# Patient Record
Sex: FEMALE | Race: WHITE | Employment: OTHER | URBAN - NONMETROPOLITAN AREA
[De-identification: names, ages, dates, MRNs, and addresses within clinical notes are randomized per-mention and may not be internally consistent; named-entity substitution may affect disease eponyms.]

---

## 2017-03-02 ENCOUNTER — OFFICE VISIT (OUTPATIENT)
Dept: INTERNAL MEDICINE | Age: 82
End: 2017-03-02

## 2017-03-02 VITALS
DIASTOLIC BLOOD PRESSURE: 62 MMHG | RESPIRATION RATE: 16 BRPM | HEIGHT: 63 IN | WEIGHT: 144 LBS | SYSTOLIC BLOOD PRESSURE: 140 MMHG | BODY MASS INDEX: 25.52 KG/M2 | HEART RATE: 68 BPM

## 2017-03-02 DIAGNOSIS — E78.00 PURE HYPERCHOLESTEROLEMIA: Primary | ICD-10-CM

## 2017-03-02 DIAGNOSIS — M19.90 OSTEOARTHRITIS, UNSPECIFIED OSTEOARTHRITIS TYPE, UNSPECIFIED SITE: ICD-10-CM

## 2017-03-02 DIAGNOSIS — M79.602 LEFT ARM PAIN: ICD-10-CM

## 2017-03-02 DIAGNOSIS — Z12.31 ENCOUNTER FOR SCREENING MAMMOGRAM FOR MALIGNANT NEOPLASM OF BREAST: ICD-10-CM

## 2017-03-02 DIAGNOSIS — E03.9 HYPOTHYROIDISM, UNSPECIFIED TYPE: ICD-10-CM

## 2017-03-02 PROCEDURE — 99214 OFFICE O/P EST MOD 30 MIN: CPT | Performed by: INTERNAL MEDICINE

## 2017-03-02 PROCEDURE — 3288F FALL RISK ASSESSMENT DOCD: CPT | Performed by: INTERNAL MEDICINE

## 2017-03-02 ASSESSMENT — ENCOUNTER SYMPTOMS
DIARRHEA: 0
VOMITING: 0
EYE PAIN: 0
ABDOMINAL PAIN: 0
SHORTNESS OF BREATH: 0
NAUSEA: 0
BACK PAIN: 0
COUGH: 0
BLOOD IN STOOL: 0
CONSTIPATION: 0

## 2017-03-09 ENCOUNTER — OFFICE VISIT (OUTPATIENT)
Dept: ONCOLOGY | Age: 82
End: 2017-03-09

## 2017-03-09 VITALS
TEMPERATURE: 97 F | OXYGEN SATURATION: 99 % | BODY MASS INDEX: 25.62 KG/M2 | HEART RATE: 72 BPM | RESPIRATION RATE: 18 BRPM | WEIGHT: 144.6 LBS | DIASTOLIC BLOOD PRESSURE: 77 MMHG | HEIGHT: 63 IN | SYSTOLIC BLOOD PRESSURE: 125 MMHG

## 2017-03-09 DIAGNOSIS — C50.411 MALIGNANT NEOPLASM OF UPPER-OUTER QUADRANT OF RIGHT FEMALE BREAST (HCC): Primary | ICD-10-CM

## 2017-03-09 PROCEDURE — 99214 OFFICE O/P EST MOD 30 MIN: CPT | Performed by: INTERNAL MEDICINE

## 2017-09-08 ENCOUNTER — HOSPITAL ENCOUNTER (OUTPATIENT)
Dept: LAB | Age: 82
Discharge: HOME | End: 2017-09-08
Payer: MEDICARE

## 2017-09-08 DIAGNOSIS — E78.00 PURE HYPERCHOLESTEROLEMIA: ICD-10-CM

## 2017-09-08 DIAGNOSIS — C50.411 MALIGNANT NEOPLASM OF UPPER-OUTER QUADRANT OF RIGHT FEMALE BREAST (HCC): ICD-10-CM

## 2017-09-08 DIAGNOSIS — E03.9 HYPOTHYROIDISM, UNSPECIFIED TYPE: ICD-10-CM

## 2017-09-08 LAB
ABSOLUTE EOS #: 0.3 K/UL (ref 0–0.4)
ABSOLUTE LYMPH #: 1.3 K/UL (ref 1–4.8)
ABSOLUTE MONO #: 0.6 K/UL (ref 0.1–1.2)
ALBUMIN SERPL-MCNC: 4.2 G/DL (ref 3.5–5.2)
ALBUMIN SERPL-MCNC: 4.2 G/DL (ref 3.5–5.2)
ALBUMIN/GLOBULIN RATIO: 1.2 (ref 1–2.5)
ALBUMIN/GLOBULIN RATIO: 1.2 (ref 1–2.5)
ALP BLD-CCNC: 68 U/L (ref 35–104)
ALP BLD-CCNC: 68 U/L (ref 35–104)
ALT SERPL-CCNC: 14 U/L (ref 5–33)
ALT SERPL-CCNC: 14 U/L (ref 5–33)
ANION GAP SERPL CALCULATED.3IONS-SCNC: 10 MMOL/L (ref 9–17)
ANION GAP SERPL CALCULATED.3IONS-SCNC: 10 MMOL/L (ref 9–17)
AST SERPL-CCNC: 20 U/L
AST SERPL-CCNC: 20 U/L
BASOPHILS # BLD: 1 %
BASOPHILS ABSOLUTE: 0.1 K/UL (ref 0–0.2)
BILIRUB SERPL-MCNC: 0.4 MG/DL (ref 0.3–1.2)
BILIRUB SERPL-MCNC: 0.4 MG/DL (ref 0.3–1.2)
BILIRUBIN DIRECT: 0.11 MG/DL
BILIRUBIN, INDIRECT: 0.29 MG/DL (ref 0–1)
BUN BLDV-MCNC: 21 MG/DL (ref 8–23)
BUN BLDV-MCNC: 21 MG/DL (ref 8–23)
BUN/CREAT BLD: 27 (ref 9–20)
BUN/CREAT BLD: 27 (ref 9–20)
CALCIUM IONIZED: 1.27 MMOL/L (ref 1.13–1.33)
CALCIUM SERPL-MCNC: 9.7 MG/DL (ref 8.6–10.4)
CALCIUM SERPL-MCNC: 9.7 MG/DL (ref 8.6–10.4)
CHLORIDE BLD-SCNC: 100 MMOL/L (ref 98–107)
CHLORIDE BLD-SCNC: 100 MMOL/L (ref 98–107)
CHOLESTEROL/HDL RATIO: 1.8
CHOLESTEROL: 210 MG/DL
CO2: 32 MMOL/L (ref 20–31)
CO2: 32 MMOL/L (ref 20–31)
CREAT SERPL-MCNC: 0.79 MG/DL (ref 0.5–0.9)
CREAT SERPL-MCNC: 0.79 MG/DL (ref 0.5–0.9)
DIFFERENTIAL TYPE: NORMAL
EOSINOPHILS RELATIVE PERCENT: 4 %
GFR AFRICAN AMERICAN: >60 ML/MIN
GFR AFRICAN AMERICAN: >60 ML/MIN
GFR NON-AFRICAN AMERICAN: >60 ML/MIN
GFR NON-AFRICAN AMERICAN: >60 ML/MIN
GFR SERPL CREATININE-BSD FRML MDRD: ABNORMAL ML/MIN/{1.73_M2}
GLOBULIN: 3.5 G/DL (ref 1.5–3.8)
GLUCOSE BLD-MCNC: 99 MG/DL (ref 70–99)
GLUCOSE BLD-MCNC: 99 MG/DL (ref 70–99)
HCT VFR BLD CALC: 39.2 % (ref 36–46)
HDLC SERPL-MCNC: 118 MG/DL
HEMOGLOBIN: 12.9 G/DL (ref 12–16)
LDL CHOLESTEROL: 76 MG/DL (ref 0–130)
LYMPHOCYTES # BLD: 22 %
MCH RBC QN AUTO: 31.5 PG (ref 26–34)
MCHC RBC AUTO-ENTMCNC: 32.9 G/DL (ref 31–37)
MCV RBC AUTO: 96 FL (ref 80–100)
MONOCYTES # BLD: 9 %
PDW BLD-RTO: 13.4 % (ref 11–14.5)
PLATELET # BLD: 296 K/UL (ref 140–450)
PLATELET ESTIMATE: NORMAL
PMV BLD AUTO: 7.4 FL (ref 6–12)
POTASSIUM SERPL-SCNC: 4.2 MMOL/L (ref 3.7–5.3)
POTASSIUM SERPL-SCNC: 4.2 MMOL/L (ref 3.7–5.3)
RBC # BLD: 4.09 M/UL (ref 4–5.2)
RBC # BLD: NORMAL 10*6/UL
SEG NEUTROPHILS: 64 %
SEGMENTED NEUTROPHILS ABSOLUTE COUNT: 3.8 K/UL (ref 1.8–7.7)
SODIUM BLD-SCNC: 142 MMOL/L (ref 135–144)
SODIUM BLD-SCNC: 142 MMOL/L (ref 135–144)
TOTAL PROTEIN: 7.7 G/DL (ref 6.4–8.3)
TOTAL PROTEIN: 7.7 G/DL (ref 6.4–8.3)
TRIGL SERPL-MCNC: 79 MG/DL
TSH SERPL DL<=0.05 MIU/L-ACNC: 3.64 MIU/L (ref 0.3–5)
VLDLC SERPL CALC-MCNC: ABNORMAL MG/DL (ref 1–30)
WBC # BLD: 6 K/UL (ref 3.5–11)
WBC # BLD: NORMAL 10*3/UL

## 2017-09-08 PROCEDURE — 36415 COLL VENOUS BLD VENIPUNCTURE: CPT

## 2017-09-08 PROCEDURE — 80061 LIPID PANEL: CPT

## 2017-09-08 PROCEDURE — 80053 COMPREHEN METABOLIC PANEL: CPT

## 2017-09-08 PROCEDURE — 85025 COMPLETE CBC W/AUTO DIFF WBC: CPT

## 2017-09-08 PROCEDURE — 82330 ASSAY OF CALCIUM: CPT

## 2017-09-08 PROCEDURE — 84443 ASSAY THYROID STIM HORMONE: CPT

## 2017-09-08 PROCEDURE — 80076 HEPATIC FUNCTION PANEL: CPT

## 2017-09-11 ENCOUNTER — OFFICE VISIT (OUTPATIENT)
Dept: INTERNAL MEDICINE | Age: 82
End: 2017-09-11
Payer: MEDICARE

## 2017-09-11 VITALS
HEIGHT: 63 IN | RESPIRATION RATE: 16 BRPM | DIASTOLIC BLOOD PRESSURE: 66 MMHG | WEIGHT: 143 LBS | SYSTOLIC BLOOD PRESSURE: 130 MMHG | HEART RATE: 76 BPM | BODY MASS INDEX: 25.34 KG/M2

## 2017-09-11 DIAGNOSIS — Z00.00 ROUTINE GENERAL MEDICAL EXAMINATION AT A HEALTH CARE FACILITY: Primary | ICD-10-CM

## 2017-09-11 DIAGNOSIS — Z23 NEED FOR PNEUMOCOCCAL VACCINATION: ICD-10-CM

## 2017-09-11 DIAGNOSIS — E78.00 PURE HYPERCHOLESTEROLEMIA: ICD-10-CM

## 2017-09-11 DIAGNOSIS — E78.5 HYPERLIPIDEMIA, UNSPECIFIED HYPERLIPIDEMIA TYPE: ICD-10-CM

## 2017-09-11 DIAGNOSIS — R60.0 EDEMA EXTREMITIES: ICD-10-CM

## 2017-09-11 DIAGNOSIS — Z00.00 MEDICARE ANNUAL WELLNESS VISIT, SUBSEQUENT: ICD-10-CM

## 2017-09-11 DIAGNOSIS — E03.9 HYPOTHYROIDISM, UNSPECIFIED TYPE: ICD-10-CM

## 2017-09-11 DIAGNOSIS — C50.911 MALIGNANT NEOPLASM OF RIGHT FEMALE BREAST, UNSPECIFIED SITE OF BREAST: ICD-10-CM

## 2017-09-11 DIAGNOSIS — M79.602 LEFT ARM PAIN: ICD-10-CM

## 2017-09-11 PROCEDURE — G0009 ADMIN PNEUMOCOCCAL VACCINE: HCPCS | Performed by: INTERNAL MEDICINE

## 2017-09-11 PROCEDURE — G0439 PPPS, SUBSEQ VISIT: HCPCS | Performed by: INTERNAL MEDICINE

## 2017-09-11 PROCEDURE — G0008 ADMIN INFLUENZA VIRUS VAC: HCPCS | Performed by: INTERNAL MEDICINE

## 2017-09-11 PROCEDURE — 90662 IIV NO PRSV INCREASED AG IM: CPT | Performed by: INTERNAL MEDICINE

## 2017-09-11 PROCEDURE — 90670 PCV13 VACCINE IM: CPT | Performed by: INTERNAL MEDICINE

## 2017-09-11 RX ORDER — ANASTROZOLE 1 MG/1
TABLET ORAL
Qty: 30 TABLET | Refills: 11 | Status: SHIPPED | OUTPATIENT
Start: 2017-09-11 | End: 2018-09-17 | Stop reason: SDUPTHER

## 2017-09-11 RX ORDER — SIMVASTATIN 20 MG
10 TABLET ORAL EVERY EVENING
Qty: 30 TABLET | Refills: 11 | Status: SHIPPED | OUTPATIENT
Start: 2017-09-11 | End: 2018-09-17 | Stop reason: SDUPTHER

## 2017-09-11 RX ORDER — TRIAMTERENE AND HYDROCHLOROTHIAZIDE 37.5; 25 MG/1; MG/1
1 TABLET ORAL DAILY
Qty: 30 TABLET | Refills: 11 | Status: SHIPPED | OUTPATIENT
Start: 2017-09-11 | End: 2018-03-15 | Stop reason: SDUPTHER

## 2017-09-11 RX ORDER — LEVOTHYROXINE SODIUM 0.05 MG/1
50 TABLET ORAL DAILY
Qty: 30 TABLET | Refills: 11 | Status: SHIPPED | OUTPATIENT
Start: 2017-09-11 | End: 2018-09-12 | Stop reason: SDUPTHER

## 2017-09-11 ASSESSMENT — ANXIETY QUESTIONNAIRES: GAD7 TOTAL SCORE: 1

## 2017-09-11 ASSESSMENT — ENCOUNTER SYMPTOMS
BACK PAIN: 0
BLOOD IN STOOL: 0
VOMITING: 0
COUGH: 0
NAUSEA: 0
SHORTNESS OF BREATH: 0
CONSTIPATION: 0
DIARRHEA: 0
EYE PAIN: 0
ABDOMINAL PAIN: 0

## 2017-09-11 ASSESSMENT — LIFESTYLE VARIABLES: HOW OFTEN DO YOU HAVE A DRINK CONTAINING ALCOHOL: 0

## 2017-09-11 ASSESSMENT — PATIENT HEALTH QUESTIONNAIRE - PHQ9: SUM OF ALL RESPONSES TO PHQ QUESTIONS 1-9: 0

## 2017-11-02 ENCOUNTER — OFFICE VISIT (OUTPATIENT)
Dept: ONCOLOGY | Age: 82
End: 2017-11-02
Payer: MEDICARE

## 2017-11-02 ENCOUNTER — HOSPITAL ENCOUNTER (OUTPATIENT)
Dept: INFUSION THERAPY | Age: 82
Discharge: HOME | End: 2017-11-02
Payer: MEDICARE

## 2017-11-02 VITALS
WEIGHT: 141.6 LBS | BODY MASS INDEX: 25.09 KG/M2 | SYSTOLIC BLOOD PRESSURE: 134 MMHG | HEIGHT: 63 IN | HEART RATE: 75 BPM | TEMPERATURE: 97.3 F | RESPIRATION RATE: 16 BRPM | DIASTOLIC BLOOD PRESSURE: 77 MMHG | OXYGEN SATURATION: 99 %

## 2017-11-02 DIAGNOSIS — Z17.0 MALIGNANT NEOPLASM OF UPPER-OUTER QUADRANT OF RIGHT BREAST IN FEMALE, ESTROGEN RECEPTOR POSITIVE (HCC): Primary | ICD-10-CM

## 2017-11-02 DIAGNOSIS — C50.411 MALIGNANT NEOPLASM OF UPPER-OUTER QUADRANT OF RIGHT BREAST IN FEMALE, ESTROGEN RECEPTOR POSITIVE (HCC): Primary | ICD-10-CM

## 2017-11-02 DIAGNOSIS — Z79.811 USE OF ANASTROZOLE (ARIMIDEX): ICD-10-CM

## 2017-11-02 LAB
ALBUMIN SERPL-MCNC: 4.5 G/DL (ref 3.5–5.1)
ALP BLD-CCNC: 83 U/L (ref 38–126)
ALT SERPL-CCNC: 15 U/L (ref 11–66)
AST SERPL-CCNC: 19 U/L (ref 5–40)
BASINOPHIL, AUTOMATED: 1 % (ref 0–12)
BILIRUB SERPL-MCNC: 0.2 MG/DL (ref 0.3–1.2)
BILIRUBIN DIRECT: < 0.2 MG/DL (ref 0–0.3)
BUN, WHOLE BLOOD: 20 MG/DL (ref 8–26)
CHLORIDE, WHOLE BLOOD: 97 MEQ/L (ref 98–109)
CREATININE, WHOLE BLOOD: 0.9 MG/DL (ref 0.5–1.2)
EOSINOPHILS RELATIVE PERCENT: 3 % (ref 0–12)
GFR, ESTIMATED: 63 ML/MIN/1.73M2
GLUCOSE, WHOLE BLOOD: 112 MG/DL (ref 70–108)
HCT VFR BLD CALC: 38.3 % (ref 37–47)
HEMOGLOBIN: 13.1 GM/DL (ref 12–16)
IONIZED CALCIUM, WHOLE BLOOD: 1.2 MMOL/L (ref 1.12–1.32)
LYMPHOCYTES # BLD: 23 % (ref 15–47)
MCH RBC QN AUTO: 32.2 PG (ref 27–31)
MCHC RBC AUTO-ENTMCNC: 34 GM/DL (ref 33–37)
MCV RBC AUTO: 95 FL (ref 81–99)
MONOCYTES: 6 % (ref 0–12)
PDW BLD-RTO: 11.4 % (ref 11.5–14.5)
PLATELET # BLD: 349 THOU/MM3 (ref 130–400)
PMV BLD AUTO: 6.8 MCM (ref 7.4–10.4)
POTASSIUM, WHOLE BLOOD: 4.7 MEQ/L (ref 3.5–4.9)
RBC # BLD: 4.05 MILL/MM3 (ref 4.2–5.4)
SEG NEUTROPHILS: 67 % (ref 43–75)
SODIUM, WHOLE BLOOD: 138 MEQ/L (ref 138–146)
TOTAL CO2, WHOLE BLOOD: 30 MEQ/L (ref 23–33)
TOTAL PROTEIN: 7.9 G/DL (ref 6.1–8)
WBC # BLD: 6.1 THOU/MM3 (ref 4.8–10.8)

## 2017-11-02 PROCEDURE — 85025 COMPLETE CBC W/AUTO DIFF WBC: CPT

## 2017-11-02 PROCEDURE — 80076 HEPATIC FUNCTION PANEL: CPT

## 2017-11-02 PROCEDURE — 99211 OFF/OP EST MAY X REQ PHY/QHP: CPT

## 2017-11-02 PROCEDURE — 80047 BASIC METABLC PNL IONIZED CA: CPT

## 2017-11-02 PROCEDURE — 36415 COLL VENOUS BLD VENIPUNCTURE: CPT

## 2017-11-02 PROCEDURE — 99214 OFFICE O/P EST MOD 30 MIN: CPT | Performed by: INTERNAL MEDICINE

## 2017-11-09 ENCOUNTER — OFFICE VISIT (OUTPATIENT)
Dept: OPHTHALMOLOGY | Age: 82
End: 2017-11-09
Payer: MEDICARE

## 2017-11-09 DIAGNOSIS — H25.093 AGE-RELATED INCIPIENT CATARACT OF BOTH EYES: Primary | ICD-10-CM

## 2017-11-09 DIAGNOSIS — H35.40 PERIPHERAL RETINAL DEGENERATION: ICD-10-CM

## 2017-11-09 PROCEDURE — 99214 OFFICE O/P EST MOD 30 MIN: CPT | Performed by: OPHTHALMOLOGY

## 2017-11-09 RX ORDER — PHENYLEPHRINE HCL 2.5 %
1 DROPS OPHTHALMIC (EYE) ONCE
Status: COMPLETED | OUTPATIENT
Start: 2017-11-09 | End: 2017-11-09

## 2017-11-09 RX ORDER — BENOXINATE HCL/FLUORESCEIN SOD 0.4%-0.25%
1 DROPS OPHTHALMIC (EYE) ONCE
Status: COMPLETED | OUTPATIENT
Start: 2017-11-09 | End: 2017-11-09

## 2017-11-09 RX ORDER — TROPICAMIDE 10 MG/ML
1 SOLUTION/ DROPS OPHTHALMIC ONCE
Status: COMPLETED | OUTPATIENT
Start: 2017-11-09 | End: 2017-11-09

## 2017-11-09 RX ADMIN — TROPICAMIDE 1 DROP: 10 SOLUTION/ DROPS OPHTHALMIC at 10:51

## 2017-11-09 RX ADMIN — Medication 1 DROP: at 10:50

## 2017-11-09 ASSESSMENT — ENCOUNTER SYMPTOMS
RESPIRATORY NEGATIVE: 1
GASTROINTESTINAL NEGATIVE: 1

## 2017-11-09 ASSESSMENT — VISUAL ACUITY
CORRECTION_TYPE: GLASSES
OS_CC: 20/50
METHOD: SNELLEN - LINEAR

## 2017-11-09 ASSESSMENT — CONF VISUAL FIELD
OD_NORMAL: 1
METHOD: COUNTING FINGERS
OS_NORMAL: 1

## 2017-11-09 ASSESSMENT — TONOMETRY
OD_IOP_MMHG: 18
OS_IOP_MMHG: 21
IOP_METHOD: APPLANATION W FLURESS DROP

## 2017-11-09 ASSESSMENT — SLIT LAMP EXAM - LIDS
COMMENTS: 2+ DERMATOCHALASIS - UPPER LID
COMMENTS: 2+ DERMATOCHALASIS - UPPER LID

## 2017-11-09 NOTE — PROGRESS NOTES
She is here for a complete exam.     Past Medical History:   Diagnosis Date    Breast cancer ([facility] [address] 2014    s/p mastectomy and reconstruction surgery.  Edema extremities     takes diuretics prn    Hyperlipidemia     Hypothyroidism     Osteoarthritis     mild    Unspecified vitamin D deficiency           Current Outpatient Prescriptions:     anastrozole (ARIMIDEX) 1 MG tablet, TAKE 1 TABLET BY MOUTH DAILY, Disp: 30 tablet, Rfl: 11    levothyroxine (LEVOTHROID) 50 MCG tablet, Take 1 tablet by mouth daily, Disp: 30 tablet, Rfl: 11    simvastatin (ZOCOR) 20 MG tablet, Take 0.5 tablets by mouth every evening, Disp: 30 tablet, Rfl: 11    triamterene-hydrochlorothiazide (MAXZIDE-25) 37.5-25 MG per tablet, Take 1 tablet by mouth daily, Disp: 30 tablet, Rfl: 11    Handicap Placard MISC, by Does not apply route . Expires in 5 years, Disp: 1 each, Rfl: 0    CALCIUM-MAGNESIUM-VITAMIN D PO, Take by mouth daily, Disp: , Rfl:     acetaminophen (TYLENOL) 500 MG tablet, Take 500 mg by mouth every 6 hours as needed for Pain. Uses approx once a week, Disp: , Rfl:     Cholecalciferol (VITAMIN D) 2000 UNITS CAPS capsule, Take 1 capsule by mouth daily. , Disp: , Rfl:     Kelp 150 MCG TABS, Take 1 capsule by mouth daily. , Disp: , Rfl:     Omega-3 Fatty Acids (OMEGA-3 FISH OIL) 1000 MG CAPS, Take 1 capsule by mouth daily. , Disp: , Rfl:     Compression Sleeve MISC, by Does not apply route., Disp: 1 each, Rfl: 0    Multiple Vitamin (MULTI-VITAMIN DAILY PO), Take 1 tablet by mouth daily. , Disp: , Rfl:     aspirin 325 MG tablet, Take by mouth daily 1/2 daily. , Disp: , Rfl:      No Known Allergies   ROS  Review of Systems   Constitutional: Negative. HENT: Negative. Respiratory: Negative. Cardiovascular: Negative. Gastrointestinal: Negative. Musculoskeletal: Negative. Skin: Negative. Neurological: Negative. Endo/Heme/Allergies: Negative.       Main Ophthalmology Exam     External Exam       Right Left

## 2017-11-09 NOTE — PATIENT INSTRUCTIONS
If you have any problems or concerns before your next scheduled appointment, please call the office at [phone].

## 2018-01-27 ENCOUNTER — OFFICE VISIT (OUTPATIENT)
Dept: PRIMARY CARE CLINIC | Age: 83
End: 2018-01-27
Payer: MEDICARE

## 2018-01-27 VITALS
DIASTOLIC BLOOD PRESSURE: 80 MMHG | HEART RATE: 80 BPM | OXYGEN SATURATION: 96 % | BODY MASS INDEX: 24.98 KG/M2 | SYSTOLIC BLOOD PRESSURE: 136 MMHG | WEIGHT: 141 LBS | TEMPERATURE: 98.2 F

## 2018-01-27 DIAGNOSIS — R05.9 COUGH: ICD-10-CM

## 2018-01-27 DIAGNOSIS — J20.9 ACUTE BRONCHITIS, UNSPECIFIED ORGANISM: Primary | ICD-10-CM

## 2018-01-27 PROCEDURE — 99213 OFFICE O/P EST LOW 20 MIN: CPT | Performed by: PHYSICIAN ASSISTANT

## 2018-01-27 RX ORDER — PREDNISONE 10 MG/1
10 TABLET ORAL 2 TIMES DAILY
Qty: 6 TABLET | Refills: 0 | Status: SHIPPED | OUTPATIENT
Start: 2018-01-27 | End: 2018-01-30 | Stop reason: ALTCHOICE

## 2018-01-27 RX ORDER — AMOXICILLIN 875 MG/1
875 TABLET, COATED ORAL 2 TIMES DAILY
Qty: 20 TABLET | Refills: 0 | Status: SHIPPED | OUTPATIENT
Start: 2018-01-27 | End: 2018-05-03

## 2018-01-27 ASSESSMENT — ENCOUNTER SYMPTOMS
DIARRHEA: 1
WHEEZING: 0
SHORTNESS OF BREATH: 0
ABDOMINAL DISTENTION: 1
SORE THROAT: 1
COUGH: 1
TROUBLE SWALLOWING: 0
NAUSEA: 0
RHINORRHEA: 1
VOMITING: 0

## 2018-01-30 ENCOUNTER — HOSPITAL ENCOUNTER (OUTPATIENT)
Dept: LAB | Age: 83
Setting detail: SPECIMEN
Discharge: HOME | End: 2018-01-30
Payer: MEDICARE

## 2018-01-30 ENCOUNTER — HOSPITAL ENCOUNTER (OUTPATIENT)
Dept: GENERAL RADIOLOGY | Age: 83
Discharge: HOME | End: 2018-02-01
Payer: MEDICARE

## 2018-01-30 ENCOUNTER — OFFICE VISIT (OUTPATIENT)
Dept: INTERNAL MEDICINE | Age: 83
End: 2018-01-30
Payer: MEDICARE

## 2018-01-30 VITALS
DIASTOLIC BLOOD PRESSURE: 72 MMHG | RESPIRATION RATE: 18 BRPM | WEIGHT: 143.8 LBS | BODY MASS INDEX: 25.48 KG/M2 | HEIGHT: 63 IN | HEART RATE: 72 BPM | TEMPERATURE: 97.8 F | SYSTOLIC BLOOD PRESSURE: 136 MMHG | OXYGEN SATURATION: 99 %

## 2018-01-30 DIAGNOSIS — R53.83 OTHER FATIGUE: ICD-10-CM

## 2018-01-30 DIAGNOSIS — R05.3 PERSISTENT COUGH FOR 3 WEEKS OR LONGER: Primary | ICD-10-CM

## 2018-01-30 DIAGNOSIS — R35.0 URINARY FREQUENCY: ICD-10-CM

## 2018-01-30 DIAGNOSIS — R05.3 PERSISTENT COUGH FOR 3 WEEKS OR LONGER: ICD-10-CM

## 2018-01-30 DIAGNOSIS — I10 ESSENTIAL HYPERTENSION: ICD-10-CM

## 2018-01-30 DIAGNOSIS — E03.9 HYPOTHYROIDISM, UNSPECIFIED TYPE: ICD-10-CM

## 2018-01-30 LAB
-: NORMAL
AMORPHOUS: NORMAL
ANION GAP SERPL CALCULATED.3IONS-SCNC: 14 MMOL/L (ref 9–17)
BACTERIA: NORMAL
BILIRUBIN URINE: NEGATIVE
BUN BLDV-MCNC: 20 MG/DL (ref 8–23)
BUN/CREAT BLD: 28 (ref 9–20)
CALCIUM SERPL-MCNC: 9.6 MG/DL (ref 8.6–10.4)
CASTS UA: NORMAL /LPF (ref 0–2)
CHLORIDE BLD-SCNC: 91 MMOL/L (ref 98–107)
CO2: 30 MMOL/L (ref 20–31)
COLOR: ABNORMAL
COMMENT UA: ABNORMAL
CREAT SERPL-MCNC: 0.72 MG/DL (ref 0.5–0.9)
CRYSTALS, UA: NORMAL /HPF
EPITHELIAL CELLS UA: NORMAL /HPF (ref 0–5)
GFR AFRICAN AMERICAN: >60 ML/MIN
GFR NON-AFRICAN AMERICAN: >60 ML/MIN
GFR SERPL CREATININE-BSD FRML MDRD: ABNORMAL ML/MIN/{1.73_M2}
GFR SERPL CREATININE-BSD FRML MDRD: ABNORMAL ML/MIN/{1.73_M2}
GLUCOSE BLD-MCNC: 91 MG/DL (ref 70–99)
GLUCOSE URINE: NEGATIVE
HCT VFR BLD CALC: 38.4 % (ref 36–46)
HEMOGLOBIN: 12.8 G/DL (ref 12–16)
KETONES, URINE: NEGATIVE
LEUKOCYTE ESTERASE, URINE: ABNORMAL
MCH RBC QN AUTO: 31.9 PG (ref 26–34)
MCHC RBC AUTO-ENTMCNC: 33.3 G/DL (ref 31–37)
MCV RBC AUTO: 95.8 FL (ref 80–100)
MUCUS: NORMAL
NITRITE, URINE: NEGATIVE
NRBC AUTOMATED: ABNORMAL PER 100 WBC
OTHER OBSERVATIONS UA: NORMAL
PDW BLD-RTO: 13.6 % (ref 11–14.5)
PH UA: 6.5 (ref 5–6)
PLATELET # BLD: 386 K/UL (ref 140–450)
PMV BLD AUTO: 7.4 FL (ref 6–12)
POTASSIUM SERPL-SCNC: 3.7 MMOL/L (ref 3.7–5.3)
PROTEIN UA: NEGATIVE
RBC # BLD: 4 M/UL (ref 4–5.2)
RBC UA: NORMAL /HPF (ref 0–4)
RENAL EPITHELIAL, UA: NORMAL /HPF
SODIUM BLD-SCNC: 135 MMOL/L (ref 135–144)
SPECIFIC GRAVITY UA: 1 (ref 1.01–1.02)
TRICHOMONAS: NORMAL
TURBIDITY: ABNORMAL
URINE HGB: NEGATIVE
UROBILINOGEN, URINE: NORMAL
WBC # BLD: 12.2 K/UL (ref 3.5–11)
WBC UA: NORMAL /HPF (ref 0–4)
YEAST: NORMAL

## 2018-01-30 PROCEDURE — 81001 URINALYSIS AUTO W/SCOPE: CPT

## 2018-01-30 PROCEDURE — 99214 OFFICE O/P EST MOD 30 MIN: CPT | Performed by: NURSE PRACTITIONER

## 2018-01-30 PROCEDURE — 85027 COMPLETE CBC AUTOMATED: CPT

## 2018-01-30 PROCEDURE — 71046 X-RAY EXAM CHEST 2 VIEWS: CPT

## 2018-01-30 PROCEDURE — 80048 BASIC METABOLIC PNL TOTAL CA: CPT

## 2018-01-30 PROCEDURE — 36415 COLL VENOUS BLD VENIPUNCTURE: CPT

## 2018-02-04 ASSESSMENT — ENCOUNTER SYMPTOMS
COUGH: 1
SINUS PAIN: 0
CONSTIPATION: 0
SORE THROAT: 0
EYE REDNESS: 0
BLURRED VISION: 0
EYE PAIN: 0
NAUSEA: 0
ABDOMINAL PAIN: 0
SHORTNESS OF BREATH: 0
WHEEZING: 0
SPUTUM PRODUCTION: 0
HEARTBURN: 0
BLOOD IN STOOL: 0
ORTHOPNEA: 0
EYE DISCHARGE: 0
VOMITING: 0
DIARRHEA: 0

## 2018-02-04 NOTE — PROGRESS NOTES
01/30/18  Candido Chan  7/26/1931      Chief Complaint: Persistent cough, fatigue. HPI:  Patient comes in with complaints of persistent cough, fatigue persisting for over one week. Patient was seen in urgent care on 1/27/18. Given amoxicillin and prednisone. Patient states only mild improvement. Denies any fevers or chills. No excessive body aches. Eating and drinking without difficulty. Denies any problems with bowels. Concerned that she has had some increased frequency with urination. Denies any dysuria hematuria or urgency. Continue to use her levothyroxine for her hypothyroidism. Denies any missed doses. Recent TSH in September of last year within normal limits. Blood pressure has been stable. Continues on Maxzide. No Known Allergies    Past Medical History:   Diagnosis Date    Breast cancer ([facility] [address] 2014    s/p mastectomy and reconstruction surgery.     Edema extremities     takes diuretics prn    Hyperlipidemia     Hypothyroidism     Osteoarthritis     mild    Unspecified vitamin D deficiency        Past Surgical History:   Procedure Laterality Date    APPENDECTOMY      at age 5   Tamea Mao BREAST BIOPSY Right 2/2014    before she had her surgery    BREAST RECONSTRUCTION Right 2/2014    Pt had done at [facility] in [address] [facility] Left 2/2014    Pt had done at [facility] in [address] [place] [address]  2007    showed mild diverticular disease, otherwise negative, repeat in 2017    MASTECTOMY, RADICAL Right 2/2014    Pt had done at [facility] in [address] [facility]    due to fibroids       Current Outpatient Prescriptions on File Prior to Visit   Medication Sig Dispense Refill    amoxicillin (AMOXIL) 875 MG tablet Take 1 tablet by mouth 2 times daily 20 tablet 0    anastrozole (ARIMIDEX) 1 MG tablet TAKE 1 TABLET BY MOUTH DAILY 30 tablet 11    levothyroxine (LEVOTHROID) 50 MCG tablet Take 1 tablet by mouth daily 30 tablet 11 abdominal pain, blood in stool, constipation, diarrhea, heartburn, melena, nausea and vomiting. Genitourinary: Positive for frequency. Negative for dysuria, flank pain, hematuria and urgency. Musculoskeletal: Negative for falls, myalgias and neck pain. Skin: Negative for rash. Neurological: Negative for dizziness, tremors, seizures, weakness and headaches. Psychiatric/Behavioral: Negative for depression and memory loss. The patient is not nervous/anxious. Physical Exam   Constitutional: She is oriented to person, place, and time and well-developed, well-nourished, and in no distress. No distress. HENT:   Head: Normocephalic and atraumatic. Right Ear: External ear normal.   Left Ear: External ear normal.   Eyes: Right eye exhibits no discharge. Left eye exhibits no discharge. Neck: Neck supple. No tracheal deviation present. Cardiovascular: Normal rate, regular rhythm and normal heart sounds. Exam reveals no gallop and no friction rub. No murmur heard. Pulmonary/Chest: Effort normal and breath sounds normal. No respiratory distress. She has no wheezes. She has no rales. Abdominal: Soft. Bowel sounds are normal. She exhibits no distension. There is no tenderness. Musculoskeletal: She exhibits no edema. Neurological: She is alert and oriented to person, place, and time. No cranial nerve deficit. Gait normal. Coordination normal.   Skin: Skin is warm and dry. No rash noted. She is not diaphoretic. No pallor. Psychiatric: Mood, memory, affect and judgment normal.   Nursing note and vitals reviewed. Vitals:    01/30/18 1046   BP: 136/72   Site: Left Arm   Position: Sitting   Cuff Size: Large Adult   Pulse: 72   Resp: 18   Temp: 97.8 °F (36.6 °C)   TempSrc: Tympanic   SpO2: 99%   Weight: 143 lb 12.8 oz (65.2 kg)   Height: 5' 3\" (1.6 m)       Assessment:  1. Persistent cough for 3 weeks or longer    - XR CHEST STANDARD (2 VW); No acute process    2.  Urinary frequency  Urine without signs of infection  - UA W/Reflex Culture; Future    3. Other fatigue  Blood work stable  - CBC; Future  - Basic Metabolic Panel; Future    4. Hypothyroidism, unspecified type  Stable continue on levothyroxine    5. Essential hypertension  Stable on current antihypertensive regimen, continue to monitor      Plan:  Probable post viral syndrome. Supportive care. If persists or worsens is to notify the office. As noted above. Follow up for routine visit. Call sooner with concerns prior.     Electronically signed by Harish Reed CNP on 2/4/2018 at 6:33 PM

## 2018-03-08 ENCOUNTER — TELEPHONE (OUTPATIENT)
Dept: INTERNAL MEDICINE | Age: 83
End: 2018-03-08

## 2018-03-08 NOTE — TELEPHONE ENCOUNTER
There should not be any problems. However, if either patient or dentist is uncertain about proceeding,  Then hold Arimidex ×7 days before procedure and restart after procedure completed. This will not affect anything significantly.

## 2018-03-15 ENCOUNTER — OFFICE VISIT (OUTPATIENT)
Dept: INTERNAL MEDICINE | Age: 83
End: 2018-03-15
Payer: MEDICARE

## 2018-03-15 VITALS
RESPIRATION RATE: 16 BRPM | DIASTOLIC BLOOD PRESSURE: 66 MMHG | SYSTOLIC BLOOD PRESSURE: 138 MMHG | HEART RATE: 88 BPM | WEIGHT: 143 LBS | HEIGHT: 63 IN | BODY MASS INDEX: 25.34 KG/M2

## 2018-03-15 DIAGNOSIS — E55.9 VITAMIN D DEFICIENCY: ICD-10-CM

## 2018-03-15 DIAGNOSIS — R60.0 EDEMA EXTREMITIES: ICD-10-CM

## 2018-03-15 DIAGNOSIS — Z12.39 ENCOUNTER FOR SCREENING FOR MALIGNANT NEOPLASM OF BREAST: ICD-10-CM

## 2018-03-15 DIAGNOSIS — G89.29 CHRONIC LEFT SHOULDER PAIN: ICD-10-CM

## 2018-03-15 DIAGNOSIS — E03.9 HYPOTHYROIDISM, UNSPECIFIED TYPE: ICD-10-CM

## 2018-03-15 DIAGNOSIS — M25.512 CHRONIC LEFT SHOULDER PAIN: ICD-10-CM

## 2018-03-15 DIAGNOSIS — E78.00 PURE HYPERCHOLESTEROLEMIA: Primary | ICD-10-CM

## 2018-03-15 PROCEDURE — 99214 OFFICE O/P EST MOD 30 MIN: CPT | Performed by: INTERNAL MEDICINE

## 2018-03-15 RX ORDER — TRIAMTERENE AND HYDROCHLOROTHIAZIDE 37.5; 25 MG/1; MG/1
1 TABLET ORAL DAILY
Qty: 30 TABLET | Refills: 11 | Status: SHIPPED | OUTPATIENT
Start: 2018-03-15 | End: 2018-12-18 | Stop reason: SDUPTHER

## 2018-03-15 ASSESSMENT — ENCOUNTER SYMPTOMS
ABDOMINAL PAIN: 0
DIARRHEA: 0
VOMITING: 0
BLOOD IN STOOL: 0
NAUSEA: 0
COUGH: 0
EYE PAIN: 0
SHORTNESS OF BREATH: 0
CONSTIPATION: 0
BACK PAIN: 0

## 2018-05-03 ENCOUNTER — OFFICE VISIT (OUTPATIENT)
Dept: ONCOLOGY | Age: 83
End: 2018-05-03
Payer: MEDICARE

## 2018-05-03 ENCOUNTER — HOSPITAL ENCOUNTER (OUTPATIENT)
Dept: INFUSION THERAPY | Age: 83
Discharge: HOME | End: 2018-05-03
Payer: MEDICARE

## 2018-05-03 VITALS
HEIGHT: 63 IN | SYSTOLIC BLOOD PRESSURE: 132 MMHG | DIASTOLIC BLOOD PRESSURE: 69 MMHG | OXYGEN SATURATION: 98 % | HEART RATE: 67 BPM | RESPIRATION RATE: 16 BRPM | TEMPERATURE: 97.9 F | WEIGHT: 145.4 LBS | BODY MASS INDEX: 25.76 KG/M2

## 2018-05-03 DIAGNOSIS — Z85.3 HISTORY OF BREAST CANCER: ICD-10-CM

## 2018-05-03 DIAGNOSIS — C50.411 MALIGNANT NEOPLASM OF UPPER-OUTER QUADRANT OF RIGHT BREAST IN FEMALE, ESTROGEN RECEPTOR POSITIVE (HCC): Primary | ICD-10-CM

## 2018-05-03 DIAGNOSIS — C50.411 MALIGNANT NEOPLASM OF UPPER-OUTER QUADRANT OF RIGHT BREAST IN FEMALE, ESTROGEN RECEPTOR POSITIVE (HCC): ICD-10-CM

## 2018-05-03 DIAGNOSIS — Z17.0 MALIGNANT NEOPLASM OF UPPER-OUTER QUADRANT OF RIGHT BREAST IN FEMALE, ESTROGEN RECEPTOR POSITIVE (HCC): Primary | ICD-10-CM

## 2018-05-03 DIAGNOSIS — Z79.811 USE OF ANASTROZOLE (ARIMIDEX): ICD-10-CM

## 2018-05-03 DIAGNOSIS — Z17.0 MALIGNANT NEOPLASM OF UPPER-OUTER QUADRANT OF RIGHT BREAST IN FEMALE, ESTROGEN RECEPTOR POSITIVE (HCC): ICD-10-CM

## 2018-05-03 LAB
ALBUMIN SERPL-MCNC: 4.2 G/DL (ref 3.5–5.1)
ALP BLD-CCNC: 66 U/L (ref 38–126)
ALT SERPL-CCNC: 15 U/L (ref 11–66)
AST SERPL-CCNC: 20 U/L (ref 5–40)
BASINOPHIL, AUTOMATED: 0 % (ref 0–3)
BILIRUB SERPL-MCNC: 0.4 MG/DL (ref 0.3–1.2)
BILIRUBIN DIRECT: < 0.2 MG/DL (ref 0–0.3)
BUN, WHOLE BLOOD: 15 MG/DL (ref 8–26)
CHLORIDE, WHOLE BLOOD: 98 MEQ/L (ref 98–109)
CREATININE, WHOLE BLOOD: 0.8 MG/DL (ref 0.5–1.2)
EOSINOPHILS RELATIVE PERCENT: 4 % (ref 0–4)
GFR, ESTIMATED: 72 ML/MIN/1.73M2
GLUCOSE, WHOLE BLOOD: 95 MG/DL (ref 70–108)
HCT VFR BLD CALC: 38.3 % (ref 37–47)
HEMOGLOBIN: 12.6 GM/DL (ref 12–16)
IONIZED CALCIUM, WHOLE BLOOD: 1.19 MMOL/L (ref 1.12–1.32)
LYMPHOCYTES # BLD: 31 % (ref 15–47)
MCH RBC QN AUTO: 31.6 PG (ref 27–31)
MCHC RBC AUTO-ENTMCNC: 32.9 GM/DL (ref 33–37)
MCV RBC AUTO: 96 FL (ref 81–99)
MONOCYTES: 7 % (ref 0–12)
PDW BLD-RTO: 11.2 % (ref 11.5–14.5)
PLATELET # BLD: 286 THOU/MM3 (ref 130–400)
PMV BLD AUTO: 6.8 FL (ref 7.4–10.4)
POTASSIUM, WHOLE BLOOD: 4.5 MEQ/L (ref 3.5–4.9)
RBC # BLD: 3.99 MILL/MM3 (ref 4.2–5.4)
SEG NEUTROPHILS: 59 % (ref 43–75)
SODIUM, WHOLE BLOOD: 138 MEQ/L (ref 138–146)
TOTAL CO2, WHOLE BLOOD: 32 MEQ/L (ref 23–33)
TOTAL PROTEIN: 7.2 G/DL (ref 6.1–8)
WBC # BLD: 5.7 THOU/MM3 (ref 4.8–10.8)

## 2018-05-03 PROCEDURE — 85025 COMPLETE CBC W/AUTO DIFF WBC: CPT

## 2018-05-03 PROCEDURE — 80076 HEPATIC FUNCTION PANEL: CPT

## 2018-05-03 PROCEDURE — 99214 OFFICE O/P EST MOD 30 MIN: CPT | Performed by: INTERNAL MEDICINE

## 2018-05-03 PROCEDURE — 80047 BASIC METABLC PNL IONIZED CA: CPT

## 2018-05-03 PROCEDURE — 36415 COLL VENOUS BLD VENIPUNCTURE: CPT

## 2018-05-03 PROCEDURE — 99211 OFF/OP EST MAY X REQ PHY/QHP: CPT

## 2018-08-21 ENCOUNTER — HOSPITAL ENCOUNTER (OUTPATIENT)
Dept: MAMMOGRAPHY | Age: 83
Discharge: HOME | End: 2018-08-23
Payer: MEDICARE

## 2018-08-21 DIAGNOSIS — Z12.39 ENCOUNTER FOR SCREENING FOR MALIGNANT NEOPLASM OF BREAST: ICD-10-CM

## 2018-08-21 PROCEDURE — 77063 BREAST TOMOSYNTHESIS BI: CPT

## 2018-09-12 DIAGNOSIS — E03.9 HYPOTHYROIDISM, UNSPECIFIED TYPE: ICD-10-CM

## 2018-09-12 RX ORDER — LEVOTHYROXINE SODIUM 0.05 MG/1
50 TABLET ORAL DAILY
Qty: 30 TABLET | Refills: 11 | Status: SHIPPED | OUTPATIENT
Start: 2018-09-12 | End: 2019-10-09 | Stop reason: SDUPTHER

## 2018-09-17 DIAGNOSIS — C50.911 MALIGNANT NEOPLASM OF RIGHT FEMALE BREAST (HCC): ICD-10-CM

## 2018-09-17 DIAGNOSIS — E78.5 HYPERLIPIDEMIA, UNSPECIFIED HYPERLIPIDEMIA TYPE: ICD-10-CM

## 2018-09-17 RX ORDER — SIMVASTATIN 20 MG
10 TABLET ORAL EVERY EVENING
Qty: 90 TABLET | Refills: 3 | Status: SHIPPED | OUTPATIENT
Start: 2018-09-17 | End: 2019-10-09 | Stop reason: SDUPTHER

## 2018-09-17 RX ORDER — ANASTROZOLE 1 MG/1
TABLET ORAL
Qty: 90 TABLET | Refills: 3 | Status: SHIPPED | OUTPATIENT
Start: 2018-09-17 | End: 2019-09-04 | Stop reason: ALTCHOICE

## 2018-09-20 ENCOUNTER — HOSPITAL ENCOUNTER (OUTPATIENT)
Dept: LAB | Age: 83
Setting detail: SPECIMEN
Discharge: HOME | End: 2018-09-20
Payer: MEDICARE

## 2018-09-20 DIAGNOSIS — Z17.0 MALIGNANT NEOPLASM OF UPPER-OUTER QUADRANT OF RIGHT BREAST IN FEMALE, ESTROGEN RECEPTOR POSITIVE (HCC): ICD-10-CM

## 2018-09-20 DIAGNOSIS — C50.411 MALIGNANT NEOPLASM OF UPPER-OUTER QUADRANT OF RIGHT BREAST IN FEMALE, ESTROGEN RECEPTOR POSITIVE (HCC): ICD-10-CM

## 2018-09-20 DIAGNOSIS — E55.9 VITAMIN D DEFICIENCY: ICD-10-CM

## 2018-09-20 DIAGNOSIS — C50.411 MALIGNANT NEOPLASM OF UPPER-OUTER QUADRANT OF RIGHT BREAST IN FEMALE, ESTROGEN RECEPTOR POSITIVE (HCC): Primary | ICD-10-CM

## 2018-09-20 DIAGNOSIS — Z17.0 MALIGNANT NEOPLASM OF UPPER-OUTER QUADRANT OF RIGHT BREAST IN FEMALE, ESTROGEN RECEPTOR POSITIVE (HCC): Primary | ICD-10-CM

## 2018-09-20 DIAGNOSIS — E78.00 PURE HYPERCHOLESTEROLEMIA: ICD-10-CM

## 2018-09-20 DIAGNOSIS — E03.9 HYPOTHYROIDISM, UNSPECIFIED TYPE: ICD-10-CM

## 2018-09-20 LAB
ABSOLUTE EOS #: 0.2 K/UL (ref 0–0.4)
ABSOLUTE IMMATURE GRANULOCYTE: ABNORMAL K/UL (ref 0–0.3)
ABSOLUTE LYMPH #: 1.1 K/UL (ref 1–4.8)
ABSOLUTE MONO #: 0.5 K/UL (ref 0.1–1.2)
ALBUMIN SERPL-MCNC: 4 G/DL (ref 3.5–5.2)
ALBUMIN/GLOBULIN RATIO: 1.3 (ref 1–2.5)
ALP BLD-CCNC: 69 U/L (ref 35–104)
ALT SERPL-CCNC: 13 U/L (ref 5–33)
ANION GAP SERPL CALCULATED.3IONS-SCNC: 10 MMOL/L (ref 9–17)
AST SERPL-CCNC: 18 U/L
BASOPHILS # BLD: 1 % (ref 0–1)
BASOPHILS ABSOLUTE: 0 K/UL (ref 0–0.2)
BILIRUB SERPL-MCNC: 0.41 MG/DL (ref 0.3–1.2)
BILIRUBIN DIRECT: 0.11 MG/DL
BILIRUBIN, INDIRECT: 0.3 MG/DL (ref 0–1)
BUN BLDV-MCNC: 20 MG/DL (ref 8–23)
BUN/CREAT BLD: 24 (ref 9–20)
CALCIUM IONIZED: 1.23 MMOL/L (ref 1.13–1.33)
CALCIUM SERPL-MCNC: 9.3 MG/DL (ref 8.6–10.4)
CHLORIDE BLD-SCNC: 100 MMOL/L (ref 98–107)
CHOLESTEROL/HDL RATIO: 2.1
CHOLESTEROL: 212 MG/DL
CO2: 29 MMOL/L (ref 20–31)
CREAT SERPL-MCNC: 0.83 MG/DL (ref 0.5–0.9)
DIFFERENTIAL TYPE: ABNORMAL
EOSINOPHILS RELATIVE PERCENT: 3 % (ref 1–7)
GFR AFRICAN AMERICAN: >60 ML/MIN
GFR NON-AFRICAN AMERICAN: >60 ML/MIN
GFR SERPL CREATININE-BSD FRML MDRD: ABNORMAL ML/MIN/{1.73_M2}
GFR SERPL CREATININE-BSD FRML MDRD: ABNORMAL ML/MIN/{1.73_M2}
GLOBULIN: 3.2 G/DL (ref 1.5–3.8)
GLUCOSE BLD-MCNC: 94 MG/DL (ref 70–99)
HCT VFR BLD CALC: 35.9 % (ref 36–46)
HDLC SERPL-MCNC: 99 MG/DL
HEMOGLOBIN: 12.3 G/DL (ref 12–16)
IMMATURE GRANULOCYTES: ABNORMAL %
LDL CHOLESTEROL: 97 MG/DL (ref 0–130)
LYMPHOCYTES # BLD: 24 % (ref 16–46)
MCH RBC QN AUTO: 33.1 PG (ref 26–34)
MCHC RBC AUTO-ENTMCNC: 34.1 G/DL (ref 31–37)
MCV RBC AUTO: 96.9 FL (ref 80–100)
MONOCYTES # BLD: 10 % (ref 4–11)
NRBC AUTOMATED: ABNORMAL PER 100 WBC
PDW BLD-RTO: 13.4 % (ref 11–14.5)
PLATELET # BLD: 274 K/UL (ref 140–450)
PLATELET ESTIMATE: ABNORMAL
PMV BLD AUTO: 7.1 FL (ref 6–12)
POTASSIUM SERPL-SCNC: 4.1 MMOL/L (ref 3.7–5.3)
RBC # BLD: 3.71 M/UL (ref 4–5.2)
RBC # BLD: ABNORMAL 10*6/UL
SEG NEUTROPHILS: 62 % (ref 43–77)
SEGMENTED NEUTROPHILS ABSOLUTE COUNT: 3 K/UL (ref 1.8–7.7)
SODIUM BLD-SCNC: 139 MMOL/L (ref 135–144)
TOTAL PROTEIN: 7.2 G/DL (ref 6.4–8.3)
TRIGL SERPL-MCNC: 78 MG/DL
TSH SERPL DL<=0.05 MIU/L-ACNC: 3.08 MIU/L (ref 0.3–5)
VITAMIN D 25-HYDROXY: 78.2 NG/ML (ref 30–100)
VLDLC SERPL CALC-MCNC: ABNORMAL MG/DL (ref 1–30)
WBC # BLD: 4.8 K/UL (ref 3.5–11)
WBC # BLD: ABNORMAL 10*3/UL

## 2018-09-20 PROCEDURE — 82330 ASSAY OF CALCIUM: CPT

## 2018-09-20 PROCEDURE — 80061 LIPID PANEL: CPT

## 2018-09-20 PROCEDURE — 84443 ASSAY THYROID STIM HORMONE: CPT

## 2018-09-20 PROCEDURE — 82306 VITAMIN D 25 HYDROXY: CPT

## 2018-09-20 PROCEDURE — 80076 HEPATIC FUNCTION PANEL: CPT

## 2018-09-20 PROCEDURE — 85025 COMPLETE CBC W/AUTO DIFF WBC: CPT

## 2018-09-20 PROCEDURE — 36415 COLL VENOUS BLD VENIPUNCTURE: CPT

## 2018-09-20 PROCEDURE — 80048 BASIC METABOLIC PNL TOTAL CA: CPT

## 2018-09-21 ENCOUNTER — OFFICE VISIT (OUTPATIENT)
Dept: INTERNAL MEDICINE | Age: 83
End: 2018-09-21
Payer: MEDICARE

## 2018-09-21 VITALS
RESPIRATION RATE: 16 BRPM | WEIGHT: 143.8 LBS | HEART RATE: 78 BPM | SYSTOLIC BLOOD PRESSURE: 138 MMHG | HEIGHT: 63 IN | BODY MASS INDEX: 25.48 KG/M2 | DIASTOLIC BLOOD PRESSURE: 70 MMHG

## 2018-09-21 DIAGNOSIS — M85.80 OSTEOPENIA, UNSPECIFIED LOCATION: ICD-10-CM

## 2018-09-21 DIAGNOSIS — Z00.00 ROUTINE GENERAL MEDICAL EXAMINATION AT A HEALTH CARE FACILITY: Primary | ICD-10-CM

## 2018-09-21 DIAGNOSIS — M25.512 LEFT SHOULDER PAIN, UNSPECIFIED CHRONICITY: ICD-10-CM

## 2018-09-21 DIAGNOSIS — E03.9 HYPOTHYROIDISM, UNSPECIFIED TYPE: ICD-10-CM

## 2018-09-21 DIAGNOSIS — E78.00 PURE HYPERCHOLESTEROLEMIA: ICD-10-CM

## 2018-09-21 PROCEDURE — 99213 OFFICE O/P EST LOW 20 MIN: CPT | Performed by: INTERNAL MEDICINE

## 2018-09-21 PROCEDURE — G0439 PPPS, SUBSEQ VISIT: HCPCS | Performed by: INTERNAL MEDICINE

## 2018-09-21 ASSESSMENT — ENCOUNTER SYMPTOMS
COUGH: 0
ABDOMINAL PAIN: 0
DIARRHEA: 0
BACK PAIN: 0
NAUSEA: 0
BLOOD IN STOOL: 0
CONSTIPATION: 0
EYE PAIN: 0
VOMITING: 0
SHORTNESS OF BREATH: 0

## 2018-09-21 ASSESSMENT — PATIENT HEALTH QUESTIONNAIRE - PHQ9
SUM OF ALL RESPONSES TO PHQ QUESTIONS 1-9: 0
SUM OF ALL RESPONSES TO PHQ QUESTIONS 1-9: 0

## 2018-09-21 ASSESSMENT — LIFESTYLE VARIABLES: HOW OFTEN DO YOU HAVE A DRINK CONTAINING ALCOHOL: 0

## 2018-09-21 ASSESSMENT — ANXIETY QUESTIONNAIRES: GAD7 TOTAL SCORE: 0

## 2018-09-21 NOTE — PROGRESS NOTES
Medicare Annual Wellness Visit  Name: Jessica Ascencio Date: 2018   MRN: [id number] Sex: Female   Age: 80 y.o. Ethnicity: Non-/Non    : 1931 Race: Ursula Serum is here for Medicare AWV; Hyperlipidemia; Shoulder Pain; and Other (osteopenia)    Screenings for behavioral, psychosocial and functional/safety risks, and cognitive dysfunction are all negative except as indicated below. These results, as well as other patient data from the [address] form, are documented in Flowsheets linked to this Encounter. No Known Allergies    Prior to Visit Medications    Medication Sig Taking? Authorizing Provider   anastrozole (ARIMIDEX) 1 MG tablet TAKE 1 TABLET BY MOUTH DAILY Yes Candy Wan MD   simvastatin (ZOCOR) 20 MG tablet TAKE 0.5 TABLETS BY MOUTH EVERY EVENING Yes Candy Wan MD   levothyroxine (SYNTHROID) 50 MCG tablet TAKE 1 TABLET BY MOUTH DAILY Yes Candy Wan MD   triamterene-hydrochlorothiazide (MAXZIDE-25) 37.5-25 MG per tablet Take 1 tablet by mouth daily Yes Candy Wan MD   Handicap Placard MISC by Does not apply route . Expires in 5 years Yes Candy Wan MD   CALCIUM-MAGNESIUM-VITAMIN D PO Take by mouth daily Yes Historical Provider, MD   acetaminophen (TYLENOL) 500 MG tablet Take 500 mg by mouth every 6 hours as needed for Pain. Uses approx once a week Yes Historical MD Theo   Cholecalciferol (VITAMIN D) 2000 UNITS CAPS capsule Take 1 capsule by mouth daily. Yes Historical Provider, MD   Omega-3 Fatty Acids (OMEGA-3 FISH OIL) 1000 MG CAPS Take 1 capsule by mouth daily. Yes Historical Provider, MD   Compression Sleeve MISC by Does not apply route. Yes Mary Mensah MD   Multiple Vitamin (MULTI-VITAMIN DAILY PO) Take 1 tablet by mouth daily. Yes Historical Provider, MD   aspirin 325 MG tablet Take by mouth daily 1/2 daily.   Yes Historical MD Theo       Past Medical History:   Diagnosis Date    Breast

## 2018-09-21 NOTE — PATIENT INSTRUCTIONS
Personalized Preventive Plan for Scott Schwartz - 9/21/2018  Medicare offers a range of preventive health benefits. Some of the tests and screenings are paid in full while other may be subject to a deductible, co-insurance, and/or copay. Some of these benefits include a comprehensive review of your medical history including lifestyle, illnesses that may run in your family, and various assessments and screenings as appropriate. After reviewing your medical record and screening and assessments performed today your provider may have ordered immunizations, labs, imaging, and/or referrals for you. A list of these orders (if applicable) as well as your Preventive Care list are included within your After Visit Summary for your review. Other Preventive Recommendations:    · A preventive eye exam performed by an eye specialist is recommended every 1-2 years to screen for glaucoma; cataracts, macular degeneration, and other eye disorders. · A preventive dental visit is recommended every 6 months. · Try to get at least 150 minutes of exercise per week or 10,000 steps per day on a pedometer . · Order or download the FREE \"Exercise & Physical Activity: Your Everyday Guide\" from [employer]. Call 7-475.577.3643 or search [employer] online. · You need 5896-8655 mg of calcium and 0113-0099 IU of vitamin D per day. It is possible to meet your calcium requirement with diet alone, but a vitamin D supplement is usually necessary to meet this goal.  · When exposed to the sun, use a sunscreen that protects against both UVA and UVB radiation with an SPF of 30 or greater. Reapply every 2 to 3 hours or after sweating, drying off with a towel, or swimming. · Always wear a seat belt when traveling in a car. Always wear a helmet when riding a bicycle or motorcycle.

## 2018-09-21 NOTE — PROGRESS NOTES
deficiency       Past Surgical History:   Procedure Laterality Date    APPENDECTOMY      at age 5   Emma Fuller BREAST BIOPSY Right 2014    before she had her surgery    BREAST RECONSTRUCTION Right 2014    Pt had done at [facility] in [address] Left 2014    Pt had done at [facility] in [address]  2007    showed mild diverticular disease, otherwise negative, repeat in 2017    MASTECTOMY, RADICAL Right 2014    Pt had done at [facility] in [address]    due to fibroids       Family History   Problem Relation Age of Onset    Alzheimer's Disease Mother          at age 80    Emphysema Father          at age 68    Diabetes Father     COPD Sister     Other Sister         lung disease from tobacco abuse    Stroke Sister         history of a hemorrhagic cerebrovascular accident    Cancer Daughter         CML    Other Daughter         kidney stones    Hypertension Daughter     Heart Disease Maternal Grandmother     Heart Disease Maternal Grandfather     Diabetes Maternal Grandfather        Social History   Substance Use Topics    Smoking status: Never Smoker    Smokeless tobacco: Never Used    Alcohol use No      Current Outpatient Prescriptions   Medication Sig Dispense Refill    anastrozole (ARIMIDEX) 1 MG tablet TAKE 1 TABLET BY MOUTH DAILY 90 tablet 3    simvastatin (ZOCOR) 20 MG tablet TAKE 0.5 TABLETS BY MOUTH EVERY EVENING 90 tablet 3    levothyroxine (SYNTHROID) 50 MCG tablet TAKE 1 TABLET BY MOUTH DAILY 30 tablet 11    triamterene-hydrochlorothiazide (MAXZIDE-25) 37.5-25 MG per tablet Take 1 tablet by mouth daily 30 tablet 11    Handicap Placard MISC by Does not apply route . Expires in 5 years 1 each 0    CALCIUM-MAGNESIUM-VITAMIN D PO Take by mouth daily      acetaminophen (TYLENOL) 500 MG tablet Take 500 mg by mouth every 6 hours as needed for Pain.  Uses approx once a week      Cholecalciferol (VITAMIN D) 2000 UNITS CAPS capsule Take 1 capsule by mouth daily.  Omega-3 Fatty Acids (OMEGA-3 FISH OIL) 1000 MG CAPS Take 1 capsule by mouth daily.  Compression Sleeve MISC by Does not apply route. 1 each 0    Multiple Vitamin (MULTI-VITAMIN DAILY PO) Take 1 tablet by mouth daily.  aspirin 325 MG tablet Take by mouth daily 1/2 daily. No current facility-administered medications for this visit. No Known Allergies    Health Maintenance   Topic Date Due    Shingles Vaccine (1 of 2 - 2 Dose Series) 07/26/1981    Flu vaccine (1) 09/01/2018    Pneumococcal low/med risk (2 of 2 - PPSV23) 09/11/2018    TSH testing  09/20/2019    Potassium monitoring  09/20/2019    Creatinine monitoring  09/20/2019    DTaP/Tdap/Td vaccine (2 - Td) 12/19/2021       Subjective:      Review of Systems   Constitutional: Negative for chills and fever. HENT: Negative for hearing loss. Eyes: Negative for pain and visual disturbance. Respiratory: Negative for cough and shortness of breath. Cardiovascular: Negative for chest pain, palpitations and leg swelling. Gastrointestinal: Negative for abdominal pain, blood in stool, constipation, diarrhea, nausea and vomiting. Endocrine: Negative for cold intolerance, polydipsia and polyuria. Genitourinary: Negative for difficulty urinating, dysuria and hematuria. Musculoskeletal: Negative for arthralgias, back pain, gait problem and neck pain. Skin: Negative for pallor and rash. Neurological: Negative for dizziness, weakness, numbness and headaches. Hematological: Negative for adenopathy. Does not bruise/bleed easily. Psychiatric/Behavioral: Negative for confusion. The patient is not nervous/anxious. Objective:     Physical Exam   Constitutional: She is oriented to person, place, and time. She appears well-developed and well-nourished. HENT:   Head: Normocephalic and atraumatic.    Eyes: Pupils are equal, round, and

## 2018-11-08 DIAGNOSIS — Z17.0 MALIGNANT NEOPLASM OF UPPER-OUTER QUADRANT OF RIGHT BREAST IN FEMALE, ESTROGEN RECEPTOR POSITIVE (HCC): Primary | ICD-10-CM

## 2018-11-08 DIAGNOSIS — C50.411 MALIGNANT NEOPLASM OF UPPER-OUTER QUADRANT OF RIGHT BREAST IN FEMALE, ESTROGEN RECEPTOR POSITIVE (HCC): Primary | ICD-10-CM

## 2018-11-09 ENCOUNTER — HOSPITAL ENCOUNTER (OUTPATIENT)
Dept: INFUSION THERAPY | Age: 83
Discharge: HOME | End: 2018-11-09
Payer: MEDICARE

## 2018-11-09 ENCOUNTER — OFFICE VISIT (OUTPATIENT)
Dept: ONCOLOGY | Age: 83
End: 2018-11-09
Payer: MEDICARE

## 2018-11-09 VITALS
HEIGHT: 63 IN | TEMPERATURE: 98 F | RESPIRATION RATE: 18 BRPM | SYSTOLIC BLOOD PRESSURE: 141 MMHG | DIASTOLIC BLOOD PRESSURE: 70 MMHG | OXYGEN SATURATION: 97 % | WEIGHT: 144 LBS | BODY MASS INDEX: 25.52 KG/M2 | HEART RATE: 70 BPM

## 2018-11-09 DIAGNOSIS — Z51.81 ENCOUNTER FOR MONITORING AROMATASE INHIBITOR THERAPY: ICD-10-CM

## 2018-11-09 DIAGNOSIS — C50.411 MALIGNANT NEOPLASM OF UPPER-OUTER QUADRANT OF RIGHT BREAST IN FEMALE, ESTROGEN RECEPTOR POSITIVE (HCC): ICD-10-CM

## 2018-11-09 DIAGNOSIS — Z79.811 ENCOUNTER FOR MONITORING AROMATASE INHIBITOR THERAPY: ICD-10-CM

## 2018-11-09 DIAGNOSIS — Z17.0 MALIGNANT NEOPLASM OF UPPER-OUTER QUADRANT OF RIGHT BREAST IN FEMALE, ESTROGEN RECEPTOR POSITIVE (HCC): Primary | ICD-10-CM

## 2018-11-09 DIAGNOSIS — Z17.0 MALIGNANT NEOPLASM OF UPPER-OUTER QUADRANT OF RIGHT BREAST IN FEMALE, ESTROGEN RECEPTOR POSITIVE (HCC): ICD-10-CM

## 2018-11-09 DIAGNOSIS — C50.411 MALIGNANT NEOPLASM OF UPPER-OUTER QUADRANT OF RIGHT BREAST IN FEMALE, ESTROGEN RECEPTOR POSITIVE (HCC): Primary | ICD-10-CM

## 2018-11-09 LAB
ALBUMIN SERPL-MCNC: 4 G/DL (ref 3.5–5.1)
ALP BLD-CCNC: 68 U/L (ref 38–126)
ALT SERPL-CCNC: 17 U/L (ref 11–66)
AST SERPL-CCNC: 23 U/L (ref 5–40)
BASINOPHIL, AUTOMATED: 1 % (ref 0–3)
BILIRUB SERPL-MCNC: 0.2 MG/DL (ref 0.3–1.2)
BILIRUBIN DIRECT: < 0.2 MG/DL (ref 0–0.3)
BUN, WHOLE BLOOD: 17 MG/DL (ref 8–26)
CHLORIDE, WHOLE BLOOD: 95 MEQ/L (ref 98–109)
CREATININE, WHOLE BLOOD: 0.8 MG/DL (ref 0.5–1.2)
EOSINOPHILS RELATIVE PERCENT: 4 % (ref 0–4)
GFR, ESTIMATED: 72 ML/MIN/1.73M2
GLUCOSE, WHOLE BLOOD: 92 MG/DL (ref 70–108)
HCT VFR BLD CALC: 37.1 % (ref 37–47)
HEMOGLOBIN: 12.9 GM/DL (ref 12–16)
IONIZED CALCIUM, WHOLE BLOOD: 1.18 MMOL/L (ref 1.12–1.32)
LYMPHOCYTES # BLD: 25 % (ref 15–47)
MCH RBC QN AUTO: 31.7 PG (ref 27–31)
MCHC RBC AUTO-ENTMCNC: 34.6 GM/DL (ref 33–37)
MCV RBC AUTO: 91 FL (ref 81–99)
MONOCYTES: 11 % (ref 0–12)
PDW BLD-RTO: 11 % (ref 11.5–14.5)
PLATELET # BLD: 318 THOU/MM3 (ref 130–400)
PMV BLD AUTO: 6.5 FL (ref 7.4–10.4)
POTASSIUM, WHOLE BLOOD: 4.2 MEQ/L (ref 3.5–4.9)
RBC # BLD: 4.06 MILL/MM3 (ref 4.2–5.4)
SEG NEUTROPHILS: 59 % (ref 43–75)
SODIUM, WHOLE BLOOD: 138 MEQ/L (ref 138–146)
TOTAL CO2, WHOLE BLOOD: 31 MEQ/L (ref 23–33)
TOTAL PROTEIN: 7.3 G/DL (ref 6.1–8)
WBC # BLD: 5.8 THOU/MM3 (ref 4.8–10.8)

## 2018-11-09 PROCEDURE — 80076 HEPATIC FUNCTION PANEL: CPT

## 2018-11-09 PROCEDURE — 99211 OFF/OP EST MAY X REQ PHY/QHP: CPT

## 2018-11-09 PROCEDURE — 85025 COMPLETE CBC W/AUTO DIFF WBC: CPT

## 2018-11-09 PROCEDURE — 80047 BASIC METABLC PNL IONIZED CA: CPT

## 2018-11-09 PROCEDURE — 99214 OFFICE O/P EST MOD 30 MIN: CPT | Performed by: INTERNAL MEDICINE

## 2018-11-09 PROCEDURE — 36415 COLL VENOUS BLD VENIPUNCTURE: CPT

## 2018-11-09 NOTE — PROGRESS NOTES
management. 6.  Bone density prior to RTC. Katherine Mares M.D.   [facility] [address]  [address], 1 [facility], [address] [place] [address], [address] [place] [address]

## 2018-12-18 DIAGNOSIS — R60.0 EDEMA EXTREMITIES: Primary | ICD-10-CM

## 2018-12-19 RX ORDER — TRIAMTERENE AND HYDROCHLOROTHIAZIDE 37.5; 25 MG/1; MG/1
1 TABLET ORAL DAILY
Qty: 30 TABLET | Refills: 11 | Status: SHIPPED | OUTPATIENT
Start: 2018-12-19 | End: 2019-12-13 | Stop reason: SDUPTHER

## 2019-03-21 ENCOUNTER — OFFICE VISIT (OUTPATIENT)
Dept: INTERNAL MEDICINE | Age: 84
End: 2019-03-21
Payer: MEDICARE

## 2019-03-21 VITALS
DIASTOLIC BLOOD PRESSURE: 70 MMHG | HEART RATE: 108 BPM | WEIGHT: 147 LBS | TEMPERATURE: 100.7 F | HEIGHT: 63 IN | BODY MASS INDEX: 26.05 KG/M2 | SYSTOLIC BLOOD PRESSURE: 130 MMHG | RESPIRATION RATE: 16 BRPM

## 2019-03-21 DIAGNOSIS — G89.29 CHRONIC LEFT SHOULDER PAIN: ICD-10-CM

## 2019-03-21 DIAGNOSIS — M79.602 LEFT ARM PAIN: ICD-10-CM

## 2019-03-21 DIAGNOSIS — J01.00 ACUTE NON-RECURRENT MAXILLARY SINUSITIS: ICD-10-CM

## 2019-03-21 DIAGNOSIS — E78.00 PURE HYPERCHOLESTEROLEMIA: Primary | ICD-10-CM

## 2019-03-21 DIAGNOSIS — E03.9 ACQUIRED HYPOTHYROIDISM: ICD-10-CM

## 2019-03-21 DIAGNOSIS — Z12.39 ENCOUNTER FOR SCREENING FOR MALIGNANT NEOPLASM OF BREAST: ICD-10-CM

## 2019-03-21 DIAGNOSIS — E55.9 VITAMIN D DEFICIENCY: ICD-10-CM

## 2019-03-21 DIAGNOSIS — M25.512 CHRONIC LEFT SHOULDER PAIN: ICD-10-CM

## 2019-03-21 PROCEDURE — 99214 OFFICE O/P EST MOD 30 MIN: CPT | Performed by: INTERNAL MEDICINE

## 2019-03-21 RX ORDER — AMOXICILLIN AND CLAVULANATE POTASSIUM 875; 125 MG/1; MG/1
1 TABLET, FILM COATED ORAL 2 TIMES DAILY
Qty: 20 TABLET | Refills: 0 | Status: SHIPPED | OUTPATIENT
Start: 2019-03-21 | End: 2019-03-31

## 2019-03-21 ASSESSMENT — ENCOUNTER SYMPTOMS
CONSTIPATION: 0
EYE PAIN: 0
NAUSEA: 0
BLOOD IN STOOL: 0
COUGH: 0
BACK PAIN: 0
SHORTNESS OF BREATH: 0
ABDOMINAL PAIN: 0
VOMITING: 0
DIARRHEA: 0

## 2019-03-21 ASSESSMENT — PATIENT HEALTH QUESTIONNAIRE - PHQ9
SUM OF ALL RESPONSES TO PHQ QUESTIONS 1-9: 0
SUM OF ALL RESPONSES TO PHQ QUESTIONS 1-9: 0
2. FEELING DOWN, DEPRESSED OR HOPELESS: 0
1. LITTLE INTEREST OR PLEASURE IN DOING THINGS: 0
SUM OF ALL RESPONSES TO PHQ9 QUESTIONS 1 & 2: 0

## 2019-05-02 ENCOUNTER — HOSPITAL ENCOUNTER (OUTPATIENT)
Dept: BONE DENSITY | Age: 84
Discharge: HOME | End: 2019-05-04
Payer: MEDICARE

## 2019-05-02 DIAGNOSIS — Z51.81 ENCOUNTER FOR MONITORING AROMATASE INHIBITOR THERAPY: ICD-10-CM

## 2019-05-02 DIAGNOSIS — Z79.811 ENCOUNTER FOR MONITORING AROMATASE INHIBITOR THERAPY: ICD-10-CM

## 2019-05-02 DIAGNOSIS — Z17.0 MALIGNANT NEOPLASM OF UPPER-OUTER QUADRANT OF RIGHT BREAST IN FEMALE, ESTROGEN RECEPTOR POSITIVE (HCC): ICD-10-CM

## 2019-05-02 DIAGNOSIS — C50.411 MALIGNANT NEOPLASM OF UPPER-OUTER QUADRANT OF RIGHT BREAST IN FEMALE, ESTROGEN RECEPTOR POSITIVE (HCC): ICD-10-CM

## 2019-05-02 PROCEDURE — 77085 DXA BONE DENSITY AXL VRT FX: CPT

## 2019-05-10 ENCOUNTER — OFFICE VISIT (OUTPATIENT)
Dept: ONCOLOGY | Age: 84
End: 2019-05-10
Payer: MEDICARE

## 2019-05-10 ENCOUNTER — HOSPITAL ENCOUNTER (OUTPATIENT)
Dept: INFUSION THERAPY | Age: 84
Discharge: HOME | End: 2019-05-10
Payer: MEDICARE

## 2019-05-10 VITALS
HEART RATE: 78 BPM | OXYGEN SATURATION: 98 % | DIASTOLIC BLOOD PRESSURE: 74 MMHG | TEMPERATURE: 97.4 F | BODY MASS INDEX: 25.2 KG/M2 | WEIGHT: 142.2 LBS | SYSTOLIC BLOOD PRESSURE: 142 MMHG | RESPIRATION RATE: 18 BRPM | HEIGHT: 63 IN

## 2019-05-10 DIAGNOSIS — Z51.81 ENCOUNTER FOR MONITORING AROMATASE INHIBITOR THERAPY: ICD-10-CM

## 2019-05-10 DIAGNOSIS — C50.411 MALIGNANT NEOPLASM OF UPPER-OUTER QUADRANT OF RIGHT BREAST IN FEMALE, ESTROGEN RECEPTOR POSITIVE (HCC): ICD-10-CM

## 2019-05-10 DIAGNOSIS — Z17.0 MALIGNANT NEOPLASM OF UPPER-OUTER QUADRANT OF RIGHT BREAST IN FEMALE, ESTROGEN RECEPTOR POSITIVE (HCC): Primary | ICD-10-CM

## 2019-05-10 DIAGNOSIS — Z79.811 ENCOUNTER FOR MONITORING AROMATASE INHIBITOR THERAPY: ICD-10-CM

## 2019-05-10 DIAGNOSIS — Z17.0 MALIGNANT NEOPLASM OF UPPER-OUTER QUADRANT OF RIGHT BREAST IN FEMALE, ESTROGEN RECEPTOR POSITIVE (HCC): ICD-10-CM

## 2019-05-10 DIAGNOSIS — Z12.31 ENCOUNTER FOR SCREENING MAMMOGRAM FOR MALIGNANT NEOPLASM OF BREAST: ICD-10-CM

## 2019-05-10 DIAGNOSIS — C50.411 MALIGNANT NEOPLASM OF UPPER-OUTER QUADRANT OF RIGHT BREAST IN FEMALE, ESTROGEN RECEPTOR POSITIVE (HCC): Primary | ICD-10-CM

## 2019-05-10 LAB
ALBUMIN SERPL-MCNC: 4.2 G/DL (ref 3.5–5.1)
ALP BLD-CCNC: 81 U/L (ref 38–126)
ALT SERPL-CCNC: 15 U/L (ref 11–66)
AST SERPL-CCNC: 21 U/L (ref 5–40)
BASINOPHIL, AUTOMATED: 0 % (ref 0–3)
BILIRUB SERPL-MCNC: 0.3 MG/DL (ref 0.3–1.2)
BILIRUBIN DIRECT: < 0.2 MG/DL (ref 0–0.3)
BUN, WHOLE BLOOD: 20 MG/DL (ref 8–26)
CHLORIDE, WHOLE BLOOD: 96 MEQ/L (ref 98–109)
CREATININE, WHOLE BLOOD: 0.8 MG/DL (ref 0.5–1.2)
EOSINOPHILS RELATIVE PERCENT: 4 % (ref 0–4)
GFR, ESTIMATED: 72 ML/MIN/1.73M2
GLUCOSE, WHOLE BLOOD: 132 MG/DL (ref 70–108)
HCT VFR BLD CALC: 35.8 % (ref 37–47)
HEMOGLOBIN: 12.3 GM/DL (ref 12–16)
IONIZED CALCIUM, WHOLE BLOOD: 1.15 MMOL/L (ref 1.12–1.32)
LYMPHOCYTES # BLD: 26 % (ref 15–47)
MCH RBC QN AUTO: 31.4 PG (ref 27–31)
MCHC RBC AUTO-ENTMCNC: 34.4 GM/DL (ref 33–37)
MCV RBC AUTO: 91 FL (ref 81–99)
MONOCYTES: 7 % (ref 0–12)
PDW BLD-RTO: 11.8 % (ref 11.5–14.5)
PLATELET # BLD: 332 THOU/MM3 (ref 130–400)
PMV BLD AUTO: 6.7 FL (ref 7.4–10.4)
POTASSIUM, WHOLE BLOOD: 4 MEQ/L (ref 3.5–4.9)
RBC # BLD: 3.92 MILL/MM3 (ref 4.2–5.4)
SEG NEUTROPHILS: 62 % (ref 43–75)
SODIUM, WHOLE BLOOD: 136 MEQ/L (ref 138–146)
TOTAL CO2, WHOLE BLOOD: 29 MEQ/L (ref 23–33)
TOTAL PROTEIN: 7.5 G/DL (ref 6.1–8)
WBC # BLD: 6.6 THOU/MM3 (ref 4.8–10.8)

## 2019-05-10 PROCEDURE — 80076 HEPATIC FUNCTION PANEL: CPT

## 2019-05-10 PROCEDURE — 99214 OFFICE O/P EST MOD 30 MIN: CPT | Performed by: INTERNAL MEDICINE

## 2019-05-10 PROCEDURE — 99211 OFF/OP EST MAY X REQ PHY/QHP: CPT

## 2019-05-10 PROCEDURE — 80047 BASIC METABLC PNL IONIZED CA: CPT

## 2019-05-10 PROCEDURE — 36415 COLL VENOUS BLD VENIPUNCTURE: CPT

## 2019-05-10 PROCEDURE — 85025 COMPLETE CBC W/AUTO DIFF WBC: CPT

## 2019-05-10 NOTE — PROGRESS NOTES
[facility] PROFESSIONAL SERVICES  [facility]  [address] [facility] [address] [facility] [address]  [address] [address]  Dept: [phone]  Dept Fax: [phone]  Loc: [phone]    Subjective:      Chief Complaint: Brittaney Baca is a 80 y.o. female. In December, 2013, the patient noticed an abnormality in her right breast. She sought medical attention and had a mammogram and ultrasound completed in [facility]. The mammogram was noted to have dense breast tissue and the ultrasound found a mass in the right breast measuring 3.1 cm in greatest diameter. The patent elected to see Dr. Blessing Caro in [facility] and had a core biopsy of the mass performed. 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 The patient underwent a right mastectomy and sentinel lymph node biopsy on 02/05/2014. 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 The sentinel lymph nodes analysis was negative for malignancy. HPI:  The patient is here today for follow examination. She is here for follow up of her history of breast cancer. The patient is currently on therapy with Arimidex. The patient will complete 5 years of Arimidex therapy this year. She will discontinue Arimidex therapy at the completion of her five-year interval.  This was discussed with the patient today. She tolerated Arimidex well and without side effects. Her overall health has been good. She has no signs or symptoms that are suggestive of recurrence of her breast cancer. The patient denies skeletal pain. She has not had fever or other signs of infection. The patient denies shortness of breath, chest pain, a change in bowel habits or a change in bladder habits. ECOG performance status is level 0. Her most recent mammogram was in August, 2018.   This was reported as a benign appearing mammogram.  The results of the mammogram reviewed with the patient today. PMH, SH, and FH:  I reviewed the PMH, SH and FH as noted on the electronic medical record. There have been no changes as noted in the previous documentation. Review of Systems   Constitutional: Negative. HENT: Negative. Eyes: Negative. Respiratory: Negative. Cardiovascular: Negative. Gastrointestinal: Negative. Genitourinary: Negative. Musculoskeletal: Negative. Skin: Negative. Neurological: Negative. Hematological: Negative. Psychiatric/Behavioral: Negative. Objective:   Physical Exam   Vitals:    05/10/19 1123 05/10/19 1124   BP: (!) 152/76 (!) 142/74   Site: Left Upper Arm Right Upper Arm   Position: Sitting Sitting   Cuff Size: Medium Adult Medium Adult   Pulse: 78    Resp: 18    Temp: 97.4 °F (36.3 °C)    TempSrc: Oral    SpO2: 98%    Weight: 142 lb 3.2 oz (64.5 kg)    Height: 5' 3\" (1.6 m)    Vitals reviewed and are stable. Constitutional: Elderly, frail. No acute distress. HENT: Normocephalic and atraumatic. Eyes: Pupils are equal and reactive. No scleral icterus. Neck: Overall appearance is symmetrical. No identifiable masses. Chest: Inspection and palpation of chest is normal.  Pulmonary: Effort normal. No respiratory distress. Cardiovascular: Regular rate and rhythm normal S1 and S2. Abdominal: Soft. No hepatomegaly or splenomegaly. Musculoskeletal: Gait is abnormal. Muscle strength and tone decreased. Neurological: Alert and oriented to person, place, and time. Judgment and thought content normal.  Skin: Scattered ecchymosis noted on bilateral upper and lower extremities. Psychiatric: Mood and affect appropriate for the clinical situation.  Behavior is normal. .     Data Analysis:    Hematology 5/10/2019 11/9/2018 9/20/2018   WBC 6.6 5.8 4.8   RBC 3.92 (L) 4.06 (L) 3.71 (L)   HGB 12.3 12.9 12.3   HCT 35.8 (L) 37.1 35.9 (L)   MCV 91 91 96.9   RDW 11.8 11.0 (L) 13.4    318 045     Assessment:   1. Invasive breast cancer. Stage I. Estrogen receptor positive. 2.  Use of Arimidex. 3.  Hypertension. Plan:   1. Continue Arimidex until five years of therapy has been completed. 2.  Monitor for recurrence or malignancy. 3.  Mammogram in August, 2019. 4.  Monitor for side effects and toxicity from Arimidex. 5.  Monitor blood pressure and follow up with primary care provider for further surveillance and management. Roselyn Shrestha M.D. Medical Director: [facility]  [facility] [place] [address] [address] [employer] [facility], [address] [place] [address], [address] [place] [address], [address] [place] [address] of the [facility]      **This report has been created using voice recognition software. It may contain minor errors which are inherent in voice recognition technology. **

## 2019-08-22 ENCOUNTER — HOSPITAL ENCOUNTER (OUTPATIENT)
Dept: MAMMOGRAPHY | Age: 84
Discharge: HOME | End: 2019-08-24
Payer: MEDICARE

## 2019-08-22 DIAGNOSIS — Z12.39 ENCOUNTER FOR SCREENING FOR MALIGNANT NEOPLASM OF BREAST: ICD-10-CM

## 2019-08-22 PROCEDURE — 77063 BREAST TOMOSYNTHESIS BI: CPT

## 2019-09-04 ENCOUNTER — HOSPITAL ENCOUNTER (OUTPATIENT)
Dept: LAB | Age: 84
Discharge: HOME | End: 2019-09-04
Payer: MEDICARE

## 2019-09-04 ENCOUNTER — OFFICE VISIT (OUTPATIENT)
Dept: INTERNAL MEDICINE | Age: 84
End: 2019-09-04
Payer: MEDICARE

## 2019-09-04 VITALS
HEART RATE: 76 BPM | WEIGHT: 140 LBS | SYSTOLIC BLOOD PRESSURE: 130 MMHG | HEIGHT: 63 IN | DIASTOLIC BLOOD PRESSURE: 74 MMHG | BODY MASS INDEX: 24.8 KG/M2 | RESPIRATION RATE: 16 BRPM

## 2019-09-04 DIAGNOSIS — Z01.818 PRE-OP TESTING: Primary | ICD-10-CM

## 2019-09-04 DIAGNOSIS — E78.00 PURE HYPERCHOLESTEROLEMIA: ICD-10-CM

## 2019-09-04 DIAGNOSIS — Z01.818 PRE-OP TESTING: ICD-10-CM

## 2019-09-04 DIAGNOSIS — M25.512 LEFT SHOULDER PAIN, UNSPECIFIED CHRONICITY: ICD-10-CM

## 2019-09-04 DIAGNOSIS — E55.9 VITAMIN D DEFICIENCY: ICD-10-CM

## 2019-09-04 DIAGNOSIS — E03.9 ACQUIRED HYPOTHYROIDISM: ICD-10-CM

## 2019-09-04 LAB
ABSOLUTE EOS #: 0.1 K/UL (ref 0–0.4)
ABSOLUTE IMMATURE GRANULOCYTE: ABNORMAL K/UL (ref 0–0.3)
ABSOLUTE LYMPH #: 1.3 K/UL (ref 1–4.8)
ABSOLUTE MONO #: 0.5 K/UL (ref 0.1–1.2)
BASOPHILS # BLD: 1 % (ref 0–1)
BASOPHILS ABSOLUTE: 0 K/UL (ref 0–0.2)
CHOLESTEROL/HDL RATIO: 2.3
CHOLESTEROL: 245 MG/DL
DIFFERENTIAL TYPE: ABNORMAL
EOSINOPHILS RELATIVE PERCENT: 2 % (ref 1–7)
HCT VFR BLD CALC: 38.4 % (ref 36–46)
HDLC SERPL-MCNC: 106 MG/DL
HEMOGLOBIN: 13 G/DL (ref 12–16)
IMMATURE GRANULOCYTES: ABNORMAL %
LDL CHOLESTEROL: 116 MG/DL (ref 0–130)
LYMPHOCYTES # BLD: 20 % (ref 16–46)
MCH RBC QN AUTO: 32.5 PG (ref 26–34)
MCHC RBC AUTO-ENTMCNC: 33.7 G/DL (ref 31–37)
MCV RBC AUTO: 96.5 FL (ref 80–100)
MONOCYTES # BLD: 8 % (ref 4–11)
NRBC AUTOMATED: ABNORMAL PER 100 WBC
PDW BLD-RTO: 13.8 % (ref 11–14.5)
PLATELET # BLD: 336 K/UL (ref 140–450)
PLATELET ESTIMATE: ABNORMAL
PMV BLD AUTO: 7.6 FL (ref 6–12)
RBC # BLD: 3.98 M/UL (ref 4–5.2)
RBC # BLD: ABNORMAL 10*6/UL
SEG NEUTROPHILS: 69 % (ref 43–77)
SEGMENTED NEUTROPHILS ABSOLUTE COUNT: 4.5 K/UL (ref 1.8–7.7)
TRIGL SERPL-MCNC: 115 MG/DL
TSH SERPL DL<=0.05 MIU/L-ACNC: 2.24 MIU/L (ref 0.3–5)
VITAMIN D 25-HYDROXY: 78 NG/ML (ref 30–100)
VLDLC SERPL CALC-MCNC: ABNORMAL MG/DL (ref 1–30)
WBC # BLD: 6.6 K/UL (ref 3.5–11)
WBC # BLD: ABNORMAL 10*3/UL

## 2019-09-04 PROCEDURE — 99214 OFFICE O/P EST MOD 30 MIN: CPT | Performed by: INTERNAL MEDICINE

## 2019-09-04 PROCEDURE — 36415 COLL VENOUS BLD VENIPUNCTURE: CPT

## 2019-09-04 PROCEDURE — 84443 ASSAY THYROID STIM HORMONE: CPT

## 2019-09-04 PROCEDURE — 85025 COMPLETE CBC W/AUTO DIFF WBC: CPT

## 2019-09-04 PROCEDURE — 80061 LIPID PANEL: CPT

## 2019-09-04 PROCEDURE — 82306 VITAMIN D 25 HYDROXY: CPT

## 2019-09-04 PROCEDURE — 93000 ELECTROCARDIOGRAM COMPLETE: CPT | Performed by: INTERNAL MEDICINE

## 2019-09-04 RX ORDER — CHOLECALCIFEROL (VITAMIN D3) 125 MCG
CAPSULE ORAL 2 TIMES DAILY
COMMUNITY
End: 2020-06-23

## 2019-09-04 ASSESSMENT — ENCOUNTER SYMPTOMS
NAUSEA: 0
BACK PAIN: 0
EYE PAIN: 0
BLOOD IN STOOL: 0
ABDOMINAL PAIN: 0
DIARRHEA: 0
COUGH: 0
CONSTIPATION: 0
VOMITING: 0
SHORTNESS OF BREATH: 0

## 2019-09-04 NOTE — PROGRESS NOTES
deficiency       Past Surgical History:   Procedure Laterality Date    APPENDECTOMY      at age 5   [employer] BREAST BIOPSY Right 2014    before she had her surgery    BREAST RECONSTRUCTION Right 2014    Pt had done at [facility] in [address] 2014    Pt had done at [facility] in [address]      showed mild diverticular disease, otherwise negative, repeat in     MASTECTOMY, RADICAL Right 2014    Pt had done at [facility] in [address]    due to fibroids       Family History   Problem Relation Age of Onset    Alzheimer's Disease Mother          at age 80    Emphysema Father          at age 68    Diabetes Father     COPD Sister     Other Sister         lung disease from tobacco abuse    Stroke Sister         history of a hemorrhagic cerebrovascular accident    Cancer Daughter         CML    Other Daughter         kidney stones    Hypertension Daughter     Heart Disease Maternal Grandmother     Heart Disease Maternal Grandfather     Diabetes Maternal Grandfather           Social History     Tobacco Use    Smoking status: Never Smoker    Smokeless tobacco: Never Used   Substance Use Topics    Alcohol use: No         Current Outpatient Medications   Medication Sig Dispense Refill    Lactobacillus (PROBIOTIC ACIDOPHILUS) CAPS Take by mouth 2 times daily      triamterene-hydrochlorothiazide (MAXZIDE-25) 37.5-25 MG per tablet TAKE 1 TABLET BY MOUTH DAILY 30 tablet 11    simvastatin (ZOCOR) 20 MG tablet TAKE 0.5 TABLETS BY MOUTH EVERY EVENING 90 tablet 3    levothyroxine (SYNTHROID) 50 MCG tablet TAKE 1 TABLET BY MOUTH DAILY 30 tablet 11    Handicap Placard MISC by Does not apply route . Expires in 5 years 1 each 0    CALCIUM-MAGNESIUM-VITAMIN D PO Take by mouth daily      acetaminophen (TYLENOL) 500 MG tablet Take 500 mg by mouth every 6 hours as needed for Pain.  Uses approx once a All patient questions answered. Ptvoiced understanding. Reviewed health maintenance. Instructed to continue currentmedications, diet and exercise. Patient agreed with treatment plan. Follow up asdirected.      Electronically signed by Nicole Last MD on 9/4/2019 at 11:26 AM

## 2019-09-12 ENCOUNTER — TELEPHONE (OUTPATIENT)
Dept: INTERNAL MEDICINE | Age: 84
End: 2019-09-12

## 2019-10-09 DIAGNOSIS — E03.9 HYPOTHYROIDISM, UNSPECIFIED TYPE: ICD-10-CM

## 2019-10-09 DIAGNOSIS — E78.5 HYPERLIPIDEMIA, UNSPECIFIED HYPERLIPIDEMIA TYPE: ICD-10-CM

## 2019-10-09 RX ORDER — SIMVASTATIN 20 MG
10 TABLET ORAL EVERY EVENING
Qty: 90 TABLET | Refills: 3 | Status: SHIPPED | OUTPATIENT
Start: 2019-10-09 | End: 2020-10-16

## 2019-10-09 RX ORDER — LEVOTHYROXINE SODIUM 0.05 MG/1
50 TABLET ORAL DAILY
Qty: 30 TABLET | Refills: 11 | Status: SHIPPED | OUTPATIENT
Start: 2019-10-09 | End: 2020-10-01

## 2019-12-13 ENCOUNTER — OFFICE VISIT (OUTPATIENT)
Dept: INTERNAL MEDICINE | Age: 84
End: 2019-12-13
Payer: MEDICARE

## 2019-12-13 VITALS
SYSTOLIC BLOOD PRESSURE: 118 MMHG | DIASTOLIC BLOOD PRESSURE: 70 MMHG | BODY MASS INDEX: 24.98 KG/M2 | WEIGHT: 141 LBS | RESPIRATION RATE: 16 BRPM | HEIGHT: 63 IN | HEART RATE: 84 BPM

## 2019-12-13 DIAGNOSIS — E78.00 PURE HYPERCHOLESTEROLEMIA: ICD-10-CM

## 2019-12-13 DIAGNOSIS — Z00.00 ROUTINE GENERAL MEDICAL EXAMINATION AT A HEALTH CARE FACILITY: Primary | ICD-10-CM

## 2019-12-13 DIAGNOSIS — E03.9 HYPOTHYROIDISM, UNSPECIFIED TYPE: ICD-10-CM

## 2019-12-13 DIAGNOSIS — Z23 NEED FOR INFLUENZA VACCINATION: ICD-10-CM

## 2019-12-13 DIAGNOSIS — I10 ESSENTIAL HYPERTENSION: ICD-10-CM

## 2019-12-13 DIAGNOSIS — M25.512 LEFT SHOULDER PAIN, UNSPECIFIED CHRONICITY: ICD-10-CM

## 2019-12-13 DIAGNOSIS — Z00.00 MEDICARE ANNUAL WELLNESS VISIT, SUBSEQUENT: ICD-10-CM

## 2019-12-13 PROCEDURE — G0439 PPPS, SUBSEQ VISIT: HCPCS | Performed by: INTERNAL MEDICINE

## 2019-12-13 PROCEDURE — 99213 OFFICE O/P EST LOW 20 MIN: CPT | Performed by: INTERNAL MEDICINE

## 2019-12-13 PROCEDURE — G0008 ADMIN INFLUENZA VIRUS VAC: HCPCS | Performed by: INTERNAL MEDICINE

## 2019-12-13 PROCEDURE — 90653 IIV ADJUVANT VACCINE IM: CPT | Performed by: INTERNAL MEDICINE

## 2019-12-13 RX ORDER — TRIAMTERENE AND HYDROCHLOROTHIAZIDE 37.5; 25 MG/1; MG/1
1 TABLET ORAL DAILY
Qty: 30 TABLET | Refills: 11 | Status: SHIPPED | OUTPATIENT
Start: 2019-12-13 | End: 2021-03-09

## 2019-12-13 ASSESSMENT — ENCOUNTER SYMPTOMS
ABDOMINAL PAIN: 0
SHORTNESS OF BREATH: 0
COUGH: 0
DIARRHEA: 0
EYE PAIN: 0
NAUSEA: 0
VOMITING: 0
BLOOD IN STOOL: 0
CONSTIPATION: 0
BACK PAIN: 0

## 2019-12-13 ASSESSMENT — LIFESTYLE VARIABLES: HOW OFTEN DO YOU HAVE A DRINK CONTAINING ALCOHOL: 0

## 2019-12-13 ASSESSMENT — PATIENT HEALTH QUESTIONNAIRE - PHQ9
SUM OF ALL RESPONSES TO PHQ QUESTIONS 1-9: 0
SUM OF ALL RESPONSES TO PHQ QUESTIONS 1-9: 0

## 2020-01-22 ENCOUNTER — APPOINTMENT (OUTPATIENT)
Dept: GENERAL RADIOLOGY | Age: 85
DRG: 470 | End: 2020-01-22
Payer: MEDICARE

## 2020-01-22 ENCOUNTER — HOSPITAL ENCOUNTER (INPATIENT)
Age: 85
LOS: 5 days | Discharge: NURSING, REHAB OR LONG-TERM CARE | DRG: 470 | End: 2020-01-27
Attending: ORTHOPAEDIC SURGERY | Admitting: ORTHOPAEDIC SURGERY
Payer: MEDICARE

## 2020-01-22 ENCOUNTER — ANESTHESIA EVENT (OUTPATIENT)
Dept: OPERATING ROOM | Age: 85
DRG: 470 | End: 2020-01-22
Payer: MEDICARE

## 2020-01-22 PROBLEM — S72.011A CLOSED SUBCAPITAL FRACTURE OF FEMUR, RIGHT, INITIAL ENCOUNTER (HCC): Status: ACTIVE | Noted: 2020-01-22

## 2020-01-22 PROBLEM — I10 HYPERTENSION: Status: ACTIVE | Noted: 2020-01-22

## 2020-01-22 LAB
ANION GAP SERPL CALCULATED.3IONS-SCNC: 13 MEQ/L (ref 8–16)
BASOPHILS # BLD: 0.5 %
BASOPHILS ABSOLUTE: 0.1 THOU/MM3 (ref 0–0.1)
BUN BLDV-MCNC: 21 MG/DL (ref 7–22)
CALCIUM SERPL-MCNC: 9.2 MG/DL (ref 8.5–10.5)
CHLORIDE BLD-SCNC: 96 MEQ/L (ref 98–111)
CO2: 28 MEQ/L (ref 23–33)
CREAT SERPL-MCNC: 0.7 MG/DL (ref 0.4–1.2)
EKG ATRIAL RATE: 71 BPM
EKG P AXIS: 53 DEGREES
EKG P-R INTERVAL: 170 MS
EKG Q-T INTERVAL: 418 MS
EKG QRS DURATION: 72 MS
EKG QTC CALCULATION (BAZETT): 454 MS
EKG R AXIS: 21 DEGREES
EKG T AXIS: 2 DEGREES
EKG VENTRICULAR RATE: 71 BPM
EOSINOPHIL # BLD: 0.7 %
EOSINOPHILS ABSOLUTE: 0.1 THOU/MM3 (ref 0–0.4)
ERYTHROCYTE [DISTWIDTH] IN BLOOD BY AUTOMATED COUNT: 12.9 % (ref 11.5–14.5)
ERYTHROCYTE [DISTWIDTH] IN BLOOD BY AUTOMATED COUNT: 46.6 FL (ref 35–45)
GFR SERPL CREATININE-BSD FRML MDRD: 79 ML/MIN/1.73M2
GLUCOSE BLD-MCNC: 116 MG/DL (ref 70–108)
HCT VFR BLD CALC: 40.2 % (ref 37–47)
HEMOGLOBIN: 13 GM/DL (ref 12–16)
IMMATURE GRANS (ABS): 0.1 THOU/MM3 (ref 0–0.07)
IMMATURE GRANULOCYTES: 0.8 %
LYMPHOCYTES # BLD: 10.6 %
LYMPHOCYTES ABSOLUTE: 1.3 THOU/MM3 (ref 1–4.8)
MCH RBC QN AUTO: 31.9 PG (ref 26–33)
MCHC RBC AUTO-ENTMCNC: 32.3 GM/DL (ref 32.2–35.5)
MCV RBC AUTO: 98.5 FL (ref 81–99)
MONOCYTES # BLD: 5.4 %
MONOCYTES ABSOLUTE: 0.7 THOU/MM3 (ref 0.4–1.3)
NUCLEATED RED BLOOD CELLS: 0 /100 WBC
OSMOLALITY CALCULATION: 277.8 MOSMOL/KG (ref 275–300)
PLATELET # BLD: 299 THOU/MM3 (ref 130–400)
PMV BLD AUTO: 9.4 FL (ref 9.4–12.4)
POTASSIUM SERPL-SCNC: 4.4 MEQ/L (ref 3.5–5.2)
RBC # BLD: 4.08 MILL/MM3 (ref 4.2–5.4)
SEG NEUTROPHILS: 82 %
SEGMENTED NEUTROPHILS ABSOLUTE COUNT: 10 THOU/MM3 (ref 1.8–7.7)
SODIUM BLD-SCNC: 137 MEQ/L (ref 135–145)
WBC # BLD: 12.2 THOU/MM3 (ref 4.8–10.8)

## 2020-01-22 PROCEDURE — 80048 BASIC METABOLIC PNL TOTAL CA: CPT

## 2020-01-22 PROCEDURE — 99221 1ST HOSP IP/OBS SF/LOW 40: CPT | Performed by: INTERNAL MEDICINE

## 2020-01-22 PROCEDURE — 93005 ELECTROCARDIOGRAM TRACING: CPT | Performed by: STUDENT IN AN ORGANIZED HEALTH CARE EDUCATION/TRAINING PROGRAM

## 2020-01-22 PROCEDURE — 71045 X-RAY EXAM CHEST 1 VIEW: CPT

## 2020-01-22 PROCEDURE — 94760 N-INVAS EAR/PLS OXIMETRY 1: CPT

## 2020-01-22 PROCEDURE — 72100 X-RAY EXAM L-S SPINE 2/3 VWS: CPT

## 2020-01-22 PROCEDURE — 6370000000 HC RX 637 (ALT 250 FOR IP): Performed by: NURSE PRACTITIONER

## 2020-01-22 PROCEDURE — 6360000002 HC RX W HCPCS: Performed by: NURSE PRACTITIONER

## 2020-01-22 PROCEDURE — 85025 COMPLETE CBC W/AUTO DIFF WBC: CPT

## 2020-01-22 PROCEDURE — 6370000000 HC RX 637 (ALT 250 FOR IP): Performed by: STUDENT IN AN ORGANIZED HEALTH CARE EDUCATION/TRAINING PROGRAM

## 2020-01-22 PROCEDURE — 1200000000 HC SEMI PRIVATE

## 2020-01-22 PROCEDURE — 2580000003 HC RX 258: Performed by: NURSE PRACTITIONER

## 2020-01-22 PROCEDURE — 73502 X-RAY EXAM HIP UNI 2-3 VIEWS: CPT

## 2020-01-22 PROCEDURE — 36415 COLL VENOUS BLD VENIPUNCTURE: CPT

## 2020-01-22 PROCEDURE — 99285 EMERGENCY DEPT VISIT HI MDM: CPT

## 2020-01-22 RX ORDER — HYDROCODONE BITARTRATE AND ACETAMINOPHEN 5; 325 MG/1; MG/1
1 TABLET ORAL EVERY 4 HOURS PRN
Status: DISCONTINUED | OUTPATIENT
Start: 2020-01-22 | End: 2020-01-23

## 2020-01-22 RX ORDER — SODIUM CHLORIDE 0.9 % (FLUSH) 0.9 %
10 SYRINGE (ML) INJECTION EVERY 12 HOURS SCHEDULED
Status: DISCONTINUED | OUTPATIENT
Start: 2020-01-22 | End: 2020-01-23

## 2020-01-22 RX ORDER — SIMVASTATIN 10 MG
10 TABLET ORAL EVERY EVENING
Status: DISCONTINUED | OUTPATIENT
Start: 2020-01-22 | End: 2020-01-27 | Stop reason: HOSPADM

## 2020-01-22 RX ORDER — ONDANSETRON 2 MG/ML
4 INJECTION INTRAMUSCULAR; INTRAVENOUS EVERY 6 HOURS PRN
Status: DISCONTINUED | OUTPATIENT
Start: 2020-01-22 | End: 2020-01-23

## 2020-01-22 RX ORDER — MORPHINE SULFATE 4 MG/ML
4 INJECTION, SOLUTION INTRAMUSCULAR; INTRAVENOUS
Status: DISCONTINUED | OUTPATIENT
Start: 2020-01-22 | End: 2020-01-23

## 2020-01-22 RX ORDER — ACETAMINOPHEN 325 MG/1
650 TABLET ORAL ONCE
Status: COMPLETED | OUTPATIENT
Start: 2020-01-22 | End: 2020-01-22

## 2020-01-22 RX ORDER — HYDROCODONE BITARTRATE AND ACETAMINOPHEN 5; 325 MG/1; MG/1
2 TABLET ORAL EVERY 4 HOURS PRN
Status: DISCONTINUED | OUTPATIENT
Start: 2020-01-22 | End: 2020-01-23

## 2020-01-22 RX ORDER — ACETAMINOPHEN 500 MG
500 TABLET ORAL EVERY 6 HOURS PRN
Status: DISCONTINUED | OUTPATIENT
Start: 2020-01-22 | End: 2020-01-23

## 2020-01-22 RX ORDER — MORPHINE SULFATE 2 MG/ML
2 INJECTION, SOLUTION INTRAMUSCULAR; INTRAVENOUS
Status: DISCONTINUED | OUTPATIENT
Start: 2020-01-22 | End: 2020-01-23

## 2020-01-22 RX ORDER — SODIUM CHLORIDE 0.9 % (FLUSH) 0.9 %
10 SYRINGE (ML) INJECTION PRN
Status: DISCONTINUED | OUTPATIENT
Start: 2020-01-22 | End: 2020-01-23

## 2020-01-22 RX ORDER — SODIUM CHLORIDE 9 MG/ML
INJECTION, SOLUTION INTRAVENOUS CONTINUOUS
Status: DISCONTINUED | OUTPATIENT
Start: 2020-01-22 | End: 2020-01-23

## 2020-01-22 RX ORDER — LEVOTHYROXINE SODIUM 0.05 MG/1
50 TABLET ORAL DAILY
Status: DISCONTINUED | OUTPATIENT
Start: 2020-01-23 | End: 2020-01-27 | Stop reason: HOSPADM

## 2020-01-22 RX ORDER — MULTIVITAMIN WITH FOLIC ACID 400 MCG
1 TABLET ORAL DAILY
Status: DISCONTINUED | OUTPATIENT
Start: 2020-01-23 | End: 2020-01-23

## 2020-01-22 RX ORDER — TOCOPHERSOLAN (VITAMIN E TPGS) 400/15ML
1 LIQUID (ML) ORAL DAILY
Status: DISCONTINUED | OUTPATIENT
Start: 2020-01-23 | End: 2020-01-22 | Stop reason: RX

## 2020-01-22 RX ORDER — TRIAMTERENE AND HYDROCHLOROTHIAZIDE 37.5; 25 MG/1; MG/1
1 TABLET ORAL DAILY
Status: DISCONTINUED | OUTPATIENT
Start: 2020-01-22 | End: 2020-01-27 | Stop reason: HOSPADM

## 2020-01-22 RX ORDER — VITAMIN B COMPLEX
2000 TABLET ORAL DAILY
Status: DISCONTINUED | OUTPATIENT
Start: 2020-01-22 | End: 2020-01-23

## 2020-01-22 RX ADMIN — TRIAMTERENE AND HYDROCHLOROTHIAZIDE 1 TABLET: 37.5; 25 TABLET ORAL at 21:24

## 2020-01-22 RX ADMIN — VITAMIN D, TAB 1000IU (100/BT) 2000 UNITS: 25 TAB at 21:24

## 2020-01-22 RX ADMIN — SIMVASTATIN 10 MG: 10 TABLET, FILM COATED ORAL at 21:24

## 2020-01-22 RX ADMIN — SODIUM CHLORIDE: 9 INJECTION, SOLUTION INTRAVENOUS at 17:00

## 2020-01-22 RX ADMIN — MORPHINE SULFATE 2 MG: 2 INJECTION, SOLUTION INTRAMUSCULAR; INTRAVENOUS at 23:31

## 2020-01-22 RX ADMIN — ACETAMINOPHEN 650 MG: 325 TABLET ORAL at 11:07

## 2020-01-22 RX ADMIN — HYDROCODONE BITARTRATE AND ACETAMINOPHEN 2 TABLET: 5; 325 TABLET ORAL at 16:37

## 2020-01-22 RX ADMIN — HYDROCODONE BITARTRATE AND ACETAMINOPHEN 1 TABLET: 5; 325 TABLET ORAL at 21:25

## 2020-01-22 RX ADMIN — Medication 10 ML: at 21:24

## 2020-01-22 ASSESSMENT — PAIN SCALES - GENERAL
PAINLEVEL_OUTOF10: 6
PAINLEVEL_OUTOF10: 7
PAINLEVEL_OUTOF10: 6
PAINLEVEL_OUTOF10: 5
PAINLEVEL_OUTOF10: 7
PAINLEVEL_OUTOF10: 7
PAINLEVEL_OUTOF10: 5
PAINLEVEL_OUTOF10: 10
PAINLEVEL_OUTOF10: 7
PAINLEVEL_OUTOF10: 8

## 2020-01-22 ASSESSMENT — PAIN - FUNCTIONAL ASSESSMENT
PAIN_FUNCTIONAL_ASSESSMENT: PREVENTS OR INTERFERES WITH MANY ACTIVE NOT PASSIVE ACTIVITIES
PAIN_FUNCTIONAL_ASSESSMENT: PREVENTS OR INTERFERES SOME ACTIVE ACTIVITIES AND ADLS

## 2020-01-22 ASSESSMENT — PAIN DESCRIPTION - ONSET
ONSET: ON-GOING

## 2020-01-22 ASSESSMENT — PAIN DESCRIPTION - ORIENTATION
ORIENTATION: RIGHT

## 2020-01-22 ASSESSMENT — PAIN DESCRIPTION - FREQUENCY
FREQUENCY: CONTINUOUS

## 2020-01-22 ASSESSMENT — PAIN DESCRIPTION - PROGRESSION
CLINICAL_PROGRESSION: NOT CHANGED

## 2020-01-22 ASSESSMENT — PAIN DESCRIPTION - LOCATION
LOCATION: HIP

## 2020-01-22 ASSESSMENT — ENCOUNTER SYMPTOMS: BACK PAIN: 0

## 2020-01-22 ASSESSMENT — PAIN DESCRIPTION - PAIN TYPE
TYPE: ACUTE PAIN

## 2020-01-22 ASSESSMENT — PAIN DESCRIPTION - DESCRIPTORS
DESCRIPTORS: DISCOMFORT
DESCRIPTORS: DISCOMFORT
DESCRIPTORS: ACHING

## 2020-01-22 NOTE — ED PROVIDER NOTES
[facility]  eMERGENCY dEPARTMENT eNCOUnter          CHIEF COMPLAINT       Chief Complaint   Patient presents with    Hip Pain    Groin Pain    Fall       Nurses Notes reviewed and I agree except as noted in the HPI. HISTORY OF PRESENT ILLNESS    Beckie Back is a 80 y.o. female who presents to the Emergency Department for the evaluation of fall, right hip and groin pain. Patient was got out of the car and she forgot her magazines in the car so she went to turn around and she fell. Patient fell onto her right hip and now has right hip and groin pain. Patient was unable to ambulate after she fell. Patient was on her way to see her oncologist, Dr. Jordi La. Patient had breast cancer and has been in remission for 5 years. Patient denies numbness or tingling her right leg or foot. Patient states she has 2/10 pain if she doesn't move her right leg. Patient states the pain is worse with movement. Patient did take her BP medication this morning. Patient has no other complaints at this time. REVIEW OF SYSTEMS     Review of Systems   Genitourinary: Negative for flank pain. Musculoskeletal: Positive for arthralgias (right hip and groin) and gait problem. Negative for back pain, joint swelling, myalgias, neck pain and neck stiffness. Neurological: Negative for dizziness, syncope, weakness, numbness and headaches. PAST MEDICAL HISTORY    has a past medical history of Breast cancer ([address] Edema extremities, Hyperlipidemia, Hypertension, Hypothyroidism, Osteoarthritis, and Unspecified vitamin D deficiency. SURGICAL HISTORY      has a past surgical history that includes sergo and bso (cervix removed) (1978); Appendectomy; Colonoscopy (2007); Mastectomy, radical (Right, 2/2014); Breast reduction surgery (Left, 2/2014); Breast reconstruction (Right, 2/2014); Breast biopsy (Right, 2/2014); Cataract removal with implant (Right, 09/19/2019); eye surgery; and Hysterectomy.     CURRENT She reports that she does not drink alcohol or use drugs. PHYSICAL EXAM     INITIAL VITALS:  height is 5' 3\" (1.6 m) and weight is 140 lb (63.5 kg). Her oral temperature is 97.7 °F (36.5 °C). Her blood pressure is 159/80 (abnormal) and her pulse is 84. Her respiration is 16 and oxygen saturation is 95%. Physical Exam  Vitals signs and nursing note reviewed. Constitutional:       Appearance: She is well-developed. She is not diaphoretic. HENT:      Head: Normocephalic and atraumatic. Right Ear: External ear normal.      Left Ear: External ear normal.   Eyes:      General: No scleral icterus. Right eye: No discharge. Left eye: No discharge. Conjunctiva/sclera: Conjunctivae normal.   Neck:      Musculoskeletal: Normal range of motion and neck supple. Vascular: No JVD. Pulmonary:      Effort: Pulmonary effort is normal. No respiratory distress. Breath sounds: No stridor. Abdominal:      General: There is no distension. Palpations: Abdomen is soft. Musculoskeletal:      Right hip: She exhibits decreased range of motion, tenderness and bony tenderness. She exhibits no deformity. Comments: Tenderness to right posterior hip and groin, decreased ROM to right hip, secondary to pain, RLE is shortened, no rotation or pain below right hip   Skin:     General: Skin is warm and dry. Findings: No erythema. Neurological:      Mental Status: She is alert and oriented to person, place, and time. Motor: No abnormal muscle tone.    Psychiatric:         Behavior: Behavior normal.           DIFFERENTIAL DIAGNOSIS:   Included but not limited to: dislocation, fracture, contusion    DIAGNOSTIC RESULTS     EKG: All EKG's are interpreted by the Emergency Department Physician who either signs or Co-signs this chart in the absence of a cardiologist.    none    RADIOLOGY: non-plain film images(s) such as CT, Ultrasound and MRI are readby theradiologist.    XR CHEST 1 VW Final Result   No acute disease. **This report has been created using voice recognition software. It may contain minor errors which are inherent in voice recognition technology. **      Final report electronically signed by Dr. Allison Rousseau on 1/22/2020 11:30 AM      XR HIP 2-3 VW W PELVIS RIGHT   Final Result   Acute right subcapital hip fracture. **This report has been created using voice recognition software. It may contain minor errors which are inherent in voice recognition technology. **      Final report electronically signed by Dr. Allison Rousseau on 1/22/2020 11:19 AM      XR LUMBAR SPINE (2-3 VIEWS)   Final Result      No gross acute fracture. **This report has been created using voice recognition software. It may contain minor errors which are inherent in voice recognition technology. **      Final report electronically signed by Dr. Allison Rousseau on 1/22/2020 11:27 AM            LABS:   Labs Reviewed   CBC WITH AUTO DIFFERENTIAL - Abnormal; Notable for the following components:       Result Value    WBC 12.2 (*)     RBC 4.08 (*)     RDW-SD 46.6 (*)     Segs Absolute 10.0 (*)     Immature Grans (Abs) 0.10 (*)     All other components within normal limits   BASIC METABOLIC PANEL - Abnormal; Notable for the following components:    Chloride 96 (*)     Glucose 116 (*)     All other components within normal limits   GLOMERULAR FILTRATION RATE, ESTIMATED - Abnormal; Notable for the following components:    Est, Glom Filt Rate 79 (*)     All other components within normal limits   ANION GAP   OSMOLALITY       EMERGENCYDEPARTMENTCOURSE:   Vitals:    Vitals:    01/22/20 1012 01/22/20 1121 01/22/20 1223 01/22/20 1306   BP: 139/75 (!) 164/76 (!) 159/80    Pulse:  72  84   Resp:  16  16   Temp:       TempSrc:       SpO2: 95% 97% 95% 95%   Weight:       Height:         Patient was seen history physical exam was performed.   See disposition below    MDM:  Patient is consistent

## 2020-01-22 NOTE — ED NOTES
Pt resting on cot. Daughter at bedside. Call light within reach. Waiting for admitting orders. Updated on POC. Req. To speak w/MARIE Dean. Pa updated.       Myrna Lara RN  01/22/20 9439

## 2020-01-22 NOTE — ED NOTES
Spoke with ED provider regarding patient admission status. She has put in two calls regarding admission orders.            Marisol Lea RN  01/22/20 4440

## 2020-01-22 NOTE — CONSULTS
Consult History & Physical      Patient:  Brian Alan  YOB: 1931    MRN: [id number]     Acct: [de-identified]      Chief Complaint:  Surgical Clearance    Date of Service: Pt seen/examined in consultation on 1/22/2020    History Of Present Illness:      80 y.o. female who we are asked to see/evaluate by Cristino Chavez MD for surgical clearance. Patient states that she was at Dr. Jesi Becker office, oncologist, and sustained a mechanical fall. Patient states she got out of the car forgot her magazines in the car turned around and fell. Patient injured her right hip. Patient denies LOC or head injury. Denies neck pain. Patient evaluated in the emergency department. Patient hemodynamically stable while in the ER. X-rays completed and note patient sustained an acute right subcapital hip fracture. Patient admitted under the care of orthopedist.  Сергей Gutierrez requesting surgical clearance for anticipated surgery on 1/23/2020    Patient denies chest pain or shortness of breath. Denies cough or recent illness. Denies nausea, vomiting, diarrhea constipation. Denies fevers or chills. Denies urinary complaints. Patient states she feels fine as long as she lies still. She states that she has significant right hip pain with any movement. Past medical history includes hypertension, hyperlipidemia and hypothyroidism. Patient endorses a history of breast cancer. She states that she has had a mastectomy and reconstruction following diagnosis. Patient is a past smoker. Denies illicit drug use. Denies alcohol use. Past Medical History:        Diagnosis Date    Breast cancer [facility]) 2014    s/p mastectomy and reconstruction surgery.     Edema extremities     takes diuretics prn    Hyperlipidemia     Hypertension     Hypothyroidism     Osteoarthritis     mild    Unspecified vitamin D deficiency        Past Surgical History:        Procedure Laterality Date    APPENDECTOMY      at age 11    BREAST BIOPSY Right 2/2014    before she had her surgery    BREAST RECONSTRUCTION Right 2/2014    Pt had done at [facility] in [address] Left 2/2014    Pt had done at [facility] in [address] Right 09/19/2019    COLONOSCOPY  2007    showed mild diverticular disease, otherwise negative, repeat in 2017    EYE SURGERY      HYSTERECTOMY      MASTECTOMY, RADICAL Right 2/2014    Pt had done at [facility] in [address]    due to fibroids       Medications Prior to Admission:    Prior to Admission medications    Medication Sig Start Date End Date Taking? Authorizing Provider   Multiple Vitamins-Minerals (PRESERVISION AREDS 2 PO) Take 1 tablet by mouth daily   Yes Historical Provider, MD   triamterene-hydrochlorothiazide (MAXZIDE-25) 37.5-25 MG per tablet Take 1 tablet by mouth daily  Patient taking differently: Take 0.5 tablets by mouth daily as needed Pt takes if sbp > 135 12/13/19  Yes Carlota Avila MD   simvastatin (ZOCOR) 20 MG tablet TAKE 0.5 TABLETS BY MOUTH EVERY EVENING 10/9/19  Yes Carlota Avila MD   levothyroxine (SYNTHROID) 50 MCG tablet TAKE 1 TABLET BY MOUTH DAILY 10/9/19  Yes Carlota Avila MD   Lactobacillus (PROBIOTIC ACIDOPHILUS) CAPS Take by mouth 2 times daily   Yes Historical Provider, MD   CALCIUM-MAGNESIUM-VITAMIN D PO Take by mouth daily   Yes Historical Provider, MD   acetaminophen (TYLENOL) 500 MG tablet Take 500 mg by mouth every 6 hours as needed for Pain. Uses approx once a week   Yes Historical Provider, MD   Cholecalciferol (VITAMIN D) 2000 UNITS CAPS capsule Take 1 capsule by mouth daily. Yes Historical Provider, MD   Multiple Vitamin (MULTI-VITAMIN DAILY PO) Take 1 tablet by mouth daily. Yes Historical Provider, MD   aspirin 325 MG tablet Take by mouth daily 1/2 daily. Yes Historical Provider, MD   Handicap Placard MISC by Does not apply route .   Expires in 5 years motion to right hip, due to pain. Skin: Skin color, texture, turgor normal.  No rashes or lesions. Neurologic:  Neurovascularly intact without any focal sensory/motor deficits. Cranial nerves: II-XII intact, grossly non-focal.  Psychiatric: Alert and oriented, thought content appropriate, normal insight  Capillary Refill: Brisk,< 3 seconds   Peripheral Pulses: +2 palpable, equal bilaterally     Labs:     Recent Labs     01/22/20  1118   WBC 12.2*   HGB 13.0   HCT 40.2        Recent Labs     01/22/20  1118      K 4.4   CL 96*   CO2 28   BUN 21   CREATININE 0.7   CALCIUM 9.2     No results for input(s): AST, ALT, BILIDIR, BILITOT, ALKPHOS in the last 72 hours. No results for input(s): INR in the last 72 hours. No results for input(s): Monique Medici in the last 72 hours. Urinalysis:    Lab Results   Component Value Date    NITRU NEGATIVE 01/30/2018    WBCUA 0 TO 4 01/30/2018    BACTERIA None 01/30/2018    RBCUA 0 TO 4 01/30/2018    SPECGRAV 1.005 01/30/2018    GLUCOSEU NEGATIVE 01/30/2018       Radiology: I have reviewed the radiology reports with the following interpretation:     XR CHEST 1 VW   Final Result   No acute disease. **This report has been created using voice recognition software. It may contain minor errors which are inherent in voice recognition technology. **      Final report electronically signed by Dr. Smooth Ospina on 1/22/2020 11:30 AM      XR HIP 2-3 VW W PELVIS RIGHT   Final Result   Acute right subcapital hip fracture. **This report has been created using voice recognition software. It may contain minor errors which are inherent in voice recognition technology. **      Final report electronically signed by Dr. Smooth Ospina on 1/22/2020 11:19 AM      XR LUMBAR SPINE (2-3 VIEWS)   Final Result      No gross acute fracture. **This report has been created using voice recognition software.  It may contain minor errors which are inherent discussed with patient by surgery and anesthesiologist.         Thank you for the consultation.     Electronically signed by Holly Mckay DO on 1/22/2020 at 4:33 PM

## 2020-01-22 NOTE — ED TRIAGE NOTES
Pt to ED via private vehicle w/rprts of R hip, R groin pain following a fall this morning at 0915 in the parking lot from standing position. States heading to doctor's appointment. Turned quickly and lost balance. Falling onto her bottom. Friend w/her assisted her to standing position. Pt rprts no pain while sitting. Rprts increased pain 8/10 when bearing weight.

## 2020-01-23 ENCOUNTER — ANESTHESIA (OUTPATIENT)
Dept: OPERATING ROOM | Age: 85
DRG: 470 | End: 2020-01-23
Payer: MEDICARE

## 2020-01-23 ENCOUNTER — APPOINTMENT (OUTPATIENT)
Dept: GENERAL RADIOLOGY | Age: 85
DRG: 470 | End: 2020-01-23
Payer: MEDICARE

## 2020-01-23 VITALS
RESPIRATION RATE: 1 BRPM | DIASTOLIC BLOOD PRESSURE: 54 MMHG | SYSTOLIC BLOOD PRESSURE: 90 MMHG | OXYGEN SATURATION: 100 %

## 2020-01-23 PROCEDURE — 2580000003 HC RX 258: Performed by: NURSE PRACTITIONER

## 2020-01-23 PROCEDURE — 3700000001 HC ADD 15 MINUTES (ANESTHESIA): Performed by: ORTHOPAEDIC SURGERY

## 2020-01-23 PROCEDURE — 3700000000 HC ANESTHESIA ATTENDED CARE: Performed by: ORTHOPAEDIC SURGERY

## 2020-01-23 PROCEDURE — 6360000002 HC RX W HCPCS: Performed by: ORTHOPAEDIC SURGERY

## 2020-01-23 PROCEDURE — 3600000005 HC SURGERY LEVEL 5 BASE: Performed by: ORTHOPAEDIC SURGERY

## 2020-01-23 PROCEDURE — 6360000002 HC RX W HCPCS: Performed by: REGISTERED NURSE

## 2020-01-23 PROCEDURE — 2580000003 HC RX 258: Performed by: ORTHOPAEDIC SURGERY

## 2020-01-23 PROCEDURE — 7100000001 HC PACU RECOVERY - ADDTL 15 MIN: Performed by: ORTHOPAEDIC SURGERY

## 2020-01-23 PROCEDURE — 6360000002 HC RX W HCPCS: Performed by: NURSE PRACTITIONER

## 2020-01-23 PROCEDURE — 73502 X-RAY EXAM HIP UNI 2-3 VIEWS: CPT

## 2020-01-23 PROCEDURE — 0SR906A REPLACEMENT OF RIGHT HIP JOINT WITH OXIDIZED ZIRCONIUM ON POLYETHYLENE SYNTHETIC SUBSTITUTE, UNCEMENTED, OPEN APPROACH: ICD-10-PCS | Performed by: ORTHOPAEDIC SURGERY

## 2020-01-23 PROCEDURE — 7100000000 HC PACU RECOVERY - FIRST 15 MIN: Performed by: ORTHOPAEDIC SURGERY

## 2020-01-23 PROCEDURE — 6370000000 HC RX 637 (ALT 250 FOR IP): Performed by: ORTHOPAEDIC SURGERY

## 2020-01-23 PROCEDURE — 2500000003 HC RX 250 WO HCPCS: Performed by: REGISTERED NURSE

## 2020-01-23 PROCEDURE — 99232 SBSQ HOSP IP/OBS MODERATE 35: CPT | Performed by: PHYSICIAN ASSISTANT

## 2020-01-23 PROCEDURE — 2500000003 HC RX 250 WO HCPCS: Performed by: ORTHOPAEDIC SURGERY

## 2020-01-23 PROCEDURE — C1776 JOINT DEVICE (IMPLANTABLE): HCPCS | Performed by: ORTHOPAEDIC SURGERY

## 2020-01-23 PROCEDURE — 2709999900 HC NON-CHARGEABLE SUPPLY: Performed by: ORTHOPAEDIC SURGERY

## 2020-01-23 PROCEDURE — 1200000000 HC SEMI PRIVATE

## 2020-01-23 PROCEDURE — 3600000015 HC SURGERY LEVEL 5 ADDTL 15MIN: Performed by: ORTHOPAEDIC SURGERY

## 2020-01-23 PROCEDURE — 6370000000 HC RX 637 (ALT 250 FOR IP): Performed by: NURSE PRACTITIONER

## 2020-01-23 DEVICE — COBALT CHROME 12/14 TAPER FEMORAL                                    HEAD 32MM + 0: Type: IMPLANTABLE DEVICE | Site: HIP | Status: FUNCTIONAL

## 2020-01-23 DEVICE — REFLECTION INTERFIT THREADED HOLE COVER
Type: IMPLANTABLE DEVICE | Site: HIP | Status: FUNCTIONAL
Brand: REFLECTION

## 2020-01-23 DEVICE — SYNERGY POROUS HIGH OFFSET FEMORAL                                    SIZE 12
Type: IMPLANTABLE DEVICE | Site: HIP | Status: FUNCTIONAL
Brand: SYNERGY

## 2020-01-23 DEVICE — R3 0 HOLE ACETABULAR SHELL 48MM
Type: IMPLANTABLE DEVICE | Site: HIP | Status: FUNCTIONAL
Brand: R3 ACETABULAR

## 2020-01-23 DEVICE — R3 20 DEGREE XLPE ACETABULAR LINER                                    32MM INNER DIAMETER X OUTER DIAMETER 48MM
Type: IMPLANTABLE DEVICE | Site: HIP | Status: FUNCTIONAL
Brand: R3

## 2020-01-23 RX ORDER — TRANEXAMIC ACID 100 MG/ML
INJECTION, SOLUTION INTRAVENOUS PRN
Status: DISCONTINUED | OUTPATIENT
Start: 2020-01-23 | End: 2020-01-23 | Stop reason: SDUPTHER

## 2020-01-23 RX ORDER — BUPIVACAINE HYDROCHLORIDE 5 MG/ML
INJECTION, SOLUTION EPIDURAL; INTRACAUDAL PRN
Status: DISCONTINUED | OUTPATIENT
Start: 2020-01-23 | End: 2020-01-23 | Stop reason: ALTCHOICE

## 2020-01-23 RX ORDER — TRAMADOL HYDROCHLORIDE 50 MG/1
50 TABLET ORAL EVERY 6 HOURS PRN
Status: DISCONTINUED | OUTPATIENT
Start: 2020-01-23 | End: 2020-01-27 | Stop reason: HOSPADM

## 2020-01-23 RX ORDER — HYDROCODONE BITARTRATE AND ACETAMINOPHEN 5; 325 MG/1; MG/1
1 TABLET ORAL EVERY 4 HOURS PRN
Status: DISCONTINUED | OUTPATIENT
Start: 2020-01-23 | End: 2020-01-27 | Stop reason: HOSPADM

## 2020-01-23 RX ORDER — ACETAMINOPHEN 325 MG/1
650 TABLET ORAL EVERY 6 HOURS
Status: DISCONTINUED | OUTPATIENT
Start: 2020-01-23 | End: 2020-01-27 | Stop reason: HOSPADM

## 2020-01-23 RX ORDER — MORPHINE SULFATE 2 MG/ML
2 INJECTION, SOLUTION INTRAMUSCULAR; INTRAVENOUS
Status: DISPENSED | OUTPATIENT
Start: 2020-01-23 | End: 2020-01-24

## 2020-01-23 RX ORDER — DOCUSATE SODIUM 100 MG/1
100 CAPSULE, LIQUID FILLED ORAL 2 TIMES DAILY
Status: DISCONTINUED | OUTPATIENT
Start: 2020-01-23 | End: 2020-01-27 | Stop reason: HOSPADM

## 2020-01-23 RX ORDER — SODIUM CHLORIDE 9 MG/ML
INJECTION, SOLUTION INTRAVENOUS CONTINUOUS
Status: DISCONTINUED | OUTPATIENT
Start: 2020-01-23 | End: 2020-01-27 | Stop reason: HOSPADM

## 2020-01-23 RX ORDER — SODIUM CHLORIDE 0.9 % (FLUSH) 0.9 %
10 SYRINGE (ML) INJECTION EVERY 12 HOURS SCHEDULED
Status: DISCONTINUED | OUTPATIENT
Start: 2020-01-23 | End: 2020-01-27 | Stop reason: HOSPADM

## 2020-01-23 RX ORDER — TRANEXAMIC ACID 100 MG/ML
INJECTION, SOLUTION INTRAVENOUS PRN
Status: DISCONTINUED | OUTPATIENT
Start: 2020-01-23 | End: 2020-01-23 | Stop reason: ALTCHOICE

## 2020-01-23 RX ORDER — SODIUM CHLORIDE 0.9 % (FLUSH) 0.9 %
10 SYRINGE (ML) INJECTION PRN
Status: DISCONTINUED | OUTPATIENT
Start: 2020-01-23 | End: 2020-01-27 | Stop reason: HOSPADM

## 2020-01-23 RX ORDER — PROPOFOL 10 MG/ML
INJECTION, EMULSION INTRAVENOUS CONTINUOUS PRN
Status: DISCONTINUED | OUTPATIENT
Start: 2020-01-23 | End: 2020-01-23 | Stop reason: SDUPTHER

## 2020-01-23 RX ORDER — ONDANSETRON 2 MG/ML
4 INJECTION INTRAMUSCULAR; INTRAVENOUS EVERY 6 HOURS PRN
Status: DISCONTINUED | OUTPATIENT
Start: 2020-01-23 | End: 2020-01-27 | Stop reason: HOSPADM

## 2020-01-23 RX ORDER — SENNA AND DOCUSATE SODIUM 50; 8.6 MG/1; MG/1
1 TABLET, FILM COATED ORAL 2 TIMES DAILY
Status: DISCONTINUED | OUTPATIENT
Start: 2020-01-23 | End: 2020-01-27 | Stop reason: HOSPADM

## 2020-01-23 RX ORDER — VANCOMYCIN HYDROCHLORIDE 500 MG/10ML
INJECTION, POWDER, LYOPHILIZED, FOR SOLUTION INTRAVENOUS PRN
Status: DISCONTINUED | OUTPATIENT
Start: 2020-01-23 | End: 2020-01-23 | Stop reason: ALTCHOICE

## 2020-01-23 RX ORDER — FENTANYL CITRATE 50 UG/ML
INJECTION, SOLUTION INTRAMUSCULAR; INTRAVENOUS PRN
Status: DISCONTINUED | OUTPATIENT
Start: 2020-01-23 | End: 2020-01-23 | Stop reason: SDUPTHER

## 2020-01-23 RX ORDER — MIDAZOLAM HYDROCHLORIDE 1 MG/ML
INJECTION INTRAMUSCULAR; INTRAVENOUS PRN
Status: DISCONTINUED | OUTPATIENT
Start: 2020-01-23 | End: 2020-01-23 | Stop reason: SDUPTHER

## 2020-01-23 RX ADMIN — SODIUM CHLORIDE: 9 INJECTION, SOLUTION INTRAVENOUS at 12:28

## 2020-01-23 RX ADMIN — SODIUM CHLORIDE: 9 INJECTION, SOLUTION INTRAVENOUS at 09:17

## 2020-01-23 RX ADMIN — HYDROCODONE BITARTRATE AND ACETAMINOPHEN 2 TABLET: 5; 325 TABLET ORAL at 03:51

## 2020-01-23 RX ADMIN — MORPHINE SULFATE 2 MG: 2 INJECTION, SOLUTION INTRAMUSCULAR; INTRAVENOUS at 09:49

## 2020-01-23 RX ADMIN — SIMVASTATIN 10 MG: 10 TABLET, FILM COATED ORAL at 20:58

## 2020-01-23 RX ADMIN — CEFAZOLIN 2 G: 10 INJECTION, POWDER, FOR SOLUTION INTRAVENOUS at 11:36

## 2020-01-23 RX ADMIN — MORPHINE SULFATE 2 MG: 2 INJECTION, SOLUTION INTRAMUSCULAR; INTRAVENOUS at 23:02

## 2020-01-23 RX ADMIN — ACETAMINOPHEN 325 MG: 325 TABLET ORAL at 20:59

## 2020-01-23 RX ADMIN — HYDROCODONE BITARTRATE AND ACETAMINOPHEN 1 TABLET: 5; 325 TABLET ORAL at 15:51

## 2020-01-23 RX ADMIN — SENNOSIDES AND DOCUSATE SODIUM 1 TABLET: 8.6; 5 TABLET ORAL at 20:59

## 2020-01-23 RX ADMIN — DOCUSATE SODIUM 100 MG: 100 CAPSULE, LIQUID FILLED ORAL at 20:59

## 2020-01-23 RX ADMIN — SODIUM CHLORIDE: 9 INJECTION, SOLUTION INTRAVENOUS at 21:00

## 2020-01-23 RX ADMIN — ONDANSETRON 4 MG: 2 INJECTION INTRAMUSCULAR; INTRAVENOUS at 21:00

## 2020-01-23 RX ADMIN — ASPIRIN 325 MG: 325 TABLET, DELAYED RELEASE ORAL at 15:51

## 2020-01-23 RX ADMIN — MORPHINE SULFATE 2 MG: 2 INJECTION, SOLUTION INTRAMUSCULAR; INTRAVENOUS at 07:21

## 2020-01-23 RX ADMIN — HYDROCODONE BITARTRATE AND ACETAMINOPHEN 1 TABLET: 5; 325 TABLET ORAL at 20:59

## 2020-01-23 RX ADMIN — PROPOFOL 10 MCG/KG/MIN: 10 INJECTION, EMULSION INTRAVENOUS at 11:46

## 2020-01-23 RX ADMIN — TRIAMTERENE AND HYDROCHLOROTHIAZIDE 1 TABLET: 37.5; 25 TABLET ORAL at 15:52

## 2020-01-23 RX ADMIN — FENTANYL CITRATE 100 MCG: 50 INJECTION, SOLUTION INTRAMUSCULAR; INTRAVENOUS at 12:00

## 2020-01-23 RX ADMIN — TRANEXAMIC ACID 1000 MG: 1 INJECTION, SOLUTION INTRAVENOUS at 11:48

## 2020-01-23 RX ADMIN — LEVOTHYROXINE SODIUM 50 MCG: 50 TABLET ORAL at 15:52

## 2020-01-23 RX ADMIN — DEXTROSE MONOHYDRATE 2 G: 50 INJECTION, SOLUTION INTRAVENOUS at 18:49

## 2020-01-23 RX ADMIN — MAGNESIUM HYDROXIDE 30 ML: 400 SUSPENSION ORAL at 15:51

## 2020-01-23 RX ADMIN — Medication 10 ML: at 09:14

## 2020-01-23 RX ADMIN — MIDAZOLAM HYDROCHLORIDE 2 MG: 1 INJECTION, SOLUTION INTRAMUSCULAR; INTRAVENOUS at 12:00

## 2020-01-23 RX ADMIN — TRAMADOL HYDROCHLORIDE 50 MG: 50 TABLET, FILM COATED ORAL at 19:29

## 2020-01-23 RX ADMIN — PHENYLEPHRINE HYDROCHLORIDE 100 MCG: 10 INJECTION INTRAVENOUS at 12:12

## 2020-01-23 RX ADMIN — DOCUSATE SODIUM 100 MG: 100 CAPSULE, LIQUID FILLED ORAL at 15:51

## 2020-01-23 RX ADMIN — SENNOSIDES AND DOCUSATE SODIUM 1 TABLET: 8.6; 5 TABLET ORAL at 15:51

## 2020-01-23 ASSESSMENT — PULMONARY FUNCTION TESTS
PIF_VALUE: 0
PIF_VALUE: 1
PIF_VALUE: 0
PIF_VALUE: 1
PIF_VALUE: 0
PIF_VALUE: 1
PIF_VALUE: 0
PIF_VALUE: 1
PIF_VALUE: 0
PIF_VALUE: 1
PIF_VALUE: 0
PIF_VALUE: 1
PIF_VALUE: 0

## 2020-01-23 ASSESSMENT — PAIN SCALES - GENERAL
PAINLEVEL_OUTOF10: 8
PAINLEVEL_OUTOF10: 9
PAINLEVEL_OUTOF10: 9
PAINLEVEL_OUTOF10: 6
PAINLEVEL_OUTOF10: 0
PAINLEVEL_OUTOF10: 9
PAINLEVEL_OUTOF10: 6
PAINLEVEL_OUTOF10: 10
PAINLEVEL_OUTOF10: 9
PAINLEVEL_OUTOF10: 6
PAINLEVEL_OUTOF10: 6

## 2020-01-23 ASSESSMENT — PAIN DESCRIPTION - ONSET: ONSET: ON-GOING

## 2020-01-23 ASSESSMENT — PAIN DESCRIPTION - PAIN TYPE: TYPE: ACUTE PAIN

## 2020-01-23 ASSESSMENT — PAIN - FUNCTIONAL ASSESSMENT: PAIN_FUNCTIONAL_ASSESSMENT: PREVENTS OR INTERFERES SOME ACTIVE ACTIVITIES AND ADLS

## 2020-01-23 ASSESSMENT — PAIN DESCRIPTION - FREQUENCY: FREQUENCY: CONTINUOUS

## 2020-01-23 ASSESSMENT — PAIN DESCRIPTION - LOCATION: LOCATION: HIP

## 2020-01-23 ASSESSMENT — PAIN DESCRIPTION - ORIENTATION: ORIENTATION: RIGHT

## 2020-01-23 ASSESSMENT — PAIN DESCRIPTION - DIRECTION: RADIATING_TOWARDS: RT THIGH

## 2020-01-23 ASSESSMENT — PAIN DESCRIPTION - PROGRESSION: CLINICAL_PROGRESSION: NOT CHANGED

## 2020-01-23 NOTE — CARE COORDINATION
20, 7:29 AM  DISCHARGE PLANNING EVALUATION:    Yisel Mcdermott       Admitted from: ED   2020/ [facility] day: 1   Location: [facility] Reason for admit: Closed subcapital fracture of femur, right, initial encounter [facility] [S72.011A] Status: IP  Admit order signed?: yes  PMH:  has a past medical history of Breast cancer ([facility] Edema extremities, Hyperlipidemia, Hypertension, Hypothyroidism, Osteoarthritis, and Unspecified vitamin D deficiency. Procedure:         RIGHT TOTAL HIP  By Jolene Head    Pertinent abnormal Imagin/22  Right hip and pelvis xray  Acute right subcapital hip fracture. Medications:  Scheduled Meds:   sodium chloride flush  10 mL Intravenous 2 times per day    Vitamin D  2,000 Units Oral Daily    levothyroxine  50 mcg Oral Daily    multivitamin  1 tablet Oral Daily    simvastatin  10 mg Oral QPM    triamterene-hydrochlorothiazide  1 tablet Oral Daily     Continuous Infusions:   sodium chloride 100 mL/hr at 20 1700      Pertinent Info/Orders/Treatment Plan:  N/V checks, pain control, NPO after MN, consent for surgery, IVF, total hip precautions, ileus prevention protocol    Diet: Diet NPO, After Midnight   Smoking status:  reports that she has never smoked.  She has never used smokeless tobacco.   PCP: Chava Sadler MD  Readmission 30 days or less: no  Readmission Risk Score: 10%    Discharge Planning Evaluation  Current Residence:  Private Residence  Living Arrangements:  Alone   Support Systems:  Family Members, Children, Friends/Neighbors, [employer]  Current Services PTA:     Potential Assistance Needed:  Transitional Care  Potential Assistance Purchasing Medications:  No  Does patient want to participate in local refill/ meds to beds program?  No  Type of Home Care Services:  None  Patient expects to be discharged to:  home  Expected Discharge date:  20  Follow Up Appointment: Best Day/ Time: Tuesday AM    Patient Goals/Plan/Treatment Preferences: Patient left for surgery; she is from home alone. SW saw this am, see her note for specifics. Transportation/Food Security/Housekeeping Addressed:  No issues identified.     Evaluation: yes

## 2020-01-23 NOTE — ANESTHESIA PROCEDURE NOTES
Spinal Block    Patient location during procedure: OR  Start time: 1/23/2020 11:30 AM  End time: 1/23/2020 11:44 AM  Reason for block: procedure for pain  Staffing  Resident/CRNA: LYNN Silva CRNA  Performed: resident/CRNA   Preanesthetic Checklist  Completed: patient identified, site marked, surgical consent, pre-op evaluation, timeout performed, IV checked, risks and benefits discussed, monitors and equipment checked, anesthesia consent given, oxygen available and patient being monitored  Spinal Block  Patient position: sitting  Prep: Betadine  Patient monitoring: continuous pulse ox  Approach: midline  Location: L3/L4  Provider prep: mask and sterile gloves  Local infiltration: lidocaine  Agent: bupivacaine  Dose: 1.8  Dose: 1.8  Needle  Needle type: Quincke   Needle gauge: 22 G  Needle length: 3.5 in  Kit: [id number]  Lot number: [id number]  Assessment  Sensory level: T6  Swirl obtained: Yes  CSF: clear  Attempts: 3+  Hemodynamics: stable  Additional Notes  Attempt x 2 DARLENE Mariscal and Attempt x 2 Dr. Benard Schilder

## 2020-01-23 NOTE — FLOWSHEET NOTE
Pt is a 81yo mother. Pt's daughter and good friend were present. Pt doesn't currently have AD in place, but would like to complete them. Pt had surgery to repair a leg fracture today and would like to wait until tomorrow to complete. Pt's injury happened in the [facility]. [place]'s parking lot. Pt is a member of [employer].  prayed with the pt. Spiritual care to continue to follow up on AD and to offer support and encouragement. 01/23/20 1430   Encounter Summary   Services provided to: Patient and family together   Referral/Consult From: [address] [place] [address] Children;Scientologist/kwadwo community;Friends/neighbors   Place of Alevism   ([facility])   Contact Scientologist Completed   Continue Visiting Yes  (1/23)   Complexity of Encounter Moderate   Length of Encounter 30 minutes   Advance Care Planning Yes   Spiritual/Quaker   Type Spiritual support   Assessment Calm; Approachable   Intervention Active listening;Explored feelings, thoughts, concerns;Explored coping resources;Prayer;Sustaining presence/ Ministry of presence   Outcome Connection/belonging;Comfort;Engaged in conversation

## 2020-01-23 NOTE — PROGRESS NOTES
1/23 at 11am. Pt accepted her risks. Pt understands that she will need to go to rehab after this hospital stay. Pt had no complaints. Past medical history, family history, social history and allergies reviewed again and is unchanged since admission. ROS Pt denies CP, SOB, HA, abd pain. Pt admits to hip pain. Medications:  Reviewed    Infusion Medications    sodium chloride 100 mL/hr at 01/23/20 4074     Scheduled Medications    sodium chloride flush  10 mL Intravenous 2 times per day    Vitamin D  2,000 Units Oral Daily    levothyroxine  50 mcg Oral Daily    multivitamin  1 tablet Oral Daily    simvastatin  10 mg Oral QPM    triamterene-hydrochlorothiazide  1 tablet Oral Daily     PRN Meds: sodium chloride flush, morphine **OR** morphine, ondansetron, HYDROcodone 5 mg - acetaminophen **OR** HYDROcodone 5 mg - acetaminophen, acetaminophen      Intake/Output Summary (Last 24 hours) at 1/23/2020 1141  Last data filed at 1/23/2020 0549  Gross per 24 hour   Intake 350 ml   Output 900 ml   Net -550 ml       Diet:  Diet NPO, After Midnight    Exam:  /60   Pulse 77   Temp 98.1 °F (36.7 °C) (Oral)   Resp 16   Ht 5' 3\" (1.6 m)   Wt 140 lb (63.5 kg)   SpO2 91%   BMI 24.80 kg/m²      General appearance: No apparent distress, appears stated age and cooperative. HEENT: Pupils equal, round, and reactive to light. Conjunctivae/corneas clear. Neck: Supple, with full range of motion. No jugular venous distention. Trachea midline. Respiratory:  Normal respiratory effort on RA. Clear to auscultation, bilaterally without Rales/Wheezes/Rhonchi. Cardiovascular: Regular rate and rhythm with normal S1/S2 without murmurs, rubs or gallops. Abdomen: Soft, non-tender, non-distended with normal bowel sounds. Musculoskeletal: passive and active ROM x 3 extremities. TTP RLE   Skin: Skin color, texture, turgor normal.  No rashes or lesions.   Neurologic:  Neurovascularly intact without any focal sensory/motor deficits. Cranial nerves: II-XII intact, grossly non-focal.  Psychiatric: Alert and oriented, thought content appropriate, normal insight  Capillary Refill: Brisk,< 3 seconds   Peripheral Pulses: +2 palpable, equal bilaterally     Labs:   Recent Labs     01/22/20  1118   WBC 12.2*   HGB 13.0   HCT 40.2        Recent Labs     01/22/20  1118      K 4.4   CL 96*   CO2 28   BUN 21   CREATININE 0.7   CALCIUM 9.2     Radiology:  XR CHEST 1 VW   Final Result   No acute disease. **This report has been created using voice recognition software. It may contain minor errors which are inherent in voice recognition technology. **      Final report electronically signed by Dr. Mich Kaplan on 1/22/2020 11:30 AM      XR HIP 2-3 VW W PELVIS RIGHT   Final Result   Acute right subcapital hip fracture. **This report has been created using voice recognition software. It may contain minor errors which are inherent in voice recognition technology. **      Final report electronically signed by Dr. Mich Kaplan on 1/22/2020 11:19 AM      XR LUMBAR SPINE (2-3 VIEWS)   Final Result      No gross acute fracture. **This report has been created using voice recognition software. It may contain minor errors which are inherent in voice recognition technology. **      Final report electronically signed by Dr. Mich Kaplan on 1/22/2020 11:27 AM      XR HIP RIGHT (2-3 VIEWS)    (Results Pending)     Electronically signed by MARIE Frank on 1/23/2020 at 11:41 AM

## 2020-01-23 NOTE — H&P
[address] [place] [address]                              HISTORY AND PHYSICAL    PATIENT NAME: Nisreen Dubose                   :        1931  MED REC NO:   [id number]                           ROOM:       0011  ACCOUNT NO:   [de-identified]                           ADMIT DATE: 2020  PROVIDER:     RADHA Mckenzie OF PROCEDURE:  2020    SERVICE:  Orthopedics. DIAGNOSES:  1. Displaced right interscapular hip fracture. 2.  Hypothyroidism. 3.  Hypocholesterolemia. ALLERGIES:  None. MEDICATIONS:  Simvastatin, levothyroxine, Tylenol, and aspirin. SOCIAL HISTORY:  No tobacco.  No alcohol. Lives at home alone. She is  a community ambulator, does a lot of bowling, as well as is getting  ready to travel to [place] for the remainder of the winter here in a  month. REVIEW OF SYSTEMS:  Negative. HISTORY OF PRESENT ILLNESS:  The patient is an 51-year-old female who is  actually going in for routine followup visit for breast cancer at   _____ office. She tripped and fell in the parking lot and was unable to  bear weight on it. She was brought to the emergent department,  diagnosed with a hip fracture and was admitted for definitive care. Pain is only in the groin. She suffered no other injuries during her  fall. PHYSICAL EXAMINATION:  GENERAL:  Well-developed and well-nourished female, resting comfortably  in bed, complaining of pain in her right hip. Alert and oriented x4. HEENT:  Normocephalic and atraumatic. Extraocular muscles are intact. NECK:  No carotid bruits. No JVD. HEART:  Regular rate and rhythm with no murmurs, rubs, or gallops. LUNGS:  Clear to auscultation bilaterally. ABDOMEN:  Soft, nontender, and nondistended. Normoactive bowel sounds. EXTREMITIES:  Her right lower extremity is short and externally rotated,  positive log roll. Significant pain in the groin.   She

## 2020-01-23 NOTE — BRIEF OP NOTE
Brief Postoperative Note  ______________________________________________________________    Patient: Liliane Khalil  YOB: 1931  MRN: [id number]  Date of Procedure: 1/23/2020    Pre-Op Diagnosis: RIGHT HIP FRACTURE    Post-Op Diagnosis: Same       Procedure(s):  RIGHT TOTAL HIP    Anesthesia: Spinal    Surgeon(s):  Bryce Thornton MD    Assistant: Dariusz Eden    Estimated Blood Loss (mL): 904     Complications: None    Specimens:   * No specimens in log *    Implants:  Implant Name Type Inv.  Item Serial No.  Lot No. LRB No. Used   IMPL HIP COVER THR HOLE MED DEMAND Hip IMPL HIP COVER THR HOLE MED DEMAND  SMITH [id number] Right 1   SHELL ACET R3 NO HOLE 48 Hip SHELL ACET R3 NO HOLE 48  [place] [id number] Right 1   IMPL HIP ACET LINER R3 20DEG [id number] Hip IMPL HIP ACET LINER R3 20DEG 22R23IN  [place] [id number] Right 1   IMPL HIP FEM STEM SYN PORS SZ 12 150MM Hip IMPL HIP FEM STEM SYN PORS SZ 12 150MM  [place] [id number] Right 1   IMPL HIP FEM HEAD COBLT CHROME 12TO14 0 Hip IMPL HIP FEM HEAD COBLT CHROME 12TO14 0  [place] [id number] Right 1         Drains: * No LDAs found *    Findings: See operative dictation    Bryce Thornton MD  Date: 1/23/2020  Time: 12:42 PM

## 2020-01-23 NOTE — PROGRESS NOTES
1356  Patient meets PACU D/C criteria at this time. Patient is awake and alert on 2L NC and VSS. Report called to [address] [place] [address]. Family updated.   Patient transported back to [address] [place] [address] room 11 on 2L NC.

## 2020-01-23 NOTE — ANESTHESIA PRE PROCEDURE
Department of Anesthesiology  Preprocedure Note       Name:  Marj Kahn   Age:  80 y.o.  :  1931                                          MRN:  [id number]         Date:  2020      Surgeon: Cathleen Wills):  Marsha Erickson MD    Procedure: RIGHT TOTAL HIP (Right Hip)    Medications prior to admission:   Prior to Admission medications    Medication Sig Start Date End Date Taking? Authorizing Provider   Multiple Vitamins-Minerals (PRESERVISION AREDS 2 PO) Take 1 tablet by mouth daily   Yes Historical Provider, MD   triamterene-hydrochlorothiazide (MAXZIDE-25) 37.5-25 MG per tablet Take 1 tablet by mouth daily  Patient taking differently: Take 0.5 tablets by mouth daily as needed Pt takes if sbp > 135 19  Yes Cindy Mathias MD   simvastatin (ZOCOR) 20 MG tablet TAKE 0.5 TABLETS BY MOUTH EVERY EVENING 10/9/19  Yes Cindy Mathias MD   levothyroxine (SYNTHROID) 50 MCG tablet TAKE 1 TABLET BY MOUTH DAILY 10/9/19  Yes Cindy Mathias MD   Lactobacillus (PROBIOTIC ACIDOPHILUS) CAPS Take by mouth 2 times daily   Yes Historical Provider, MD   CALCIUM-MAGNESIUM-VITAMIN D PO Take by mouth daily   Yes Historical Provider, MD   acetaminophen (TYLENOL) 500 MG tablet Take 500 mg by mouth every 6 hours as needed for Pain. Uses approx once a week   Yes Historical Provider, MD   Cholecalciferol (VITAMIN D) 2000 UNITS CAPS capsule Take 1 capsule by mouth daily. Yes Historical Provider, MD   Multiple Vitamin (MULTI-VITAMIN DAILY PO) Take 1 tablet by mouth daily. Yes Historical Provider, MD   aspirin 325 MG tablet Take by mouth daily 1/2 daily. Yes Historical Provider, MD   Handicap Placard MISC by Does not apply route . Expires in 5 years 3/2/17   Cindy Mathias MD   Compression Sleeve MISC by Does not apply route.  3/25/14   Alfred Coyle MD       Current medications:    Current Facility-Administered Medications   Medication Dose Route Frequency Provider Last Rate Last Dose    0.9 % sodium chloride infusion   Intravenous Continuous LYNN Meyer -  mL/hr at 01/23/20 4455      sodium chloride flush 0.9 % injection 10 mL  10 mL Intravenous 2 times per day Karl Celestin APRN - CNP   10 mL at 01/23/20 0914    sodium chloride flush 0.9 % injection 10 mL  10 mL Intravenous PRN Karl Celestin APRN - CNP        morphine (PF) injection 2 mg  2 mg Intravenous Q2H PRN Karl Celestin APRN - CNP   2 mg at 01/23/20 4300    Or    morphine injection 4 mg  4 mg Intravenous Q2H PRN Karl Celestin APRN - CNP        ondansetron [facility]) injection 4 mg  4 mg Intravenous Q6H PRN Karl Celestin APRN - CNP        HYDROcodone-acetaminophen (NORCO) 5-325 MG per tablet 1 tablet  1 tablet Oral Q4H PRN Karl Celestin APRN - CNP   1 tablet at 01/22/20 2125    Or    HYDROcodone-acetaminophen (NORCO) 5-325 MG per tablet 2 tablet  2 tablet Oral Q4H PRN Karl Celestin APRN - CNP   2 tablet at 01/23/20 0351    acetaminophen (TYLENOL) tablet 500 mg  500 mg Oral Q6H PRN Karl Celestin APRN - CNP        Vitamin D (CHOLECALCIFEROL) tablet 2,000 Units  2,000 Units Oral Daily Karl Celestin APRN - CNP   2,000 Units at 01/22/20 2124    levothyroxine (SYNTHROID) tablet 50 mcg  50 mcg Oral Daily Kalr Celestin APRN - MIKEL        multivitamin 1 tablet  1 tablet Oral Daily Karl Celestin APRN - CNP        simvastatin (ZOCOR) tablet 10 mg  10 mg Oral QPM Karl Celestin APRN - CNP   10 mg at 01/22/20 2124    triamterene-hydrochlorothiazide (MAXZIDE-25) 37.5-25 MG per tablet 1 tablet  1 tablet Oral Daily Karl Celestin APRN - CNP   1 tablet at 01/22/20 2124       Allergies:  No Known Allergies    Problem List:    Patient Active Problem List   Diagnosis Code    Hyperlipidemia E78.5    Hypothyroidism E03.9    Osteoarthritis M19.90    [facility] C50.919    Edema extremities R60.0    Vitamin D deficiency E55.9    Use of anastrozole (Arimidex) Z79.811    Encounter for monitoring aromatase inhibitor therapy Z51.81, Z79.811    Left arm pain M79.602    Closed subcapital fracture of femur, right, initial encounter ([facility] S72.011A    Hypertension I10       Past Medical History:        Diagnosis Date    Breast cancer ([facility] 2014    s/p mastectomy and reconstruction surgery.  Edema extremities     takes diuretics prn    Hyperlipidemia     Hypertension     Hypothyroidism     Osteoarthritis     mild    Unspecified vitamin D deficiency        Past Surgical History:        Procedure Laterality Date    APPENDECTOMY      at age 5   Equilla Kelli BREAST BIOPSY Right 2/2014    before she had her surgery    BREAST RECONSTRUCTION Right 2/2014    Pt had done at [facility] in [address] Left 2/2014    Pt had done at [facility] in [address] Right 09/19/2019    COLONOSCOPY  2007    showed mild diverticular disease, otherwise negative, repeat in 2017   [place], RADICAL Right 2/2014    Pt had done at [facility] in [address]    due to fibroids       Social History:    Social History     Tobacco Use    Smoking status: Never Smoker    Smokeless tobacco: Never Used   Substance Use Topics    Alcohol use:  No                                Counseling given: Not Answered      Vital Signs (Current):   Vitals:    01/22/20 2110 01/22/20 2334 01/23/20 0347 01/23/20 0906   BP: 121/68 127/60 138/76 137/60   Pulse: 79 72 69 77   Resp: 16 16 16 16   Temp: 97.5 °F (36.4 °C)  98 °F (36.7 °C) 98.1 °F (36.7 °C)   TempSrc: Oral  Oral Oral   SpO2: 93% 94% 92% 91%   Weight:       Height:                                                  BP Readings from Last 3 Encounters:   01/23/20 137/60   12/13/19 118/70   09/04/19 130/74       NPO Status:                                                                                 BMI:   Wt Readings from Last 3 Encounters:   01/22/20 140 lb (63.5 kg)   12/13/19 141 lb (64 kg)   09/04/19 140 lb (63.5 kg)     Body mass index is 24.8 kg/m². CBC:   Lab Results   Component Value Date    WBC 12.2 01/22/2020    RBC 4.08 01/22/2020    HGB 13.0 01/22/2020    HCT 40.2 01/22/2020    MCV 98.5 01/22/2020    RDW 13.8 09/04/2019     01/22/2020       CMP:   Lab Results   Component Value Date     01/22/2020    K 4.4 01/22/2020    CL 96 01/22/2020    CO2 28 01/22/2020    BUN 21 01/22/2020    CREATININE 0.7 01/22/2020    GFRAA >60 09/20/2018    LABGLOM 79 01/22/2020    GLUCOSE 116 01/22/2020    PROT 7.5 05/10/2019    CALCIUM 9.2 01/22/2020    BILITOT 0.3 05/10/2019    ALKPHOS 81 05/10/2019    AST 21 05/10/2019    ALT 15 05/10/2019       POC Tests: No results for input(s): POCGLU, POCNA, POCK, POCCL, POCBUN, POCHEMO, POCHCT in the last 72 hours. Coags: No results found for: PROTIME, INR, APTT    HCG (If Applicable): No results found for: PREGTESTUR, PREGSERUM, HCG, HCGQUANT     ABGs: No results found for: PHART, PO2ART, REX2EQE, MFS6QPQ, BEART, U2DOGXNU     Type & Screen (If Applicable):  No results found for: [facility]    Anesthesia Evaluation  Patient summary reviewed no history of anesthetic complications:   Airway: Mallampati: II  TM distance: >3 FB   Neck ROM: full  Mouth opening: > = 3 FB Dental: normal exam         Pulmonary:normal exam                               Cardiovascular:    (+) hypertension:, hyperlipidemia                  Neuro/Psych:               GI/Hepatic/Renal:             Endo/Other:                     Abdominal:           Vascular:                                        Anesthesia Plan      spinal     ASA 2           MIPS: Postoperative opioids intended and Prophylactic antiemetics administered. Anesthetic plan and risks discussed with patient.       Plan discussed with CRNA and surgical team.                  Hodan Lizama MD   1/23/2020

## 2020-01-23 NOTE — ANESTHESIA POSTPROCEDURE EVALUATION
Department of Anesthesiology  Postprocedure Note    Patient: April Galvan  MRN: [id number]  YOB: 1931  Date of evaluation: 1/23/2020  Time:  2:19 PM     Procedure Summary     Date:  01/23/20 Room / Location:  [place] [facility]  [facility] [place] [facility]    Anesthesia Start:  9926 Anesthesia Stop:  9716    Procedure:  RIGHT TOTAL HIP (Right Hip) Diagnosis:  (RIGHT HIP FRACTURE)    Surgeon:  Nerissa Kay MD Responsible Provider:      Anesthesia Type:  spinal ASA Status:  2          Anesthesia Type: spinal    Jozef Phase I: Jozef Score: 8    Jozef Phase II:      Last vitals: Reviewed and per EMR flowsheets. Anesthesia Post Evaluation    Patient location during evaluation: PACU  Patient participation: complete - patient participated  Level of consciousness: awake and alert  Airway patency: patent  Nausea & Vomiting: no nausea and no vomiting  Complications: no  Cardiovascular status: hemodynamically stable  Respiratory status: acceptable  Hydration status: euvolemic      [address]. [address]  POST-ANESTHESIA NOTE       Name:  April Galvan                                         Age:  80 y.o.   MRN:  [id number]      Last Vitals:  /62   Pulse 56   Temp 98 °F (36.7 °C) (Temporal)   Resp 21   Ht 5' 3\" (1.6 m)   Wt 140 lb (63.5 kg)   SpO2 98%   BMI 24.80 kg/m²   Patient Vitals for the past 4 hrs:   BP Temp Temp src Pulse Resp SpO2   01/23/20 1335 129/62 -- -- 56 21 98 %   01/23/20 1330 (!) 127/59 -- -- 59 21 100 %   01/23/20 1325 (!) 96/45 -- -- 60 20 97 %   01/23/20 1320 (!) 118/58 -- -- 61 30 94 %   01/23/20 1315 (!) 100/54 -- -- 60 24 92 %   01/23/20 1310 (!) 97/50 -- -- 62 10 93 %   01/23/20 1305 (!) 95/47 98 °F (36.7 °C) Temporal 57 12 93 %   01/23/20 1300 -- -- -- 60 10 100 %       Level of Consciousness:  Awake    Respiratory:  Stable    Oxygen Saturation:  Stable    Cardiovascular:  Stable    Hydration:  Adequate    PONV:  Stable    Post-op Pain:  Adequate analgesia    Post-op Assessment: No apparent anesthetic complications    Additional Follow-Up / Treatment / Comment:  Matthew Herrera MD  January 23, 2020   2:19 PM

## 2020-01-24 LAB
BASOPHILS # BLD: 0.2 %
BASOPHILS ABSOLUTE: 0 THOU/MM3 (ref 0–0.1)
EOSINOPHIL # BLD: 0.2 %
EOSINOPHILS ABSOLUTE: 0 THOU/MM3 (ref 0–0.4)
ERYTHROCYTE [DISTWIDTH] IN BLOOD BY AUTOMATED COUNT: 13 % (ref 11.5–14.5)
ERYTHROCYTE [DISTWIDTH] IN BLOOD BY AUTOMATED COUNT: 46.5 FL (ref 35–45)
HCT VFR BLD CALC: 34.1 % (ref 37–47)
HEMOGLOBIN: 11.4 GM/DL (ref 12–16)
IMMATURE GRANS (ABS): 0.06 THOU/MM3 (ref 0–0.07)
IMMATURE GRANULOCYTES: 0.5 %
LYMPHOCYTES # BLD: 7 %
LYMPHOCYTES ABSOLUTE: 0.9 THOU/MM3 (ref 1–4.8)
MCH RBC QN AUTO: 32.5 PG (ref 26–33)
MCHC RBC AUTO-ENTMCNC: 33.4 GM/DL (ref 32.2–35.5)
MCV RBC AUTO: 97.2 FL (ref 81–99)
MONOCYTES # BLD: 11.5 %
MONOCYTES ABSOLUTE: 1.5 THOU/MM3 (ref 0.4–1.3)
NUCLEATED RED BLOOD CELLS: 0 /100 WBC
PLATELET # BLD: 234 THOU/MM3 (ref 130–400)
PMV BLD AUTO: 9.6 FL (ref 9.4–12.4)
RBC # BLD: 3.51 MILL/MM3 (ref 4.2–5.4)
SEG NEUTROPHILS: 80.6 %
SEGMENTED NEUTROPHILS ABSOLUTE COUNT: 10.6 THOU/MM3 (ref 1.8–7.7)
WBC # BLD: 13.1 THOU/MM3 (ref 4.8–10.8)

## 2020-01-24 PROCEDURE — 1200000000 HC SEMI PRIVATE

## 2020-01-24 PROCEDURE — 6370000000 HC RX 637 (ALT 250 FOR IP): Performed by: ORTHOPAEDIC SURGERY

## 2020-01-24 PROCEDURE — 2580000003 HC RX 258: Performed by: ORTHOPAEDIC SURGERY

## 2020-01-24 PROCEDURE — 97116 GAIT TRAINING THERAPY: CPT

## 2020-01-24 PROCEDURE — 97110 THERAPEUTIC EXERCISES: CPT

## 2020-01-24 PROCEDURE — 97530 THERAPEUTIC ACTIVITIES: CPT

## 2020-01-24 PROCEDURE — 85025 COMPLETE CBC W/AUTO DIFF WBC: CPT

## 2020-01-24 PROCEDURE — 97166 OT EVAL MOD COMPLEX 45 MIN: CPT

## 2020-01-24 PROCEDURE — 94760 N-INVAS EAR/PLS OXIMETRY 1: CPT

## 2020-01-24 PROCEDURE — 97162 PT EVAL MOD COMPLEX 30 MIN: CPT

## 2020-01-24 PROCEDURE — 36415 COLL VENOUS BLD VENIPUNCTURE: CPT

## 2020-01-24 PROCEDURE — 99232 SBSQ HOSP IP/OBS MODERATE 35: CPT | Performed by: PHYSICIAN ASSISTANT

## 2020-01-24 PROCEDURE — 6360000002 HC RX W HCPCS: Performed by: ORTHOPAEDIC SURGERY

## 2020-01-24 PROCEDURE — 97535 SELF CARE MNGMENT TRAINING: CPT

## 2020-01-24 RX ADMIN — DOCUSATE SODIUM 100 MG: 100 CAPSULE, LIQUID FILLED ORAL at 09:15

## 2020-01-24 RX ADMIN — SIMVASTATIN 10 MG: 10 TABLET, FILM COATED ORAL at 18:57

## 2020-01-24 RX ADMIN — HYDROCODONE BITARTRATE AND ACETAMINOPHEN 1 TABLET: 5; 325 TABLET ORAL at 16:09

## 2020-01-24 RX ADMIN — MAGNESIUM HYDROXIDE 30 ML: 400 SUSPENSION ORAL at 09:15

## 2020-01-24 RX ADMIN — LEVOTHYROXINE SODIUM 50 MCG: 50 TABLET ORAL at 04:27

## 2020-01-24 RX ADMIN — TRAMADOL HYDROCHLORIDE 50 MG: 50 TABLET, FILM COATED ORAL at 11:55

## 2020-01-24 RX ADMIN — SENNOSIDES AND DOCUSATE SODIUM 1 TABLET: 8.6; 5 TABLET ORAL at 09:15

## 2020-01-24 RX ADMIN — Medication 10 ML: at 20:09

## 2020-01-24 RX ADMIN — ASPIRIN 325 MG: 325 TABLET, DELAYED RELEASE ORAL at 09:15

## 2020-01-24 RX ADMIN — HYDROCODONE BITARTRATE AND ACETAMINOPHEN 1 TABLET: 5; 325 TABLET ORAL at 09:15

## 2020-01-24 RX ADMIN — DEXTROSE MONOHYDRATE 2 G: 50 INJECTION, SOLUTION INTRAVENOUS at 04:14

## 2020-01-24 RX ADMIN — TRIAMTERENE AND HYDROCHLOROTHIAZIDE 1 TABLET: 37.5; 25 TABLET ORAL at 09:14

## 2020-01-24 RX ADMIN — DOCUSATE SODIUM 100 MG: 100 CAPSULE, LIQUID FILLED ORAL at 20:08

## 2020-01-24 RX ADMIN — HYDROCODONE BITARTRATE AND ACETAMINOPHEN 1 TABLET: 5; 325 TABLET ORAL at 04:27

## 2020-01-24 RX ADMIN — ACETAMINOPHEN 650 MG: 325 TABLET ORAL at 04:27

## 2020-01-24 RX ADMIN — SENNOSIDES AND DOCUSATE SODIUM 1 TABLET: 8.6; 5 TABLET ORAL at 20:08

## 2020-01-24 RX ADMIN — Medication 10 ML: at 09:15

## 2020-01-24 RX ADMIN — HYDROCODONE BITARTRATE AND ACETAMINOPHEN 1 TABLET: 5; 325 TABLET ORAL at 20:09

## 2020-01-24 ASSESSMENT — PAIN SCALES - GENERAL
PAINLEVEL_OUTOF10: 5
PAINLEVEL_OUTOF10: 5
PAINLEVEL_OUTOF10: 0
PAINLEVEL_OUTOF10: 6
PAINLEVEL_OUTOF10: 5
PAINLEVEL_OUTOF10: 3
PAINLEVEL_OUTOF10: 5

## 2020-01-24 ASSESSMENT — PAIN DESCRIPTION - PROGRESSION
CLINICAL_PROGRESSION: NOT CHANGED

## 2020-01-24 ASSESSMENT — PAIN DESCRIPTION - ORIENTATION
ORIENTATION: RIGHT
ORIENTATION: RIGHT

## 2020-01-24 ASSESSMENT — PAIN DESCRIPTION - ONSET: ONSET: ON-GOING

## 2020-01-24 ASSESSMENT — PAIN DESCRIPTION - LOCATION
LOCATION: HIP
LOCATION: HIP

## 2020-01-24 ASSESSMENT — PAIN DESCRIPTION - PAIN TYPE
TYPE: SURGICAL PAIN;ACUTE PAIN
TYPE: SURGICAL PAIN;ACUTE PAIN

## 2020-01-24 ASSESSMENT — PAIN DESCRIPTION - DESCRIPTORS: DESCRIPTORS: HEAVINESS

## 2020-01-24 ASSESSMENT — PAIN - FUNCTIONAL ASSESSMENT: PAIN_FUNCTIONAL_ASSESSMENT: PREVENTS OR INTERFERES SOME ACTIVE ACTIVITIES AND ADLS

## 2020-01-24 ASSESSMENT — PAIN DESCRIPTION - FREQUENCY: FREQUENCY: CONTINUOUS

## 2020-01-24 NOTE — PROGRESS NOTES
Hospitalist Progress Note      Patient:  Benita Cardona    Unit/Bed:7K-11/011-A  YOB: 1931  MRN: [id number]   Acct: [de-identified]   PCP: Clare Barragan MD  Date of Admission: 1/22/2020    Assessment/Plan:    1. Essential HTN: BP has been stable. Continue home medications. 2. Hypothyroidism: Levothyroxine   3. HLD: Continue Statin   4. History of Breast CA: Pt follows with Dr. Miriam Griffith. 5. Mechanical Fall, Displaced RIght Interscapular Hip Fx: Ortho primary. OR planned for 1/23. Hospitalist team will sign off at this time. Please feel free to contact us with any questions or concerns. Chief Complaint: Hip Pain     Initial H and P:-    80 y.o. female who we are asked to see/evaluate by Serena Larsen MD for surgical clearance. Patient states that she was at Dr. Nghia Magallon office, oncologist, and sustained a mechanical fall. Patient states she got out of the car forgot her magazines in the car turned around and fell. Patient injured her right hip. Patient denies LOC or head injury. Denies neck pain.     Patient evaluated in the emergency department. Patient hemodynamically stable while in the ER. X-rays completed and note patient sustained an acute right subcapital hip fracture. Patient admitted under the care of orthopedist.  Pamela Adame requesting surgical clearance for anticipated surgery on 1/23/2020     Patient denies chest pain or shortness of breath. Denies cough or recent illness. Denies nausea, vomiting, diarrhea constipation. Denies fevers or chills. Denies urinary complaints. Patient states she feels fine as long as she lies still. She states that she has significant right hip pain with any movement. Past medical history includes hypertension, hyperlipidemia and hypothyroidism. Patient endorses a history of breast cancer. She states that she has had a mastectomy and reconstruction following diagnosis.   Patient is a past gallops. Abdomen: Soft, non-tender, non-distended with normal bowel sounds. Musculoskeletal: passive and active ROM x 3 extremities. Dressing c/d/i   Skin: Skin color, texture, turgor normal.  No rashes or lesions. Neurologic:  Neurovascularly intact without any focal sensory/motor deficits. Cranial nerves: II-XII intact, grossly non-focal.  Psychiatric: Alert and oriented, thought content appropriate, normal insight  Capillary Refill: Brisk,< 3 seconds   Peripheral Pulses: +2 palpable, equal bilaterally     Labs:   Recent Labs     01/22/20  1118 01/24/20  0731   WBC 12.2* 13.1*   HGB 13.0 11.4*   HCT 40.2 34.1*    234     Recent Labs     01/22/20  1118      K 4.4   CL 96*   CO2 28   BUN 21   CREATININE 0.7   CALCIUM 9.2     Radiology:  XR HIP RIGHT (2-3 VIEWS)   Final Result    Satisfactory postoperative appearance following right hip prosthesis. **This report has been created using voice recognition software. It may contain minor errors which are inherent in voice recognition technology. **      Final report electronically signed by Dr. Lizbet Goldsmith on 1/23/2020 1:51 PM      XR CHEST 1 VW   Final Result   No acute disease. **This report has been created using voice recognition software. It may contain minor errors which are inherent in voice recognition technology. **      Final report electronically signed by Dr. Saroj Rowley on 1/22/2020 11:30 AM      XR HIP 2-3 VW W PELVIS RIGHT   Final Result   Acute right subcapital hip fracture. **This report has been created using voice recognition software. It may contain minor errors which are inherent in voice recognition technology. **      Final report electronically signed by Dr. Saroj Rowley on 1/22/2020 11:19 AM      XR LUMBAR SPINE (2-3 VIEWS)   Final Result      No gross acute fracture. **This report has been created using voice recognition software.  It may contain minor errors which are inherent in voice recognition technology. **      Final report electronically signed by Dr. Nubia Coker on 1/22/2020 11:27 AM        Electronically signed by MARIE Saxena on 1/24/2020 at 4:30 PM

## 2020-01-24 NOTE — CARE COORDINATION
1/24/20, 2:06 PM    DISCHARGE PLANNING EVALUATION      No family here at present. Possible TCU referral.   Daughter has shared that she is unsure yet if they can take patient home, depending on how Ruth Harris does with PT/OT.

## 2020-01-24 NOTE — PROGRESS NOTES
procurement, ROM, Neuromuscular Re-education    Goals:  Patient goals : return home  Short term goals  Time Frame for Short term goals: by discharge  Short term goal 1: bed mobility with mod I for ease of getting in/out of bed  Short term goal 2: sit <> stand with mod I for ease of transfers   Short term goal 3: ambulate > 150ft with use of LRAd and supervision assist for safe in home/community gait   Short term goal 4: 3 steps with bilat hand rails available while managing use of LRAD with stand-by assist to prepare for safe home entry/exit  Short term goal 5: car transfer with stand-by assist to prepare safe transport   Long term goals  Time Frame for Long term goals : N/A secondary to short ELOS    Following session, patient left in safe position with all fall risk precautions in place.

## 2020-01-24 NOTE — PROGRESS NOTES
[facility]  INPATIENT OCCUPATIONAL THERAPY  [facility] ORTHOPEDICS 7K  EVALUATION    Time:   Time In: 5015  Time Out: 1055  Timed Code Treatment Minutes: 15 Minutes  Minutes: 25          Date: 2020  Patient Name: Mario Thompson,   Gender: female      MRN: [id number]  : 1931  (80 y.o.)  Referring Practitioner: Raciel Reddy MD  Diagnosis: Closed subcaital fracture of femur- right   Additional Pertinent Hx: Mario Thompson is a 80 y.o. female who presents to the Emergency Department for the evaluation of fall, right hip and groin pain. X-rays completed and note patient sustained an acute right subcapital hip fracture. Right total hip replacementstandard direct lateral approach    Restrictions/Precautions:  Restrictions/Precautions: Fall Risk, Weight Bearing  Right Lower Extremity Weight Bearing: Weight Bearing As Tolerated  Position Activity Restriction  Hip Precautions: No hip flexion > 90 degrees, No ABduction, No ADduction, No hip internal rotation    Subjective  Chart Reviewed: Yes, Operative Notes, Orders, Progress Notes, History and Physical  Patient assessed for rehabilitation services?: Yes  Family / Caregiver Present: No         Pain:  Pain Assessment  Patient Currently in Pain: Yes  Pain Assessment: 0-10  Pain Level: (pt did not state during OT eval)    Social/Functional History:  Lives With: Alone  Type of Home: House  Home Layout: One level  Home Access: Stairs to enter with rails  Entrance Stairs - Number of Steps: 3  Entrance Stairs - Rails: Both  Home Equipment: Cane, Rolling walker   Bathroom Shower/Tub: Tub/Shower unit  Bathroom Toilet: Handicap height  Bathroom Equipment: Tub transfer bench  Bathroom Accessibility: Accessible    Receives Help From: Family  ADL Assistance: [address] [place] [address] Avenue: Independent  Homemaking Responsibilities: Yes  Ambulation Assistance: Independent  Transfer Assistance: Independent    Active :  Yes     Additional Comments: Pt. very active before surgery and managed all ADL/IADL tasks. Pt. has daughter near by to help and is a physical therapist and will help in discharge planning. VISION:WNL    HEARING:  WNL    COGNITION: WNL    RANGE OF MOTION:  Bilateral Upper Extremity:  WNL    STRENGTH:  Bilateral Upper Extremity:  WNL    ADL:   Toileting: Stand By Assistance. Pt. able to complete hygiene without assistance   Toilet Transfer: Contact Guard Assistance. with vcs of technique of sliding foot forward for sitting down/standing up. Pt. Was able to verbalize 2/3 hip precautions. OT educated on hoe hip precautions effect ability to complete LB dressing/bathing and use of LHAE. BALANCE:  Sitting Balance:  Stand by Assistance d/t pt c/o dizziness following ambulating from the bathroom    BED MOBILITY:  Sit to Supine: Mod x1 to guide trunk down and Min x1 to lift/guide legs    TRANSFERS:  Sit to Stand:  Contact Guard Assistance. vcs to slide foot forward  Stand to Sit: Contact Guard Assistance. \" \"    FUNCTIONAL MOBILITY:  Assistive Device: Rolling Walker  Assist Level:  Contact Guard Assistance. Distance: To and from bathroom  Pt. Required increased time to navigate to the bathroom noting slow pace. Activity Tolerance:  Patient tolerance of  treatment: fair. Pt. Became fatigued following ambulation and toileting and was returned to bed at end of session. Assessment:  Assessment: Pt. demonstrates decreased functional mobility and ability to complete ADL tasks secondary to total hip replacement. Pt. would benefit from skilled OT services to address these areas. Performance deficits / Impairments: Decreased functional mobility , Decreased ADL status, Decreased endurance, Decreased strength  Prognosis: Good  REQUIRES OT FOLLOW UP: Yes  Decision Making: Medium Complexity    Treatment Initiated: Treatment and education initiated within context of evaluation.   Evaluation time included review of current medical information, gathering information related to past medical, social and functional history, completion of standardized testing, formal and informal observation of tasks, assessment of data and development of plan of care and goals. Treatment time included skilled education and facilitation of tasks to increase safety and independence with ADL's for improved functional independence and quality of life. Discharge Recommendations:  Subacute/Skilled Nursing Facility    Patient Education:  OT Education: OT Role, Plan of Care, Precautions  Patient Education: Pt. educated on sliding affected leg forward during transfers to maintain hip precautions. Equipment Recommendations:  Equipment Needed: Yes  Other: Pt. may need LHAE d/t hip precautions, will continue to assess    Plan:  Times per week: 6x  Current Treatment Recommendations: Strengthening, Safety Education & Training, Balance Training, Patient/Caregiver Education & Training, Self-Care / ADL, Functional Mobility Training    Goals:  Patient goals : to return home   Short term goals  Time Frame for Short term goals: by discharge  Short term goal 1: Pt. will complete LB ADLs using LHAE with Delma and no vcs to maintain hip precautions. Short term goal 2: [facility]. [facility] adhering to precautions without vcs. Short term goal 3: Pt. will complete dynamic standing activities with SBA in prep for bathing/grooming tasks. Short term goal 4: Pt. will navigate to/from bathroom and in room using AE with SBA to obtain needed ADL items in closets. Long term goals  Time Frame for Long term goals : not established d/t ELOS         Following session, patient left in safe position with all fall risk precautions in place.

## 2020-01-24 NOTE — PLAN OF CARE
Problem: Falls - Risk of:  Goal: Will remain free from falls  Description  Will remain free from falls  Outcome: Ongoing  Note:   Pt using call light appropriately to call for assistance. Pt reports understanding of fall prevention when discussed. Problem: Falls - Risk of:  Goal: Absence of physical injury  Description  Absence of physical injury  Outcome: Ongoing  Note:   Pt using call light appropriately to call for assistance. Pt reports understanding of fall prevention when discussed. Problem: Pain:  Goal: Pain level will decrease  Description  Pain level will decrease  Outcome: Ongoing  Note:   Pt report pain at 0-6 on scale. Pt states oral medication helping to achieve pain goal of a 5 on scale. Problem: Pain:  Goal: Control of acute pain  Description  Control of acute pain  Outcome: Ongoing  Note:   Pt report pain at 0-6 on scale. Pt states oral medication helping to achieve pain goal of a 5 on scale. Problem: Pain:  Goal: Control of chronic pain  Description  Control of chronic pain  Outcome: Ongoing  Note:   Pt report pain at 0-6 on scale. Pt states oral medication helping to achieve pain goal of a 5 on scale. Problem: Discharge Planning:  Goal: Knowledge of discharge instructions  Description  Knowledge of discharge instructions  Outcome: Ongoing  Note:   Pt plans ? at discharge. Care manager and social working helping with discharge needs. Problem: Infection - Surgical Site:  Goal: Signs of wound healing will improve  Description  Signs of wound healing will improve  Outcome: Ongoing  Note:   Pt's surgical incision healing. Dressing is dry and intact and not due to be changed today. No other skin impairments noted. Pt understands the importance of frequent repositioning in order to prevent any skin breakdown.       Problem: Mobility - Impaired:  Goal: Achieve maximum mobility level  Description  Achieve maximum mobility level  Outcome: Ongoing  Note:   Up with 1 assist,

## 2020-01-24 NOTE — PROGRESS NOTES
goal 2: sit <> stand with mod I for ease of transfers   Short term goal 3: ambulate > 150ft with use of LRAd and supervision assist for safe in home/community gait   Short term goal 4: 3 steps with bilat hand rails available while managing use of LRAD with stand-by assist to prepare for safe home entry/exit  Short term goal 5: car transfer with stand-by assist to prepare safe transport   Long term goals  Time Frame for Long term goals : N/A secondary to short ELOS    Following session, patient left in safe position with all fall risk precautions in place.

## 2020-01-24 NOTE — CARE COORDINATION
1/24/20, 2:00 PM  Doctor Kerrie 89 day: 2  Location: -11/011-A Reason for admit: Closed subcapital fracture of femur, right, initial encounter ([facility] [S72.011A]   Treatment plan of care: S/P right total hip repair yesterday. PT/OT following, pain management continues, monitoring labs. Barriers to Discharge: medical stability, pre cert for TCU  PCP: Xiomara Arana MD  Readmission Risk Score: 12%  Patient Discharge Plan: Pt from home alone. Plan is to start insurance pre-cert after PT/OT work with patient.

## 2020-01-24 NOTE — PROGRESS NOTES
Consult received. Chart reviewed. Spoke with patient and her daughter about TCU. They would like TCU at discharge. Precert initiated.

## 2020-01-25 PROCEDURE — 97116 GAIT TRAINING THERAPY: CPT

## 2020-01-25 PROCEDURE — 1200000000 HC SEMI PRIVATE

## 2020-01-25 PROCEDURE — 6370000000 HC RX 637 (ALT 250 FOR IP): Performed by: ORTHOPAEDIC SURGERY

## 2020-01-25 PROCEDURE — 97110 THERAPEUTIC EXERCISES: CPT

## 2020-01-25 PROCEDURE — 2580000003 HC RX 258: Performed by: ORTHOPAEDIC SURGERY

## 2020-01-25 PROCEDURE — 97530 THERAPEUTIC ACTIVITIES: CPT

## 2020-01-25 PROCEDURE — 97535 SELF CARE MNGMENT TRAINING: CPT

## 2020-01-25 RX ADMIN — LEVOTHYROXINE SODIUM 50 MCG: 50 TABLET ORAL at 06:19

## 2020-01-25 RX ADMIN — DOCUSATE SODIUM 100 MG: 100 CAPSULE, LIQUID FILLED ORAL at 22:46

## 2020-01-25 RX ADMIN — Medication 10 ML: at 09:24

## 2020-01-25 RX ADMIN — SENNOSIDES AND DOCUSATE SODIUM 1 TABLET: 8.6; 5 TABLET ORAL at 22:46

## 2020-01-25 RX ADMIN — Medication 10 ML: at 22:48

## 2020-01-25 RX ADMIN — MAGNESIUM HYDROXIDE 30 ML: 400 SUSPENSION ORAL at 09:24

## 2020-01-25 RX ADMIN — HYDROCODONE BITARTRATE AND ACETAMINOPHEN 1 TABLET: 5; 325 TABLET ORAL at 13:26

## 2020-01-25 RX ADMIN — TRAMADOL HYDROCHLORIDE 50 MG: 50 TABLET, FILM COATED ORAL at 06:23

## 2020-01-25 RX ADMIN — HYDROCODONE BITARTRATE AND ACETAMINOPHEN 1 TABLET: 5; 325 TABLET ORAL at 09:23

## 2020-01-25 RX ADMIN — HYDROCODONE BITARTRATE AND ACETAMINOPHEN 1 TABLET: 5; 325 TABLET ORAL at 03:31

## 2020-01-25 RX ADMIN — SIMVASTATIN 10 MG: 10 TABLET, FILM COATED ORAL at 17:01

## 2020-01-25 RX ADMIN — HYDROCODONE BITARTRATE AND ACETAMINOPHEN 1 TABLET: 5; 325 TABLET ORAL at 18:10

## 2020-01-25 RX ADMIN — TRIAMTERENE AND HYDROCHLOROTHIAZIDE 1 TABLET: 37.5; 25 TABLET ORAL at 09:23

## 2020-01-25 RX ADMIN — ASPIRIN 325 MG: 325 TABLET, DELAYED RELEASE ORAL at 09:24

## 2020-01-25 RX ADMIN — SENNOSIDES AND DOCUSATE SODIUM 1 TABLET: 8.6; 5 TABLET ORAL at 09:23

## 2020-01-25 RX ADMIN — HYDROCODONE BITARTRATE AND ACETAMINOPHEN 1 TABLET: 5; 325 TABLET ORAL at 22:46

## 2020-01-25 RX ADMIN — ACETAMINOPHEN 650 MG: 325 TABLET ORAL at 09:24

## 2020-01-25 RX ADMIN — DOCUSATE SODIUM 100 MG: 100 CAPSULE, LIQUID FILLED ORAL at 09:23

## 2020-01-25 ASSESSMENT — PAIN SCALES - GENERAL
PAINLEVEL_OUTOF10: 6
PAINLEVEL_OUTOF10: 4
PAINLEVEL_OUTOF10: 8
PAINLEVEL_OUTOF10: 6
PAINLEVEL_OUTOF10: 5
PAINLEVEL_OUTOF10: 8
PAINLEVEL_OUTOF10: 5

## 2020-01-25 ASSESSMENT — PAIN DESCRIPTION - ORIENTATION
ORIENTATION: RIGHT
ORIENTATION: RIGHT

## 2020-01-25 ASSESSMENT — PAIN DESCRIPTION - PROGRESSION
CLINICAL_PROGRESSION: NOT CHANGED
CLINICAL_PROGRESSION: GRADUALLY WORSENING
CLINICAL_PROGRESSION: NOT CHANGED

## 2020-01-25 ASSESSMENT — PAIN DESCRIPTION - ONSET
ONSET: ON-GOING
ONSET: ON-GOING

## 2020-01-25 ASSESSMENT — PAIN DESCRIPTION - PAIN TYPE
TYPE: SURGICAL PAIN
TYPE: SURGICAL PAIN;ACUTE PAIN

## 2020-01-25 ASSESSMENT — PAIN DESCRIPTION - LOCATION
LOCATION: HIP
LOCATION: HIP

## 2020-01-25 ASSESSMENT — PAIN DESCRIPTION - DESCRIPTORS
DESCRIPTORS: ACHING
DESCRIPTORS: ACHING

## 2020-01-25 ASSESSMENT — PAIN - FUNCTIONAL ASSESSMENT
PAIN_FUNCTIONAL_ASSESSMENT: PREVENTS OR INTERFERES SOME ACTIVE ACTIVITIES AND ADLS
PAIN_FUNCTIONAL_ASSESSMENT: PREVENTS OR INTERFERES SOME ACTIVE ACTIVITIES AND ADLS

## 2020-01-25 ASSESSMENT — PAIN DESCRIPTION - FREQUENCY
FREQUENCY: CONTINUOUS
FREQUENCY: CONTINUOUS

## 2020-01-25 NOTE — PROGRESS NOTES
discharge  Short term goal 1: bed mobility with mod I for ease of getting in/out of bed  Short term goal 2: sit <> stand with mod I for ease of transfers   Short term goal 3: ambulate > 150ft with use of LRAd and supervision assist for safe in home/community gait   Short term goal 4: 3 steps with bilat hand rails available while managing use of LRAD with stand-by assist to prepare for safe home entry/exit  Short term goal 5: car transfer with stand-by assist to prepare safe transport   Long term goals  Time Frame for Long term goals : N/A secondary to short ELOS    Following session, patient left in safe position with all fall risk precautions in place.

## 2020-01-25 NOTE — PROGRESS NOTES
Progress Note    Subjective:     Post-Operative Day: 2 Status Post right Total Hip Arthroplasty for fracture  Systemic or Specific Complaints:No Complaints    Objective:     VITALS:  BP (!) 126/58   Pulse 91   Temp 97.4 °F (36.3 °C) (Oral)   Resp 16   Ht 5' 3\" (1.6 m)   Wt 140 lb (63.5 kg)   SpO2 97%   BMI 24.80 kg/m²   24HR INTAKE/OUTPUT:    Intake/Output Summary (Last 24 hours) at 1/25/2020 1218  Last data filed at 1/25/2020 0331  Gross per 24 hour   Intake 1050 ml   Output 1600 ml   Net -550 ml       General: alert, appears stated age and cooperative   Wound: Wound clean and dry no evidence of infection. and No Erythema   DVT Exam: No evidence of DVT seen on physical exam.  Negative Moreno's sign. NVI in lower extremity. Thigh swollen but soft. Moving foot and ankle. Data Review  CBC:   Lab Results   Component Value Date    WBC 13.1 01/24/2020    RBC 3.51 01/24/2020    HGB 11.4 01/24/2020    HCT 34.1 01/24/2020     01/24/2020     BMP:   Lab Results   Component Value Date     01/22/2020    K 4.4 01/22/2020    CL 96 01/22/2020    CO2 28 01/22/2020    BUN 21 01/22/2020    CREATININE 0.7 01/22/2020    GLUCOSE 116 01/22/2020       Assessment:     Status Post right Total Hip Arthroplasty. Doing well postoperatively.      Plan:      1:  Continue Total Hip Replacement protocol   2:  Continue Deep venous thrombosis prophylaxis  3:  Continue physical therapy with Total Hip precautions  4:  Continue Pain Control  5:  TCU

## 2020-01-25 NOTE — PLAN OF CARE
Problem: Falls - Risk of:  Goal: Will remain free from falls  Description  Will remain free from falls   1/24/2020 2105 by Yanet Barrera RN  Outcome: Ongoing  Note:   Pt using call light appropriately to call for assistance with ambulation to the bathroom and to chair. Pt is also compliant with use of non-skid slippers. Pt reports understanding of fall prevention when discussed. Problem: Falls - Risk of:  Goal: Absence of physical injury  Description  Absence of physical injury   1/24/2020 2105 by Yanet Barrera RN  Outcome: Ongoing  Note:   Pt using call light appropriately to call for assistance with ambulation to the bathroom and to chair. Pt is also compliant with use of non-skid slippers. Pt reports understanding of fall prevention when discussed. Problem: Pain:  Goal: Pain level will decrease  Description  Pain level will decrease   1/24/2020 2105 by Yanet Barrera RN  Outcome: Ongoing  Note:   Pt report pain at 0-7 on scale. Pt states oral medication helping to achieve pain goal of a 5 on scale. Problem: Pain:  Goal: Control of acute pain  Description  Control of acute pain   1/24/2020 2105 by Yanet Barrera RN  Outcome: Ongoing  Note:   Pt report pain at 0-7 on scale. Pt states oral medication helping to achieve pain goal of a 5 on scale. Problem: Pain:  Goal: Control of chronic pain  Description  Control of chronic pain   1/24/2020 2105 by Yanet Barrera RN  Outcome: Ongoing  Note:   Pt report pain at 0-7 on scale. Pt states oral medication helping to achieve pain goal of a 5 on scale. Problem: Discharge Planning:  Goal: Knowledge of discharge instructions  Description  Knowledge of discharge instructions   1/24/2020 2105 by Yanet Barrera RN  Outcome: Ongoing  Note:   Pt plans tcu? at discharge. Care manager and social working helping with discharge needs.        Problem: Infection - Surgical Site:  Goal: Signs of wound healing will improve  Description  Signs of wound healing will improve   1/24/2020 2105 by Max Kelley RN  Outcome: Ongoing  Note:   Dressing dry and intact. Unable to visualize surgical incision due to surgical dressing. Dressing not to be removed today. No fevers, tachycardia, hypotension noted. Pt denies any complaints      Problem: Mobility - Impaired:  Goal: Achieve maximum mobility level  Description  Achieve maximum mobility level   1/24/2020 2105 by Max Kelley RN  Outcome: Ongoing  Note:   Up with 1 assist, walker, and gait belt. Requires assistance to mobilize out of bed, chair and to bathroom. Independent once set up for meals and bathing. Problem: DISCHARGE BARRIERS  Goal: Patient's continuum of care needs are met  1/24/2020 2105 by Max Kelley RN  Outcome: Ongoing  Note:   Pt plans tcu? at discharge. Care manager and social working helping with discharge needs. Problem: Risk for Impaired Skin Integrity  Goal: Tissue integrity - skin and mucous membranes  Description  Structural intactness and normal physiological function of skin and  mucous membranes. Outcome: Completed   Care plan reviewed with patient. Patient verbalize understanding of the plan of care and contribute to goal setting.

## 2020-01-25 NOTE — PLAN OF CARE
Problem: Falls - Risk of:  Goal: Will remain free from falls  Description  Will remain free from falls   Outcome: Ongoing  Note:   Pt using call light appropriately to call for assistance with ambulation to the bathroom and to chair. Pt is also compliant with use of non-skid slippers. Pt reports understanding of fall prevention when discussed. Problem: Falls - Risk of:  Goal: Absence of physical injury  Description  Absence of physical injury   Outcome: Ongoing  Note:   Pt using call light appropriately to call for assistance with ambulation to the bathroom and to chair. Pt is also compliant with use of non-skid slippers. Pt reports understanding of fall prevention when discussed. Problem: Pain:  Goal: Pain level will decrease  Description  Pain level will decrease   Outcome: Ongoing  Note:   Pt report pain at 5-6 on scale. Pt states oral medication helping to achieve pain goal of a 5 on scale. Problem: Pain:  Goal: Control of acute pain  Description  Control of acute pain   Outcome: Ongoing  Note:   Pt report pain at 5-6 on scale. Pt states oral medication helping to achieve pain goal of a 5 on scale. Problem: Pain:  Goal: Control of chronic pain  Description  Control of chronic pain   Outcome: Ongoing  Note:   Pt report pain at 5-6 on scale. Pt states oral medication helping to achieve pain goal of a 5 on scale. Problem: Discharge Planning:  Goal: Knowledge of discharge instructions  Description  Knowledge of discharge instructions   Outcome: Ongoing  Note:   Pt plans tcu? at discharge. Care manager and social working helping with discharge needs. Problem: Infection - Surgical Site:  Goal: Signs of wound healing will improve  Description  Signs of wound healing will improve   Outcome: Ongoing  Note:   Dressing dry and intact. Unable to visualize surgical incision due to surgical dressing. Dressing not to be removed today. No fevers, tachycardia, hypotension noted.  Pt denies any

## 2020-01-25 NOTE — PROGRESS NOTES
[address] [address]  INPATIENT PHYSICAL THERAPY  DAILY NOTE  [facility] ORTHOPEDICS 7K - 7K-11/011-A    Time In: 0815  Time Out: 0831  Timed Code Treatment Minutes: 16 Minutes  Minutes: 16          Date: 2020  Patient Name: Adrienne Block,  Gender:  female        MRN: [id number]  : 1931  (80 y.o.)     Referring Practitioner: Papo Reynolds MD     Additional Pertinent Hx: Pt is s/p fall resulting in Displaced right intracapsular hip fracture. Pt is s/p LINDA on right LE and is now WBAT. Prior Level of Function:  Lives With: Alone  Type of Home: House  Home Layout: One level  Home Access: Stairs to enter with rails  Entrance Stairs - Number of Steps: 3  Entrance Stairs - Rails: Both  Home Equipment: Cane, Rolling walker   Bathroom Shower/Tub: Tub/Shower unit  Bathroom Toilet: Handicap height  Bathroom Equipment: Tub transfer bench  Bathroom Accessibility: Accessible    Receives Help From: Family  ADL Assistance: [facility]: Independent  Homemaking Responsibilities: Yes  Ambulation Assistance: Independent  Transfer Assistance: Independent  Active : Yes  Additional Comments: Pt. very active before surgery and managed all ADL/IADL tasks. Pt. has daughter near by to help and is a physical therapist and will help in discharge planning. Restrictions/Precautions:  Restrictions/Precautions: Fall Risk, Weight Bearing  Right Lower Extremity Weight Bearing: Weight Bearing As Tolerated  Position Activity Restriction  Hip Precautions: No hip flexion > 90 degrees, No ABduction, No ADduction, No hip internal rotation    SUBJECTIVE: RN approved tx session reporting pt was dizzy throughout the night, with BP dropping while on bedside commode. Pt in bed as therapist arrived, reporting increased hip and back pain. Educated pt on getting OOB and increased pain 2 days post op.  Pt agreeable to get to chair to complete breakfast.     PAIN: 8/10 R hip and back     OBJECTIVE:  Bed Mobility: Education  Patient Education: [employer] Mobility, Transfers, Gait, Use of Gait Belt    Goals:  Patient goals : return home  Short term goals  Time Frame for Short term goals: by discharge  Short term goal 1: bed mobility with mod I for ease of getting in/out of bed  Short term goal 2: sit <> stand with mod I for ease of transfers   Short term goal 3: ambulate > 150ft with use of LRAd and supervision assist for safe in home/community gait   Short term goal 4: 3 steps with bilat hand rails available while managing use of LRAD with stand-by assist to prepare for safe home entry/exit  Short term goal 5: car transfer with stand-by assist to prepare safe transport   Long term goals  Time Frame for Long term goals : N/A secondary to short ELOS    Following session, patient left in safe position with all fall risk precautions in place.

## 2020-01-25 NOTE — PLAN OF CARE
Problem: Falls - Risk of:  Goal: Will remain free from falls  Description  Will remain free from falls   1/24/2020 2203 by Kervin Ferraro RN  Outcome: Ongoing  Note:   Patient using call light appropriately to call for assistance with ambulation to the bathroom and to chair. Patient is compliant with use of non-skid slippers. Patient reports understanding of fall prevention when discussed. Bed alarm is in place. 1/24/2020 2105 by Shelby Mujica RN  Outcome: Ongoing  Note:   Pt using call light appropriately to call for assistance with ambulation to the bathroom and to chair. Pt is also compliant with use of non-skid slippers. Pt reports understanding of fall prevention when discussed. 1/24/2020 1720 by Shelby Mujica RN  Reactivated  1/24/2020 1717 by Shelby Mujica RN  Outcome: Completed  Goal: Absence of physical injury  Description  Absence of physical injury   1/24/2020 2203 by Kervin Ferraro RN  Outcome: Ongoing  Note:   Patient is free from physical injury at this time. 1/24/2020 2105 by Shelby Mujica RN  Outcome: Ongoing  Note:   Pt using call light appropriately to call for assistance with ambulation to the bathroom and to chair. Pt is also compliant with use of non-skid slippers. Pt reports understanding of fall prevention when discussed. 1/24/2020 1720 by Shelby Mujica RN  Reactivated  1/24/2020 1717 by Shelby Mujica RN  Outcome: Completed     Problem: Pain:  Goal: Pain level will decrease  Description  Pain level will decrease   1/24/2020 2203 by Kervin Ferraro RN  Outcome: Ongoing  Note:   Patient reports pain at a 6 on scale. Patient states oral medication helping to achieve pain goal of a 5 on scale. Patient is able to reposition for comfort, ice applied to right hip, and pillows used for support. 1/24/2020 2105 by Shelby Mujica RN  Outcome: Ongoing  Note:   Pt report pain at 0-7 on scale. Pt states oral medication helping to achieve pain goal of a 5 on scale. surgical incision due to surgical dressing. Dressing not to be removed today. No fevers, tachycardia, hypotension noted. Pt denies any complaints   1/24/2020 1720 by Mario Edmondson RN  Reactivated  1/24/2020 1717 by Mario Edmondson RN  Outcome: Completed     Problem: Mobility - Impaired:  Goal: Achieve maximum mobility level  Description  Achieve maximum mobility level   1/24/2020 2203 by Demetria Tian RN  Outcome: Ongoing  Note:   Patient is up with 1 assist, walker, and gait belt to the bathroom and chair. Gait is slow and steady. 1/24/2020 2105 by Mario Edmondson RN  Outcome: Ongoing  Note:   Up with 1 assist, walker, and gait belt. Requires assistance to mobilize out of bed, chair and to bathroom. Independent once set up for meals and bathing.    1/24/2020 1721 by Mario Edmondson RN  Reactivated  1/24/2020 1717 by Mario Edmondson RN  Outcome: Completed     Problem: DISCHARGE BARRIERS  Goal: Patient's continuum of care needs are met  1/24/2020 2203 by Demetria Tian RN  Outcome: Ongoing  Note:   Patient plans TCU at discharge.  assisting with discharge planning. 1/24/2020 2105 by Mario Edmondson RN  Outcome: Ongoing  Note:   Pt plans tcu? at discharge. Care manager and social working helping with discharge needs. 1/24/2020 1721 by Mario Edmondson RN  Reactivated  1/24/2020 1717 by Mario Edmondson RN  Outcome: Completed     Care plan reviewed with patient. Patient verbalizes understanding of the plan of care and contributes to goal setting.

## 2020-01-25 NOTE — PROGRESS NOTES
[address] [facility]  Occupational Therapy  Daily Note  Time:   Time In: 6796  Time Out: 1032  Timed Code Treatment Minutes: 30 Minutes  Minutes: 30          Date: 2020  Patient Name: Ganga Frausto,   Gender: female      Room: [facility]011-A  MRN: [id number]  : 1931  (80 y.o.)  Referring Practitioner: Tiffany Cox MD  Diagnosis: Closed subcaital fracture of femur- right   Additional Pertinent Hx: Ganga Frausto is a 80 y.o. female who presents to the Emergency Department for the evaluation of fall, right hip and groin pain. X-rays completed and note patient sustained an acute right subcapital hip fracture. Right total hip replacementstandard direct lateral approach    Restrictions/Precautions:  Restrictions/Precautions: Fall Risk, Weight Bearing  Right Lower Extremity Weight Bearing: Weight Bearing As Tolerated  Position Activity Restriction  Hip Precautions: No hip flexion > 90 degrees, No ABduction, No ADduction, No hip internal rotation    SUBJECTIVE: Nurse Kemper Scheuermann ok'd session. Up in chair upon arrival, son present, agreeable to OT session    PAIN: 210: R hip    COGNITION: WFL    ADL:   Grooming: with set-up. combed hair sitting in bedside chair  Lower Extremity Dressing: with set-up. For donning slipper socks  used reacher and sock aid, Moderate VC given for technique. ADDITIONAL ACTIVITIES:  Educated patient and son regarding purchasing options for hip kit, voiced understanding and appreciation    Able to recall 2/3 hip precautions    ASSESSMENT:     Activity Tolerance:  Patient tolerance of  treatment: good.        Discharge Recommendations: Subacute/Skilled Nursing Facility  Equipment Recommendations: Equipment Needed: Yes  Other: Pt. may need LHAE d/t hip precautions, will continue to assess  Plan: Times per week: 6x  Current Treatment Recommendations: Strengthening, Safety Education & Training, Balance Training, Patient/Caregiver Education & Training, Self-Care /

## 2020-01-26 PROCEDURE — 6370000000 HC RX 637 (ALT 250 FOR IP): Performed by: ORTHOPAEDIC SURGERY

## 2020-01-26 PROCEDURE — 97116 GAIT TRAINING THERAPY: CPT

## 2020-01-26 PROCEDURE — 2580000003 HC RX 258: Performed by: ORTHOPAEDIC SURGERY

## 2020-01-26 PROCEDURE — 1200000000 HC SEMI PRIVATE

## 2020-01-26 PROCEDURE — 97530 THERAPEUTIC ACTIVITIES: CPT

## 2020-01-26 RX ADMIN — LEVOTHYROXINE SODIUM 50 MCG: 50 TABLET ORAL at 05:33

## 2020-01-26 RX ADMIN — TRAMADOL HYDROCHLORIDE 50 MG: 50 TABLET, FILM COATED ORAL at 11:21

## 2020-01-26 RX ADMIN — HYDROCODONE BITARTRATE AND ACETAMINOPHEN 1 TABLET: 5; 325 TABLET ORAL at 08:27

## 2020-01-26 RX ADMIN — SENNOSIDES AND DOCUSATE SODIUM 1 TABLET: 8.6; 5 TABLET ORAL at 08:27

## 2020-01-26 RX ADMIN — ASPIRIN 325 MG: 325 TABLET, DELAYED RELEASE ORAL at 08:27

## 2020-01-26 RX ADMIN — TRIAMTERENE AND HYDROCHLOROTHIAZIDE 1 TABLET: 37.5; 25 TABLET ORAL at 08:27

## 2020-01-26 RX ADMIN — HYDROCODONE BITARTRATE AND ACETAMINOPHEN 1 TABLET: 5; 325 TABLET ORAL at 14:52

## 2020-01-26 RX ADMIN — Medication 10 ML: at 11:21

## 2020-01-26 RX ADMIN — SIMVASTATIN 10 MG: 10 TABLET, FILM COATED ORAL at 17:29

## 2020-01-26 RX ADMIN — HYDROCODONE BITARTRATE AND ACETAMINOPHEN 1 TABLET: 5; 325 TABLET ORAL at 05:33

## 2020-01-26 RX ADMIN — DOCUSATE SODIUM 100 MG: 100 CAPSULE, LIQUID FILLED ORAL at 08:27

## 2020-01-26 RX ADMIN — DOCUSATE SODIUM 100 MG: 100 CAPSULE, LIQUID FILLED ORAL at 21:01

## 2020-01-26 RX ADMIN — Medication 10 ML: at 21:01

## 2020-01-26 RX ADMIN — SENNOSIDES AND DOCUSATE SODIUM 1 TABLET: 8.6; 5 TABLET ORAL at 21:01

## 2020-01-26 RX ADMIN — HYDROCODONE BITARTRATE AND ACETAMINOPHEN 1 TABLET: 5; 325 TABLET ORAL at 21:01

## 2020-01-26 RX ADMIN — TRAMADOL HYDROCHLORIDE 50 MG: 50 TABLET, FILM COATED ORAL at 17:29

## 2020-01-26 ASSESSMENT — PAIN DESCRIPTION - ONSET
ONSET: ON-GOING
ONSET: ON-GOING

## 2020-01-26 ASSESSMENT — PAIN DESCRIPTION - LOCATION
LOCATION: HIP

## 2020-01-26 ASSESSMENT — PAIN SCALES - GENERAL
PAINLEVEL_OUTOF10: 6
PAINLEVEL_OUTOF10: 8
PAINLEVEL_OUTOF10: 6
PAINLEVEL_OUTOF10: 8
PAINLEVEL_OUTOF10: 7
PAINLEVEL_OUTOF10: 7
PAINLEVEL_OUTOF10: 5
PAINLEVEL_OUTOF10: 7
PAINLEVEL_OUTOF10: 8
PAINLEVEL_OUTOF10: 7
PAINLEVEL_OUTOF10: 6
PAINLEVEL_OUTOF10: 8
PAINLEVEL_OUTOF10: 8
PAINLEVEL_OUTOF10: 7

## 2020-01-26 ASSESSMENT — PAIN DESCRIPTION - DESCRIPTORS
DESCRIPTORS: ACHING
DESCRIPTORS: ACHING

## 2020-01-26 ASSESSMENT — PAIN DESCRIPTION - ORIENTATION
ORIENTATION: RIGHT

## 2020-01-26 ASSESSMENT — PAIN DESCRIPTION - PROGRESSION
CLINICAL_PROGRESSION: NOT CHANGED
CLINICAL_PROGRESSION: GRADUALLY WORSENING

## 2020-01-26 ASSESSMENT — PAIN DESCRIPTION - PAIN TYPE
TYPE: SURGICAL PAIN

## 2020-01-26 ASSESSMENT — PAIN DESCRIPTION - FREQUENCY
FREQUENCY: CONTINUOUS
FREQUENCY: CONTINUOUS

## 2020-01-26 NOTE — PLAN OF CARE
plans ECF at discharge. Care manager and social working helping with discharge needs. Care plan reviewed with patient. Patient verbalizes understanding of the plan of care and contribute to goal setting.

## 2020-01-26 NOTE — PLAN OF CARE
Problem: Falls - Risk of:  Goal: Will remain free from falls  Description  Will remain free from falls   Outcome: Ongoing  Note:   No falls this shift. Pt using call light appropriately to call for assistance with ambulation to the bathroom and to chair. Pt is also compliant with use of non-skid slippers. Pt reports understanding of fall prevention when discussed. Goal: Absence of physical injury  Description  Absence of physical injury   Outcome: Ongoing  Note:   No falls this shift. Pt using call light appropriately to call for assistance with ambulation to the bathroom and to chair. Pt is also compliant with use of non-skid slippers. Pt reports understanding of fall prevention when discussed. Problem: Pain:  Goal: Pain level will decrease  Description  Pain level will decrease   Outcome: Ongoing  Note:   Pt report pain at 5-8 on scale. Pt states oral medication helping to achieve pain goal of a 5 on scale. Goal: Control of acute pain  Description  Control of acute pain   Outcome: Ongoing  Note:   Pt report pain at 5-8 on scale. Pt states oral medication helping to achieve pain goal of a 5 on scale. Goal: Control of chronic pain  Description  Control of chronic pain   Outcome: Ongoing  Note:   Pt report pain at 5-8 on scale. Pt states oral medication helping to achieve pain goal of a 5 on scale. Problem: Discharge Planning:  Goal: Knowledge of discharge instructions  Description  Knowledge of discharge instructions   Outcome: Ongoing  Note:   Pt plans TCU at discharge. Care manager and social working helping with discharge needs. Problem: Infection - Surgical Site:  Goal: Signs of wound healing will improve  Description  Signs of wound healing will improve   Outcome: Ongoing  Note:   Afebrile this shift. No other s/s of infection noted. Will continue to monitor.       Problem: Mobility - Impaired:  Goal: Achieve maximum mobility level  Description  Achieve maximum mobility level   Outcome: Ongoing  Note:   Up with 1 assist, walker, and gait belt, and with steady gait. Tolerates working with PT and OT well. Problem: DISCHARGE BARRIERS  Goal: Patient's continuum of care needs are met  Outcome: Ongoing  Note:   Pt plans TCU at discharge. Care manager and social working helping with discharge needs. Care plan reviewed with patient. Patient verbalize understanding of the plan of care and contribute to goal setting.

## 2020-01-27 ENCOUNTER — HOSPITAL ENCOUNTER (INPATIENT)
Age: 85
LOS: 12 days | Discharge: HOME | DRG: 560 | End: 2020-02-08
Attending: FAMILY MEDICINE | Admitting: FAMILY MEDICINE
Payer: MEDICARE

## 2020-01-27 VITALS
WEIGHT: 140 LBS | HEIGHT: 63 IN | SYSTOLIC BLOOD PRESSURE: 115 MMHG | DIASTOLIC BLOOD PRESSURE: 61 MMHG | HEART RATE: 76 BPM | TEMPERATURE: 97.2 F | RESPIRATION RATE: 16 BRPM | BODY MASS INDEX: 24.8 KG/M2 | OXYGEN SATURATION: 94 %

## 2020-01-27 PROBLEM — Z96.641 HISTORY OF TOTAL RIGHT HIP ARTHROPLASTY: Status: ACTIVE | Noted: 2020-01-27

## 2020-01-27 PROCEDURE — 97116 GAIT TRAINING THERAPY: CPT

## 2020-01-27 PROCEDURE — 6370000000 HC RX 637 (ALT 250 FOR IP): Performed by: ORTHOPAEDIC SURGERY

## 2020-01-27 PROCEDURE — 2580000003 HC RX 258: Performed by: ORTHOPAEDIC SURGERY

## 2020-01-27 PROCEDURE — 94760 N-INVAS EAR/PLS OXIMETRY 1: CPT

## 2020-01-27 PROCEDURE — 0220000000 HC SKILLED NURSING FACILITY

## 2020-01-27 PROCEDURE — 97110 THERAPEUTIC EXERCISES: CPT

## 2020-01-27 PROCEDURE — 1290000000 HC SEMI PRIVATE OTHER R&B

## 2020-01-27 RX ORDER — ACETAMINOPHEN 325 MG/1
650 TABLET ORAL EVERY 6 HOURS
Status: DISCONTINUED | OUTPATIENT
Start: 2020-01-27 | End: 2020-02-04

## 2020-01-27 RX ORDER — SIMVASTATIN 10 MG
10 TABLET ORAL EVERY EVENING
Status: DISCONTINUED | OUTPATIENT
Start: 2020-01-27 | End: 2020-02-08 | Stop reason: HOSPADM

## 2020-01-27 RX ORDER — CALCIUM CARBONATE/VITAMIN D3 250-3.125
1 TABLET ORAL DAILY
Status: DISCONTINUED | OUTPATIENT
Start: 2020-01-28 | End: 2020-02-08 | Stop reason: HOSPADM

## 2020-01-27 RX ORDER — MULTIVITAMIN WITH FOLIC ACID 400 MCG
1 TABLET ORAL DAILY
Status: DISCONTINUED | OUTPATIENT
Start: 2020-01-28 | End: 2020-02-08 | Stop reason: HOSPADM

## 2020-01-27 RX ORDER — DOCUSATE SODIUM 100 MG/1
100 CAPSULE, LIQUID FILLED ORAL 2 TIMES DAILY
Status: DISCONTINUED | OUTPATIENT
Start: 2020-01-27 | End: 2020-02-08 | Stop reason: HOSPADM

## 2020-01-27 RX ORDER — ACETAMINOPHEN 325 MG/1
650 TABLET ORAL EVERY 6 HOURS
Status: CANCELLED | OUTPATIENT
Start: 2020-01-27

## 2020-01-27 RX ORDER — HYDROCODONE BITARTRATE AND ACETAMINOPHEN 5; 325 MG/1; MG/1
1 TABLET ORAL EVERY 4 HOURS PRN
Status: CANCELLED | OUTPATIENT
Start: 2020-01-27

## 2020-01-27 RX ORDER — TRAMADOL HYDROCHLORIDE 50 MG/1
50 TABLET ORAL EVERY 6 HOURS PRN
Status: DISCONTINUED | OUTPATIENT
Start: 2020-01-27 | End: 2020-02-08 | Stop reason: HOSPADM

## 2020-01-27 RX ORDER — DOCUSATE SODIUM 100 MG/1
100 CAPSULE, LIQUID FILLED ORAL 2 TIMES DAILY
Status: CANCELLED | OUTPATIENT
Start: 2020-01-27

## 2020-01-27 RX ORDER — HYDROCODONE BITARTRATE AND ACETAMINOPHEN 5; 325 MG/1; MG/1
1 TABLET ORAL EVERY 4 HOURS PRN
Status: DISCONTINUED | OUTPATIENT
Start: 2020-01-27 | End: 2020-02-06

## 2020-01-27 RX ORDER — SIMVASTATIN 10 MG
10 TABLET ORAL EVERY EVENING
Status: CANCELLED | OUTPATIENT
Start: 2020-01-27

## 2020-01-27 RX ORDER — TRIAMTERENE AND HYDROCHLOROTHIAZIDE 37.5; 25 MG/1; MG/1
1 TABLET ORAL DAILY
Status: CANCELLED | OUTPATIENT
Start: 2020-01-28

## 2020-01-27 RX ORDER — TRIAMTERENE AND HYDROCHLOROTHIAZIDE 37.5; 25 MG/1; MG/1
1 TABLET ORAL DAILY
Status: DISCONTINUED | OUTPATIENT
Start: 2020-01-28 | End: 2020-02-08 | Stop reason: HOSPADM

## 2020-01-27 RX ORDER — SENNA AND DOCUSATE SODIUM 50; 8.6 MG/1; MG/1
1 TABLET, FILM COATED ORAL 2 TIMES DAILY
Status: CANCELLED | OUTPATIENT
Start: 2020-01-27

## 2020-01-27 RX ORDER — SENNA AND DOCUSATE SODIUM 50; 8.6 MG/1; MG/1
1 TABLET, FILM COATED ORAL 2 TIMES DAILY
Status: DISCONTINUED | OUTPATIENT
Start: 2020-01-27 | End: 2020-02-08 | Stop reason: HOSPADM

## 2020-01-27 RX ORDER — TRAMADOL HYDROCHLORIDE 50 MG/1
50 TABLET ORAL EVERY 6 HOURS PRN
Status: CANCELLED | OUTPATIENT
Start: 2020-01-27

## 2020-01-27 RX ORDER — LEVOTHYROXINE SODIUM 0.05 MG/1
50 TABLET ORAL DAILY
Status: DISCONTINUED | OUTPATIENT
Start: 2020-01-28 | End: 2020-02-08 | Stop reason: HOSPADM

## 2020-01-27 RX ORDER — ONDANSETRON 2 MG/ML
4 INJECTION INTRAMUSCULAR; INTRAVENOUS EVERY 6 HOURS PRN
Status: CANCELLED | OUTPATIENT
Start: 2020-01-27

## 2020-01-27 RX ORDER — LEVOTHYROXINE SODIUM 0.05 MG/1
50 TABLET ORAL DAILY
Status: CANCELLED | OUTPATIENT
Start: 2020-01-28

## 2020-01-27 RX ORDER — ONDANSETRON 2 MG/ML
4 INJECTION INTRAMUSCULAR; INTRAVENOUS EVERY 6 HOURS PRN
Status: DISCONTINUED | OUTPATIENT
Start: 2020-01-27 | End: 2020-01-28

## 2020-01-27 RX ADMIN — ACETAMINOPHEN 650 MG: 325 TABLET ORAL at 16:51

## 2020-01-27 RX ADMIN — ACETAMINOPHEN 650 MG: 325 TABLET ORAL at 23:26

## 2020-01-27 RX ADMIN — MAGNESIUM HYDROXIDE 30 ML: 400 SUSPENSION ORAL at 08:35

## 2020-01-27 RX ADMIN — HYDROCODONE BITARTRATE AND ACETAMINOPHEN 1 TABLET: 5; 325 TABLET ORAL at 02:23

## 2020-01-27 RX ADMIN — TRAMADOL HYDROCHLORIDE 50 MG: 50 TABLET, FILM COATED ORAL at 06:04

## 2020-01-27 RX ADMIN — DOCUSATE SODIUM 100 MG: 100 CAPSULE, LIQUID FILLED ORAL at 08:34

## 2020-01-27 RX ADMIN — TRAMADOL HYDROCHLORIDE 50 MG: 50 TABLET, FILM COATED ORAL at 23:26

## 2020-01-27 RX ADMIN — TRIAMTERENE AND HYDROCHLOROTHIAZIDE 1 TABLET: 37.5; 25 TABLET ORAL at 08:35

## 2020-01-27 RX ADMIN — TRAMADOL HYDROCHLORIDE 50 MG: 50 TABLET, FILM COATED ORAL at 16:30

## 2020-01-27 RX ADMIN — Medication 10 ML: at 08:36

## 2020-01-27 RX ADMIN — DOCUSATE SODIUM 100 MG: 100 CAPSULE, LIQUID FILLED ORAL at 20:18

## 2020-01-27 RX ADMIN — SENNOSIDES AND DOCUSATE SODIUM 1 TABLET: 8.6; 5 TABLET ORAL at 20:18

## 2020-01-27 RX ADMIN — SIMVASTATIN 10 MG: 10 TABLET, FILM COATED ORAL at 17:50

## 2020-01-27 RX ADMIN — HYDROCODONE BITARTRATE AND ACETAMINOPHEN 1 TABLET: 5; 325 TABLET ORAL at 12:54

## 2020-01-27 RX ADMIN — SENNOSIDES AND DOCUSATE SODIUM 1 TABLET: 8.6; 5 TABLET ORAL at 08:34

## 2020-01-27 RX ADMIN — LEVOTHYROXINE SODIUM 50 MCG: 50 TABLET ORAL at 06:04

## 2020-01-27 RX ADMIN — HYDROCODONE BITARTRATE AND ACETAMINOPHEN 1 TABLET: 5; 325 TABLET ORAL at 07:16

## 2020-01-27 RX ADMIN — ASPIRIN 325 MG: 325 TABLET, DELAYED RELEASE ORAL at 08:34

## 2020-01-27 ASSESSMENT — PAIN DESCRIPTION - ORIENTATION
ORIENTATION: RIGHT

## 2020-01-27 ASSESSMENT — PAIN SCALES - GENERAL
PAINLEVEL_OUTOF10: 5
PAINLEVEL_OUTOF10: 0
PAINLEVEL_OUTOF10: 5
PAINLEVEL_OUTOF10: 6
PAINLEVEL_OUTOF10: 4
PAINLEVEL_OUTOF10: 9
PAINLEVEL_OUTOF10: 3
PAINLEVEL_OUTOF10: 7
PAINLEVEL_OUTOF10: 10
PAINLEVEL_OUTOF10: 5
PAINLEVEL_OUTOF10: 6
PAINLEVEL_OUTOF10: 0
PAINLEVEL_OUTOF10: 7

## 2020-01-27 ASSESSMENT — PAIN - FUNCTIONAL ASSESSMENT
PAIN_FUNCTIONAL_ASSESSMENT: PREVENTS OR INTERFERES SOME ACTIVE ACTIVITIES AND ADLS

## 2020-01-27 ASSESSMENT — PAIN DESCRIPTION - DESCRIPTORS
DESCRIPTORS: ACHING

## 2020-01-27 ASSESSMENT — PAIN DESCRIPTION - FREQUENCY
FREQUENCY: CONTINUOUS

## 2020-01-27 ASSESSMENT — PAIN DESCRIPTION - ONSET
ONSET: ON-GOING

## 2020-01-27 ASSESSMENT — PAIN DESCRIPTION - LOCATION
LOCATION: HIP

## 2020-01-27 ASSESSMENT — PAIN DESCRIPTION - PAIN TYPE
TYPE: SURGICAL PAIN

## 2020-01-27 ASSESSMENT — PAIN DESCRIPTION - PROGRESSION
CLINICAL_PROGRESSION: GRADUALLY IMPROVING

## 2020-01-27 NOTE — PROGRESS NOTES
on proper hand placement with functional transfers for increased safety. Ambulation:  Contact Guard Assistance  Distance: 40'x1  Surface: Level Tile  Device:Rolling Walker  Gait Deviations: Forward Flexed Posture, Slow Meseret, Decreased Step Length Bilaterally, Decreased Gait Speed, Decreased Heel Strike Bilaterally and Patient reported that towards the end of ambulation she started to feel dizzy. Exercise:  Patient was guided in 1 set(s) 15 reps of exercise to both lower extremities. Ankle pumps, Glut sets, Quad sets, Heelslides, Short arc quads and Seated hip flexion. Exercises were completed for increased independence with functional mobility. Functional Outcome Measures: Completed  AM-PAC Inpatient Mobility without Stair Climbing Raw Score : 14  AM-PAC Inpatient without Stair Climbing T-Scale Score : 40.85    ASSESSMENT:  Assessment: Patient progressing toward established goals. Patient required cues on proper hand placement with functional transfers for increased safety. Patient would benefit from continued therapy to increase strength, ROM, balance, endurance, and functional mobility. Activity Tolerance:  Patient tolerance of  treatment: good. Equipment Recommendations: Other: will cont to assess  Discharge Recommendations:  Continue to assess pending progress, Subacute/Skilled Nursing Facility    Plan: Times per week: 6xBID, 1xqd  Current Treatment Recommendations: Strengthening, Transfer Training, Endurance Training, Patient/Caregiver Education & Training, Balance Training, Gait Training, Home Exercise Program, Functional Mobility Training, Stair training, Safety Education & Training, Equipment Evaluation, Education, & procurement, ROM, Neuromuscular Re-education    Patient Education  Patient Education: Precautions/Restrictions, Transfers, Gait, Use of Gait Belt, Verbal Exercise Instruction,  - Patient Verbalized Understanding    Goals:  Patient goals : return home  Short term goals  Time Frame for Short term goals: by discharge  Short term goal 1: bed mobility with mod I for ease of getting in/out of bed  Short term goal 2: sit <> stand with mod I for ease of transfers   Short term goal 3: ambulate > 150ft with use of LRAd and supervision assist for safe in home/community gait   Short term goal 4: 3 steps with bilat hand rails available while managing use of LRAD with stand-by assist to prepare for safe home entry/exit  Short term goal 5: car transfer with stand-by assist to prepare safe transport   Long term goals  Time Frame for Long term goals : N/A secondary to short ELOS    Following session, patient left in safe position with all fall risk precautions in place.

## 2020-01-27 NOTE — PROGRESS NOTES
2 person skin check done by Leonora Torrez RN and Brenda Sky RN. She has a incision on the right hip which is closed with zipties.   On inner left ankle she has an abrasion

## 2020-01-27 NOTE — FLOWSHEET NOTE
[facility]  OCCUPATIONAL THERAPY MISSED TREATMENT NOTE  [facility] ORTHOPEDICS 7K  7K-11/011-A      Date: 2020  Patient Name: Bria Raza        CSN: [id number]   : 1931  (80 y.o.)  Gender: female   Referring Practitioner: Blossom Arce MD  Diagnosis: Closed subcaital fracture of femur- right          REASON FOR MISSED TREATMENT: Patient discharged

## 2020-01-27 NOTE — PLAN OF CARE
Problem: Falls - Risk of:  Goal: Will remain free from falls  Description  Will remain free from falls   1/26/2020 2254 by Paul Jones RN  Outcome: Ongoing  Note:   No falls this shift. Pt using call light appropriately to call for assistance with ambulation to the bathroom and to chair. Pt is also compliant with use of non-skid slippers. Pt reports understanding of fall prevention when discussed. Goal: Absence of physical injury  Description  Absence of physical injury   1/26/2020 2254 by Paul Jones RN  Outcome: Ongoing  Note:   No falls this shift. Pt using call light appropriately to call for assistance with ambulation to the bathroom and to chair. Pt is also compliant with use of non-skid slippers. Pt reports understanding of fall prevention when discussed. Problem: Pain:  Goal: Pain level will decrease  Description  Pain level will decrease   1/26/2020 2254 by Paul Jones RN  Outcome: Ongoing  Note:   Pt report pain at 5-8 on scale. Pt states oral medication helping to achieve pain goal of a 5 on scale. Goal: Control of acute pain  Description  Control of acute pain   1/26/2020 2254 by Paul Jones RN  Outcome: Ongoing  Note:   Pt report pain at 5-8 on scale. Pt states oral medication helping to achieve pain goal of a 5 on scale. Goal: Control of chronic pain  Description  Control of chronic pain   1/26/2020 2254 by Paul Jones RN  Outcome: Ongoing  Note:   Pt report pain at 5-8 on scale. Pt states oral medication helping to achieve pain goal of a 5 on scale. Problem: Discharge Planning:  Goal: Knowledge of discharge instructions  Description  Knowledge of discharge instructions   1/26/2020 2254 by Paul Jones RN  Outcome: Ongoing  Note:   Pt plans TCU at discharge. Care manager and social working helping with discharge needs.        Problem: Infection - Surgical Site:  Goal: Signs of wound healing will improve  Description  Signs of wound healing will improve   1/26/2020 2254 by Rosalinda Gray RN  Outcome: Ongoing  Note:   Afebrile this shift. No other s/s of infection noted. Will continue to monitor. Problem: Mobility - Impaired:  Goal: Achieve maximum mobility level  Description  Achieve maximum mobility level   1/26/2020 2254 by Rosalinda Gray RN  Outcome: Ongoing  Note:   Up with 1 assist, walker, and gait belt, and with steady gait. Tolerates working with PT and OT well. Problem: DISCHARGE BARRIERS  Goal: Patient's continuum of care needs are met  1/26/2020 2254 by Rosalinda Gray RN  Outcome: Ongoing  Note:   Pt plans TCU at discharge. Care manager and social working helping with discharge needs. Care plan reviewed with patient. Patient verbalize understanding of the plan of care and contribute to goal setting.

## 2020-01-27 NOTE — PROGRESS NOTES
Physical Therapy   [address] [address]  INPATIENT PHYSICAL THERAPY  DAILY NOTE  [facility] ORTHOPEDICS 7K - 7K-11/011-A  Time In: 0710  Time Out: 0736  Timed Code Treatment Minutes: 26 Minutes  Minutes: 26          Date: 2020  Patient Name: Mario Thompson,  Gender:  female        MRN: [id number]  : 1931  (80 y.o.)     Referring Practitioner: Raciel Reddy MD     Additional Pertinent Hx: Pt is s/p fall resulting in Displaced right intracapsular hip fracture. Pt is s/p LINDA on right LE and is now WBAT. Prior Level of Function:  Lives With: Alone  Type of Home: House  Home Layout: One level  Home Access: Stairs to enter with rails  Entrance Stairs - Number of Steps: 3  Entrance Stairs - Rails: Both  Home Equipment: Cane, Rolling walker   Bathroom Shower/Tub: Tub/Shower unit  Bathroom Toilet: Handicap height  Bathroom Equipment: Tub transfer bench  Bathroom Accessibility: Accessible    Receives Help From: Family  ADL Assistance: [address] [place] [address] Avenue: Independent  Homemaking Responsibilities: Yes  Ambulation Assistance: Independent  Transfer Assistance: Independent  Active : Yes  Additional Comments: Pt. very active before surgery and managed all ADL/IADL tasks. Pt. has daughter near by to help and is a physical therapist and will help in discharge planning. Restrictions/Precautions:  Restrictions/Precautions: Fall Risk, Weight Bearing  Right Lower Extremity Weight Bearing: Weight Bearing As Tolerated  Position Activity Restriction  Hip Precautions: No hip flexion > 90 degrees, No ABduction, No ADduction, No hip internal rotation    SUBJECTIVE: RN approved treatment session. Patient in bed upon arrival and agreeable to therapy. Patient requested use of bathroom during treatment session. Patient reported that with walking she felt dizzy today.      PAIN: 10/10    OBJECTIVE:  Bed Mobility:  Supine to Sit: Maximum Assistance    Transfers:  Sit to Stand: [employer] [facility], to light min A    Ambulation:  Contact Guard Assistance, X 1, X 2  Distance: 8'x2 CGA x1 and ~20'x1 with CGA x2 due to feeling dizzy  Surface: Level Tile  Device:Rolling Walker  Gait Deviations: Forward Flexed Posture, Slow Meseret, Decreased Step Length Bilaterally, Decreased Weight Shift Right, Decreased Gait Speed and Decreased Heel Strike Bilaterally    Balance:  Static Standing Balance: Contact Guard Assistance, X 2  Patient completed standing balance training with bahtroom use. Exercise:  Patient was guided in 1 set(s) 10-15 reps of exercise to both lower extremities. Ankle pumps, Quad sets, Heelslides and Short arc quads. Exercises were completed for increased independence with functional mobility. Functional Outcome Measures: Completed  AM-PAC Inpatient Mobility without Stair Climbing Raw Score : 14  AM-PAC Inpatient without Stair Climbing T-Scale Score : 40.85    ASSESSMENT:  Assessment: Patient progressing toward established goals. Patient required assist of two with bathroom use and ambulation for increased safety due to patient feeling dizzy. Patient required cues on proper hand placement with functional transfers for increased safety. Patient would benefit from continued therapy to increase strength, ROM, balance, endurance, and functional mobility. Activity Tolerance:  Patient tolerance of  treatment: good. Equipment Recommendations: Other: will cont to assess  Discharge Recommendations:  Continue to assess pending progress, Subacute/Skilled Nursing Facility    Plan: Times per week: 6xBID, 1xqd  Current Treatment Recommendations: Strengthening, Transfer Training, Endurance Training, Patient/Caregiver Education & Training, Balance Training, Gait Training, Home Exercise Program, Functional Mobility Training, Stair training, Safety Education & Training, Equipment Evaluation, Education, & procurement, ROM, Neuromuscular Re-education    Patient Education  Patient Education: Precautions/Restrictions, [employer] Mobility, Transfers, Gait, Use of Gait Belt, Verbal Exercise Instruction,  - Patient Verbalized Understanding    Goals:  Patient goals : return home  Short term goals  Time Frame for Short term goals: by discharge  Short term goal 1: bed mobility with mod I for ease of getting in/out of bed  Short term goal 2: sit <> stand with mod I for ease of transfers   Short term goal 3: ambulate > 150ft with use of LRAd and supervision assist for safe in home/community gait   Short term goal 4: 3 steps with bilat hand rails available while managing use of LRAD with stand-by assist to prepare for safe home entry/exit  Short term goal 5: car transfer with stand-by assist to prepare safe transport   Long term goals  Time Frame for Long term goals : N/A secondary to short ELOS    Following session, patient left in safe position with all fall risk precautions in place.

## 2020-01-27 NOTE — PROGRESS NOTES
Report called to [facility]. Pt to be transported to 8E66 per wheelchair with personal belongings.

## 2020-01-28 ENCOUNTER — APPOINTMENT (OUTPATIENT)
Dept: INTERVENTIONAL RADIOLOGY/VASCULAR | Age: 85
DRG: 560 | End: 2020-01-28
Attending: FAMILY MEDICINE
Payer: MEDICARE

## 2020-01-28 PROCEDURE — 97530 THERAPEUTIC ACTIVITIES: CPT

## 2020-01-28 PROCEDURE — 93970 EXTREMITY STUDY: CPT

## 2020-01-28 PROCEDURE — 6360000002 HC RX W HCPCS: Performed by: FAMILY MEDICINE

## 2020-01-28 PROCEDURE — 97167 OT EVAL HIGH COMPLEX 60 MIN: CPT

## 2020-01-28 PROCEDURE — 2500000003 HC RX 250 WO HCPCS: Performed by: ORTHOPAEDIC SURGERY

## 2020-01-28 PROCEDURE — 6370000000 HC RX 637 (ALT 250 FOR IP): Performed by: FAMILY MEDICINE

## 2020-01-28 PROCEDURE — 97535 SELF CARE MNGMENT TRAINING: CPT

## 2020-01-28 PROCEDURE — 94760 N-INVAS EAR/PLS OXIMETRY 1: CPT

## 2020-01-28 PROCEDURE — 6370000000 HC RX 637 (ALT 250 FOR IP): Performed by: ORTHOPAEDIC SURGERY

## 2020-01-28 PROCEDURE — 97110 THERAPEUTIC EXERCISES: CPT

## 2020-01-28 PROCEDURE — 97116 GAIT TRAINING THERAPY: CPT

## 2020-01-28 PROCEDURE — 97162 PT EVAL MOD COMPLEX 30 MIN: CPT

## 2020-01-28 PROCEDURE — 1290000000 HC SEMI PRIVATE OTHER R&B

## 2020-01-28 RX ORDER — FAMOTIDINE 20 MG/1
20 TABLET, FILM COATED ORAL DAILY
Status: DISCONTINUED | OUTPATIENT
Start: 2020-01-28 | End: 2020-02-08 | Stop reason: HOSPADM

## 2020-01-28 RX ORDER — ONDANSETRON 4 MG/1
4 TABLET, FILM COATED ORAL EVERY 8 HOURS PRN
Status: DISCONTINUED | OUTPATIENT
Start: 2020-01-28 | End: 2020-02-08 | Stop reason: HOSPADM

## 2020-01-28 RX ADMIN — SIMVASTATIN 10 MG: 10 TABLET, FILM COATED ORAL at 22:11

## 2020-01-28 RX ADMIN — Medication 5 UNITS: at 18:15

## 2020-01-28 RX ADMIN — ENOXAPARIN SODIUM 40 MG: 40 INJECTION SUBCUTANEOUS at 15:41

## 2020-01-28 RX ADMIN — DOCUSATE SODIUM 100 MG: 100 CAPSULE, LIQUID FILLED ORAL at 10:22

## 2020-01-28 RX ADMIN — TRAMADOL HYDROCHLORIDE 50 MG: 50 TABLET, FILM COATED ORAL at 22:10

## 2020-01-28 RX ADMIN — SENNOSIDES AND DOCUSATE SODIUM 1 TABLET: 8.6; 5 TABLET ORAL at 10:22

## 2020-01-28 RX ADMIN — Medication 250 MG: at 10:22

## 2020-01-28 RX ADMIN — ASPIRIN 325 MG: 325 TABLET, DELAYED RELEASE ORAL at 10:22

## 2020-01-28 RX ADMIN — SENNOSIDES AND DOCUSATE SODIUM 1 TABLET: 8.6; 5 TABLET ORAL at 22:11

## 2020-01-28 RX ADMIN — FAMOTIDINE 20 MG: 20 TABLET ORAL at 16:56

## 2020-01-28 RX ADMIN — ACETAMINOPHEN 650 MG: 325 TABLET ORAL at 22:09

## 2020-01-28 RX ADMIN — THERA TABS 1 TABLET: TAB at 10:22

## 2020-01-28 RX ADMIN — ACETAMINOPHEN 650 MG: 325 TABLET ORAL at 15:34

## 2020-01-28 RX ADMIN — DOCUSATE SODIUM 100 MG: 100 CAPSULE, LIQUID FILLED ORAL at 22:15

## 2020-01-28 RX ADMIN — TRAMADOL HYDROCHLORIDE 50 MG: 50 TABLET, FILM COATED ORAL at 15:35

## 2020-01-28 RX ADMIN — HYDROCODONE BITARTRATE AND ACETAMINOPHEN 1 TABLET: 5; 325 TABLET ORAL at 08:09

## 2020-01-28 RX ADMIN — ACETAMINOPHEN 650 MG: 325 TABLET ORAL at 10:23

## 2020-01-28 RX ADMIN — LEVOTHYROXINE SODIUM 50 MCG: 50 TABLET ORAL at 05:27

## 2020-01-28 RX ADMIN — TRIAMTERENE AND HYDROCHLOROTHIAZIDE 1 TABLET: 37.5; 25 TABLET ORAL at 10:22

## 2020-01-28 ASSESSMENT — PAIN DESCRIPTION - LOCATION
LOCATION: HIP
LOCATION: HIP

## 2020-01-28 ASSESSMENT — PAIN SCALES - GENERAL
PAINLEVEL_OUTOF10: 6
PAINLEVEL_OUTOF10: 7
PAINLEVEL_OUTOF10: 7
PAINLEVEL_OUTOF10: 6
PAINLEVEL_OUTOF10: 7
PAINLEVEL_OUTOF10: 5
PAINLEVEL_OUTOF10: 6

## 2020-01-28 ASSESSMENT — PAIN DESCRIPTION - ONSET
ONSET: ON-GOING
ONSET: ON-GOING

## 2020-01-28 ASSESSMENT — PAIN DESCRIPTION - PAIN TYPE
TYPE: SURGICAL PAIN
TYPE: ACUTE PAIN;SURGICAL PAIN

## 2020-01-28 ASSESSMENT — PAIN DESCRIPTION - FREQUENCY
FREQUENCY: INTERMITTENT
FREQUENCY: INTERMITTENT

## 2020-01-28 ASSESSMENT — PAIN DESCRIPTION - DESCRIPTORS
DESCRIPTORS: ACHING;THROBBING
DESCRIPTORS: ACHING;DISCOMFORT;DULL

## 2020-01-28 ASSESSMENT — PAIN DESCRIPTION - ORIENTATION
ORIENTATION: RIGHT
ORIENTATION: RIGHT

## 2020-01-28 ASSESSMENT — PAIN - FUNCTIONAL ASSESSMENT: PAIN_FUNCTIONAL_ASSESSMENT: ACTIVITIES ARE NOT PREVENTED

## 2020-01-28 ASSESSMENT — PAIN DESCRIPTION - PROGRESSION
CLINICAL_PROGRESSION: NOT CHANGED
CLINICAL_PROGRESSION: NOT CHANGED

## 2020-01-28 NOTE — PROGRESS NOTES
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

## 2020-01-28 NOTE — FLOWSHEET NOTE
Pt is a 80year old woman.  followed up on consult for AD. Pt stated that she was too tired tonight to work on them, but to come back tomorrow. Pt shared in detail about her health issues:  Breast cancer and her broken hip.  offered words of encouragement. Spiritual care to continue to follow up on AD and to offer support. 01/27/20 1850   Encounter Summary   Services provided to: Patient   Referral/Consult From: Physician   Support System Children;Scientology/kwadwo community;Friends/neighbors   Place of [facility]   ([address])   Continue Visiting Yes  (1/27)   Complexity of Encounter Moderate   Length of Encounter 15 minutes   Advance Care Planning Yes   Spiritual/Adventist   Type Spiritual support   Assessment Calm; Approachable   Intervention Active listening;Explored feelings, thoughts, concerns;Explored coping resources;Sustaining presence/ Ministry of presence   Outcome Connection/belonging;Comfort

## 2020-01-28 NOTE — PROGRESS NOTES
[facility]  INPATIENT PHYSICAL THERAPY  EVALUATION  [facility] SKILLED NURSING UNIT - 8E-66/066-A    Time In: 1027  Time Out: 1471  Time interrupted by physician visit  Time in: 1126  Time Out: 1133  Timed Code Treatment Minutes: 42 Minutes  Minutes: 61          Date: 2020  Patient Name: Haritha Card,  Gender:  female        MRN: [id number]  : 1931  (80 y.o.)  Referral Date : 20   Referring Practitioner: Anup Thomas MD (referring), Rowdy Jones MD (attending)  Treatment Diagnosis: difficulty in walking  Additional Pertinent Hx: Pt is s/p fall resulting in Displaced right intracapsular hip fracture. Pt is s/p LINDA on right LE and is now WBAT. Restrictions/Precautions:  Restrictions/Precautions: Fall Risk, Weight Bearing  Right Lower Extremity Weight Bearing: Weight Bearing As Tolerated  Position Activity Restriction  Hip Precautions: No hip flexion > 90 degrees, No ABduction, No ADduction, No hip internal rotation    Subjective:  Chart Reviewed: Yes  Patient assessed for rehabilitation services?: Yes  Family / Caregiver Present: Yes(daughter)  Subjective: Pt in room, daughter and  present, pt agreeable to PT. 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           Social/Functional History:    Lives With: Alone  Type of Home: House  Home Layout: One level  Home Access: Stairs to enter with rails  Entrance Stairs - Number of Steps: 3  Entrance Stairs - Rails: Both  Home Equipment: Cane, Rolling walker     Bathroom Shower/Tub: Tub/Shower unit  Bathroom Toilet: Standard  Bathroom Accessibility: Accessible  IADL Comments:      Receives Help From: Family  ADL Assistance: [facility] Avenue: Independent  Homemaking Responsibilities: Yes  Ambulation Assistance: Independent  Transfer Assistance: Independent    Active : Yes  Occupation: Retired  Leisure & Hobbies: bowling, cards, bridge  Additional Comments: Pt. very active before surgery and managed all ADL/IADL tasks. Pt. has daughter near by to help and is a physical therapist and will help in discharge planning. OBJECTIVE:  Range of Motion:  Right Lower Extremity: WFL  Left Lower Extremity: WFL    Strength:  Right Lower Extremity: Impaired - hip not assessed due to recent surgery, knee 3+/5, ankle 4-/5  Left Lower Extremity: Impaired - hip flexion, knee and ankle 4/5    Balance:  Static Sitting Balance:  Independent  Static Standing Balance: Contact Guard Assistance  Dynamic Standing Balance: Contact Guard Assistance, Minimal Assistance    Bed Mobility:  Rolling to Left: Stand By Assistance   Rolling to Right: Stand By Assistance   Supine to Sit: Stand By Assistance  Sit to Supine: Stand By Assistance one time, Minimal Assistance one time    Transfers:  Sit to Stand: [employer], verbal cues for hand placement  Stand to Sit:Contact [employer]  Stand Pivot:Contact Guard Assistance    Ambulation:  Contact Guard Assistance  Distance: 130', 12', 88'  Surface: Level Tile  Device:Rolling Walker  Gait Deviations:  Slow Meseret, Decreased Step Length on Left and Decreased Heel Strike Bilaterally  Verbal cues to relax shoulders. Contact Guard Assistance  Distance: 10'  Surface: Uneven Surface  Device:Rolling Walker  Gait Deviations:  Slow Meseret, Decreased Step Length Bilaterally and Decreased Heel Strike Bilaterally    Stairs:  Contact Guard Assistance to descend, Minimal Assistance to ascend  Number of Steps: 1  Height: 6\" step with parallel bars  Verbal cues for lower extremity sequencing  Pt also completed ~1 inch step over threshold with CGA and verbal cues for lower extremity sequencing. Exercise:  Patient was guided in 1 set(s) 10 reps of exercise to both lower extremities.   Seated: long independence to navigate home safely. Long term goal 4: Patient will ascend/descend 3 steps with 2 hand rails and modified independence to access/leave home safely. Long term goal 5: Patient will complete car transfer with supervision to transport to home safely. Long term goal 6: Patient will ascend/descend 1, 6\" step with RW and supervision for community entry. Following session, patient left in safe position with all fall risk precautions in place.

## 2020-01-28 NOTE — PROGRESS NOTES
Assistance    Ambulation:  Stand By [facility] Assistance  Distance: 85ft 10ft  Surface: Level Tile  Device:Rolling Walker  Gait Deviations: Forward Flexed Posture, Slow Meseret, Decreased Step Length Bilaterally, Decreased Weight Shift Right, Lean to Left, Decreased Gait Speed, Decreased Heel Strike on Right, Decreased Heel Strike Bilaterally and Unsteady Gait    Balance:  Dynamic Standing Balance: Contact Guard Assistance, Minimal Assistance  Pt stood and tapped cones with R and L LEs on cue for improved weight shifting and RLE mobility. Pt also stood and reached in all directions while maintaining precautions. t did need a few cues to move feet and avoid twisting. Pt steady no LOB      Functional Outcome Measures: Completed  Balance Score: 7  Gait Score: 9  Tinetti Total Score: 16    ASSESSMENT:  Assessment: Patient progressing toward established goals. Activity Tolerance:  Patient tolerance of  treatment: good. Pt tolerated session well. Pt dmeos decreased strength, endurance, balance, and independence with mobility. Equipment Recommendations:Equipment Needed: No  Discharge Recommendations: Continue to assess pending progress, Home with Home health PT    Plan: Times per week: 5xBID, 1 x QD  Current Treatment Recommendations: Strengthening, Functional Mobility Training, Neuromuscular Re-education, Home Exercise Program, Transfer Training, Gait Training, Balance Training, Endurance Training, Stair training, Patient/Caregiver Education & Training, Safety Education & Training    Patient Education  Patient Education: Plan of Care, [employer] Mobility, Transfers, Gait, Verbal Exercise Instruction    Goals:  Patient goals : go home  Short term goals  Time Frame for Short term goals: 2 weeks  Short term goal 1: Patient will complete supine < > sit with supervision to transfer in/out of bed with decreased difficulty.   Short term goal 2: Patient will complete sit < > stand with SBA to increase independence

## 2020-01-28 NOTE — PLAN OF CARE
Problem: Pain:  Goal: Pain level will decrease  Description  Pain level will decrease  Outcome: Ongoing  Note:   Patient denies pain. Pain goal is 3/10. Problem: Pain:  Goal: Control of acute pain  Description  Control of acute pain  Outcome: Ongoing     Problem: Pain:  Goal: Control of chronic pain  Description  Control of chronic pain  Outcome: Ongoing     Problem: Bleeding:  Goal: Will show no signs and symptoms of excessive bleeding  Description  Will show no signs and symptoms of excessive bleeding  Outcome: Ongoing  Note:   No s/s of bleeding. Patient is has SCDs for prophylaxis     Problem: Mobility - Impaired:  Goal: Mobility will improve  Description  Mobility will improve  Outcome: Ongoing  Note:   Patient is a one assist with walker. Had been previously dizzy when sitting up, but no s/s of that this shift. Problem: Infection - Surgical Site:  Goal: Signs of wound healing will improve  Description  Signs of wound healing will improve  Outcome: Ongoing  Note:   No s/s of infection on r hip incision. Problem: Discharge Planning:  Goal: Patients continuum of care needs are met  Description  Patients continuum of care needs are met  Outcome: Ongoing  Note:   No plan for discharge at this time. Patient conference on Tuesday 1/28     Problem: Skin Integrity:  Goal: Will show no infection signs and symptoms  Description  Will show no infection signs and symptoms  Outcome: Ongoing     Problem: Skin Integrity:  Goal: Absence of new skin breakdown  Description  Absence of new skin breakdown  Outcome: Ongoing  Note:   No new beakdown. Patient has abrasion on inner left ankle, open to air. Problem: Falls - Risk of:  Goal: Will remain free from falls  Description  Will remain free from falls  Outcome: Ongoing  Note:   No falls noted. Patient uses call light appropriately to alert staff to needs. Wears non slip slippers and gait belt when ambulating.       Problem: Falls - Risk of:  Goal: Absence of physical injury  Description  Absence of physical injury  Outcome: Ongoing     Problem: Bowel/Gastric:  Goal: Ability to achieve a regular elimination pattern will improve  Description  Ability to achieve a regular elimination pattern will improve  Outcome: Ongoing  Note:   Last bowel movement on 1/27. Problem: Bowel/Gastric:  Goal: Occurrences of constipation will decrease  Description  Occurrences of constipation will decrease  Outcome: Ongoing   Reviewed care plan with patient. Patient needs reinforcement on care plan and treatment goals.

## 2020-01-28 NOTE — H&P
TCU History and Physical      Chief Complaint and Reason for TCU Admission:   The need to continue the time with therapies following the right hip fracture    History of Present Illness:  Haritha Card  is a 80 y.o. female admitted to the transitional care unit on 1/27/2020. She was getting out of her car when she fell with fracture of the right hip. She was medically cleared and proceeded to have it surgically repaired. She tolerated it well and now comes to the TCU for addition time for strengthening before the return home. Past Medical History:      Diagnosis Date    Breast cancer [facility]) 2014    s/p mastectomy and reconstruction surgery.  Edema extremities     takes diuretics prn    Hyperlipidemia     Hypertension     Hypothyroidism     Osteoarthritis     mild    Unspecified vitamin D deficiency        Primary care provider: Felicitas Max MD     Past Surgical History:      Procedure Laterality Date    APPENDECTOMY      at age 5   [facility] BREAST BIOPSY Right 2/2014    before she had her surgery    BREAST RECONSTRUCTION Right 2/2014    Pt had done at [facility] in [address] Left 2/2014    Pt had done at [facility] in [address] Right 09/19/2019    COLONOSCOPY  2007    showed mild diverticular disease, otherwise negative, repeat in 2017   [place], RADICAL Right 2/2014    Pt had done at [facility] in [address]    due to fibroids   [address] Right 1/23/2020    RIGHT TOTAL HIP performed by Bertha Villar MD at [address] [facility],[address]:    Patient has no known allergies.     Current Medications:    Current Facility-Administered Medications: famotidine (PEPCID) tablet 20 mg, 20 mg, Oral, Daily  enoxaparin (LOVENOX) injection 40 mg, 40 mg, Subcutaneous, Q24H  ondansetron (ZOFRAN) tablet 4 mg, 4 mg, Oral, Q8H PRN  acetaminophen (TYLENOL) tablet 650 mg, 650 mg, Oral, Q6H  docusate sodium (COLACE) capsule 100 mg, 100 mg, Oral, BID  magnesium hydroxide (MILK OF MAGNESIA) 400 MG/5ML suspension 30 mL, 30 mL, Oral, Daily PRN  sennosides-docusate sodium (SENOKOT-S) 8.6-50 MG tablet 1 tablet, 1 tablet, Oral, BID  traMADol (ULTRAM) tablet 50 mg, 50 mg, Oral, Q6H PRN  HYDROcodone-acetaminophen (NORCO) 5-325 MG per tablet 1 tablet, 1 tablet, Oral, Q4H PRN  levothyroxine (SYNTHROID) tablet 50 mcg, 50 mcg, Oral, Daily  [START ON 1/30/2020] ppd (tuberculin skin test) read, 1 each, Does not apply, Weekly  simvastatin (ZOCOR) tablet 10 mg, 10 mg, Oral, QPM  triamterene-hydrochlorothiazide (MAXZIDE-25) 37.5-25 MG per tablet 1 tablet, 1 tablet, Oral, Daily  tuberculin injection 5 Units, 5 Units, Intradermal, Weekly  multivitamin 1 tablet, 1 tablet, Oral, Daily  oyster shell calcium/vitamin D 250-125 MG-UNIT per tablet 250 mg, 1 tablet, Oral, Daily  bisacodyl (DULCOLAX) EC tablet 5 mg, 5 mg, Oral, Daily PRN     Resident is taking an antipsychotic medication? No     If yes, was Gradual Dose Reduction (GDR) attempted? NA     No- GDR is clinically contraindicated due to the fact that tapering the medication  would not achieve the desired therapeutic effects and the current dose is  necessary to maintain or improve the resident's function, well-being, safety, and  quality of life. Social History:  Social History     Socioeconomic History    Marital status:       Spouse name: pt actually     Number of children: 3    Years of education: Not on file    Highest education level: Not on file   Occupational History    Occupation: retired  and book-keeper   Social Needs    Financial resource strain: Not on file    Food insecurity:     Worry: Not on file     Inability: Not on file   Compre[employer] needs:     Medical: Not on file     Non-medical: Not on file   Tobacco Use    Smoking status: Never Smoker    Smokeless tobacco: Never Used Substance and Sexual Activity    Alcohol use: No    Drug use: No    Sexual activity: Not on file   Lifestyle    Physical activity:     Days per week: Not on file     Minutes per session: Not on file    Stress: Not on file   Relationships    Social connections:     Talks on phone: Not on file     Gets together: Not on file     Attends Quaker service: Not on file     Active member of club or organization: Not on file     Attends meetings of clubs or organizations: Not on file     Relationship status: Not on file    Intimate partner violence:     Fear of current or ex partner: Not on file     Emotionally abused: Not on file     Physically abused: Not on file     Forced sexual activity: Not on file   Other Topics Concern    Not on file   Social History Narrative    Not on file           Family History:       Problem Relation Age of Onset    Alzheimer's Disease Mother          at age 80    Emphysema Father          at age 68    Diabetes Father     COPD Sister     Other Sister         lung disease from tobacco abuse    Stroke Sister         history of a hemorrhagic cerebrovascular accident    Cancer Daughter         CML    Other Daughter         kidney stones    Hypertension Daughter     Heart Disease Maternal Grandmother     Heart Disease Maternal Grandfather     Diabetes Maternal Grandfather        Review of Systems:  CONSTITUTIONAL:  positive for  fatigue  EYES:  positive for  glasses  HEENT:  negative for  earaches  RESPIRATORY:  negative for  dyspnea  CARDIOVASCULAR:  negative for  chest pain  GASTROINTESTINAL:  positive for constipation  GENITOURINARY:  negative for dysuria  SKIN:  negative  HEMATOLOGIC/LYMPHATIC:  negative for petechiae  MUSCULOSKELETAL:  positive for  myalgias, arthralgias and bone pain  NEUROLOGICAL:  negative for dysphagia  BEHAVIOR/PSYCH:  negative for elated mood  10 point system review otherwise negative    Physical Exam:  BP (!) 152/71   Pulse 75   Temp

## 2020-01-28 NOTE — PROGRESS NOTES
for functional decline, increased falls, and readmission to hospital.    Performance deficits / Impairments: Decreased functional mobility , Decreased ADL status, Decreased endurance, Decreased strength, Decreased high-level IADLs, Decreased balance  Prognosis: Good    Treatment Initiated: Treatment and education initiated within context of evaluation. Evaluation time included review of current medical information, gathering information related to past medical, social and functional history, completion of standardized testing, formal and informal observation of tasks, assessment of data and development of plan of care and goals. Treatment time included skilled education and facilitation of tasks to increase safety and independence with ADL's for improved functional independence and quality of life. Discharge Recommendations:  Continue to assess pending progress, Patient would benefit from continued therapy after discharge    Patient Education:  OT Education: OT Role, Plan of Care, Precautions, Transfer Training, ADL Adaptive Strategies    Equipment Recommendations:  Equipment Needed: Yes  Other: [place]    Plan:  Times per week: 6x  Plan weeks: 3   Current Treatment Recommendations: Strengthening, Safety Education & Training, Balance Training, Patient/Caregiver Education & Training, Self-Care / ADL, Functional Mobility Training, Endurance Training    Goals:     Short term goals  Time Frame for Short term goals: 2 weeks  Short term goal 1: Pt. will complete LB ADLs using LHAE with Delma and no vcs to maintain hip precautions. Short term goal 2: Pt. will complete functional transfers to/from toliet with SBA adhering to precautions without vcs to increase indep with toileting routine. Short term goal 3: Pt. will complete dynamic standing task with SBA x 4 min to increase endurance in prep for bathing/grooming tasks.    Short term goal 4: Pt. will navigate to/from bathroom and in room using AE with SBA to obtain

## 2020-01-29 PROCEDURE — 97116 GAIT TRAINING THERAPY: CPT

## 2020-01-29 PROCEDURE — 1290000000 HC SEMI PRIVATE OTHER R&B

## 2020-01-29 PROCEDURE — 6370000000 HC RX 637 (ALT 250 FOR IP): Performed by: FAMILY MEDICINE

## 2020-01-29 PROCEDURE — 97530 THERAPEUTIC ACTIVITIES: CPT

## 2020-01-29 PROCEDURE — 6360000002 HC RX W HCPCS: Performed by: FAMILY MEDICINE

## 2020-01-29 PROCEDURE — 97535 SELF CARE MNGMENT TRAINING: CPT

## 2020-01-29 PROCEDURE — 6370000000 HC RX 637 (ALT 250 FOR IP): Performed by: ORTHOPAEDIC SURGERY

## 2020-01-29 PROCEDURE — 97110 THERAPEUTIC EXERCISES: CPT

## 2020-01-29 RX ADMIN — TRAMADOL HYDROCHLORIDE 50 MG: 50 TABLET, FILM COATED ORAL at 06:24

## 2020-01-29 RX ADMIN — LEVOTHYROXINE SODIUM 50 MCG: 50 TABLET ORAL at 06:24

## 2020-01-29 RX ADMIN — FAMOTIDINE 20 MG: 20 TABLET ORAL at 09:48

## 2020-01-29 RX ADMIN — SENNOSIDES AND DOCUSATE SODIUM 1 TABLET: 8.6; 5 TABLET ORAL at 09:48

## 2020-01-29 RX ADMIN — THERA TABS 1 TABLET: TAB at 09:48

## 2020-01-29 RX ADMIN — SIMVASTATIN 10 MG: 10 TABLET, FILM COATED ORAL at 22:39

## 2020-01-29 RX ADMIN — SENNOSIDES AND DOCUSATE SODIUM 1 TABLET: 8.6; 5 TABLET ORAL at 22:39

## 2020-01-29 RX ADMIN — HYDROCODONE BITARTRATE AND ACETAMINOPHEN 1 TABLET: 5; 325 TABLET ORAL at 15:08

## 2020-01-29 RX ADMIN — DOCUSATE SODIUM 100 MG: 100 CAPSULE, LIQUID FILLED ORAL at 09:48

## 2020-01-29 RX ADMIN — DOCUSATE SODIUM 100 MG: 100 CAPSULE, LIQUID FILLED ORAL at 22:39

## 2020-01-29 RX ADMIN — TRIAMTERENE AND HYDROCHLOROTHIAZIDE 1 TABLET: 37.5; 25 TABLET ORAL at 09:48

## 2020-01-29 RX ADMIN — Medication 250 MG: at 09:48

## 2020-01-29 RX ADMIN — ACETAMINOPHEN 650 MG: 325 TABLET ORAL at 22:39

## 2020-01-29 RX ADMIN — ACETAMINOPHEN 650 MG: 325 TABLET ORAL at 09:48

## 2020-01-29 RX ADMIN — ENOXAPARIN SODIUM 40 MG: 40 INJECTION SUBCUTANEOUS at 09:49

## 2020-01-29 RX ADMIN — HYDROCODONE BITARTRATE AND ACETAMINOPHEN 1 TABLET: 5; 325 TABLET ORAL at 22:39

## 2020-01-29 RX ADMIN — ACETAMINOPHEN 650 MG: 325 TABLET ORAL at 06:24

## 2020-01-29 ASSESSMENT — PAIN DESCRIPTION - LOCATION
LOCATION: HIP
LOCATION: HIP

## 2020-01-29 ASSESSMENT — PAIN SCALES - GENERAL
PAINLEVEL_OUTOF10: 4
PAINLEVEL_OUTOF10: 5
PAINLEVEL_OUTOF10: 5
PAINLEVEL_OUTOF10: 2
PAINLEVEL_OUTOF10: 6

## 2020-01-29 ASSESSMENT — PAIN DESCRIPTION - ONSET: ONSET: ON-GOING

## 2020-01-29 ASSESSMENT — PAIN DESCRIPTION - ORIENTATION
ORIENTATION: RIGHT
ORIENTATION: RIGHT

## 2020-01-29 ASSESSMENT — PAIN DESCRIPTION - PAIN TYPE
TYPE: SURGICAL PAIN
TYPE: ACUTE PAIN;SURGICAL PAIN

## 2020-01-29 ASSESSMENT — PAIN DESCRIPTION - DESCRIPTORS: DESCRIPTORS: ACHING;HEAVINESS

## 2020-01-29 ASSESSMENT — PAIN DESCRIPTION - FREQUENCY: FREQUENCY: INTERMITTENT

## 2020-01-29 ASSESSMENT — PAIN - FUNCTIONAL ASSESSMENT: PAIN_FUNCTIONAL_ASSESSMENT: ACTIVITIES ARE NOT PREVENTED

## 2020-01-29 ASSESSMENT — PAIN DESCRIPTION - PROGRESSION: CLINICAL_PROGRESSION: NOT CHANGED

## 2020-01-29 NOTE — PROGRESS NOTES
walker, ets with cga-> sba:    Ambulation:  Stand By Assistance with vc's  Distance: 150' x 2  Surface: Level Tile  Device:Rolling Walker  Gait Deviations:  Slow Meseret, Decreased Step Length Bilaterally, Decreased Weight Shift Right, Decreased Gait Speed, Decreased Heel Strike on Right and working on step-thru gait pattern showing good progress    Balance:  Dynamic Standing Balance: Stand By Assistance,  completing reaching activity within michael overhead to knee levels with and briefly without ue support     Exercise:  Patient was guided in 1 set(s) 15 reps of exercise to both lower extremities. Ankle pumps, Glut sets, Quad sets, Heelslides, Short arc quads, Long arc quads,  L LE Hip abduction/adduction, Seated hip flexion, Seated hamstring curls and Seated heel/toe raises. Exercises were completed for increased independence with functional mobility. Functional Outcome Measures: Not completed       ASSESSMENT:  Assessment: Patient progressing toward established goals. Activity Tolerance:  Patient tolerance of  treatment: good.  Good motivation     Equipment Recommendations:Equipment Needed: No  Discharge Recommendations:  Continue to assess pending progress, Home with Home health PT    Plan: Times per week: 5xBID, 1 x QD  Current Treatment Recommendations: Strengthening, Functional Mobility Training, Neuromuscular Re-education, Home Exercise Program, Transfer Training, Gait Training, Balance Training, Endurance Training, Stair training, Patient/Caregiver Education & Training, Safety Education & Training    Patient Education  Patient Education: Home Exercise Program, Precautions/Restrictions, [employer] Mobility, Equipment Education, Transfers, Reviewed Prior Education, Gait, Verbal Exercise Instruction, - Patient Requires Continued Education    Goals:  Patient goals : go home  Short term goals  Time Frame for Short term goals: 2 weeks  Short term goal 1: Patient will complete supine < > sit with supervision to transfer in/out of bed with decreased difficulty. Short term goal 2: Patient will complete sit < > stand with SBA to increase independence with transfers. Short term goal 3: Patietn will ambulate 150' wtih a RW and SBA to progress towards safe home ambulation. Short term goal 4: Patietn will ascend/descend 3 steps with 2 hand rails and CGA to progress towards safe home access. Short term goal 5: Patient will complete car transfer with CGA to transfer in/out of vehicle to prepare for safe home transport. Long term goals  Time Frame for Long term goals : 3 weeks  Long term goal 1: Patient will complete supine < > sit with modified independence to transfer in/out of vehicle safely. Long term goal 2: Patient will complete sit < > stand and stand pivot transfers with modified independence to stand to ambulate wtih decreased difficulty. Long term goal 3: Patient will ambulate 80' with a RW and modified independence to navigate home safely. Long term goal 4: Patient will ascend/descend 3 steps with 2 hand rails and modified independence to access/leave home safely. Long term goal 5: Patient will complete car transfer with supervision to transport to home safely. Long term goal 6: Patient will ascend/descend 1, 6\" step with RW and supervision for community entry. Following session, patient left in safe position with all fall risk precautions in place.

## 2020-01-29 NOTE — PROGRESS NOTES
vehicle safely. Long term goal 2: Patient will complete sit < > stand and stand pivot transfers with modified independence to stand to ambulate wtih decreased difficulty. Long term goal 3: Patient will ambulate 80' with a RW and modified independence to navigate home safely. Long term goal 4: Patient will ascend/descend 3 steps with 2 hand rails and modified independence to access/leave home safely. Long term goal 5: Patient will complete car transfer with supervision to transport to home safely. Long term goal 6: Patient will ascend/descend 1, 6\" step with RW and supervision for community entry. Following session, patient left in safe position with all fall risk precautions in place.

## 2020-01-29 NOTE — PROGRESS NOTES
[facility]  [address] [place] SKILLED NURSING UNIT  Occupational Therapy  Daily Note  Time:   Time In: 9601  Time Out: 1125  Timed Code Treatment Minutes: 54 Minutes  Minutes: 55        Date: 2020  Patient Name: Rachelle Foreman,   Gender: female      Room: [facility]/066-A  MRN: [id number]  : 1931  (80 y.o.)  Referring Practitioner: Yeny Jules MD  Diagnosis: R Intracapsular hip Fx  Additional Pertinent Hx: Rachelle Foreman is a 80 y.o. female who presents to the Emergency Department for the evaluation of fall, right hip and groin pain. X-rays completed and note patient sustained an acute right subcapital hip fracture. Right total hip replacementstandard direct lateral approach. Transfer to [address] [place] [address] 2020. Restrictions/Precautions:  Restrictions/Precautions: Fall Risk, Weight Bearing  Right Lower Extremity Weight Bearing: Weight Bearing As Tolerated  Position Activity Restriction  Hip Precautions: No hip flexion > 90 degrees, No ABduction, No ADduction, No hip internal rotation    SUBJECTIVE: Patient seated in bedside chair upon arrival. Requested to get cleaned up including bathing/dressing. Patient declined shower. PAIN: 2/10- R hip     COGNITION: WFL    ADL:   Grooming: Stand By Assistance. Standing sinkside to complete oral care and wash hands after toileting tasks  Bathing: Minimal Assistance. For BLE below knees including B feet d/t hip precautions. Washed bottom/jaguar area while standing with close SBA with 1-2 UE release from walker. Able to wash remaining areas seated in chair with setup  Upper Extremity Dressing: with set-up. To doff/don tshirt in seated position  Lower Extremity Dressing: Minimal Assistance. To don slippers. Able to thread BLE into pants using reacher with SBA and vcs for technique. Able to pull up over hips while standing with SBA  Toileting: Stand By Assistance. For hygiene and clothing management  Toilet Transfer: Stand By Assistance. To/from RTS.   *Would

## 2020-01-29 NOTE — PLAN OF CARE
Marj Anthonyon  [id number]    This document includes baseline careplan information as well as current active orders for this admission to Transitional Care Unit (8E). A copy was given to the patient and/or patient representative today, 1/29/2020.     Current Active Orders:  Orders Placed This Encounter   Procedures    VL DUP LOWER EXTREMITY VENOUS BILATERAL    DIET GENERAL;    Place PPD    Waffle cushion in chair when up   [address] Operative/Affected Extremity    Encourage deep breathing and coughing every 2 hours while awake    Full Weight Bearing as tolerated    Ice therapy    Intake and output    Tobacco cessation education protocol    Vital signs per unit routine    Wound Care Instructions    Neurovascular checks    Vital signs per unit routine    Weigh patient weekly    Full code    Consult to Recreation Therapy    Inpatient consult to Dietitian    Inpatient consult to Spiritual Services    OT eval and treat    PT eval and treat    Incentive spirometry    Initiate Oxygen Therapy Protocol       Current Medications:  Current Facility-Administered Medications   Medication Dose Route Frequency Provider Last Rate Last Dose    famotidine (PEPCID) tablet 20 mg  20 mg Oral Daily Tra Magdaleno MD   20 mg at 01/29/20 0948    enoxaparin (LOVENOX) injection 40 mg  40 mg Subcutaneous Q24H Tra Magdaleno MD   40 mg at 01/29/20 0949    ondansetron (ZOFRAN) tablet 4 mg  4 mg Oral Q8H PRN Tra Magdaleno MD        acetaminophen (TYLENOL) tablet 650 mg  650 mg Oral Q6H Marsha Erickson MD   650 mg at 01/29/20 0948    docusate sodium (COLACE) capsule 100 mg  100 mg Oral BID Marsha Erickson MD   100 mg at 01/29/20 0948    magnesium hydroxide (MILK OF MAGNESIA) 400 MG/5ML suspension 30 mL  30 mL Oral Daily PRN Marsha Erickson MD        sennosides-docusate sodium (SENOKOT-S) 8.6-50 MG tablet 1 tablet  1 tablet Oral BID Marsha Erickson MD   1 tablet at

## 2020-01-30 PROCEDURE — 97530 THERAPEUTIC ACTIVITIES: CPT

## 2020-01-30 PROCEDURE — 6360000002 HC RX W HCPCS: Performed by: FAMILY MEDICINE

## 2020-01-30 PROCEDURE — 6370000000 HC RX 637 (ALT 250 FOR IP): Performed by: FAMILY MEDICINE

## 2020-01-30 PROCEDURE — 97110 THERAPEUTIC EXERCISES: CPT

## 2020-01-30 PROCEDURE — 97116 GAIT TRAINING THERAPY: CPT

## 2020-01-30 PROCEDURE — 6370000000 HC RX 637 (ALT 250 FOR IP): Performed by: ORTHOPAEDIC SURGERY

## 2020-01-30 PROCEDURE — 1290000000 HC SEMI PRIVATE OTHER R&B

## 2020-01-30 RX ADMIN — HYDROCODONE BITARTRATE AND ACETAMINOPHEN 1 TABLET: 5; 325 TABLET ORAL at 15:02

## 2020-01-30 RX ADMIN — SENNOSIDES AND DOCUSATE SODIUM 1 TABLET: 8.6; 5 TABLET ORAL at 22:00

## 2020-01-30 RX ADMIN — THERA TABS 1 TABLET: TAB at 10:18

## 2020-01-30 RX ADMIN — LEVOTHYROXINE SODIUM 50 MCG: 50 TABLET ORAL at 06:12

## 2020-01-30 RX ADMIN — TRAMADOL HYDROCHLORIDE 50 MG: 50 TABLET, FILM COATED ORAL at 22:00

## 2020-01-30 RX ADMIN — HYDROCODONE BITARTRATE AND ACETAMINOPHEN 1 TABLET: 5; 325 TABLET ORAL at 06:12

## 2020-01-30 RX ADMIN — ACETAMINOPHEN 650 MG: 325 TABLET ORAL at 06:18

## 2020-01-30 RX ADMIN — TRIAMTERENE AND HYDROCHLOROTHIAZIDE 1 TABLET: 37.5; 25 TABLET ORAL at 10:17

## 2020-01-30 RX ADMIN — FAMOTIDINE 20 MG: 20 TABLET ORAL at 10:18

## 2020-01-30 RX ADMIN — DOCUSATE SODIUM 100 MG: 100 CAPSULE, LIQUID FILLED ORAL at 22:00

## 2020-01-30 RX ADMIN — SIMVASTATIN 10 MG: 10 TABLET, FILM COATED ORAL at 22:00

## 2020-01-30 RX ADMIN — ACETAMINOPHEN 650 MG: 325 TABLET ORAL at 17:49

## 2020-01-30 RX ADMIN — ENOXAPARIN SODIUM 40 MG: 40 INJECTION SUBCUTANEOUS at 11:58

## 2020-01-30 RX ADMIN — Medication 250 MG: at 10:18

## 2020-01-30 RX ADMIN — SENNOSIDES AND DOCUSATE SODIUM 1 TABLET: 8.6; 5 TABLET ORAL at 10:18

## 2020-01-30 RX ADMIN — DOCUSATE SODIUM 100 MG: 100 CAPSULE, LIQUID FILLED ORAL at 10:18

## 2020-01-30 RX ADMIN — TRAMADOL HYDROCHLORIDE 50 MG: 50 TABLET, FILM COATED ORAL at 12:02

## 2020-01-30 RX ADMIN — ACETAMINOPHEN 650 MG: 325 TABLET ORAL at 11:58

## 2020-01-30 ASSESSMENT — PAIN DESCRIPTION - ORIENTATION
ORIENTATION: RIGHT

## 2020-01-30 ASSESSMENT — PAIN - FUNCTIONAL ASSESSMENT
PAIN_FUNCTIONAL_ASSESSMENT: ACTIVITIES ARE NOT PREVENTED

## 2020-01-30 ASSESSMENT — PAIN SCALES - GENERAL
PAINLEVEL_OUTOF10: 6
PAINLEVEL_OUTOF10: 7
PAINLEVEL_OUTOF10: 6
PAINLEVEL_OUTOF10: 7
PAINLEVEL_OUTOF10: 6
PAINLEVEL_OUTOF10: 5
PAINLEVEL_OUTOF10: 6
PAINLEVEL_OUTOF10: 5
PAINLEVEL_OUTOF10: 7
PAINLEVEL_OUTOF10: 7
PAINLEVEL_OUTOF10: 4
PAINLEVEL_OUTOF10: 5

## 2020-01-30 ASSESSMENT — PAIN DESCRIPTION - ONSET
ONSET: ON-GOING

## 2020-01-30 ASSESSMENT — PAIN DESCRIPTION - LOCATION
LOCATION: HIP
LOCATION: HIP
LOCATION: HIP;BUTTOCKS

## 2020-01-30 ASSESSMENT — PAIN DESCRIPTION - FREQUENCY
FREQUENCY: INTERMITTENT
FREQUENCY: CONTINUOUS
FREQUENCY: CONTINUOUS

## 2020-01-30 ASSESSMENT — PAIN DESCRIPTION - DESCRIPTORS
DESCRIPTORS: ACHING;SHARP
DESCRIPTORS: ACHING
DESCRIPTORS: ACHING

## 2020-01-30 ASSESSMENT — PAIN DESCRIPTION - PROGRESSION
CLINICAL_PROGRESSION: GRADUALLY IMPROVING
CLINICAL_PROGRESSION: NOT CHANGED
CLINICAL_PROGRESSION: NOT CHANGED

## 2020-01-30 ASSESSMENT — PAIN DESCRIPTION - DIRECTION: RADIATING_TOWARDS: NO RADIATING

## 2020-01-30 ASSESSMENT — PAIN DESCRIPTION - PAIN TYPE
TYPE: SURGICAL PAIN

## 2020-01-30 NOTE — PROGRESS NOTES
In bed eyes resting . Alert and oriented person, place,time. ANA 3 mm to 2 mm. Sclera white. Mucous membranes pink, moist. Skin dry, warm. Speech clear. Denies difficulty swallowing or chewing. Lung sounds clear anterior, posterior and lateral. Respirations easy and regular. Denies difficulty coughing. Heart sounds strong, regular. Bowel sounds hyperactive in all 4 quadrants. States tenderness is present right side only, near surgical incision. States flatus is present. Stated last bowel movement was 1/29/2020. Radial pulse equal strong. Hand grasp strong bilaterally. Arm drift negative bilaterally. Capillary refill less than 3 seconds. Skin turgor brisk. No edema noted. Pedal pulse strong equal. Pedal push and pull strong equal. Left leg lift weak right, left leg lift strong. Homans negative bilaterally. Denies numbness and tingling. States aching pain in right hip is at 5 on scale from 1-10. Applied ice 15 minutes per MD order. Right hip dressing intact. Bed in low position. Call light and bedside table within reach. Denies need a present moment.

## 2020-01-30 NOTE — PROGRESS NOTES
Patient in room in chair. Moderately assisted to bathroom. Received bath. Assisted back to bed and dressing change applied. Remove old 5x9 ABD pad. No drainage, redness, odor noted. incison intact with 16 zip lines. Cleaned with Povidione Iodine air to dry. aplied 5x9 ABD pad to incision. Taped with hypo allergic surgery tape. Patient tolerated well.

## 2020-01-30 NOTE — PROGRESS NOTES
Patient in room watching TV. Assisted to bathroom and back to chair. Denies needs at present moment.

## 2020-01-30 NOTE — PLAN OF CARE
Problem: Nutrition  Goal: Optimal nutrition therapy  Outcome: Ongoing   Nutrition Problem: Increased nutrient needs  Intervention: Food and/or Nutrient Delivery: Continue current diet  Nutritional Goals: Patient will continue to consume 75% or greater of meals during LOS

## 2020-01-30 NOTE — PROGRESS NOTES
Contact Guard Assistance. - SBA   Distance: To and from therapy gym and throughout hallway to different activites  Slow pace, no LOB noted. Required seated rest break after each trial of mobility     ADDITIONAL ACTIVITIES:  Pt completed BUE strengthening exercises x20 reps x1 set this date with a 2# dumb bell in all joints and all planes in order to increase strength and improve activity tolerance required for functional toilet & shower transfers and ADL routine. Pt tolerated fair, requiring rest breaks throughout task. ASSESSMENT:     Activity Tolerance:  Patient tolerance of  treatment: good. Discharge Recommendations: Continue to assess pending progress, Patient would benefit from continued therapy after discharge  Equipment Recommendations: Equipment Needed: Yes  Other: LHAE  Plan: Times per week: 6x  Plan weeks: 3   Current Treatment Recommendations: Strengthening, Safety Education & Training, Balance Training, Patient/Caregiver Education & Training, Self-Care / ADL, Functional Mobility Training, Endurance Training    Patient Education  Patient Education: Home Exercise Program    Goals  Short term goals  Time Frame for Short term goals: 2 weeks  Short term goal 1: Pt. will complete LB ADLs using LHAE with Delma and no vcs to maintain hip precautions. Short term goal 2: Pt. will complete functional transfers to/from toliet with SBA adhering to precautions without vcs to increase indep with toileting routine. Short term goal 3: Pt. will complete dynamic standing task with SBA x 4 min to increase endurance in prep for bathing/grooming tasks. Short term goal 4: Pt. will navigate to/from bathroom and in room using AE with SBA to obtain needed ADL items. Long term goals  Time Frame for Long term goals : 3 weeks  Long term goal 1: Pt will complete ADL routine with mod including shower t/f to increase indep with self care.   Long term goal 2: Pt will complete light homemaking task with Verena to increase indep with light cooking and cleaning. Following session, patient left in safe position with all fall risk precautions in place.

## 2020-01-30 NOTE — PROGRESS NOTES
Nutrition Assessment    Type and Reason for Visit: Initial, Consult(TCU assessment)    Nutrition Recommendations:   Continue current diet  Continue multivitamin and bowel meds as per physician order    Nutrition Assessment: Pt. nutritionally compromised AEB wounds. At risk for further nutrition compromise r/t increased nutrient needs for wound healing, underlying medical condition (breast cancer s/p mastectomy with reconstruction). Nutrition recommendations/interventions as per above. Malnutrition Assessment:  · Malnutrition Status: No malnutrition    Nutrition Risk Level: Moderate    Nutrient Needs:  · Estimated Daily Total Kcal: ~1700 (~25 kcals/kg current weight of 68 kg on 1/28/20)  · Estimated Daily Protein (g): ~68 (~1.3 grams/kg IBW of 52 kg on 1/30/20)  · Estimated Daily Total Fluid (ml/day): per physician    Nutrition Diagnosis:   · Problem: Increased nutrient needs  · Etiology: related to Acute injury/trauma     Signs and symptoms:  as evidenced by Presence of wounds    Objective Information:  · Nutrition-Focused Physical Findings: Admitted with right intracapsular hip fx and repair 1/23/20. Patient seen and reports that she has a good appetite (note patient ate 100% of all three meals yesterday). Patient reports stable weight for a long time now. Patient says that she was constipated but now her bowel are moving. Patient denies the wanting an ONS. Patient denies nutritional needs. Meds: multivitamin, Colace, Senokot.     · Wound Type: Surgical Wound(right hip 1/23/20)  · Current Nutrition Therapies:  · Oral Diet Orders: General   · Oral Diet intake: %  · Oral Nutrition Supplement (ONS) Orders: None  · ONS intake: Refused  · Anthropometric Measures:  · Ht: 5' 3\" (160 cm)(per patient)   · Current Body Wt: 150 lb (68 kg)(1/28/20, bedscale, trace RLE edema)  · Admission Body Wt: 150 lb (68 kg)(1/28/20, bedscale, trace RLE edema)  · Usual Body Wt: 140 lb (63.5 kg)(per patient, she feels

## 2020-01-30 NOTE — PLAN OF CARE
Problem: Pain:  Goal: Pain level will decrease  Description  Pain level will decrease  Outcome: Ongoing  Pain decreases with rest, repositioning, ice application, and prn pain medications. Problem: Bleeding:  Goal: Will show no signs and symptoms of excessive bleeding  Description  Will show no signs and symptoms of excessive bleeding  Outcome: Ongoing   No signs of excessive bleeding noted this shift. Problem: Mobility - Impaired:  Goal: Mobility will improve  Description  Mobility will improve  Outcome: Ongoing  Patient continues with therapies and is working toward discharge goals. Problem: Infection - Surgical Site:  Goal: Signs of wound healing will improve  Description  Signs of wound healing will improve  Outcome: Ongoing  No signs or symptoms of infection noted. Problem: Discharge Planning:  Goal: Patients continuum of care needs are met  Description  Patients continuum of care needs are met  Outcome: Ongoing  Team conference held each Tuesday with Medical Director, , PT, OT, and primary nurse to evaluate readiness for discharge and identify discharge home going needs. Problem: Skin Integrity:  Goal: Will show no infection signs and symptoms  Description  Will show no infection signs and symptoms  Outcome: Ongoing  No signs or symptoms of infection noted. Problem: Skin Integrity:  Goal: Absence of new skin breakdown  Description  Absence of new skin breakdown  Outcome: Ongoing  No new skin breakdown noted since admission. Problem: Bowel/Gastric:  Goal: Occurrences of constipation will decrease  Description  Occurrences of constipation will decrease  Outcome: Ongoing  Patient did have BM through the night. Did take bowel softener and senokot this morning.

## 2020-01-30 NOTE — PROGRESS NOTES
Bilaterally,  Slight Decreased Weight Shift Right, Decreased Gait Speed and Decreased Heel Strike Bilaterally. Pt continues to show improvement with achieving/ maintaining step-thru gait pattern    Balance:  Dynamic Standing Balance: Stand By Assistance,  standing with b ue support to complete B LE standing ther. ex    Exercise:  Patient was guided in 1 set(s) 15 reps of exercise to both lower extremities. Seated Long arc quads,Seated L LE Hip abduction/adduction, Seated hip flexion, Seated hamstring curls with resistance t-band, Seated heel/toe raises and Standing heel/toe raises, Standing marches, Standing  L LE hip abduction/adduction, Standing L LE hip flexion, Standing hamstring curls and Mini squats. Exercises were completed for increased independence with functional mobility. Functional Outcome Measures: Not completed       ASSESSMENT:  Assessment: Patient progressing toward established goals. Activity Tolerance:  Patient tolerance of  treatment: good. Equipment Recommendations:Equipment Needed: No  Discharge Recommendations:  Continue to assess pending progress, Home with Home health PT    Plan: Times per week: 5xBID, 1 x QD  Current Treatment Recommendations: Strengthening, Functional Mobility Training, Neuromuscular Re-education, Home Exercise Program, Transfer Training, Gait Training, Balance Training, Endurance Training, Stair training, Patient/Caregiver Education & Training, Safety Education & Training    Patient Education  Patient Education: Home Exercise Program, Precautions/Restrictions, Equipment Education, Transfers, Reviewed Prior Education, Gait, Verbal Exercise Instruction, - Patient Requires Continued Education    Goals:  Patient goals : go home  Short term goals  Time Frame for Short term goals: 2 weeks  Short term goal 1: Patient will complete supine < > sit with supervision to transfer in/out of bed with decreased difficulty.   Short term goal 2: Patient will complete sit < > stand with SBA to increase independence with transfers. Short term goal 3: Patietn will ambulate 150' wtih a RW and SBA to progress towards safe home ambulation. Short term goal 4: Patietn will ascend/descend 3 steps with 2 hand rails and CGA to progress towards safe home access. Short term goal 5: Patient will complete car transfer with CGA to transfer in/out of vehicle to prepare for safe home transport. Long term goals  Time Frame for Long term goals : 3 weeks  Long term goal 1: Patient will complete supine < > sit with modified independence to transfer in/out of vehicle safely. Long term goal 2: Patient will complete sit < > stand and stand pivot transfers with modified independence to stand to ambulate wtih decreased difficulty. Long term goal 3: Patient will ambulate 80' with a RW and modified independence to navigate home safely. Long term goal 4: Patient will ascend/descend 3 steps with 2 hand rails and modified independence to access/leave home safely. Long term goal 5: Patient will complete car transfer with supervision to transport to home safely. Long term goal 6: Patient will ascend/descend 1, 6\" step with RW and supervision for community entry. Following session, patient left in safe position with all fall risk precautions in place.

## 2020-01-31 PROCEDURE — 1290000000 HC SEMI PRIVATE OTHER R&B

## 2020-01-31 PROCEDURE — 6360000002 HC RX W HCPCS: Performed by: FAMILY MEDICINE

## 2020-01-31 PROCEDURE — 6370000000 HC RX 637 (ALT 250 FOR IP): Performed by: FAMILY MEDICINE

## 2020-01-31 PROCEDURE — 97110 THERAPEUTIC EXERCISES: CPT

## 2020-01-31 PROCEDURE — 97535 SELF CARE MNGMENT TRAINING: CPT

## 2020-01-31 PROCEDURE — 97530 THERAPEUTIC ACTIVITIES: CPT

## 2020-01-31 PROCEDURE — 6370000000 HC RX 637 (ALT 250 FOR IP): Performed by: ORTHOPAEDIC SURGERY

## 2020-01-31 PROCEDURE — 97116 GAIT TRAINING THERAPY: CPT

## 2020-01-31 RX ADMIN — FAMOTIDINE 20 MG: 20 TABLET ORAL at 09:04

## 2020-01-31 RX ADMIN — DOCUSATE SODIUM 100 MG: 100 CAPSULE, LIQUID FILLED ORAL at 09:04

## 2020-01-31 RX ADMIN — ACETAMINOPHEN 650 MG: 325 TABLET ORAL at 12:28

## 2020-01-31 RX ADMIN — SENNOSIDES AND DOCUSATE SODIUM 1 TABLET: 8.6; 5 TABLET ORAL at 20:22

## 2020-01-31 RX ADMIN — TRIAMTERENE AND HYDROCHLOROTHIAZIDE 1 TABLET: 37.5; 25 TABLET ORAL at 09:04

## 2020-01-31 RX ADMIN — SIMVASTATIN 10 MG: 10 TABLET, FILM COATED ORAL at 20:22

## 2020-01-31 RX ADMIN — TRAMADOL HYDROCHLORIDE 50 MG: 50 TABLET, FILM COATED ORAL at 06:01

## 2020-01-31 RX ADMIN — ENOXAPARIN SODIUM 40 MG: 40 INJECTION SUBCUTANEOUS at 12:27

## 2020-01-31 RX ADMIN — DOCUSATE SODIUM 100 MG: 100 CAPSULE, LIQUID FILLED ORAL at 20:22

## 2020-01-31 RX ADMIN — THERA TABS 1 TABLET: TAB at 09:04

## 2020-01-31 RX ADMIN — LEVOTHYROXINE SODIUM 50 MCG: 50 TABLET ORAL at 06:01

## 2020-01-31 RX ADMIN — TRAMADOL HYDROCHLORIDE 50 MG: 50 TABLET, FILM COATED ORAL at 12:32

## 2020-01-31 RX ADMIN — ACETAMINOPHEN 650 MG: 325 TABLET ORAL at 06:01

## 2020-01-31 RX ADMIN — Medication 250 MG: at 09:04

## 2020-01-31 RX ADMIN — HYDROCODONE BITARTRATE AND ACETAMINOPHEN 1 TABLET: 5; 325 TABLET ORAL at 20:22

## 2020-01-31 RX ADMIN — ACETAMINOPHEN 650 MG: 325 TABLET ORAL at 17:24

## 2020-01-31 RX ADMIN — SENNOSIDES AND DOCUSATE SODIUM 1 TABLET: 8.6; 5 TABLET ORAL at 09:04

## 2020-01-31 ASSESSMENT — PAIN DESCRIPTION - PROGRESSION

## 2020-01-31 ASSESSMENT — PAIN DESCRIPTION - DESCRIPTORS
DESCRIPTORS: ACHING

## 2020-01-31 ASSESSMENT — PAIN - FUNCTIONAL ASSESSMENT
PAIN_FUNCTIONAL_ASSESSMENT: ACTIVITIES ARE NOT PREVENTED

## 2020-01-31 ASSESSMENT — PAIN DESCRIPTION - PAIN TYPE
TYPE: SURGICAL PAIN

## 2020-01-31 ASSESSMENT — PAIN SCALES - GENERAL
PAINLEVEL_OUTOF10: 7
PAINLEVEL_OUTOF10: 5
PAINLEVEL_OUTOF10: 8
PAINLEVEL_OUTOF10: 7
PAINLEVEL_OUTOF10: 8
PAINLEVEL_OUTOF10: 8

## 2020-01-31 ASSESSMENT — PAIN DESCRIPTION - DIRECTION: RADIATING_TOWARDS: R LEG

## 2020-01-31 ASSESSMENT — PAIN DESCRIPTION - LOCATION
LOCATION: HIP;BUTTOCKS
LOCATION: BUTTOCKS;HIP
LOCATION: HIP;BUTTOCKS
LOCATION: HIP
LOCATION: HIP;BUTTOCKS

## 2020-01-31 ASSESSMENT — PAIN DESCRIPTION - ORIENTATION
ORIENTATION: RIGHT

## 2020-01-31 ASSESSMENT — PAIN DESCRIPTION - ONSET
ONSET: ON-GOING

## 2020-01-31 ASSESSMENT — PAIN DESCRIPTION - FREQUENCY
FREQUENCY: INTERMITTENT

## 2020-01-31 NOTE — PROGRESS NOTES
Right hip incision dressing change. Removed old ABD pad, no redness, drainage or swelling noted. Incision intact, edges well approximated with 16 zip lines. Cleansed with Iodine, applied ABD pad to incision, secured with hypo-allergic surgery tape. Patient tolerated well.

## 2020-01-31 NOTE — PROGRESS NOTES
Sitting in chair. Eating lunch. Alert and oriented to person, place and time. ANA 4mm to 3mm, brisk. Skin warm and dry. Mucous membranes pink and moist. Hair shiny. Speech clear. Denies difficulty swallowing or chewing. Lung sounds clear anterior posterior and lateral. Respirations labored. Heart sounds strong and regular. No cough noted. Bowel sounds hyperactive in all four quadrants. Abdomen soft, round, denies tenderness to palpation. Radial pulse strong bilaterally. Hand grasp strong bilaterally. Arm drift negative bilaterally. Capillary refill less than 3 seconds. Skin turgor sluggish. Radial pulse strong bilaterally. Pedal push and pull strong bilaterally. Moreno's sign negative bilaterally. Lift leg lift strong. Right leg lift weak. No edema noted. Denies numbness and tingling. Right hip dressing clean, dry and intact. States right hip pain, rated 7 on 1-10 scale, ice applied per doctors order. Sitting in chair. Eyes closed. Wheels locked. Over bed table and call light within reach. Denies needs at present time.

## 2020-01-31 NOTE — PROGRESS NOTES
Sitting in bed watching tv. Alert and oriented to person, place and time. ANA 4mm to 3mm, brisk. Mucous membranes pink and moist. Skin warm and dry. Smile symmetrical. Speech clear. Denies difficulty chewing or swallowing. Lung sounds clear anterior, posterior and lateral. Heart sounds distant and regular. No cough noted. Abdomen round and soft, denies tenderness to palpation. Bowel sounds active in all four quadrants. Denies difficulty urinating or passing gas. States difficulty with constipation. States last bowel movement yesterday. Right hip dressing clean, dry and intact. Radial pulse strong bilaterally. Hand grasp strong bilaterally. Arm drift negative bilaterally. Skin turgor sluggish. Capillary refill less than 3 seconds. Pedal push and pull strong bilaterally. Pedal pulse strong bilaterally. Left leg lift strong. Right leg lift weak. Moreno's sign negative bilaterally. Denies numbness and tingling. No edema noted. States right hip pain, rated a 7 on 1-10 scale, ice applied as ordered by MD. Sitting in chair. Reading newspaper. Wheels locked. Over bed table and call light within reach. Denies needs at this present time.

## 2020-01-31 NOTE — PROGRESS NOTES
rails and CGA to progress towards safe home access. Short term goal 5: Patient will complete car transfer with CGA to transfer in/out of vehicle to prepare for safe home transport. Long term goals  Time Frame for Long term goals : 3 weeks  Long term goal 1: Patient will complete supine < > sit with modified independence to transfer in/out of vehicle safely. Long term goal 2: Patient will complete sit < > stand and stand pivot transfers with modified independence to stand to ambulate wtih decreased difficulty. Long term goal 3: Patient will ambulate 80' with a RW and modified independence to navigate home safely. Long term goal 4: Patient will ascend/descend 3 steps with 2 hand rails and modified independence to access/leave home safely. Long term goal 5: Patient will complete car transfer with supervision to transport to home safely. Long term goal 6: Patient will ascend/descend 1, 6\" step with RW and supervision for community entry. Following session, patient left in safe position with all fall risk precautions in place.

## 2020-01-31 NOTE — PROGRESS NOTES
increase strength and improve activity tolerance required for functional toilet & shower transfers and ADL routine. Pt tolerated fair, requiring rest breaks throughout task. Patient completed IADL homemaking task this date of making muffins and then washing dishes - task was graded to challenge balance with 1-2 UE release from walker, safety with transferring items with use of RW, and activity tolerance. Patient was able to retrieve all items from graded heights with SBA and min VCs for hip precautions during the retrieval and transportation of items. Patient required 1 seated rest break  throughout task and 1 seated rest break after task with a standing endurance of 14 min x1 trial and 4 min x1 trial.     ASSESSMENT:  Activity Tolerance:  Patient tolerance of  treatment: fair. Discharge Recommendations: Continue to assess pending progress, Patient would benefit from continued therapy after discharge  Equipment Recommendations: Equipment Needed: Yes  Other: LHAE  Plan: Times per week: 6x  Plan weeks: 3   Current Treatment Recommendations: Strengthening, Safety Education & Training, Balance Training, Patient/Caregiver Education & Training, Self-Care / ADL, Functional Mobility Training, Endurance Training    Patient Education  Patient Education: IADL's while maintaining hip precautions    Goals  Short term goals  Time Frame for Short term goals: 2 weeks  Short term goal 1: Pt. will complete LB ADLs using LHAE with Delma and no vcs to maintain hip precautions. Short term goal 2: Pt. will complete functional transfers to/from toliet with SBA adhering to precautions without vcs to increase indep with toileting routine. Short term goal 3: Pt. will complete dynamic standing task with SBA x 4 min to increase endurance in prep for bathing/grooming tasks. Short term goal 4: Pt. will navigate to/from bathroom and in room using AE with SBA to obtain needed ADL items.   Long term goals  Time Frame for Long term

## 2020-02-01 PROCEDURE — 6370000000 HC RX 637 (ALT 250 FOR IP): Performed by: FAMILY MEDICINE

## 2020-02-01 PROCEDURE — 1290000000 HC SEMI PRIVATE OTHER R&B

## 2020-02-01 PROCEDURE — 6360000002 HC RX W HCPCS: Performed by: FAMILY MEDICINE

## 2020-02-01 PROCEDURE — 6370000000 HC RX 637 (ALT 250 FOR IP): Performed by: ORTHOPAEDIC SURGERY

## 2020-02-01 RX ADMIN — ACETAMINOPHEN 650 MG: 325 TABLET ORAL at 18:19

## 2020-02-01 RX ADMIN — SENNOSIDES AND DOCUSATE SODIUM 1 TABLET: 8.6; 5 TABLET ORAL at 20:15

## 2020-02-01 RX ADMIN — ACETAMINOPHEN 650 MG: 325 TABLET ORAL at 00:11

## 2020-02-01 RX ADMIN — TRAMADOL HYDROCHLORIDE 50 MG: 50 TABLET, FILM COATED ORAL at 06:39

## 2020-02-01 RX ADMIN — SENNOSIDES AND DOCUSATE SODIUM 1 TABLET: 8.6; 5 TABLET ORAL at 09:32

## 2020-02-01 RX ADMIN — THERA TABS 1 TABLET: TAB at 09:32

## 2020-02-01 RX ADMIN — ENOXAPARIN SODIUM 40 MG: 40 INJECTION SUBCUTANEOUS at 12:00

## 2020-02-01 RX ADMIN — Medication 250 MG: at 09:32

## 2020-02-01 RX ADMIN — DOCUSATE SODIUM 100 MG: 100 CAPSULE, LIQUID FILLED ORAL at 20:15

## 2020-02-01 RX ADMIN — LEVOTHYROXINE SODIUM 50 MCG: 50 TABLET ORAL at 06:39

## 2020-02-01 RX ADMIN — ACETAMINOPHEN 650 MG: 325 TABLET ORAL at 12:00

## 2020-02-01 RX ADMIN — FAMOTIDINE 20 MG: 20 TABLET ORAL at 09:32

## 2020-02-01 RX ADMIN — TRAMADOL HYDROCHLORIDE 50 MG: 50 TABLET, FILM COATED ORAL at 00:11

## 2020-02-01 RX ADMIN — HYDROCODONE BITARTRATE AND ACETAMINOPHEN 1 TABLET: 5; 325 TABLET ORAL at 22:29

## 2020-02-01 RX ADMIN — SIMVASTATIN 10 MG: 10 TABLET, FILM COATED ORAL at 20:15

## 2020-02-01 RX ADMIN — DOCUSATE SODIUM 100 MG: 100 CAPSULE, LIQUID FILLED ORAL at 09:32

## 2020-02-01 RX ADMIN — ACETAMINOPHEN 650 MG: 325 TABLET ORAL at 06:39

## 2020-02-01 RX ADMIN — TRIAMTERENE AND HYDROCHLOROTHIAZIDE 1 TABLET: 37.5; 25 TABLET ORAL at 09:32

## 2020-02-01 ASSESSMENT — PAIN SCALES - GENERAL
PAINLEVEL_OUTOF10: 6
PAINLEVEL_OUTOF10: 2
PAINLEVEL_OUTOF10: 1
PAINLEVEL_OUTOF10: 0
PAINLEVEL_OUTOF10: 4
PAINLEVEL_OUTOF10: 6
PAINLEVEL_OUTOF10: 0
PAINLEVEL_OUTOF10: 0

## 2020-02-01 ASSESSMENT — PAIN DESCRIPTION - PROGRESSION: CLINICAL_PROGRESSION: GRADUALLY IMPROVING

## 2020-02-02 PROBLEM — E87.1 HYPONATREMIA: Status: ACTIVE | Noted: 2020-02-02

## 2020-02-02 LAB
ANION GAP SERPL CALCULATED.3IONS-SCNC: 11 MEQ/L (ref 8–16)
BASOPHILS # BLD: 0.4 %
BASOPHILS ABSOLUTE: 0.1 THOU/MM3 (ref 0–0.1)
BUN BLDV-MCNC: 26 MG/DL (ref 7–22)
CALCIUM SERPL-MCNC: 9.5 MG/DL (ref 8.5–10.5)
CHLORIDE BLD-SCNC: 94 MEQ/L (ref 98–111)
CO2: 29 MEQ/L (ref 23–33)
CREAT SERPL-MCNC: 0.7 MG/DL (ref 0.4–1.2)
EOSINOPHIL # BLD: 1.5 %
EOSINOPHILS ABSOLUTE: 0.2 THOU/MM3 (ref 0–0.4)
ERYTHROCYTE [DISTWIDTH] IN BLOOD BY AUTOMATED COUNT: 13.2 % (ref 11.5–14.5)
ERYTHROCYTE [DISTWIDTH] IN BLOOD BY AUTOMATED COUNT: 47.3 FL (ref 35–45)
GFR SERPL CREATININE-BSD FRML MDRD: 79 ML/MIN/1.73M2
GLUCOSE BLD-MCNC: 101 MG/DL (ref 70–108)
HCT VFR BLD CALC: 36.2 % (ref 37–47)
HEMOGLOBIN: 11.7 GM/DL (ref 12–16)
IMMATURE GRANS (ABS): 0.26 THOU/MM3 (ref 0–0.07)
IMMATURE GRANULOCYTES: 2.1 %
LYMPHOCYTES # BLD: 9.5 %
LYMPHOCYTES ABSOLUTE: 1.2 THOU/MM3 (ref 1–4.8)
MCH RBC QN AUTO: 31.6 PG (ref 26–33)
MCHC RBC AUTO-ENTMCNC: 32.3 GM/DL (ref 32.2–35.5)
MCV RBC AUTO: 97.8 FL (ref 81–99)
MONOCYTES # BLD: 8.2 %
MONOCYTES ABSOLUTE: 1 THOU/MM3 (ref 0.4–1.3)
NUCLEATED RED BLOOD CELLS: 0 /100 WBC
PLATELET # BLD: 445 THOU/MM3 (ref 130–400)
PMV BLD AUTO: 8.5 FL (ref 9.4–12.4)
POTASSIUM SERPL-SCNC: 4.7 MEQ/L (ref 3.5–5.2)
RBC # BLD: 3.7 MILL/MM3 (ref 4.2–5.4)
SEG NEUTROPHILS: 78.3 %
SEGMENTED NEUTROPHILS ABSOLUTE COUNT: 9.8 THOU/MM3 (ref 1.8–7.7)
SODIUM BLD-SCNC: 134 MEQ/L (ref 135–145)
WBC # BLD: 12.5 THOU/MM3 (ref 4.8–10.8)

## 2020-02-02 PROCEDURE — 6370000000 HC RX 637 (ALT 250 FOR IP): Performed by: FAMILY MEDICINE

## 2020-02-02 PROCEDURE — 97116 GAIT TRAINING THERAPY: CPT

## 2020-02-02 PROCEDURE — 97110 THERAPEUTIC EXERCISES: CPT

## 2020-02-02 PROCEDURE — 85025 COMPLETE CBC W/AUTO DIFF WBC: CPT

## 2020-02-02 PROCEDURE — 6370000000 HC RX 637 (ALT 250 FOR IP): Performed by: ORTHOPAEDIC SURGERY

## 2020-02-02 PROCEDURE — 6360000002 HC RX W HCPCS: Performed by: FAMILY MEDICINE

## 2020-02-02 PROCEDURE — 97535 SELF CARE MNGMENT TRAINING: CPT

## 2020-02-02 PROCEDURE — 80048 BASIC METABOLIC PNL TOTAL CA: CPT

## 2020-02-02 PROCEDURE — 1290000000 HC SEMI PRIVATE OTHER R&B

## 2020-02-02 PROCEDURE — 36415 COLL VENOUS BLD VENIPUNCTURE: CPT

## 2020-02-02 RX ADMIN — THERA TABS 1 TABLET: TAB at 08:58

## 2020-02-02 RX ADMIN — SIMVASTATIN 10 MG: 10 TABLET, FILM COATED ORAL at 20:47

## 2020-02-02 RX ADMIN — TRAMADOL HYDROCHLORIDE 50 MG: 50 TABLET, FILM COATED ORAL at 00:16

## 2020-02-02 RX ADMIN — ENOXAPARIN SODIUM 40 MG: 40 INJECTION SUBCUTANEOUS at 13:09

## 2020-02-02 RX ADMIN — DOCUSATE SODIUM 100 MG: 100 CAPSULE, LIQUID FILLED ORAL at 08:58

## 2020-02-02 RX ADMIN — TRAMADOL HYDROCHLORIDE 50 MG: 50 TABLET, FILM COATED ORAL at 06:26

## 2020-02-02 RX ADMIN — SENNOSIDES AND DOCUSATE SODIUM 1 TABLET: 8.6; 5 TABLET ORAL at 08:58

## 2020-02-02 RX ADMIN — ACETAMINOPHEN 650 MG: 325 TABLET ORAL at 00:16

## 2020-02-02 RX ADMIN — LEVOTHYROXINE SODIUM 50 MCG: 50 TABLET ORAL at 06:26

## 2020-02-02 RX ADMIN — Medication 250 MG: at 08:58

## 2020-02-02 RX ADMIN — FAMOTIDINE 20 MG: 20 TABLET ORAL at 08:58

## 2020-02-02 RX ADMIN — ACETAMINOPHEN 650 MG: 325 TABLET ORAL at 06:27

## 2020-02-02 RX ADMIN — ACETAMINOPHEN 650 MG: 325 TABLET ORAL at 18:05

## 2020-02-02 RX ADMIN — DOCUSATE SODIUM 100 MG: 100 CAPSULE, LIQUID FILLED ORAL at 20:48

## 2020-02-02 RX ADMIN — HYDROCODONE BITARTRATE AND ACETAMINOPHEN 1 TABLET: 5; 325 TABLET ORAL at 08:57

## 2020-02-02 RX ADMIN — HYDROCODONE BITARTRATE AND ACETAMINOPHEN 1 TABLET: 5; 325 TABLET ORAL at 20:48

## 2020-02-02 RX ADMIN — SENNOSIDES AND DOCUSATE SODIUM 1 TABLET: 8.6; 5 TABLET ORAL at 20:47

## 2020-02-02 RX ADMIN — TRIAMTERENE AND HYDROCHLOROTHIAZIDE 1 TABLET: 37.5; 25 TABLET ORAL at 08:58

## 2020-02-02 RX ADMIN — ACETAMINOPHEN 650 MG: 325 TABLET ORAL at 13:09

## 2020-02-02 RX ADMIN — HYDROCODONE BITARTRATE AND ACETAMINOPHEN 1 TABLET: 5; 325 TABLET ORAL at 16:01

## 2020-02-02 ASSESSMENT — PAIN DESCRIPTION - FREQUENCY: FREQUENCY: INTERMITTENT

## 2020-02-02 ASSESSMENT — PAIN SCALES - GENERAL
PAINLEVEL_OUTOF10: 8
PAINLEVEL_OUTOF10: 4
PAINLEVEL_OUTOF10: 4
PAINLEVEL_OUTOF10: 8
PAINLEVEL_OUTOF10: 4
PAINLEVEL_OUTOF10: 5
PAINLEVEL_OUTOF10: 7
PAINLEVEL_OUTOF10: 3
PAINLEVEL_OUTOF10: 6

## 2020-02-02 ASSESSMENT — PAIN DESCRIPTION - LOCATION: LOCATION: HIP

## 2020-02-02 ASSESSMENT — PAIN DESCRIPTION - PAIN TYPE: TYPE: SURGICAL PAIN

## 2020-02-02 ASSESSMENT — PAIN DESCRIPTION - DESCRIPTORS: DESCRIPTORS: SHOOTING;SHARP

## 2020-02-02 ASSESSMENT — PAIN DESCRIPTION - ORIENTATION: ORIENTATION: RIGHT

## 2020-02-02 ASSESSMENT — PAIN DESCRIPTION - ONSET: ONSET: ON-GOING

## 2020-02-02 ASSESSMENT — PAIN DESCRIPTION - DIRECTION: RADIATING_TOWARDS: R KNEE

## 2020-02-02 ASSESSMENT — PAIN - FUNCTIONAL ASSESSMENT: PAIN_FUNCTIONAL_ASSESSMENT: ACTIVITIES ARE NOT PREVENTED

## 2020-02-02 ASSESSMENT — PAIN DESCRIPTION - PROGRESSION: CLINICAL_PROGRESSION: GRADUALLY IMPROVING

## 2020-02-02 NOTE — PROGRESS NOTES
Patient: Jesus Alberto Brown  Unit/Bed: 8E-66/066-A  YOB: 1931  MRN: [id number] Acct: [de-identified]   Admitting Diagnosis: Right Intracapsular Hip Fracture  Admit Date:  1/27/2020  Hospital Day: 6    Assessment:     Active Problems:    Hyperlipidemia    Hypothyroidism    Hypertension    History of total right hip arthroplasty    Hyponatremia  Resolved Problems:    * No resolved hospital problems. *      Plan:     Follow sodium  Continue therapies        Subjective:     Patient has no complaint of CP, SOB or GI upset. .   Medication side effects: none    Scheduled Meds:   famotidine  20 mg Oral Daily    enoxaparin  40 mg Subcutaneous Q24H    acetaminophen  650 mg Oral Q6H    docusate sodium  100 mg Oral BID    sennosides-docusate sodium  1 tablet Oral BID    levothyroxine  50 mcg Oral Daily    ppd  1 each Does not apply Weekly    simvastatin  10 mg Oral QPM    triamterene-hydrochlorothiazide  1 tablet Oral Daily    tuberculin  5 Units Intradermal Weekly    multivitamin  1 tablet Oral Daily    oyster shell calcium/vitamin D  1 tablet Oral Daily     Continuous Infusions:  PRN Meds:ondansetron, magnesium hydroxide, traMADol, HYDROcodone 5 mg - acetaminophen, bisacodyl    Review of Systems  Pertinent items are noted in HPI. Objective:     Patient Vitals for the past 8 hrs:   BP Temp Temp src Pulse Resp SpO2   02/02/20 0853 114/68 97.5 °F (36.4 °C) Axillary 81 16 93 %     I/O last 3 completed shifts: In: 2665 [P.O.:2390]  Out: -   I/O this shift:   In: 5 [P.O.:420]  Out: -     /68   Pulse 81   Temp 97.5 °F (36.4 °C) (Axillary)   Resp 16   Ht 5' 3\" (1.6 m) Comment: per patient  Wt 150 lb 12.8 oz (68.4 kg)   SpO2 93%   BMI 26.71 kg/m²     /68   Pulse 81   Temp 97.5 °F (36.4 °C) (Axillary)   Resp 16   Ht 5' 3\" (1.6 m) Comment: per patient  Wt 150 lb 12.8 oz (68.4 kg)   SpO2 93%   BMI 26.71 kg/m²   General appearance: alert, appears stated age and cooperative  Head:

## 2020-02-02 NOTE — PROGRESS NOTES
Assistance    Ambulation:  Stand By Assistance  Distance: 150 feet x1 and 100 feet x1   Surface: Level Tile  Device:Rolling Walker  Gait Deviations:  Slow Meseret, Decreased Weight Shift Right and Steady with no LOB    Balance:  Dynamic Standing Balance: Stand By Assistance, Pt standing in restroom to manage depends. Pt also stood and completed functional reaching activity requiring her to reach outside [place] to B sides. Activity completed to promote increased weight shifts to R LE and for greater safety with functional reaching tasks. Exercise:  Patient was guided in 1 set(s) 10 reps of exercise to both lower extremities. Standing heel/toe raises, Standing marches, Standing hip abduction/adduction and Standing hamstring curls. Exercises were completed for increased independence with functional mobility. Functional Outcome Measures: Not completed       ASSESSMENT:  Assessment: Patient progressing toward established goals. and Pt tolerated session well. Limited by decreased strength and decreased mobility. Would benefit from continued therapy at [facility]. Activity Tolerance:  Patient tolerance of  treatment: good. 2     Equipment Recommendations:Equipment Needed: No  Discharge Recommendations:  Continue to assess pending progress, Home with Home health PT    Plan: Times per week: 5xBID, 1 x QD  Current Treatment Recommendations: Strengthening, Functional Mobility Training, Neuromuscular Re-education, Home Exercise Program, Transfer Training, Gait Training, Balance Training, Endurance Training, Stair training, Patient/Caregiver Education & Training, Safety Education & Training    Patient Education  Patient Education: Plan of Care, [employer] Mobility, Transfers, Gait    Goals:  Patient goals : go home  Short term goals  Time Frame for Short term goals: 2 weeks  Short term goal 1: Patient will complete supine < > sit with supervision to transfer in/out of bed with decreased difficulty.   Short term goal 2: Patient

## 2020-02-02 NOTE — PROGRESS NOTES
throughout      ADDITIONAL ACTIVITIES:  Review of hip precautions pt able to recall 2/3   . Pt completed BUE strengthening exercises x15 reps x1 set this date with a minimal resistive band  in all joints and all planes in order to increase strength and improve activity tolerance required for functional toilet & shower transfers and ADL routine. Pt tolerated in sitting, requiring short  rest breaks throughout task. ASSESSMENT:     Activity Tolerance:  Patient tolerance of  treatment: good. Discharge Recommendations: Continue to assess pending progress, Patient would benefit from continued therapy after discharge  Equipment Recommendations: Equipment Needed: Yes  Other: LHAE  Plan: Times per week: 6x  Plan weeks: 3   Current Treatment Recommendations: Strengthening, Safety Education & Training, Balance Training, Patient/Caregiver Education & Training, Self-Care / ADL, Functional Mobility Training, Endurance Training    Patient Education  Patient Education: ADL's, Home Exercise Program, Precautions and Importance of Increasing Activity    Goals  Short term goals  Time Frame for Short term goals: 2 weeks  Short term goal 1: Pt. will complete LB ADLs using LHAE with Delma and no vcs to maintain hip precautions. Short term goal 2: Pt. will complete functional transfers to/from toliet with SBA adhering to precautions without vcs to increase indep with toileting routine. Short term goal 3: Pt. will complete dynamic standing task with SBA x 4 min to increase endurance in prep for bathing/grooming tasks. Short term goal 4: Pt. will navigate to/from bathroom and in room using AE with SBA to obtain needed ADL items. Long term goals  Time Frame for Long term goals : 3 weeks  Long term goal 1: Pt will complete ADL routine with mod including shower t/f to increase indep with self care. Long term goal 2: Pt will complete light homemaking task with Verena to increase indep with light cooking and cleaning.     Following

## 2020-02-03 PROCEDURE — 97116 GAIT TRAINING THERAPY: CPT

## 2020-02-03 PROCEDURE — 97535 SELF CARE MNGMENT TRAINING: CPT

## 2020-02-03 PROCEDURE — 6370000000 HC RX 637 (ALT 250 FOR IP): Performed by: ORTHOPAEDIC SURGERY

## 2020-02-03 PROCEDURE — 97530 THERAPEUTIC ACTIVITIES: CPT

## 2020-02-03 PROCEDURE — 97110 THERAPEUTIC EXERCISES: CPT

## 2020-02-03 PROCEDURE — 6360000002 HC RX W HCPCS: Performed by: FAMILY MEDICINE

## 2020-02-03 PROCEDURE — 94760 N-INVAS EAR/PLS OXIMETRY 1: CPT

## 2020-02-03 PROCEDURE — 6370000000 HC RX 637 (ALT 250 FOR IP): Performed by: FAMILY MEDICINE

## 2020-02-03 PROCEDURE — 1290000000 HC SEMI PRIVATE OTHER R&B

## 2020-02-03 PROCEDURE — 0220000000 HC SKILLED NURSING FACILITY

## 2020-02-03 RX ADMIN — THERA TABS 1 TABLET: TAB at 09:10

## 2020-02-03 RX ADMIN — LEVOTHYROXINE SODIUM 50 MCG: 50 TABLET ORAL at 06:21

## 2020-02-03 RX ADMIN — HYDROCODONE BITARTRATE AND ACETAMINOPHEN 1 TABLET: 5; 325 TABLET ORAL at 11:04

## 2020-02-03 RX ADMIN — Medication 250 MG: at 09:10

## 2020-02-03 RX ADMIN — HYDROCODONE BITARTRATE AND ACETAMINOPHEN 1 TABLET: 5; 325 TABLET ORAL at 00:54

## 2020-02-03 RX ADMIN — TRAMADOL HYDROCHLORIDE 50 MG: 50 TABLET, FILM COATED ORAL at 06:22

## 2020-02-03 RX ADMIN — ACETAMINOPHEN 650 MG: 325 TABLET ORAL at 12:21

## 2020-02-03 RX ADMIN — TRIAMTERENE AND HYDROCHLOROTHIAZIDE 1 TABLET: 37.5; 25 TABLET ORAL at 09:10

## 2020-02-03 RX ADMIN — SIMVASTATIN 10 MG: 10 TABLET, FILM COATED ORAL at 19:48

## 2020-02-03 RX ADMIN — HYDROCODONE BITARTRATE AND ACETAMINOPHEN 1 TABLET: 5; 325 TABLET ORAL at 21:33

## 2020-02-03 RX ADMIN — TRAMADOL HYDROCHLORIDE 50 MG: 50 TABLET, FILM COATED ORAL at 18:19

## 2020-02-03 RX ADMIN — SENNOSIDES AND DOCUSATE SODIUM 1 TABLET: 8.6; 5 TABLET ORAL at 09:10

## 2020-02-03 RX ADMIN — ACETAMINOPHEN 650 MG: 325 TABLET ORAL at 18:18

## 2020-02-03 RX ADMIN — FAMOTIDINE 20 MG: 20 TABLET ORAL at 09:10

## 2020-02-03 RX ADMIN — ACETAMINOPHEN 650 MG: 325 TABLET ORAL at 06:21

## 2020-02-03 RX ADMIN — ENOXAPARIN SODIUM 40 MG: 40 INJECTION SUBCUTANEOUS at 12:22

## 2020-02-03 RX ADMIN — DOCUSATE SODIUM 100 MG: 100 CAPSULE, LIQUID FILLED ORAL at 19:48

## 2020-02-03 RX ADMIN — SENNOSIDES AND DOCUSATE SODIUM 1 TABLET: 8.6; 5 TABLET ORAL at 19:48

## 2020-02-03 RX ADMIN — DOCUSATE SODIUM 100 MG: 100 CAPSULE, LIQUID FILLED ORAL at 09:10

## 2020-02-03 ASSESSMENT — PAIN SCALES - GENERAL
PAINLEVEL_OUTOF10: 5
PAINLEVEL_OUTOF10: 5
PAINLEVEL_OUTOF10: 6
PAINLEVEL_OUTOF10: 5
PAINLEVEL_OUTOF10: 8
PAINLEVEL_OUTOF10: 6
PAINLEVEL_OUTOF10: 7
PAINLEVEL_OUTOF10: 4
PAINLEVEL_OUTOF10: 4
PAINLEVEL_OUTOF10: 6
PAINLEVEL_OUTOF10: 4
PAINLEVEL_OUTOF10: 8

## 2020-02-03 ASSESSMENT — PAIN - FUNCTIONAL ASSESSMENT: PAIN_FUNCTIONAL_ASSESSMENT: ACTIVITIES ARE NOT PREVENTED

## 2020-02-03 ASSESSMENT — PAIN DESCRIPTION - ONSET: ONSET: ON-GOING

## 2020-02-03 ASSESSMENT — PAIN DESCRIPTION - DESCRIPTORS: DESCRIPTORS: ACHING

## 2020-02-03 ASSESSMENT — PAIN DESCRIPTION - LOCATION
LOCATION: HIP
LOCATION: HIP

## 2020-02-03 ASSESSMENT — PAIN DESCRIPTION - PAIN TYPE: TYPE: SURGICAL PAIN

## 2020-02-03 ASSESSMENT — PAIN DESCRIPTION - DIRECTION: RADIATING_TOWARDS: RIGHT THIGH

## 2020-02-03 ASSESSMENT — PAIN DESCRIPTION - FREQUENCY: FREQUENCY: INTERMITTENT

## 2020-02-03 ASSESSMENT — PAIN DESCRIPTION - ORIENTATION: ORIENTATION: RIGHT

## 2020-02-03 ASSESSMENT — PAIN DESCRIPTION - PROGRESSION: CLINICAL_PROGRESSION: GRADUALLY IMPROVING

## 2020-02-03 NOTE — PROGRESS NOTES
Assistance  Car:Stand By Assistance  Toilet transfer with rolling walker, ets with sba-> supervision     Ambulation:  Stand By Assistance, with verbal cues   Distance:15' x 1, 180' x 1, 80' x 1,150' x 1,130' x 1  Surface: Level Tile, Carpet and Ramp  Device:Rolling Walker  Gait Deviations:  Slow Meseret, Decreased Weight Shift Right, Decreased Gait Speed and occasional mild decreased stance time R LE ,step thru gait pattern achieved    Balance:  Dynamic Standing Balance: Supervision  standing at sink with 1 ue support reaching head to knee levels within michael , no unsteadiness observed    Stairs:  Stand By Assistance, [employer], with verbal cues ,   lt. cga-> sba  Number of Steps: 1    Porch step  Height: 6\" step with Rolling Walker    Stairs:  Stand By Assistance, with verbal cues   Number of Steps: 4  Height: 6\" step with Bilateral Handrails, non-reciprocally    Exercise:  Patient was guided in 1 set(s) 15 reps of exercise to both lower extremities. Ankle pumps, Glut sets, Quad sets, Heelslides, Short arc quads, Long arc quads, Hip abduction/adduction, Seated hip flexion, Seated hamstring curls and Seated heel/toe raises. Exercises were completed for increased independence with functional mobility. Functional Outcome Measures: Not completed       ASSESSMENT:  Assessment: Patient progressing toward established goals. Activity Tolerance:  Patient tolerance of  treatment: good.       Equipment Recommendations:Equipment Needed: No  Discharge Recommendations:  Continue to assess pending progress, Home with Home health PT    Plan: Times per week: 5xBID, 1 x QD  Current Treatment Recommendations: Strengthening, Functional Mobility Training, Neuromuscular Re-education, Home Exercise Program, Transfer Training, Gait Training, Balance Training, Endurance Training, Stair training, Patient/Caregiver Education & Training, Safety Education & Training    Patient Education  Patient Education: Home Exercise Program, Precautions/Restrictions, [employer] Mobility, Equipment Education, Transfers, Reviewed Prior Education, Gait, Stairs, Car Transfers, Verbal Exercise Instruction, - Patient Requires Continued Education    Goals:  Patient goals : go home  Short term goals  Time Frame for Short term goals: 2 weeks  Short term goal 1: Patient will complete supine < > sit with supervision to transfer in/out of bed with decreased difficulty. Short term goal 2: Patient will complete sit < > stand with SBA to increase independence with transfers. Short term goal 3: Patietn will ambulate 150' wtih a RW and SBA to progress towards safe home ambulation. Short term goal 4: Patietn will ascend/descend 3 steps with 2 hand rails and CGA to progress towards safe home access. Short term goal 5: Patient will complete car transfer with CGA to transfer in/out of vehicle to prepare for safe home transport. Long term goals  Time Frame for Long term goals : 3 weeks  Long term goal 1: Patient will complete supine < > sit with modified independence to transfer in/out of vehicle safely. Long term goal 2: Patient will complete sit < > stand and stand pivot transfers with modified independence to stand to ambulate wtih decreased difficulty. Long term goal 3: Patient will ambulate 80' with a RW and modified independence to navigate home safely. Long term goal 4: Patient will ascend/descend 3 steps with 2 hand rails and modified independence to access/leave home safely. Long term goal 5: Patient will complete car transfer with supervision to transport to home safely. Long term goal 6: Patient will ascend/descend 1, 6\" step with RW and supervision for community entry. Following session, patient left in safe position with all fall risk precautions in place.

## 2020-02-03 NOTE — PROGRESS NOTES
various heights, 3 different chairs, couch, and car. Verbal cues for positioning on couch to increase ease with standing up and education on positioning of right lower extremity if sitting down on lower surface. Ambulation:  Stand By Assistance  Distance: 85', 80', 15', 65', 50', 120'  Surface: Level Tile  Device:Rolling Walker  Gait Deviations: Forward Flexed Posture, Decreased Step Length on Left and Decreased Heel Strike Bilaterally  Verbal cues for improved posture    Stairs:  Contact Guard Assistance  Number of Steps: 4  Height: 6\" step with Bilateral Handrails   Verbal cues for lower extremity sequencing    Stairs:  Contact Guard Assistance  Number of Steps: 1  Height: 6\" step with Rolling Walker    Balance:  Static Sitting Balance:  Independent  Static Standing Balance: Stand By Assistance  Dynamic Standing Balance: Stand By Assistance, Contact Guard Assistance    Exercise:  Not completed    Functional Outcome Measures: Not completed       ASSESSMENT:  Assessment: Patient progressing toward established goals. Ms. Armida Galo is making good progress towards goals set for her. Patient progressed to SBA with ambulation with a RW and is able to ambulate increased distances. Patient progressed to SBA with sit < > stand and able to ascend/descend 4 steps with 2 hand rails and CGA. Pt continues to experience pain in her right hip, however gradually improving. Endy Payan would benefit from continued skilled PT services to improve her ability to complete functional mobility, reduce her risk for falls and allow patient to return to PLOF. Activity Tolerance:  Patient tolerance of  treatment: good.       Equipment Recommendations:Equipment Needed: No  Discharge Recommendations:  Continue to assess pending progress, Home with Home health PT    Plan: Times per week: 5xBID, 1 x QD  Current Treatment Recommendations: Strengthening, Functional Mobility Training, Neuromuscular Re-education, Home Exercise Program, Transfer

## 2020-02-03 NOTE — PATIENT CARE CONFERENCE
psychotropic medications. Oxygen Use: No    Home Medications Reconciled: N/A    Physical Therapy:   Ms. Joseline Pope is making good progress towards goals set for her. Patient progressed to SBA with ambulation with a RW and is able to ambulate increased distances. Patient progressed to SBA with sit < > stand and able to ascend/descend 4 steps with 2 hand rails and CGA. Pt continues to experience pain in her right hip, however gradually improving. Miri Fuentes would benefit from continued skilled PT services to improve her ability to complete functional mobility, reduce her risk for falls and allow patient to return to [facility]. PT Equipment Recommendations  Equipment Needed: No    Occupational  Therapy:  Pt is making good progress towards goals. Factors facilitating goal achievement include: pleasant, cooperative, good family support. Barriers to goal achievement include: hip precautions, decreased activity tolerance. Family education has been addressed on the following topics: n/a. Pt continues to require skilled Occupational Therapy to address the following performance deficits balance with 1-2 UE release from walker, increasing ADL routine with use of LHAE as needed, awareness of hip precautions during IADL/ADL routine, safety awareness, and activity tolerance. Without skilled OT intervention pt is at risk for functional decline, increased falls, and readmission to hospital.      Equipment: Would benefit from hip kit. Patient reports she has a grab bar in shower and daughter is installing additional one. Nutrition:    Weight: 137 lb 6.4 oz (62.3 kg) / Body mass index is 24.34 kg/m². Please see nutrition note for details.     Family Education: No family available for education  Fall Risk:  Falling star program initiated    Goal Achievement:   Patient Continues to progress toward goal achievement    Discharge Plan   Estimated Length of Stay: pending insurance  Destination: home health  Services at Discharge: Home Health  Physical Therapy, Occupational Therapy, Nursing and aide 3x week  Equipment at Discharge: Needs: hip kit  Factors facilitating achievement of predicted outcomes: Family support, Motivated, Cooperative and Pleasant  Barriers to the achievement of predicted outcomes: Pain and hip precautions    Readmission Risk              Risk of Unplanned Readmission:        19         High (20-31)     Moderate (10-19)     Low (0-9)    Team Members Present at Conference:  Physician: Dr. Jenifer Youssef MD  Nurse: Richard Pacheco RN, Cruzito Andrews RN  :  Verma Sacks, [facility]  Occupational Therapist:  STEPHANIE Randhawa  Physical Therapist:   Rian Blackmon PT  Speech Therapist: Speech Therapist: N/A. All team members have reviewed and updated as necessary and appropriate the established interdisciplinary plan of care within the medical record of Meryl Hdz. Pt's team conference attended (see above). Pt seen on rounds with LSW, to review the results with patient and family. Discussed length of stay as above. Pt's team conference attended (see above.)  Pt seen on rounds with LSW, to review the results with patient and family. Discussed length of stay as above.     Physical Exam:  General:  Alert and oriented  Vitals:   Vitals:    02/04/20 0745   BP: (!) 98/42   Pulse: 76   Resp: 18   Temp: 99.2 °F (37.3 °C)   SpO2: 95%     Heart:  Regular rate and rhythm  Lungs:  Clear to auscultation  Abdomen:  soft with positive bowel sounds     Assessment:  Progressing in full rehabilitation program (see above)    Plan:  Continue Rehab            Continue current medications            Spent 35 minutes today in patient management and care    Electronically signed by Luiz Bonner MD on 2/4/2020 at 9:02 AM

## 2020-02-03 NOTE — PROGRESS NOTES
Training    Patient Education  Patient Education: ADL's with use of LHAE, plan of care    Goals  Short term goals  Time Frame for Short term goals: 2 weeks  Short term goal 1: Pt. will complete LB ADLs using LHAE with Delma and no vcs to maintain hip precautions. Short term goal 2: Pt. will complete functional transfers to/from toliet with SBA adhering to precautions without vcs to increase indep with toileting routine. Short term goal 3: Pt. will complete dynamic standing task with SBA x 4 min to increase endurance in prep for bathing/grooming tasks. Short term goal 4: Pt. will navigate to/from bathroom and in room using AE with SBA to obtain needed ADL items. Long term goals  Time Frame for Long term goals : 3 weeks  Long term goal 1: Pt will complete ADL routine with mod including shower t/f to increase indep with self care. Long term goal 2: Pt will complete light homemaking task with Verena to increase indep with light cooking and cleaning. Following session, patient left in safe position with all fall risk precautions in place.

## 2020-02-03 NOTE — PROGRESS NOTES
In bed relaxing watching the news. No needs or concerns voiced at this time. Call light in hand.  Isra PNS/RSC

## 2020-02-03 NOTE — PROGRESS NOTES
In recliner watching TV doing a word search. No needs or concerns voiced at this time. Call Light within reach.  O.Kiara PNS/RCS

## 2020-02-04 PROCEDURE — 86580 TB INTRADERMAL TEST: CPT

## 2020-02-04 PROCEDURE — 97535 SELF CARE MNGMENT TRAINING: CPT

## 2020-02-04 PROCEDURE — 6370000000 HC RX 637 (ALT 250 FOR IP): Performed by: FAMILY MEDICINE

## 2020-02-04 PROCEDURE — 97116 GAIT TRAINING THERAPY: CPT

## 2020-02-04 PROCEDURE — 6370000000 HC RX 637 (ALT 250 FOR IP): Performed by: ORTHOPAEDIC SURGERY

## 2020-02-04 PROCEDURE — 97112 NEUROMUSCULAR REEDUCATION: CPT

## 2020-02-04 PROCEDURE — 1290000000 HC SEMI PRIVATE OTHER R&B

## 2020-02-04 PROCEDURE — 97530 THERAPEUTIC ACTIVITIES: CPT

## 2020-02-04 PROCEDURE — 6360000002 HC RX W HCPCS: Performed by: FAMILY MEDICINE

## 2020-02-04 PROCEDURE — 97110 THERAPEUTIC EXERCISES: CPT

## 2020-02-04 PROCEDURE — 2500000003 HC RX 250 WO HCPCS: Performed by: ORTHOPAEDIC SURGERY

## 2020-02-04 RX ORDER — ACETAMINOPHEN 325 MG/1
650 TABLET ORAL EVERY 4 HOURS PRN
Status: DISCONTINUED | OUTPATIENT
Start: 2020-02-04 | End: 2020-02-08 | Stop reason: HOSPADM

## 2020-02-04 RX ADMIN — TRAMADOL HYDROCHLORIDE 50 MG: 50 TABLET, FILM COATED ORAL at 09:49

## 2020-02-04 RX ADMIN — THERA TABS 1 TABLET: TAB at 09:50

## 2020-02-04 RX ADMIN — DOCUSATE SODIUM 100 MG: 100 CAPSULE, LIQUID FILLED ORAL at 09:49

## 2020-02-04 RX ADMIN — LEVOTHYROXINE SODIUM 50 MCG: 50 TABLET ORAL at 05:48

## 2020-02-04 RX ADMIN — FAMOTIDINE 20 MG: 20 TABLET ORAL at 09:49

## 2020-02-04 RX ADMIN — ENOXAPARIN SODIUM 40 MG: 40 INJECTION SUBCUTANEOUS at 11:57

## 2020-02-04 RX ADMIN — Medication 250 MG: at 09:50

## 2020-02-04 RX ADMIN — ACETAMINOPHEN 650 MG: 325 TABLET ORAL at 11:59

## 2020-02-04 RX ADMIN — ACETAMINOPHEN 650 MG: 325 TABLET ORAL at 00:26

## 2020-02-04 RX ADMIN — TRIAMTERENE AND HYDROCHLOROTHIAZIDE 1 TABLET: 37.5; 25 TABLET ORAL at 09:50

## 2020-02-04 RX ADMIN — ACETAMINOPHEN 650 MG: 325 TABLET ORAL at 05:48

## 2020-02-04 RX ADMIN — Medication 5 UNITS: at 09:50

## 2020-02-04 RX ADMIN — SIMVASTATIN 10 MG: 10 TABLET, FILM COATED ORAL at 20:49

## 2020-02-04 RX ADMIN — HYDROCODONE BITARTRATE AND ACETAMINOPHEN 1 TABLET: 5; 325 TABLET ORAL at 05:48

## 2020-02-04 RX ADMIN — DOCUSATE SODIUM 100 MG: 100 CAPSULE, LIQUID FILLED ORAL at 20:51

## 2020-02-04 RX ADMIN — SENNOSIDES AND DOCUSATE SODIUM 1 TABLET: 8.6; 5 TABLET ORAL at 09:50

## 2020-02-04 ASSESSMENT — PAIN SCALES - GENERAL
PAINLEVEL_OUTOF10: 6
PAINLEVEL_OUTOF10: 6
PAINLEVEL_OUTOF10: 7
PAINLEVEL_OUTOF10: 0
PAINLEVEL_OUTOF10: 7
PAINLEVEL_OUTOF10: 6
PAINLEVEL_OUTOF10: 3

## 2020-02-04 NOTE — PROGRESS NOTES
safety. Pt completed IADL homemaking tasks this date including meal prep and clean up of task with S to SBA with min cues for safety and hip precautions. Tasks this date were graded to challenge standing endurance, balance with varying UE release, dynmic reaching, activity tolrance and safety within environmnet with special regard to hip precautions. Pt was able to retrieve and transport all items from graded heights with SBA and moderate cues for kitchen safety and min cues for RW safety however demonstrated good awareness to ECT and adaptive techniques to increase safety and ease with task. Pt then completed clean up of task, including washing dishes with SUP and 0 cues required throughout. ASSESSMENT:  Activity Tolerance:  Patient tolerance of  treatment: good. Assessment:   Pt is making good progress towards goals. Pt has met 4/4 STGs and 0/2 LTGs. Pt has exhibited improvement in mobility, safety, ADLs, pain control. Pt continues to exhibit decreased awareness to hip precautions, balance, endurance, safety awareness, and activity tolerance causing barriers to meeting pt's goals. Pt continues to require skilled OT intervention to improve safety and independence with ADL/IADL performance to facilitate return to PLOF and home environment. Discharge Recommendations: Home with Home health OT  Equipment Recommendations: Equipment Needed: Yes  Other: Would benefit from hip kit. Patient reports she has a grab bar in shower and daughter is installing additional one.    Plan: Times per week: 6x  Plan weeks: 3   Current Treatment Recommendations: Strengthening, Safety Education & Training, Balance Training, Patient/Caregiver Education & Training, Self-Care / ADL, Functional Mobility Training, Endurance Training    Patient Education  Patient Education: IADL's, Precautions, Home Safety and Assistive Device Safety    Goals  Short term goals  Time Frame for Short term goals: 2 weeks  Short term goal 1: Pt. will

## 2020-02-04 NOTE — PROGRESS NOTES
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

## 2020-02-04 NOTE — PROGRESS NOTES
/55, pulse 72. Pulse 72, pulse ox room air 93%, respirations 16, temp. 98.6, pain 6 on a pain scale form 1-10 in the right hip and thigh. [facility] was notified and has a [place] to administer at 2100. Using an ice pack now on right hip for pain. Assessment has unchanged from last assessment. Call light in reach, declines any needs at this time. AURELIO/NOEL Costa

## 2020-02-04 NOTE — PROGRESS NOTES
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

## 2020-02-04 NOTE — PLAN OF CARE
Problem: Pain:  Goal: Pain level will decrease  Description  Pain level will decrease  Outcome: Ongoing  Note:   Her pain can be high at times  Goal: Control of acute pain  Description  Control of acute pain  Outcome: Ongoing  Note:   Acute pain not managed  Goal: Control of chronic pain  Description  Control of chronic pain  Outcome: Ongoing  Note:   Chronic pain managed some with med     Problem: Bleeding:  Goal: Will show no signs and symptoms of excessive bleeding  Description  Will show no signs and symptoms of excessive bleeding  Outcome: Ongoing  Note:   No symptoms of excessive bleeding monitoring labs     Problem: Mobility - Impaired:  Goal: Mobility will improve  Description  Mobility will improve  Outcome: Ongoing  Note:   Mobility improving     Problem: Infection - Surgical Site:  Goal: Signs of wound healing will improve  Description  Signs of wound healing will improve  Outcome: Ongoing  Note:   Her wound is healing     Problem: Discharge Planning:  Goal: Patients continuum of care needs are met  Description  Patients continuum of care needs are met  Outcome: Ongoing  Note:   Patient's continuum of care needs being met     Problem: Skin Integrity:  Goal: Will show no infection signs and symptoms  Description  Will show no infection signs and symptoms  Outcome: Ongoing  Note:   No symptoms of infection     Problem: Falls - Risk of:  Goal: Will remain free from falls  Description  Will remain free from falls  Outcome: Ongoing  Note:   No falls this shift .  Using her call light appropraitely     Problem: Nutrition  Goal: Optimal nutrition therapy  Outcome: Ongoing  Note:   Working toward an optimal nutrition

## 2020-02-04 NOTE — PROGRESS NOTES
Report was handed off to [address] [place] [address],[address], patient lying in bed with eyes closed, sequential stockings on legs bilaterally,bed alarm on, call light in reach.  Tomás CAREY/DAVIDC

## 2020-02-04 NOTE — PROGRESS NOTES
Assistance  Distance: 180' x 1, 150' x 1  Surface: Level Tile  Device:Rolling Walker  Gait Deviations:  Decreased Weight Shift Right, Decreased Heel Strike Bilaterally and  Occasional mild decreased stance time R LE ,freq. Step thru gait pattern achieved    Stairs:  Stand By Assistance  Number of Steps: 4  Height: 6\" step with Bilateral Handrails, non-reciprocally      Exercise:  Patient was guided in 1 set(s) 15 reps of exercise to both lower extremities. Ankle pumps, Glut sets, Quad sets, Heelslides, Short arc quads, Long arc quads,  L LE Hip abduction/adduction, Seated hip flexion, Seated hamstring curls and Seated heel/toe raises. Exercises were completed for increased independence with functional mobility. Functional Outcome Measures: Not completed       ASSESSMENT:  Assessment: Patient progressing toward established goals. Activity Tolerance:  Patient tolerance of  treatment: good. Equipment Recommendations:Equipment Needed: No  Discharge Recommendations:  Continue to assess pending progress, Home with Home health PT    Plan: Times per week: 5xBID, 1 x QD  Current Treatment Recommendations: Strengthening, Functional Mobility Training, Neuromuscular Re-education, Home Exercise Program, Transfer Training, Gait Training, Balance Training, Endurance Training, Stair training, Patient/Caregiver Education & Training, Safety Education & Training    Patient Education  Patient Education: Home Exercise Program, [employer] Mobility, Equipment Education, Transfers, Reviewed Prior Education, Gait, Stairs, Verbal Exercise Instruction, - Patient Requires Continued Education    Goals:  Patient goals : go home  Short term goals  Time Frame for Short term goals: 2 weeks  Short term goal 1: Patient will complete supine < > sit with supervision to transfer in/out of bed with decreased difficulty. Short term goal 2: Patient will complete sit < > stand with SBA to increase independence with transfers.   Short term goal 3: Marti

## 2020-02-04 NOTE — PROGRESS NOTES
RECREATIONAL THERAPY  MISSED TREATMENT    [x]Transitional Care Unit  []Inpatient Rehabilitation    Date:  2/4/2020            Patient Name: Jeaneth Jesus           MRN: [id number]  Srikanth: [de-identified]          YOB: 1931 (80 y.o.)       Gender: female   Diagnosis: R Intracapsular hip Fx  Physician: Referring Practitioner: Alban Mcmullen MD    REASON FOR MISSED TREATMENT:    []Hold treatment per nursing request  []Patient refusal  []Family declined treatment  []Patient at testing and/or off the floor  []Patient unavailable, with PT/OT/nursing, etc.  [x]Other pt was going to play [employer] with peers but we did not have enough players to play-she stayed in room and visited with a good friend and family   [place], [place]    2/4/2020

## 2020-02-04 NOTE — PROGRESS NOTES
Steps: 1  Height: 6\" step with Rolling Walker   Verbal cues for lower extremity sequencing    Exercise:  Not completed    Balance with no upper extremity support: reaching with pole and picking up magnetized objects in various direction, shooting ball in hoop for ~4 minutes, standing and throwing ball against wall ~2 feet from wall and catching. Standing on foam: eyes closed 20\" and 30\" with CGA/min assist, shoulder flexion x10, cervical flexion/extension x 10, cervical rotation x 10, shoulder flexion to 90 degrees with ball while squeezing ball. Pt tending to lean to the left and posteriorly with loss of balance, requiring min assist at times and tactile cues for correction of balance. Functional Outcome Measures: Not completed       ASSESSMENT:  Assessment:   . Ms. Jin met 5/5 STG's and 1/6 LTG's. Patient is making good progress towards goals set for her, progressing supine < > sit to modified independence, sit < > stand to Supervision/Stand By Assistance, car transfers to stand by assistance and steps to stand by assistance. Patient requires occasional verbal cues to maintain hip precautions however. Esperanza Yen would benefit from continued skilled PT services to improve her ability to complete functional mobility, allow patient to return to I PLOF and reduce her risk for falls. Activity Tolerance:  Patient tolerance of  treatment: good.       Equipment Recommendations:Equipment Needed: No  Discharge Recommendations:  Discharge Recommendations: Continue to assess pending progress, Home with Home health PT    Plan: Times per week: 5xBID, 1 x QD  Current Treatment Recommendations: Strengthening, Functional Mobility Training, Neuromuscular Re-education, Home Exercise Program, Transfer Training, Gait Training, Balance Training, Endurance Training, Stair training, Patient/Caregiver Education & Training, Safety Education & Training    Patient Education  Patient Education: Gait, Stairs, Car Transfers, Verbal Exercise Instruction,  - Patient Verbalized Understanding, - Patient Requires Continued Education    Goals:  Patient goals : go home  Short term goals  Time Frame for Short term goals: 2 weeks  Short term goal 1: Patient will complete supine < > sit with supervision to transfer in/out of bed with decreased difficulty. GOAL MET, DISCONTINUE, SEE LTG  Short term goal 2: Patient will complete sit < > stand with SBA to increase independence with transfers. GOAL MET, DISCONTINUE, SEE LTG  Short term goal 3: Patietn will ambulate 150' wtih a RW and SBA to progress towards safe home ambulation. GOAL MET, DISCONTINUE, SEE LTG  Short term goal 4: Patietn will ascend/descend 3 steps with 2 hand rails and CGA to progress towards safe home access. GOAL MET, SEE UPDATED GOAL  Short term goal 5: Patient will complete car transfer with CGA to transfer in/out of vehicle to prepare for safe home transport. GOAL MET, DISCONTINUE, SEE LTG  Long term goals  Time Frame for Long term goals : 3 weeks  Long term goal 1: Patient will complete supine < > sit with modified independence to transfer in/out of vehicle safely. GOAL MET, DISCONTINUE, SEE LTG  Long term goal 2: Patient will complete sit < > stand and stand pivot transfers with modified independence to stand to ambulate wtih decreased difficulty. GOAL NOT MET, CONTINUE  Long term goal 3: Patient will ambulate 150' with a RW and modified independence to navigate home safely. GOAL NOT MET, CONTINUE  Long term goal 4: Patient will ascend/descend 3 steps with 2 hand rails and modified independence to access/leave home safely. GOAL NOT MET, CONTINUE  Long term goal 5: Patient will complete car transfer with supervision to transport to home safely. GOAL NOT MET, CONTINUE  Long term goal 6: Patient will ascend/descend 1, 6\" step with RW and supervision for community entry.  GOAL NOT MET, CONTINUE    Revised Short-Term Goals:    Short term goals  Time Frame for Short term goals: 2 weeks  Short term goal 1: Patient will complete supine < > sit with supervision to transfer in/out of bed with decreased difficulty. GOAL MET, DISCONTINUE, SEE LTG  Short term goal 2: Patient will complete sit < > stand with SBA to increase independence with transfers. GOAL MET, DISCONTINUE, SEE LTG  Short term goal 3: Patietn will ambulate 150' wtih a RW and SBA to progress towards safe home ambulation. GOAL MET, DISCONTINUE, SEE LTG  Short term goal 4: Patietn will ascend/descend 3 steps with 2 hand rails and CGA to progress towards safe home access. GOAL MET, DISCONTINUE, SEE LTG  Short term goal 5: Patient will complete car transfer with CGA to transfer in/out of vehicle to prepare for safe home transport. GOAL MET, DISCONTINUE, SEE LTG  Short term goal 6: Patient will be educated on standing HEP to increase strength and mobility. Revised Long-Term Goals  Long term goals  Time Frame for Long term goals : 3 weeks  Long term goal 1: Patient will complete supine < > sit with modified independence to transfer in/out of vehicle safely. GOAL MET, DISCONTINUE, SEE LTG  Long term goal 2: Patient will complete sit < > stand and stand pivot transfers with modified independence to stand to ambulate wtih decreased difficulty. Long term goal 3: Patient will ambulate 80' with a RW and modified independence to navigate home safely. Long term goal 4: Patient will ascend/descend 3 steps with 2 hand rails and modified independence to access/leave home safely. Long term goal 5: Patient will complete car transfer with supervision to transport to home safely. Long term goal 6: Patient will ascend/descend 1, 6\" step with RW and supervision for community entry.

## 2020-02-04 NOTE — PLAN OF CARE
Team Conference held this afternoon. Recommendations of the team were explained to the patient by all team members. Team is recommending that patient continue on TCU for several more days, with discharge date pending insurance approval. Update due this date. Patient will be a RE team conference on 2/11 pending insurance approval. Following discharge team is recommending home with home health, PT,OT,RN and aide services. SW will continue to monitor progress and maintain contact with patient and patient's family regarding length of stay and recommendations.  SW will continue to assist with ongoing discharge planning   Problem: Discharge Planning:  Goal: Patients continuum of care needs are met  Description  Patients continuum of care needs are met  2/4/2020 1412 by TOBIN Bolivar  Outcome: Ongoing

## 2020-02-05 PROCEDURE — 97110 THERAPEUTIC EXERCISES: CPT

## 2020-02-05 PROCEDURE — 94760 N-INVAS EAR/PLS OXIMETRY 1: CPT

## 2020-02-05 PROCEDURE — 6360000002 HC RX W HCPCS: Performed by: FAMILY MEDICINE

## 2020-02-05 PROCEDURE — 6370000000 HC RX 637 (ALT 250 FOR IP): Performed by: FAMILY MEDICINE

## 2020-02-05 PROCEDURE — 97116 GAIT TRAINING THERAPY: CPT

## 2020-02-05 PROCEDURE — 97535 SELF CARE MNGMENT TRAINING: CPT

## 2020-02-05 PROCEDURE — 97530 THERAPEUTIC ACTIVITIES: CPT

## 2020-02-05 PROCEDURE — 6370000000 HC RX 637 (ALT 250 FOR IP): Performed by: ORTHOPAEDIC SURGERY

## 2020-02-05 PROCEDURE — 1290000000 HC SEMI PRIVATE OTHER R&B

## 2020-02-05 RX ADMIN — DOCUSATE SODIUM 100 MG: 100 CAPSULE, LIQUID FILLED ORAL at 20:46

## 2020-02-05 RX ADMIN — ACETAMINOPHEN 650 MG: 325 TABLET ORAL at 18:31

## 2020-02-05 RX ADMIN — FAMOTIDINE 20 MG: 20 TABLET ORAL at 09:53

## 2020-02-05 RX ADMIN — HYDROCODONE BITARTRATE AND ACETAMINOPHEN 1 TABLET: 5; 325 TABLET ORAL at 02:01

## 2020-02-05 RX ADMIN — ACETAMINOPHEN 650 MG: 325 TABLET ORAL at 00:08

## 2020-02-05 RX ADMIN — HYDROCODONE BITARTRATE AND ACETAMINOPHEN 1 TABLET: 5; 325 TABLET ORAL at 23:06

## 2020-02-05 RX ADMIN — LEVOTHYROXINE SODIUM 50 MCG: 50 TABLET ORAL at 06:03

## 2020-02-05 RX ADMIN — TRIAMTERENE AND HYDROCHLOROTHIAZIDE 1 TABLET: 37.5; 25 TABLET ORAL at 09:54

## 2020-02-05 RX ADMIN — HYDROCODONE BITARTRATE AND ACETAMINOPHEN 1 TABLET: 5; 325 TABLET ORAL at 06:18

## 2020-02-05 RX ADMIN — ENOXAPARIN SODIUM 40 MG: 40 INJECTION SUBCUTANEOUS at 13:01

## 2020-02-05 RX ADMIN — THERA TABS 1 TABLET: TAB at 09:54

## 2020-02-05 RX ADMIN — TRAMADOL HYDROCHLORIDE 50 MG: 50 TABLET, FILM COATED ORAL at 20:46

## 2020-02-05 RX ADMIN — DOCUSATE SODIUM 100 MG: 100 CAPSULE, LIQUID FILLED ORAL at 09:53

## 2020-02-05 RX ADMIN — Medication 250 MG: at 09:54

## 2020-02-05 RX ADMIN — SIMVASTATIN 10 MG: 10 TABLET, FILM COATED ORAL at 20:46

## 2020-02-05 RX ADMIN — ACETAMINOPHEN 650 MG: 325 TABLET ORAL at 11:17

## 2020-02-05 RX ADMIN — SENNOSIDES AND DOCUSATE SODIUM 1 TABLET: 8.6; 5 TABLET ORAL at 09:54

## 2020-02-05 ASSESSMENT — PAIN SCALES - GENERAL
PAINLEVEL_OUTOF10: 0
PAINLEVEL_OUTOF10: 6
PAINLEVEL_OUTOF10: 7
PAINLEVEL_OUTOF10: 5
PAINLEVEL_OUTOF10: 5
PAINLEVEL_OUTOF10: 6
PAINLEVEL_OUTOF10: 4
PAINLEVEL_OUTOF10: 0
PAINLEVEL_OUTOF10: 5

## 2020-02-05 ASSESSMENT — PAIN DESCRIPTION - LOCATION
LOCATION: HIP
LOCATION: HIP

## 2020-02-05 ASSESSMENT — PAIN DESCRIPTION - ORIENTATION
ORIENTATION: RIGHT
ORIENTATION: RIGHT

## 2020-02-05 ASSESSMENT — PAIN DESCRIPTION - PAIN TYPE
TYPE: SURGICAL PAIN
TYPE: SURGICAL PAIN

## 2020-02-05 ASSESSMENT — PAIN DESCRIPTION - DESCRIPTORS
DESCRIPTORS: ACHING;DISCOMFORT
DESCRIPTORS: ACHING;DISCOMFORT

## 2020-02-05 ASSESSMENT — PAIN DESCRIPTION - ONSET
ONSET: ON-GOING
ONSET: PROGRESSIVE

## 2020-02-05 ASSESSMENT — PAIN DESCRIPTION - FREQUENCY: FREQUENCY: INTERMITTENT

## 2020-02-05 ASSESSMENT — PAIN DESCRIPTION - PROGRESSION
CLINICAL_PROGRESSION: GRADUALLY WORSENING
CLINICAL_PROGRESSION: GRADUALLY WORSENING

## 2020-02-05 NOTE — PROGRESS NOTES
Assistance  Stand to Sit:Stand By Assistance    Ambulation:  Stand By Assistance  Distance: 220' x 1,15' x 2,50' x 1  Surface: Level Tile  Device:Rolling Walker  Gait Deviations:  Slow Meseret, Decreased Weight Shift Right and  No unsteadiness    Stairs:  Stand By Assistance  Number of Steps: 4  Height: 6\" step with Bilateral Handrails, NON-RECIPROCALLY. NO VC'S NEEDED FOR \"GOOD UP AND BAD DOWN\"    Balance:  Dynamic Standing Balance: Stand By Assistance,  standing at sink reaching within -> edge of michael overhead to knee levels , no unsteadiness    Exercise:  Patient was guided in 1 set(s) 15 reps of exercise to both lower extremities. SUPINE: Ankle pumps, Glut sets, Quad sets, Heelslides, L LE hip abd/add, Short arc quads,  SEATED:Long arc quads,  L LE Hip abduction/adduction, Seated hip flexion, Seated hamstring curls, Seated heel/toe raises,  STANDING WITH B UE SUPPORT ON // BARS:Standing heel/toe raises, Standing marches,  L LE Standing hip abduction/adduction, Standing hamstring curls and Mini squats. Exercises were completed for increased independence with functional mobility. Functional Outcome Measures: Not completed       ASSESSMENT:  Assessment: Patient progressing toward established goals. Activity Tolerance:  Patient tolerance of  treatment: good.       Equipment Recommendations:Equipment Needed: No  Discharge Recommendations:  Continue to assess pending progress, Home with Home health PT    Plan: Times per week: 5xBID, 1 x QD  Current Treatment Recommendations: Strengthening, Functional Mobility Training, Neuromuscular Re-education, Home Exercise Program, Transfer Training, Gait Training, Balance Training, Endurance Training, Stair training, Patient/Caregiver Education & Training, Safety Education & Training    Patient Education  Patient Education: Home Exercise Program, Precautions/Restrictions, [employer] Mobility, Equipment Education, Transfers, Reviewed Prior Education, Gait, Stairs, Verbal Exercise Instruction, - Patient Requires Continued Education    Goals:  Patient goals : go home  Short term goals  Time Frame for Short term goals: 2 weeks  Short term goal 1: Patient will complete supine < > sit with supervision to transfer in/out of bed with decreased difficulty. GOAL MET, DISCONTINUE, SEE LTG  Short term goal 2: Patient will complete sit < > stand with SBA to increase independence with transfers. GOAL MET, DISCONTINUE, SEE LTG  Short term goal 3: Patietn will ambulate 150' wtih a RW and SBA to progress towards safe home ambulation. GOAL MET, DISCONTINUE, SEE LTG  Short term goal 4: Patietn will ascend/descend 3 steps with 2 hand rails and CGA to progress towards safe home access. GOAL MET, DISCONTINUE, SEE LTG  Short term goal 5: Patient will complete car transfer with CGA to transfer in/out of vehicle to prepare for safe home transport. GOAL MET, DISCONTINUE, SEE LTG  Short term goal 6: Patient will be educated on standing HEP to increase strength and mobility. Long term goals  Time Frame for Long term goals : 3 weeks  Long term goal 1: Patient will complete supine < > sit with modified independence to transfer in/out of vehicle safely. GOAL MET, DISCONTINUE, SEE LTG  Long term goal 2: Patient will complete sit < > stand and stand pivot transfers with modified independence to stand to ambulate wtih decreased difficulty. Long term goal 3: Patient will ambulate [address]' with a RW and modified independence to navigate home safely. Long term goal 4: Patient will ascend/descend 3 steps with 2 hand rails and modified independence to access/leave home safely. Long term goal 5: Patient will complete car transfer with supervision to transport to home safely. Long term goal 6: Patient will ascend/descend 1, 6\" step with RW and supervision for community entry. Following session, patient left in safe position with all fall risk precautions in place.

## 2020-02-05 NOTE — PLAN OF CARE
Problem: Nutrition  Goal: Optimal nutrition therapy  2/5/2020 1320 by Denny Galicia RD, LD  Outcome: Ongoing   Nutrition Problem: Increased nutrient needs  Intervention: Food and/or Nutrient Delivery: Continue current diet  Nutritional Goals: Patient will continue to consume 75% or greater of meals during LOS

## 2020-02-05 NOTE — PROGRESS NOTES
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

## 2020-02-05 NOTE — PROGRESS NOTES
required for functional toilet & shower transfers and ADL routine. Pt tolerated fair, requiring rest breaks throughout task. Education provided on Stunable. Patient completed simulated IADL homemaking task this date of doing laundry - task was graded to challenge balance with 1-2 UE release from walker, maintaining hip precautions, and activity tolernace. Patient was able to retrieve all items from graded heights including floor level with use of reacher. Discussed how to safely/properly take laundry in/out of washer and dryer with use of reacher while maintaining hip precautions. Able to stand and fold with BUE release from walker. Required seated rest break after task. Required SBA for balance throughout with no LOB noted. ASSESSMENT:     Activity Tolerance:  Patient tolerance of  treatment: fair. Discharge Recommendations: Home with Home health OT  Equipment Recommendations: Equipment Needed: Yes  Other: Would benefit from hip kit. Patient reports she has a grab bar in shower and daughter is installing additional one. Plan: Times per week: 6x  Plan weeks: 3   Current Treatment Recommendations: Strengthening, Safety Education & Training, Balance Training, Patient/Caregiver Education & Training, Self-Care / ADL, Functional Mobility Training, Endurance Training    Patient Education  Patient Education: IADL's and Home Exercise Program    Goals  Short term goals  Time Frame for Short term goals: 2 weeks  Short term goal 1: Pt. will complete LB ADLs using LHAE with Mod I and no vcs to maintain hip precautions. Short term goal 2: Pt. will complete functional transfers to/from toliet with MOD I adhering to precautions without vcs to increase indep with toileting routine. Short term goal 3: Pt. will complete dynamic standing task with SUP >10 min to increase endurance in prep for bathing/grooming tasks.    Short term goal 4: Pt. will navigate HH distances and within kitchen/bathroom environmnet

## 2020-02-05 NOTE — PLAN OF CARE
Problem: Pain:  Goal: Pain level will decrease  Description  Pain level will decrease  2/5/2020 0334 by Ryan Mancilla RN  Outcome: Ongoing   Patient requests PRN pain meds  Problem: Bleeding:  Goal: Will show no signs and symptoms of excessive bleeding  Description  Will show no signs and symptoms of excessive bleeding  2/5/2020 0334 by Ryan Mancilla RN  Outcome: Ongoing   No excessive bleeding on ABD on Rt hip  Problem: Mobility - Impaired:  Goal: Mobility will improve  Description  Mobility will improve  2/5/2020 0334 by Ryan Mancilla RN  Outcome: Ongoing   Mobility with standby assist and walker with gait belt

## 2020-02-05 NOTE — PROGRESS NOTES
Patient in chair with visitor at bedside. Call light in reach. No further needs.  [place] Yo PNS/RSC

## 2020-02-05 NOTE — FLOWSHEET NOTE
Pt is an 88y. o. female who was in bed propped so she can sit up, in 8E-66; her daughter was also in the room. Pt shared that she may be going home by the end of the week and recounted the episode that led to her broken hipbone. Pt and daughter shared their [place] community's impact upon them both, as well as the hope they have in full recovery. Daughter mentioned pt is not very patient and will need to take her time in recovery once home.  reiterated daughter's words, shared his journey to [facility] (pt asked him about it), prayed with both of them and closed our time together. 02/05/20 1330   Encounter Summary   Services provided to: Patient and family together   Referral/Consult From: [address] [place] [address] Family members   Continue Visiting Yes  (2/5 if still here)   Complexity of Encounter Moderate   Length of Encounter 30 minutes   Spiritual/Nondenominational   Type Spiritual support   Assessment Calm; Approachable; Hopeful;Peaceful   Intervention Active listening;Explored feelings, thoughts, concerns;Prayer   Outcome Expressed gratitude;Engaged in conversation;Receptive; Connection/belonging

## 2020-02-05 NOTE — PROGRESS NOTES
In recliner, pt asked to stand to perform dressing change, Removed dressing from rt medial thigh, cleansed with betadine, applied ABD and secured with non adhesive tape.  Skin break down where tape was, left open to air to allow to heal. pt sat back down, call light within reach GIULIANO Sanchez PNS/RSC

## 2020-02-05 NOTE — PROGRESS NOTES
Report given off to [facility] - KULWANT FELTON the nurse.  Melly Rm PNS/RSC

## 2020-02-05 NOTE — PROGRESS NOTES
Supervision    Ambulation:  Stand By Assistance  Distance: 150' x 1,250' x 1, 130' x 1  Surface: Level Tile, Uneven Surface, Carpet, Ramp and  gravel  Device:Rolling Walker  Gait Deviations:  Occasional slight Decreased Weight Shift Right and  Occasional vc for posture    Stairs:  Stand By Assistance  Number of Steps: 4  Height: 6\" step with Bilateral Handrails, non-recip. Stairs:  Stand By Assistance and vc's  Number of Steps: 1    Porch step  Height: 6\" step with Rolling Walker      Exercise: no there. Ex this pm due to [address] amb./activity    Functional Outcome Measures: Not completed       ASSESSMENT:  Assessment: Patient progressing toward established goals. Activity Tolerance:  Patient tolerance of  treatment: good. Equipment Recommendations:Equipment Needed: No  Discharge Recommendations:  Continue to assess pending progress, Home with Home health PT    Plan: Times per week: 5xBID, 1 x QD  Current Treatment Recommendations: Strengthening, Functional Mobility Training, Neuromuscular Re-education, Home Exercise Program, Transfer Training, Gait Training, Balance Training, Endurance Training, Stair training, Patient/Caregiver Education & Training, Safety Education & Training    Patient Education  Patient Education: Family Education, Equipment Education, Transfers, Reviewed Prior Education, Gait, Stairs, Car Transfers, Verbal Exercise Instruction, - Patient Requires Continued Education    Goals:  Patient goals : go home  Short term goals  Time Frame for Short term goals: 2 weeks  Short term goal 1: Patient will complete supine < > sit with supervision to transfer in/out of bed with decreased difficulty. GOAL MET, DISCONTINUE, SEE LTG  Short term goal 2: Patient will complete sit < > stand with SBA to increase independence with transfers. GOAL MET, DISCONTINUE, SEE LTG  Short term goal 3: Marti will ambulate 150' wtih a RW and SBA to progress towards safe home ambulation.  GOAL MET, DISCONTINUE, SEE

## 2020-02-06 PROCEDURE — 97110 THERAPEUTIC EXERCISES: CPT

## 2020-02-06 PROCEDURE — 97535 SELF CARE MNGMENT TRAINING: CPT

## 2020-02-06 PROCEDURE — 1290000000 HC SEMI PRIVATE OTHER R&B

## 2020-02-06 PROCEDURE — 6370000000 HC RX 637 (ALT 250 FOR IP): Performed by: ORTHOPAEDIC SURGERY

## 2020-02-06 PROCEDURE — 97116 GAIT TRAINING THERAPY: CPT

## 2020-02-06 PROCEDURE — 97530 THERAPEUTIC ACTIVITIES: CPT

## 2020-02-06 PROCEDURE — 6360000002 HC RX W HCPCS: Performed by: FAMILY MEDICINE

## 2020-02-06 PROCEDURE — 6370000000 HC RX 637 (ALT 250 FOR IP): Performed by: FAMILY MEDICINE

## 2020-02-06 RX ADMIN — HYDROCODONE BITARTRATE AND ACETAMINOPHEN 1 TABLET: 5; 325 TABLET ORAL at 08:05

## 2020-02-06 RX ADMIN — DOCUSATE SODIUM 100 MG: 100 CAPSULE, LIQUID FILLED ORAL at 20:08

## 2020-02-06 RX ADMIN — SENNOSIDES AND DOCUSATE SODIUM 1 TABLET: 8.6; 5 TABLET ORAL at 08:48

## 2020-02-06 RX ADMIN — TRAMADOL HYDROCHLORIDE 50 MG: 50 TABLET, FILM COATED ORAL at 05:42

## 2020-02-06 RX ADMIN — TRIAMTERENE AND HYDROCHLOROTHIAZIDE 1 TABLET: 37.5; 25 TABLET ORAL at 08:48

## 2020-02-06 RX ADMIN — FAMOTIDINE 20 MG: 20 TABLET ORAL at 08:48

## 2020-02-06 RX ADMIN — THERA TABS 1 TABLET: TAB at 08:48

## 2020-02-06 RX ADMIN — LEVOTHYROXINE SODIUM 50 MCG: 50 TABLET ORAL at 05:41

## 2020-02-06 RX ADMIN — TRAMADOL HYDROCHLORIDE 50 MG: 50 TABLET, FILM COATED ORAL at 20:09

## 2020-02-06 RX ADMIN — ENOXAPARIN SODIUM 40 MG: 40 INJECTION SUBCUTANEOUS at 13:20

## 2020-02-06 RX ADMIN — DOCUSATE SODIUM 100 MG: 100 CAPSULE, LIQUID FILLED ORAL at 08:48

## 2020-02-06 RX ADMIN — SIMVASTATIN 10 MG: 10 TABLET, FILM COATED ORAL at 20:08

## 2020-02-06 RX ADMIN — SENNOSIDES AND DOCUSATE SODIUM 1 TABLET: 8.6; 5 TABLET ORAL at 20:09

## 2020-02-06 RX ADMIN — Medication 250 MG: at 08:48

## 2020-02-06 ASSESSMENT — PAIN DESCRIPTION - PROGRESSION
CLINICAL_PROGRESSION: NOT CHANGED
CLINICAL_PROGRESSION: GRADUALLY WORSENING
CLINICAL_PROGRESSION: NOT CHANGED
CLINICAL_PROGRESSION: GRADUALLY WORSENING

## 2020-02-06 ASSESSMENT — PAIN DESCRIPTION - LOCATION
LOCATION: HIP

## 2020-02-06 ASSESSMENT — PAIN DESCRIPTION - PAIN TYPE
TYPE: SURGICAL PAIN

## 2020-02-06 ASSESSMENT — PAIN SCALES - GENERAL
PAINLEVEL_OUTOF10: 5
PAINLEVEL_OUTOF10: 6
PAINLEVEL_OUTOF10: 4
PAINLEVEL_OUTOF10: 5
PAINLEVEL_OUTOF10: 5

## 2020-02-06 ASSESSMENT — PAIN DESCRIPTION - ORIENTATION
ORIENTATION: RIGHT

## 2020-02-06 ASSESSMENT — PAIN DESCRIPTION - ONSET
ONSET: ON-GOING

## 2020-02-06 ASSESSMENT — PAIN DESCRIPTION - DESCRIPTORS
DESCRIPTORS: ACHING
DESCRIPTORS: ACHING;DISCOMFORT
DESCRIPTORS: ACHING

## 2020-02-06 ASSESSMENT — PAIN - FUNCTIONAL ASSESSMENT
PAIN_FUNCTIONAL_ASSESSMENT: PREVENTS OR INTERFERES SOME ACTIVE ACTIVITIES AND ADLS
PAIN_FUNCTIONAL_ASSESSMENT: ACTIVITIES ARE NOT PREVENTED

## 2020-02-06 NOTE — PROGRESS NOTES
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

## 2020-02-06 NOTE — PROGRESS NOTES
to/from toliet with MOD I adhering to precautions without vcs to increase indep with toileting routine. Short term goal 3: Pt. will complete dynamic standing task with SUP >10 min to increase endurance in prep for bathing/grooming tasks. Short term goal 4: Pt. will navigate HH distances and within kitchen/bathroom environmnet using AE with Mod I to obtain needed ADL items. Long term goals  Time Frame for Long term goals : 3 weeks  Long term goal 1: Pt will complete ADL routine with mod I including shower t/f to increase indep with self care. Long term goal 2: Pt will complete light homemaking task with Verena to increase indep with light cooking and cleaning. Following session, patient left in safe position with all fall risk precautions in place.

## 2020-02-06 NOTE — PROGRESS NOTES
[address] [address]  INPATIENT PHYSICAL THERAPY  DAILY NOTE  - [place] SKILLED NURSING UNIT - 8E-66/066-A  Time In: 5031  Time Out: 1238  Timed Code Treatment Minutes: 30 Minutes  Minutes: 30          Date: 2020  Patient Name: Marj Kahn,  Gender:  female        MRN: [id number]  : 1931  (80 y.o.)  Referral Date : 20  Referring Practitioner: Enedelia Granda MD (referring), Vanda Higgins MD (attending)  Treatment Diagnosis: difficulty in walking  Additional Pertinent Hx: Pt is s/p fall resulting in Displaced right intracapsular hip fracture. Pt is s/p LINDA on right LE and is now WBAT. Prior Level of Function:  Lives With: Alone  Type of Home: House  Home Layout: One level  Home Access: Stairs to enter with rails  Entrance Stairs - Number of Steps: 3  Entrance Stairs - Rails: Both  Home Equipment: Cane, Rolling walker   Bathroom Shower/Tub: Tub/Shower unit  Bathroom Toilet: Standard  Bathroom Accessibility: Accessible    Receives Help From: Family  ADL Assistance: Independent  Homemaking Assistance: Independent  Homemaking Responsibilities: Yes  Ambulation Assistance: Independent  Transfer Assistance: Independent  Active : Yes  IADL Comments:    Additional Comments: Adiel is high functioning and was amamanging all aspects of her care and enviroment    Restrictions/Precautions:  Restrictions/Precautions: Fall Risk, Weight Bearing  Right Lower Extremity Weight Bearing: Weight Bearing As Tolerated  Position Activity Restriction  Hip Precautions: No hip flexion > 90 degrees, No ABduction, No ADduction, No hip internal rotation    SUBJECTIVE: Patient agreeable to PT. PAIN: pt with no reports of pain    OBJECTIVE:  Bed Mobility:  Not completed    Transfers:  Sit to Stand: Supervision  Stand to [facility] By Assistance    Ambulation:  Supervision  Distance: 90', 39', 72', 100', 48', 40'  Surface: Level Tile  Device:Rolling Walker  Gait Deviations:   Forward Flexed Posture and Decreased Single Leg Stance Phase on the Right LE    Contact Guard Assistance, Minimal Assistance  Distance: 40' x2  Surface: Level Tile  Device:Hurry cane  Gait Deviations: Forward Flexed Posture, Slow Meseret and Decreased Heel Strike Bilaterally  Verbal cues for sequencing of cane and lower extremities. Pt with 2 minor loss of balance to the left, requiring min assist first time to recover and CGA second time. Pt noting bathroom small and difficulty to fit walker inside of bathroom. PT recommending continued use of RW at this time, but discussed using cane in bathroom. Stairs:  Stand By Assistance  Number of Steps: 4  Height: 6\" step with Bilateral Handrails   Verbal cues for sequencing with descent    Stairs:  Stand By Assistance  Number of Steps: 1  Height: 6\" step with Rolling Walker    Exercise:  Patient was guided in 1 set(s) 12-15 reps of exercise to both lower extremities. Standing: heel raises, hip flexion, hip extension, hamstring curls, march, step taps on 6\" step. Exercises were completed for increased independence with functional mobility. Verbal cues for improved posture    Functional Outcome Measures: Not completed       ASSESSMENT:  Assessment: Patient progressing toward established goals. Pt initiating gait training with a straight cane this date, demonstrating occasional loss of balance, but improving with increased distance. Activity Tolerance:  Patient tolerance of  treatment: good.       Equipment Recommendations:Equipment Needed: No  Discharge Recommendations:  Continue to assess pending progress, Home with Home health PT    Plan: Times per week: 5xBID, 1 x QD  Current Treatment Recommendations: Strengthening, Functional Mobility Training, Neuromuscular Re-education, Home Exercise Program, Transfer Training, Gait Training, Balance Training, Endurance Training, Stair training, Patient/Caregiver Education & Training, Safety Education & Training    Patient Education  Patient Education: Gait, Stairs, Verbal Exercise Instruction,  - Patient Verbalized Understanding, - Patient Requires Continued Education    Goals:  Patient goals : go home  Short term goals  Time Frame for Short term goals: 2 weeks  Short term goal 1: Patient will complete supine < > sit with supervision to transfer in/out of bed with decreased difficulty. GOAL MET, DISCONTINUE, SEE LTG  Short term goal 2: Patient will complete sit < > stand with SBA to increase independence with transfers. GOAL MET, DISCONTINUE, SEE LTG  Short term goal 3: Patietn will ambulate 150' wtih a RW and SBA to progress towards safe home ambulation. GOAL MET, DISCONTINUE, SEE LTG  Short term goal 4: Patietn will ascend/descend 3 steps with 2 hand rails and CGA to progress towards safe home access. GOAL MET, DISCONTINUE, SEE LTG  Short term goal 5: Patient will complete car transfer with CGA to transfer in/out of vehicle to prepare for safe home transport. GOAL MET, DISCONTINUE, SEE LTG  Short term goal 6: Patient will be educated on standing HEP to increase strength and mobility. Long term goals  Time Frame for Long term goals : 3 weeks  Long term goal 1: Patient will complete supine < > sit with modified independence to transfer in/out of vehicle safely. GOAL MET, DISCONTINUE, SEE LTG  Long term goal 2: Patient will complete sit < > stand and stand pivot transfers with modified independence to stand to ambulate wtih decreased difficulty. Long term goal 3: Patient will ambulate 80' with a RW and modified independence to navigate home safely. Long term goal 4: Patient will ascend/descend 3 steps with 2 hand rails and modified independence to access/leave home safely. Long term goal 5: Patient will complete car transfer with supervision to transport to home safely. Long term goal 6: Patient will ascend/descend 1, 6\" step with RW and supervision for community entry.     Following session, patient left in safe position with all fall risk precautions in place.

## 2020-02-06 NOTE — PROGRESS NOTES
[facility]  INPATIENT PHYSICAL THERAPY  DAILY NOTE  [address] [place] [facility] UN[facility] - 8E-66/066-A    Time In: 09  Time Out: 1009  Timed Code Treatment Minutes: 62 Minutes  Minutes: 57          Date: 2020  Patient Name: Kwasi Nuñez,  Gender:  female        MRN: [id number]  : 1931  (80 y.o.)  Referral Date : 20  Referring Practitioner: Javon Painting MD (referring), Patti Baker MD (attending)  Treatment Diagnosis: difficulty in walking  Additional Pertinent Hx: Pt is s/p fall resulting in Displaced right intracapsular hip fracture. Pt is s/p LINDA on right LE and is now WBAT.       Prior Level of Function:  Lives With: Alone  Type of Home: House  Home Layout: One level  Home Access: Stairs to enter with rails  Entrance Stairs - Number of Steps: 3  Entrance Stairs - Rails: Both  Home Equipment: Cane, Rolling walker   Bathroom Shower/Tub: Tub/Shower unit  Bathroom Toilet: Standard  Bathroom Accessibility: Accessible    Receives Help From: Family  ADL Assistance: Independent  Homemaking Assistance: Independent  Homemaking Responsibilities: Yes  Ambulation Assistance: Independent  Transfer Assistance: Independent  Active : Yes  IADL Comments:    Additional Comments: Adiel is high functioning and was amamanging all aspects of her care and enviroment    Restrictions/Precautions:  Restrictions/Precautions: Fall Risk, Weight Bearing  Right Lower Extremity Weight Bearing: Weight Bearing As Tolerated  Position Activity Restriction  Hip Precautions: No hip flexion > 90 degrees, No ABduction, No ADduction, No hip internal rotation    SUBJECTIVE: pleasant, co-operative    PAIN: no pain reported    OBJECTIVE:  Bed Mobility:  Rolling to Left: Modified Independent   Rolling to Right: Modified Independent   Supine to Sit: Modified Independent  Sit to Supine: Modified Independent   Scooting: Modified Independent    Transfers:  Sit to Stand: Modified Independent, to/from chair with arms,  mod I  from b/s chair, recliner in rehab apt and mat SBA-> SUPERVISION from car seat  Stand to Sit:Modified Independent, Supervision,  same as above  Car:Supervision, Stand By Assistance,  no difficulties with car transfer front seat, passengers side     Stairs:  Stand By Assistance, with verbal cues   Number of Steps: 1  porch step  Height: 6\" step with Rolling Walker    Stairs:  Stand By Assistance-> supervision  Number of Steps: 4 steps x 1 trial and 4 steps x 3 consecutive trials= 16 steps  Height: 6\" step with Bilateral Handrails, non-reciprocally    Ambulation:  Modified Independent, Supervision, Stand By Assistance,  sba-> supervision with community distances 150'+. MOD I with household distances of 48' -> 150'  Distance: 180' x 1,50' x 1,200' x 1,180' x 1  Surface: Level Tile, Uneven Surface, Carpet, Ramp and  gravel  Device:Rolling Walker  Gait Deviations:  occasional slight decreased stance time through R LE however step-thru gait pattern achieved as distances increased    Balance:  Dynamic Standing Balance: Modified Independent,  pt had no difficulty with  Retrieving a wash cloth up from floor with 1-> no ue support on walker  And with use of a reacher    Exercise:  Patient was guided in 1 set(s) 15 reps of exercise to both lower extremities. SUPINE:Ankle pumps, Glut sets, Quad sets, Heelslides,  L LE Hip abd/add, Short arc quads, SEATED: Long arc quads, L LE Hip abduction/adduction, Seated hamstring curls, Seated heel/toe raises . Exercises were completed for increased independence with functional mobility. Functional Outcome Measures: Not completed       ASSESSMENT:  Assessment: Patient progressing toward established goals. Activity Tolerance:  Patient tolerance of  treatment: good.       Equipment Recommendations:Equipment Needed: No  Discharge Recommendations:  Continue to assess pending progress, Home with Home health PT    Plan: Times per week: 5xBID, 1 x QD  Current Treatment Recommendations: will ascend/descend 3 steps with 2 hand rails and modified independence to access/leave home safely. Long term goal 5: Patient will complete car transfer with supervision to transport to home safely. Long term goal 6: Patient will ascend/descend 1, 6\" step with RW and supervision for community entry. Following session, patient left in safe position with all fall risk precautions in place.

## 2020-02-07 PROCEDURE — 6370000000 HC RX 637 (ALT 250 FOR IP): Performed by: FAMILY MEDICINE

## 2020-02-07 PROCEDURE — 6370000000 HC RX 637 (ALT 250 FOR IP): Performed by: ORTHOPAEDIC SURGERY

## 2020-02-07 PROCEDURE — 97112 NEUROMUSCULAR REEDUCATION: CPT

## 2020-02-07 PROCEDURE — 97110 THERAPEUTIC EXERCISES: CPT

## 2020-02-07 PROCEDURE — 97116 GAIT TRAINING THERAPY: CPT

## 2020-02-07 PROCEDURE — 6360000002 HC RX W HCPCS: Performed by: FAMILY MEDICINE

## 2020-02-07 PROCEDURE — 97530 THERAPEUTIC ACTIVITIES: CPT

## 2020-02-07 PROCEDURE — 97535 SELF CARE MNGMENT TRAINING: CPT

## 2020-02-07 PROCEDURE — 1290000000 HC SEMI PRIVATE OTHER R&B

## 2020-02-07 RX ADMIN — FAMOTIDINE 20 MG: 20 TABLET ORAL at 08:32

## 2020-02-07 RX ADMIN — TRAMADOL HYDROCHLORIDE 50 MG: 50 TABLET, FILM COATED ORAL at 05:37

## 2020-02-07 RX ADMIN — THERA TABS 1 TABLET: TAB at 08:32

## 2020-02-07 RX ADMIN — ENOXAPARIN SODIUM 40 MG: 40 INJECTION SUBCUTANEOUS at 13:31

## 2020-02-07 RX ADMIN — DOCUSATE SODIUM 100 MG: 100 CAPSULE, LIQUID FILLED ORAL at 08:32

## 2020-02-07 RX ADMIN — LEVOTHYROXINE SODIUM 50 MCG: 50 TABLET ORAL at 05:37

## 2020-02-07 RX ADMIN — Medication 250 MG: at 08:32

## 2020-02-07 RX ADMIN — ACETAMINOPHEN 650 MG: 325 TABLET ORAL at 22:10

## 2020-02-07 RX ADMIN — TRIAMTERENE AND HYDROCHLOROTHIAZIDE 1 TABLET: 37.5; 25 TABLET ORAL at 08:32

## 2020-02-07 RX ADMIN — TRAMADOL HYDROCHLORIDE 50 MG: 50 TABLET, FILM COATED ORAL at 18:25

## 2020-02-07 RX ADMIN — SIMVASTATIN 10 MG: 10 TABLET, FILM COATED ORAL at 22:09

## 2020-02-07 RX ADMIN — DOCUSATE SODIUM 100 MG: 100 CAPSULE, LIQUID FILLED ORAL at 22:10

## 2020-02-07 RX ADMIN — ACETAMINOPHEN 650 MG: 325 TABLET ORAL at 00:07

## 2020-02-07 RX ADMIN — ACETAMINOPHEN 650 MG: 325 TABLET ORAL at 08:32

## 2020-02-07 RX ADMIN — SENNOSIDES AND DOCUSATE SODIUM 1 TABLET: 8.6; 5 TABLET ORAL at 08:32

## 2020-02-07 ASSESSMENT — PAIN DESCRIPTION - LOCATION
LOCATION: HIP

## 2020-02-07 ASSESSMENT — PAIN SCALES - GENERAL
PAINLEVEL_OUTOF10: 6
PAINLEVEL_OUTOF10: 3
PAINLEVEL_OUTOF10: 5
PAINLEVEL_OUTOF10: 0
PAINLEVEL_OUTOF10: 3
PAINLEVEL_OUTOF10: 8
PAINLEVEL_OUTOF10: 3

## 2020-02-07 ASSESSMENT — PAIN DESCRIPTION - FREQUENCY: FREQUENCY: INTERMITTENT

## 2020-02-07 ASSESSMENT — PAIN DESCRIPTION - DESCRIPTORS
DESCRIPTORS: ACHING;DISCOMFORT
DESCRIPTORS: ACHING
DESCRIPTORS: ACHING;DISCOMFORT

## 2020-02-07 ASSESSMENT — PAIN DESCRIPTION - PROGRESSION
CLINICAL_PROGRESSION: GRADUALLY WORSENING
CLINICAL_PROGRESSION: NOT CHANGED
CLINICAL_PROGRESSION: GRADUALLY WORSENING

## 2020-02-07 ASSESSMENT — PAIN DESCRIPTION - ONSET
ONSET: ON-GOING

## 2020-02-07 ASSESSMENT — PAIN DESCRIPTION - PAIN TYPE
TYPE: SURGICAL PAIN

## 2020-02-07 ASSESSMENT — PAIN DESCRIPTION - ORIENTATION
ORIENTATION: RIGHT

## 2020-02-07 NOTE — DISCHARGE INSTR - COC
Continuity of Care Form    Patient Name: Victor M Alejandro   :  1931  MRN:  [id number]    Admit date:  2020  Discharge date:  20    Code Status Order: Full Code   Advance Directives:   Advance Care Flowsheet Documentation     Date/Time Healthcare Directive Type of Healthcare Directive Copy in [address] Agent's Name Healthcare Agent's Phone Number    20 1343  No, patient does not have an advance directive for healthcare treatment -- -- -- -- --          Admitting Physician:  Malina Puckett MD  PCP: Jess Munroe MD    Discharging Nurse: [facility] Unit/Room#: 8E-66/066-A  Discharging Unit Phone Number: [phone]    Emergency Contact:   Extended Emergency Contact Information  Primary Emergency Contact: Jose Elias Lin [facility] of [address] Phone: [phone]  Mobile Phone: [phone]  Relation: Child  Secondary Emergency Contact: [address] of [address] Phone: [phone]  Relation: None    Past Surgical History:  Past Surgical History:   Procedure Laterality Date    APPENDECTOMY      at age 5   [address] Right 2014    before she had her surgery    BREAST RECONSTRUCTION Right 2014    Pt had done at [facility] in [address] Left 2014    Pt had done at [facility] in [address] Right 2019    COLONOSCOPY  2007    showed mild diverticular disease, otherwise negative, repeat in 2017   [place], RADICAL Right 2014    Pt had done at [facility] in [address]    due to fibroids   [address] [facility] Right 2020    RIGHT TOTAL HIP [facility]       Immunization History:   Immunization History   Administered Date(s) Administered    Influenza Virus Vaccine 2006, 10/31/2007, 09/28/2009, 10/23/2014    Influenza, High Dose (Fluzone 65 yrs and older) 09/11/2017    Influenza, Triv, inactivated, subunit, adjuvanted, IM (Fluad 65 yrs and older) 12/13/2019    PPD Test 01/28/2020, 02/04/2020    Pneumococcal Conjugate 13-valent (Inrefym18) 09/11/2017    Pneumococcal Conjugate 7-valent (Prevnar7) 12/19/2011    Tdap (Boostrix, Adacel) 12/19/2011       Active Problems:  Patient Active Problem List   Diagnosis Code    Hyperlipidemia E78.5    Hypothyroidism E03.9    Osteoarthritis M19.90    [facility] C50.919    Edema extremities R60.0    Vitamin D deficiency E55.9    Use of anastrozole (Arimidex) Z79.811    Encounter for monitoring aromatase inhibitor therapy Z51.81, Z79.811    Left arm pain M79.602    Closed subcapital fracture of femur, right, initial encounter ([facility] S72.011A    Hypertension I10    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    Hyponatremia E87.1       Isolation/Infection:   Isolation          No Isolation        Patient Infection Status     None to display          Nurse Assessment:  Last Vital Signs: /60   Pulse 94   Temp 98 °F (36.7 °C) (Oral)   Resp 16   Ht 5' 3\" (1.6 m) Comment: per patient  Wt 137 lb 6.4 oz (62.3 kg)   SpO2 93%   BMI 24.34 kg/m²     Last documented pain score (0-10 scale): Pain Level: 3  Last Weight:   Wt Readings from Last 1 Encounters:   02/03/20 137 lb 6.4 oz (62.3 kg)     Mental Status:  oriented, alert, coherent, logical, thought processes intact and able to concentrate and follow conversation

## 2020-02-07 NOTE — PROGRESS NOTES
[address] [address]  INPATIENT PHYSICAL THERAPY  Discharge Note  [address] 8E-66/066-A  Time In: 5760  Time Out: 5460  Timed Code Treatment Minutes: 54 Minutes  Minutes: 55          Date: 2020  Patient Name: Ivett Rogers,  Gender:  female        MRN: [id number]  : 1931  (80 y.o.)  Referral Date : 20  Referring Practitioner: Ty Patel MD (referring), Mike Donovan MD (attending)  Treatment Diagnosis: difficulty in walking  Additional Pertinent Hx: Pt is s/p fall resulting in Displaced right intracapsular hip fracture. Pt is s/p LINDA on right LE and is now WBAT. Prior Level of Function:  Lives With: Alone  Type of Home: House  Home Layout: One level  Home Access: Stairs to enter with rails  Entrance Stairs - Number of Steps: 3  Entrance Stairs - Rails: Both  Home Equipment: Cane, Rolling walker    Restrictions/Precautions:  Restrictions/Precautions: Fall Risk, Weight Bearing  Right Lower Extremity Weight Bearing: Weight Bearing As Tolerated  Position Activity Restriction  Hip Precautions: No hip flexion > 90 degrees, No ABduction, No ADduction, No hip internal rotation    SUBJECTIVE: Patient in room, agreeable to PT. PAIN: 6/10: right hip    OBJECTIVE:  Bed Mobility:  Rolling to Left: Independent   Rolling to Right: Independent   Supine to Sit: Independent  Sit to Supine: Independent     Transfers:  Sit to Stand: Modified Independent  Stand to Sit:Modified Independent  Stand Pivot:Modified Independent  Car:Supervision    Ambulation:  Modified Independent  Distance: 170', 80', 20', 65',10'  Surface: Level Tile  Device:Rolling Walker  Gait Deviations: Decreased single leg stance on right LE  No cues for gait pattern or safety    Contact Guard Assistance  Distance: 110'  Surface: Level Tile  Device:Cane  Gait Deviations:   Forward Flexed Posture, Slow [place], Decreased Step Length on Left and Decreased Heel Strike Bilaterally  Verbal cues for cane and LE sequencing    Stand By Assistance  Distance: 12'  Surface: Ramp  Device:Rolling Walker  Gait Deviations: Forward Flexed Posture and Slow [place]  Verbal cues to reduce speed with descent    Stairs:  Stand By Assistance  Number of Steps: 12  Height: 6\" step with Bilateral Handrails     Stairs:  Stand By Assistance  Number of Steps: 1  Height: 6\" step with Rolling Walker   Verbal cues for LE sequencing    Balance: Pt able to reach for object reacher with modified independence  Standing on black balance pad: eyes closed 15\", 20\", 30\"; shoulder flexion to 90 degrees, shoulder horizontal abduction/adduction, cervical rotation, cervical flexion/extension. All completed with no UE support. Verbal cues for compensatory strategies with loss of balance. Shooting ball in hoop with no UE support x 4 minutes    Exercise:  Patient was guided in 1 set(s) 11 reps of exercise to both lower extremities. Standing: marching, hip flexion, hip extension, hamstring curls, heel raise, toe raise, mini squats. Provided standing, supine and seated HEP. Educated pt on completing standing HEP at counter/sink and not to use chair. Exercises were completed for increased independence with functional mobility. Functional Outcome Measures: Completed  Balance Score: 13  Gait Score: 9  Tinetti Total Score: 22/28  Risk Indicators:  Less than/equal to 18 = high risk  19-23 Moderate risk  Greater than/equal to 24 = low risk    ASSESSMENT:  Assessment:   . Mrs. Gertrudis Scott met 6/6 STG's and 4/6 LTG's. Patient did not meet stair goals due to requiring SBA, requiring occasional verbal cues for LE sequencing. Patient has met all other goals and shown good progression since initial evaluation, progressing sit < > stand from CGA to modified independence, supine < > sit from SBA/min assist to modified independence, and gait from CGA to modified independence with a RW and ambulating increased distances.   Patient also progressed stairs from CGA/min assist to SBA. Ms. Mirna Grover is returning to home 2/8/2020. Activity Tolerance:  Patient tolerance of  treatment: good. Equipment Recommendations:Equipment Needed: No  Discharge Recommendations:  Discharge Recommendations: Home with Home health PT    Plan: Discharge physical therapy services. Pt returning to home alone with family support. Recommend home health PT. Patient Education  Patient Education: Bed Mobility, Transfers, Gait, Stairs, Car Transfers, Verbal Exercise Instruction,  - Patient Verbalized Understanding, - Patient Requires Continued Education    Goals:  Patient goals : go home  Short term goals  Time Frame for Short term goals: 2 weeks  Short term goal 1: Patient will complete supine < > sit with supervision to transfer in/out of bed with decreased difficulty. GOAL MET, DISCONTINUE, SEE LTG  Short term goal 2: Patient will complete sit < > stand with SBA to increase independence with transfers. GOAL MET, DISCONTINUE, SEE LTG  Short term goal 3: Patietn will ambulate 150' wtih a RW and SBA to progress towards safe home ambulation. GOAL MET, DISCONTINUE, SEE LTG  Short term goal 4: Patietn will ascend/descend 3 steps with 2 hand rails and CGA to progress towards safe home access. GOAL MET, DISCONTINUE, SEE LTG  Short term goal 5: Patient will complete car transfer with CGA to transfer in/out of vehicle to prepare for safe home transport. GOAL MET, DISCONTINUE, SEE LTG  Short term goal 6: Patient will be educated on standing HEP to increase strength and mobility. Long term goals GOAL MET  Time Frame for Long term goals : 3 weeks  Long term goal 1: Patient will complete supine < > sit with modified independence to transfer in/out of vehicle safely. GOAL MET, DISCONTINUE, SEE LTG  Long term goal 2: Patient will complete sit < > stand and stand pivot transfers with modified independence to stand to ambulate wtih decreased difficulty.  GOAL MET  Long term goal 3: Patient will ambulate 80' with a RW

## 2020-02-07 NOTE — PLAN OF CARE
Problem: Pain:  Goal: Pain level will decrease  Description  Pain level will decrease  Outcome: Ongoing  Note:   Promote participation in pain management. Problem: Mobility - Impaired:  Goal: Mobility will improve  Description  Mobility will improve  Outcome: Ongoing  Note:   Assess barriers to increased mobility. Problem: Discharge Planning:  Goal: Patients continuum of care needs are met  Description  Patients continuum of care needs are met  Outcome: Ongoing  Note:   Involve patient in discharge planning process. Problem: Skin Integrity:  Goal: Will show no infection signs and symptoms  Description  Will show no infection signs and symptoms  Outcome: Ongoing  Note:   Keep skin clean and dry. Problem: Falls - Risk of:  Goal: Will remain free from falls  Description  Will remain free from falls  Outcome: Ongoing  Note:   Fall precautions. Patient is now modified independent with a walker in her room. Patient is being discharged to home tomorrow and is eager to be home.

## 2020-02-07 NOTE — PROGRESS NOTES
[facility]  Recreational Therapy  Discharge Note  Transitional Care Unit           Date:  2/7/2020       Patient Name: Ning Zamora      MRN: [id number]       YOB: 1931 [de-identified]80 y.o.)       Gender: female  Diagnosis: R Intracapsular hip Fx  Referring Practitioner: Dmitriy Pulido MD    Patient discharged from Recreational Therapy at this time. See recreational therapy notes for details.     Electronically signed by: OBED Vance  Date: 2/7/2020

## 2020-02-07 NOTE — PROGRESS NOTES
[address] [address]  INPATIENT PHYSICAL THERAPY  DAILY NOTE  - [place] SKILLED NURSING UNIT - 8E-66/066-A    Time In: 3679  Time Out: 1458  Timed Code Treatment Minutes: 45 Minutes  Minutes: 38          Date: 2020  Patient Name: Bria Raza,  Gender:  female        MRN: [id number]  : 1931  (80 y.o.)  Referral Date : 20  Referring Practitioner: Blossom Arce MD (referring), Omar Decker MD (attending)  Treatment Diagnosis: difficulty in walking  Additional Pertinent Hx: Pt is s/p fall resulting in Displaced right intracapsular hip fracture. Pt is s/p LINDA on right LE and is now WBAT.       Prior Level of Function:  Lives With: Alone  Type of Home: House  Home Layout: One level  Home Access: Stairs to enter with rails  Entrance Stairs - Number of Steps: 3  Entrance Stairs - Rails: Both  Home Equipment: Cane, Rolling walker   Bathroom Shower/Tub: Tub/Shower unit  Bathroom Toilet: Standard  Bathroom Accessibility: Accessible    Receives Help From: Family  ADL Assistance: Independent  Homemaking Assistance: Independent  Homemaking Responsibilities: Yes  Ambulation Assistance: Independent  Transfer Assistance: Independent  Active : Yes  IADL Comments:    Additional Comments: Adiel is high functioning and was amamanging all aspects of her care and enviroment    Restrictions/Precautions:  Restrictions/Precautions: Fall Risk, Weight Bearing  Right Lower Extremity Weight Bearing: Weight Bearing As Tolerated  Position Activity Restriction  Hip Precautions: No hip flexion > 90 degrees, No ABduction, No ADduction, No hip internal rotation    SUBJECTIVE: pleasant, co-operative, very hardworking in therapy    PAIN:  No pain reported    OBJECTIVE:  Bed Mobility:  Not Tested    Transfers:  Sit to Stand: Modified Independent  Stand to Sit:Modified Independent    Ambulation:  Supervision  Distance: 250' x 2  Surface: Level Tile  Device:Rolling Walker  Gait Deviations:   No unsteadiness, step-thru gait pattern maintained        Exercise:  Patient was guided in 1 set(s) 15 reps of exercise to both lower extremities. Long arc quads, L LE Hip abduction/adduction, Seated hip flexion, Seated hamstring curls, Seated heel/toe raises, Standing heel/toe raises, Standing marches,  L LE Standing hip abduction/adduction, Standing hip extension, Standing hamstring curls, Mini squats and  and alternating 4\" step-ups forward/backwards 15 reps each with b ue support on // bars and sba. Exercises were completed for increased independence with functional mobility. Functional Outcome Measures: Not completed  Balance Score: 13  Gait Score: 9  Tinetti Total Score: 22    ASSESSMENT:  Assessment: Patient progressing toward established goals. Activity Tolerance:  Patient tolerance of  treatment: good. Equipment Recommendations:Equipment Needed: No  Discharge Recommendations:  Continue to assess pending progress, Home with Home health PT    Plan: Times per week: 5xBID, 1 x QD  Current Treatment Recommendations: Strengthening, Functional Mobility Training, Neuromuscular Re-education, Home Exercise Program, Transfer Training, Gait Training, Balance Training, Endurance Training, Stair training, Patient/Caregiver Education & Training, Safety Education & Training    Patient Education  Patient Education: Home Exercise Program, Transfers, Reviewed Prior Education, Gait, Verbal Exercise Instruction, - Patient Requires Continued Education    Goals:  Patient goals : go home  Short term goals  Time Frame for Short term goals: 2 weeks  Short term goal 1: Patient will complete supine < > sit with supervision to transfer in/out of bed with decreased difficulty. GOAL MET, DISCONTINUE, SEE LTG  Short term goal 2: Patient will complete sit < > stand with SBA to increase independence with transfers. GOAL MET, DISCONTINUE, SEE LTG  Short term goal 3: Marti will ambulate 150' wtih a RW and SBA to progress towards safe home ambulation.  GOAL MET, DISCONTINUE, SEE LTG  Short term goal 4: Patietn will ascend/descend 3 steps with 2 hand rails and CGA to progress towards safe home access. GOAL MET, DISCONTINUE, SEE LTG  Short term goal 5: Patient will complete car transfer with CGA to transfer in/out of vehicle to prepare for safe home transport. GOAL MET, DISCONTINUE, SEE LTG  Short term goal 6: Patient will be educated on standing HEP to increase strength and mobility. Long term goals  Time Frame for Long term goals : 3 weeks  Long term goal 1: Patient will complete supine < > sit with modified independence to transfer in/out of vehicle safely. GOAL MET, DISCONTINUE, SEE LTG  Long term goal 2: Patient will complete sit < > stand and stand pivot transfers with modified independence to stand to ambulate wtih decreased difficulty. Long term goal 3: Patient will ambulate 80' with a RW and modified independence to navigate home safely. Long term goal 4: Patient will ascend/descend 3 steps with 2 hand rails and modified independence to access/leave home safely. Long term goal 5: Patient will complete car transfer with supervision to transport to home safely. Long term goal 6: Patient will ascend/descend 1, 6\" step with RW and supervision for community entry. Following session, patient left in safe position with all fall risk precautions in place.

## 2020-02-08 VITALS
RESPIRATION RATE: 18 BRPM | WEIGHT: 137.4 LBS | OXYGEN SATURATION: 96 % | DIASTOLIC BLOOD PRESSURE: 68 MMHG | BODY MASS INDEX: 24.34 KG/M2 | SYSTOLIC BLOOD PRESSURE: 115 MMHG | HEART RATE: 96 BPM | TEMPERATURE: 97.1 F | HEIGHT: 63 IN

## 2020-02-08 PROCEDURE — 6370000000 HC RX 637 (ALT 250 FOR IP): Performed by: ORTHOPAEDIC SURGERY

## 2020-02-08 PROCEDURE — 6370000000 HC RX 637 (ALT 250 FOR IP): Performed by: FAMILY MEDICINE

## 2020-02-08 PROCEDURE — 6360000002 HC RX W HCPCS: Performed by: FAMILY MEDICINE

## 2020-02-08 RX ORDER — TRAMADOL HYDROCHLORIDE 50 MG/1
50 TABLET ORAL EVERY 6 HOURS PRN
Qty: 28 TABLET | Refills: 0 | Status: SHIPPED | OUTPATIENT
Start: 2020-02-08 | End: 2020-02-15

## 2020-02-08 RX ADMIN — DOCUSATE SODIUM 100 MG: 100 CAPSULE, LIQUID FILLED ORAL at 08:53

## 2020-02-08 RX ADMIN — ACETAMINOPHEN 650 MG: 325 TABLET ORAL at 06:24

## 2020-02-08 RX ADMIN — SENNOSIDES AND DOCUSATE SODIUM 1 TABLET: 8.6; 5 TABLET ORAL at 08:53

## 2020-02-08 RX ADMIN — TRIAMTERENE AND HYDROCHLOROTHIAZIDE 1 TABLET: 37.5; 25 TABLET ORAL at 08:53

## 2020-02-08 RX ADMIN — Medication 250 MG: at 08:53

## 2020-02-08 RX ADMIN — LEVOTHYROXINE SODIUM 50 MCG: 50 TABLET ORAL at 06:23

## 2020-02-08 RX ADMIN — FAMOTIDINE 20 MG: 20 TABLET ORAL at 08:53

## 2020-02-08 RX ADMIN — TRAMADOL HYDROCHLORIDE 50 MG: 50 TABLET, FILM COATED ORAL at 08:53

## 2020-02-08 RX ADMIN — THERA TABS 1 TABLET: TAB at 08:53

## 2020-02-08 RX ADMIN — ENOXAPARIN SODIUM 40 MG: 40 INJECTION SUBCUTANEOUS at 08:53

## 2020-02-08 ASSESSMENT — PAIN SCALES - GENERAL
PAINLEVEL_OUTOF10: 5
PAINLEVEL_OUTOF10: 5

## 2020-02-08 ASSESSMENT — PAIN DESCRIPTION - DESCRIPTORS: DESCRIPTORS: ACHING

## 2020-02-08 ASSESSMENT — PAIN DESCRIPTION - PAIN TYPE: TYPE: SURGICAL PAIN

## 2020-02-08 ASSESSMENT — PAIN DESCRIPTION - LOCATION: LOCATION: HIP

## 2020-02-08 ASSESSMENT — PAIN DESCRIPTION - ORIENTATION: ORIENTATION: RIGHT

## 2020-02-08 NOTE — FLOWSHEET NOTE
02/08/20 1125   Provider Notification   Reason for Communication Review case   Provider Name MARYAM Shaw   Provider Notification Physician Assistant   Method of Communication Secure Message   Response Waiting for response   Notification Time [phone]   Shift Event Other (comment)   Patient had LINDA for displaced R hip fracture on 01/23/2020. Going home today. Still has zipties in place. Would you like removed prior to discharge?

## 2020-02-08 NOTE — DISCHARGE SUMMARY
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 female admitted to the transitional care unit on 2020 for the continuation of time with therapies following the acute hospital stay for fracture of the right hip. She worked well with therapies and showed improvement in strength and independence. At the time of discharge it was uncertain as to whether she would need outpatient or home health services. She remained medically stable on the unit and will be discharged in stable condition. Consults:   none    Significant Diagnostics:   Results for Irish Garner (MRN [id number]) as of 2020 09:25   Ref.  Range 2020 11:18 2020 07:31 2020 12:39   Sodium Latest Ref Range: 135 - 145 meq/L 137  134 (L)   Potassium Latest Ref Range: 3.5 - 5.2 meq/L 4.4  4.7   Chloride Latest Ref Range: 98 - 111 meq/L 96 (L)  94 (L)   CO2 Latest Ref Range: 23 - 33 meq/L 28  29   BUN Latest Ref Range: 7 - 22 mg/dL 21  26 (H)   Creatinine Latest Ref Range: 0.4 - 1.2 mg/dL 0.7  0.7   Anion Gap Latest Ref Range: 8.0 - 16.0 meq/L 13.0  11.0   Est, Glom Filt Rate Latest Units: ml/min/1.73m2 79 (A)  79 (A)   Glucose Latest Ref Range: 70 - 108 mg/dL 116 (H)  101   Calcium Latest Ref Range: 8.5 - 10.5 mg/dL 9.2  9.5   Osmolality Calc Latest Ref Range: 275.0 - 300 mOsmol/kg 277.8     WBC Latest Ref Range: 4.8 - 10.8 thou/mm3 12.2 (H) 13.1 (H) 12.5 (H)   RBC Latest Ref Range: 4.20 - 5.40 mill/mm3 4.08 (L) 3.51 (L) 3.70 (L)   Hemoglobin

## 2020-02-08 NOTE — DISCHARGE INSTR - PHARMACY
· Be safe with medicines. If your doctor prescribed medicine, take it exactly as prescribed. Call your doctor if you think you are having a problem with your medicine. You will get more details on the specific medicines your doctor prescribes. Unused medications, especially pain medications, should be disposed to ensure medications do not end up being misused in the future, as well as helping to ensure drugs are not impacting the environment. [facility] and many local police agencies have medication take-back bins to properly destroy these medications. Please see the site https://apps. deadiversion. Cuponom[place].gov/pubdispsearch/spring/main?execution=e2s1 for additional take-back bins located in your area.

## 2020-02-10 ENCOUNTER — CARE COORDINATION (OUTPATIENT)
Dept: CASE MANAGEMENT | Age: 85
End: 2020-02-10

## 2020-02-10 PROCEDURE — 1111F DSCHRG MED/CURRENT MED MERGE: CPT | Performed by: INTERNAL MEDICINE

## 2020-02-10 NOTE — CARE COORDINATION
[facility] Discharge Call    2/10/2020    Patient: Ning Zamora Patient : 1931   MRN: [id number]  Reason for Admission:admitted to the transitional care unit on 2020 for the continuation of time with therapies following the acute hospital stay for fracture of the right hip     Discharge Date: 20 RARS: Readmission Risk Score: 17    Discharge Facility: [facility] Transition    Do you have any ongoing symptoms?:  Yes   Patient-reported symptoms:  Pain   Do you have all of your prescriptions and are they filled?:  Yes   Do you have any questions related to your medications?:  No   Have you scheduled your follow up appointment?:  Yes   Were you discharged with any Home Care or Post Acute Services or do you currently have any active services?:  Yes   Post Acute Services:  [facility]         Do you feel like you have everything you need to keep you well at home?:  Yes   Care Transitions Interventions  No Identified Needs                             Called pt for the post acute facility call. Pt stated she is doing very well. Pt stated she has been ambulating with the walker & cane at times & feels steady. Pt stated she still has some post op pain & rated her pain 6/10. Pt stated she is eating, without any nausea and vomiting. Pt denied any issues with bowels or bladder. Reminded to cough & deep breath throughout the day     Reviewed meds with the pt. Denied any questions. Pt denied any needs or concerns. CTC will continue to follow.     Future Appointments   Date Time Provider Jonnathan Moreira   2020 10:00 AM [facility]   2020  1:40 PM Ricki Fong MD [address] RN  Care Transition Nurse  [phone]

## 2020-02-10 NOTE — PLAN OF CARE
Patient goals/plan/ treatment preferences discussed by Kaylee Orellana /. Patient goals/plan/ treatment preferences reviewed with patient/ family. Patient is requesting home health services. Patient has initially chosen [facility] but on day of discharge changed her mind and requested home home health [facility] through [facility]. SW made referral on this date and faxed over AVS, orders, therapy notes, and summary face sheet. PCP will follow ongoing home health orders. Patient/ family verbalize understanding of discharge plan and are in agreement with goal/plan/treatment preferences. Understanding was demonstrated using the teach back method. AVS provided by RN at time of discharge, which includes all necessary medical information pertaining to the patients current course of illness, treatment, post-discharge goals of care, and treatment preferences.    Problem: Discharge Planning:  Goal: Patients continuum of care needs are met  Description  Patients continuum of care needs are met   2/10/2020 0937 by TOBIN Talley  Outcome: Ongoing  2/10/2020 0937 by TOBIN Talley  Reactivated   2/10/20, 9:37 AM    P

## 2020-02-14 ENCOUNTER — HOSPITAL ENCOUNTER (OUTPATIENT)
Dept: LAB | Age: 85
Discharge: HOME | End: 2020-02-14
Payer: MEDICARE

## 2020-02-14 ENCOUNTER — CARE COORDINATION (OUTPATIENT)
Dept: CASE MANAGEMENT | Age: 85
End: 2020-02-14

## 2020-02-14 ENCOUNTER — OFFICE VISIT (OUTPATIENT)
Dept: INTERNAL MEDICINE | Age: 85
End: 2020-02-14
Payer: MEDICARE

## 2020-02-14 VITALS
BODY MASS INDEX: 25.16 KG/M2 | HEIGHT: 63 IN | HEART RATE: 60 BPM | RESPIRATION RATE: 16 BRPM | DIASTOLIC BLOOD PRESSURE: 70 MMHG | SYSTOLIC BLOOD PRESSURE: 130 MMHG | WEIGHT: 142 LBS

## 2020-02-14 LAB
ABSOLUTE EOS #: 0.16 K/UL (ref 0–0.44)
ABSOLUTE IMMATURE GRANULOCYTE: 0.06 K/UL (ref 0–0.3)
ABSOLUTE LYMPH #: 1.14 K/UL (ref 1.1–3.7)
ABSOLUTE MONO #: 0.64 K/UL (ref 0.1–1.2)
BASOPHILS # BLD: 1 % (ref 0–2)
BASOPHILS ABSOLUTE: 0.04 K/UL (ref 0–0.2)
DIFFERENTIAL TYPE: ABNORMAL
EOSINOPHILS RELATIVE PERCENT: 2 % (ref 1–4)
HCT VFR BLD CALC: 35.5 % (ref 36.3–47.1)
HEMOGLOBIN: 11.7 G/DL (ref 11.9–15.1)
IMMATURE GRANULOCYTES: 1 %
IRON SATURATION: 22 % (ref 20–55)
IRON: 61 UG/DL (ref 37–145)
LYMPHOCYTES # BLD: 13 % (ref 24–43)
MCH RBC QN AUTO: 31.9 PG (ref 25.2–33.5)
MCHC RBC AUTO-ENTMCNC: 33 G/DL (ref 25.2–33.5)
MCV RBC AUTO: 96.7 FL (ref 82.6–102.9)
MONOCYTES # BLD: 7 % (ref 3–12)
NRBC AUTOMATED: 0 PER 100 WBC
PDW BLD-RTO: 13.2 % (ref 11.8–14.4)
PLATELET # BLD: 535 K/UL (ref 138–453)
PLATELET ESTIMATE: ABNORMAL
PMV BLD AUTO: 8.7 FL (ref 8.1–13.5)
RBC # BLD: 3.67 M/UL (ref 3.95–5.11)
RBC # BLD: ABNORMAL 10*6/UL
SEG NEUTROPHILS: 77 % (ref 36–65)
SEGMENTED NEUTROPHILS ABSOLUTE COUNT: 6.67 K/UL (ref 1.5–8.1)
TOTAL IRON BINDING CAPACITY: 279 UG/DL (ref 250–450)
UNSATURATED IRON BINDING CAPACITY: 218 UG/DL (ref 112–347)
WBC # BLD: 8.7 K/UL (ref 3.5–11.3)
WBC # BLD: ABNORMAL 10*3/UL

## 2020-02-14 PROCEDURE — 83550 IRON BINDING TEST: CPT

## 2020-02-14 PROCEDURE — 99214 OFFICE O/P EST MOD 30 MIN: CPT | Performed by: INTERNAL MEDICINE

## 2020-02-14 PROCEDURE — 85025 COMPLETE CBC W/AUTO DIFF WBC: CPT

## 2020-02-14 PROCEDURE — 83540 ASSAY OF IRON: CPT

## 2020-02-14 PROCEDURE — 36415 COLL VENOUS BLD VENIPUNCTURE: CPT

## 2020-02-14 ASSESSMENT — PATIENT HEALTH QUESTIONNAIRE - PHQ9
SUM OF ALL RESPONSES TO PHQ9 QUESTIONS 1 & 2: 0
SUM OF ALL RESPONSES TO PHQ QUESTIONS 1-9: 0
2. FEELING DOWN, DEPRESSED OR HOPELESS: 0
1. LITTLE INTEREST OR PLEASURE IN DOING THINGS: 0
SUM OF ALL RESPONSES TO PHQ QUESTIONS 1-9: 0

## 2020-02-14 ASSESSMENT — ENCOUNTER SYMPTOMS
VOMITING: 0
ABDOMINAL PAIN: 0
BLOOD IN STOOL: 0
BACK PAIN: 0
DIARRHEA: 0
CONSTIPATION: 0
SHORTNESS OF BREATH: 0
NAUSEA: 0
EYE PAIN: 0
COUGH: 0

## 2020-02-14 NOTE — PROGRESS NOTES
extremities     takes diuretics prn    Hyperlipidemia     Hypertension     Hypothyroidism     Osteoarthritis     mild    Unspecified vitamin D deficiency       Past Surgical History:   Procedure Laterality Date    APPENDECTOMY      at age 5   [facility] [address] BREAST BIOPSY Right 2014    before she had her surgery    BREAST RECONSTRUCTION Right 2014    Pt had done at [facility] in [address] Left 2014    Pt had done at [facility] in [address] Right 2019    COLONOSCOPY  2007    showed mild diverticular disease, otherwise negative, repeat in 2017   [place], RADICAL Right 2014    Pt had done at [facility] in [address]    due to fibroids   [address] Right 2020    RIGHT TOTAL HIP performed by Ida Dennis MD at [address] History   Problem Relation Age of Onset    Alzheimer's Disease Mother          at age 80    Emphysema Father          at age 68    Diabetes Father     COPD Sister     Other Sister         lung disease from tobacco abuse    Stroke Sister         history of a hemorrhagic cerebrovascular accident    Cancer Daughter         CML    Other Daughter         kidney stones    Hypertension Daughter     Heart Disease Maternal Grandmother     Heart Disease Maternal Grandfather     Diabetes Maternal Grandfather           Social History     Tobacco Use    Smoking status: Never Smoker    Smokeless tobacco: Never Used   Substance Use Topics    Alcohol use: No         Current Outpatient Medications   Medication Sig Dispense Refill    AZO-CRANBERRY PO Daily PRN      traMADol (ULTRAM) 50 MG tablet Take 1 tablet by mouth every 6 hours as needed for Pain for up to 7 days.  28 tablet 0    triamterene-hydrochlorothiazide (MAXZIDE-25) 37.5-25 MG per tablet Take 1 tablet by mouth daily (Patient taking differently: Take 0.5 tablets by mouth daily as needed Pt takes if sbp > 135) 30 tablet 11    simvastatin (ZOCOR) 20 MG tablet TAKE 0.5 TABLETS BY MOUTH EVERY EVENING 90 tablet 3    levothyroxine (SYNTHROID) 50 MCG tablet TAKE 1 TABLET BY MOUTH DAILY 30 tablet 11    Lactobacillus (PROBIOTIC ACIDOPHILUS) CAPS Take by mouth 2 times daily      Handicap Placard MISC by Does not apply route . Expires in 5 years 1 each 0    CALCIUM-MAGNESIUM-VITAMIN D PO Take by mouth daily      acetaminophen (TYLENOL) 500 MG tablet Take 500 mg by mouth every 6 hours as needed for Pain. Uses approx once a week      Cholecalciferol (VITAMIN D) 2000 UNITS CAPS capsule Take 1 capsule by mouth daily.  Multiple Vitamin (MULTI-VITAMIN DAILY PO) Take 1 tablet by mouth daily.  aspirin 325 MG tablet Take by mouth daily 1/2 daily.  Multiple Vitamins-Minerals (PRESERVISION AREDS 2 PO) Take 1 tablet by mouth daily       No current facility-administered medications for this visit. No Known Allergies    Health Maintenance   Topic Date Due    Pneumococcal 65+ years Vaccine (2 of 2 - PPSV23) 09/11/2018    Shingles Vaccine (1 of 2) 03/21/2020 (Originally 7/26/1981)    Lipid screen  09/04/2020    TSH testing  09/04/2020    Annual Wellness Visit (AWV)  12/13/2020    Potassium monitoring  02/02/2021    Creatinine monitoring  02/02/2021    DTaP/Tdap/Td vaccine (2 - Td) 12/19/2021    Flu vaccine  Completed    Hepatitis A vaccine  Aged Out    Hepatitis B vaccine  Aged Out    Hib vaccine  Aged Out    Meningococcal (ACWY) vaccine  Aged Out       Subjective:      Review of Systems   Constitutional: Negative for chills and fever. HENT: Negative for hearing loss. Eyes: Negative for pain and visual disturbance. Respiratory: Negative for cough and shortness of breath. Cardiovascular: Negative for chest pain, palpitations and leg swelling.    Gastrointestinal: Negative for abdominal pain, blood in stool, constipation, type  CBC With Auto Differential    Iron And TIBC      Patient fell and had a fracture of the right hip. She was admitted on 1/22/2020 and had right total hip arthroplasty repair surgery on 1/23/2020. Overall she has done well and has already done some rehab. She is continuing physical therapy through the home health agency. Right hip pain is improving. Plan:       Return if symptoms worsen or fail to improve, for Hyperlipidemia, Hypertension, hypothyroidism. Orders Placed This Encounter   Procedures    CBC With Auto Differential     Standing Status:   Future     Standing Expiration Date:   2/14/2021    Iron And TIBC     Standing Status:   Future     Standing Expiration Date:   2/14/2021     Order Specific Question:   Is Patient Fasting? Answer:   no     Order Specific Question:   No of Hours? Answer:   no     No orders of the defined types were placed in this encounter. Patientgiven educational materials - see patient instructions. Discussed use, benefit,and side effects of prescribed medications. All patient questions answered. Ptvoiced understanding. Reviewed health maintenance. Instructed to continue currentmedications, diet and exercise. Patient agreed with treatment plan. Follow up asdirected.      Electronically signed by Marbella Araiza MD on 2/14/2020 at 10:33 AM

## 2020-02-14 NOTE — CARE COORDINATION
[facility] Transitions Follow Up Call    2020    Patient: Ivett Rogers  Patient : 1931   MRN: [id number]  Reason for Admission: admitted to the transitional care unit on 2020 for the continuation of time with therapies following the acute hospital stay for fracture of the right hip   Discharge Date: 20 RARS: Readmission Risk Score: 17       Attempted sub transition message. Left message to return call to [phone] with [phone] in regards to discharge 20. Care Transitions Subsequent and Final Call    Subsequent and Final Calls  Care Transitions Interventions                          Other Interventions:             Follow Up  Future Appointments   Date Time Provider Jonnathan Moreira   2020  1:40 PM MD LAURA MenchacaT [facility]       Rojas Leach RN

## 2020-02-17 ENCOUNTER — CARE COORDINATION (OUTPATIENT)
Dept: CASE MANAGEMENT | Age: 85
End: 2020-02-17

## 2020-02-18 ENCOUNTER — TELEPHONE (OUTPATIENT)
Dept: INTERNAL MEDICINE | Age: 85
End: 2020-02-18
Payer: MEDICARE

## 2020-02-18 PROCEDURE — G0180 MD CERTIFICATION HHA PATIENT: HCPCS | Performed by: INTERNAL MEDICINE

## 2020-02-18 RX ORDER — TRAMADOL HYDROCHLORIDE 50 MG/1
50 TABLET ORAL EVERY 6 HOURS PRN
COMMUNITY
End: 2020-06-23

## 2020-02-18 RX ORDER — CHOLECALCIFEROL (VITAMIN D3) 125 MCG
2000 CAPSULE ORAL DAILY
COMMUNITY

## 2020-02-24 ENCOUNTER — CARE COORDINATION (OUTPATIENT)
Dept: CASE MANAGEMENT | Age: 85
End: 2020-02-24

## 2020-02-24 NOTE — CARE COORDINATION
[facility] Transitions Follow Up Call    2020    Patient: Victor M Alejandro  Patient : 1931   MRN: [id number]  Reason for Admission: admitted to the transitional care unit on 2020 for the continuation of time with therapies following the acute hospital stay for fracture of the right hip   Discharge Date: 20 RARS: Readmission Risk Score: 16    Spoke with: [address] [place] [address] Transitions Subsequent and Final Call    Subsequent and Final Calls  Do you have any ongoing symptoms?:  No  Have your medications changed?:  No  Do you have any questions related to your medications?:  No  Do you currently have any active services?:  Yes  Are you currently active with any services?:  [address] you have any needs or concerns that I can assist you with?:  No  Identified Barriers:  None  Care Transitions Interventions  No Identified Needs                          Other Interventions:        Called & spoke with the pt. Pt stated she was able to stand & wash dishes & dress herself today. Pt continues to ambulate with a walker. Pt is expecting PT & OT today. Pt stated she takes 0.5 tab pain pill with Tylenol to help sleep at night. Pt daughter is still staying with the pt. Pt denied any needs or concerns. CTC will continue to follow.     Follow Up  Future Appointments   Date Time Provider Jonnathan Moreira   3/19/2020  9:45 AM Alvina Her MD Oncology [facility]   2020  1:40 PM Jess Munroe MD [facility]       Bhumi Covarrubias RN  Care Transition Nurse  [phone]

## 2020-03-03 ENCOUNTER — CARE COORDINATION (OUTPATIENT)
Dept: CASE MANAGEMENT | Age: 85
End: 2020-03-03

## 2020-03-03 NOTE — CARE COORDINATION
[facility] Transitions Follow Up Call    3/3/2020    Patient: Ning Zamora  Patient : 1931   MRN: [id number]  Reason for Admission: admitted to the transitional care unit on 2020 for the continuation of time with therapies following the acute hospital stay for fracture of the right hip   Discharge Date: 20 RARS: Readmission Risk Score: 17         Spoke with: Ning Zamora  Patient stated she is doing well. She has slight pain when she sleeps. She uses her walker at all times. PT and OT is still coming to home. Incision is open to air. Denies concerns or issues at this time. Care Transitions Subsequent and Final Call    Subsequent and Final Calls  Do you have any ongoing symptoms?:  No  Have your medications changed?:  No  Do you have any questions related to your medications?:  No  Do you currently have any active services?:  No  Are you currently active with any services?:  Home Health  Do you have any needs or concerns that I can assist you with?:  No  Identified Barriers:  None  Care Transitions Interventions  No Identified Needs                          Other Interventions:             Follow Up  Future Appointments   Date Time Provider Jonnathan Moreira   3/19/2020  9:45 AM Kishore Martinez MD Oncology [facility]   2020  1:40 PM [facility]       Hollie Osorio RN

## 2020-03-10 ENCOUNTER — CARE COORDINATION (OUTPATIENT)
Dept: CASE MANAGEMENT | Age: 85
End: 2020-03-10

## 2020-03-10 NOTE — CARE COORDINATION
[facility] Transitions Follow Up Call    3/10/2020    Patient: Haritha Card  Patient : 1931   MRN: [id number]  Reason for Admission: admitted to the transitional care unit on 2020 for the continuation of time with therapies following the acute hospital stay for fracture of the right hip   Discharge Date: 20 RARS: Readmission Risk Score: 16    Spoke with: [facility] Transitions Subsequent and Final Call    Subsequent and Final Calls  Do you have any ongoing symptoms?:  No  Have your medications changed?:  No  Do you have any questions related to your medications?:  No  Do you currently have any active services?:  Yes  Are you currently active with any services?:  Home Health  Do you have any needs or concerns that I can assist you with?:  No  Identified Barriers:  None  Care Transitions Interventions  No Identified Needs  Other Interventions:        Called pt for the final transition call. Pt stated the incision looks good. Pt stated she ambulates with a cane or walker. PT is still seeing the pt. Pt stated she uses ice for post op pain, which helps. Pt stated she may take a pain pill before bedtime. Next Ortho appt 3/19/20    Pt denied any needs or concerns. Pt informed this is the final transition of care call. Instructed to call the Ortho  for any concerns. Please call during the regular office hours if possible, for urgent problems,  there is a provider on call. Call the office & follow the prompts. The answering service is able to make appointments for the following day. Call 911 for emergencies.     Follow Up  Future Appointments   Date Time Provider Jonnathan Moreira   2020 10:15 AM Pascual Ha MD [facility]   2020  1:40 PM Felicitas Max MD [address] RN  Care Transition Nurse  [phone]

## 2020-03-18 ENCOUNTER — APPOINTMENT (OUTPATIENT)
Dept: CT IMAGING | Age: 85
DRG: 205 | End: 2020-03-18
Payer: MEDICARE

## 2020-03-18 ENCOUNTER — HOSPITAL ENCOUNTER (INPATIENT)
Age: 85
LOS: 2 days | Discharge: HOME HEALTH | DRG: 205 | End: 2020-03-21
Attending: EMERGENCY MEDICINE | Admitting: FAMILY MEDICINE
Payer: MEDICARE

## 2020-03-18 ENCOUNTER — OFFICE VISIT (OUTPATIENT)
Dept: PRIMARY CARE CLINIC | Age: 85
End: 2020-03-18
Payer: MEDICARE

## 2020-03-18 ENCOUNTER — APPOINTMENT (OUTPATIENT)
Dept: GENERAL RADIOLOGY | Age: 85
DRG: 205 | End: 2020-03-18
Payer: MEDICARE

## 2020-03-18 VITALS
TEMPERATURE: 100 F | OXYGEN SATURATION: 94 % | HEART RATE: 134 BPM | DIASTOLIC BLOOD PRESSURE: 74 MMHG | SYSTOLIC BLOOD PRESSURE: 130 MMHG

## 2020-03-18 LAB
-: ABNORMAL
ABSOLUTE EOS #: 0 K/UL (ref 0–0.4)
ABSOLUTE IMMATURE GRANULOCYTE: 0 K/UL (ref 0–0.3)
ABSOLUTE LYMPH #: 0.7 K/UL (ref 1–4.8)
ABSOLUTE MONO #: 1.06 K/UL (ref 0.1–1.2)
AMORPHOUS: ABNORMAL
ANION GAP SERPL CALCULATED.3IONS-SCNC: 13 MMOL/L (ref 9–17)
BACTERIA: ABNORMAL
BASOPHILS # BLD: 0 % (ref 0–1)
BASOPHILS ABSOLUTE: 0 K/UL (ref 0–0.2)
BILIRUBIN URINE: NEGATIVE
BUN BLDV-MCNC: 26 MG/DL (ref 8–23)
BUN/CREAT BLD: 42 (ref 9–20)
CALCIUM SERPL-MCNC: 8.8 MG/DL (ref 8.6–10.4)
CASTS UA: ABNORMAL /LPF (ref 0–2)
CHLORIDE BLD-SCNC: 92 MMOL/L (ref 98–107)
CO2: 25 MMOL/L (ref 20–31)
COLOR: ABNORMAL
COMMENT UA: ABNORMAL
CREAT SERPL-MCNC: 0.62 MG/DL (ref 0.5–0.9)
CRYSTALS, UA: ABNORMAL /HPF
D DIMER: 1690 NG/ML
DIFFERENTIAL TYPE: ABNORMAL
DIRECT EXAM: NORMAL
EOSINOPHILS RELATIVE PERCENT: 0 % (ref 1–7)
EPITHELIAL CELLS UA: ABNORMAL /HPF (ref 0–5)
GFR AFRICAN AMERICAN: >60 ML/MIN
GFR NON-AFRICAN AMERICAN: >60 ML/MIN
GFR SERPL CREATININE-BSD FRML MDRD: ABNORMAL ML/MIN/{1.73_M2}
GFR SERPL CREATININE-BSD FRML MDRD: ABNORMAL ML/MIN/{1.73_M2}
GLUCOSE BLD-MCNC: 163 MG/DL (ref 70–99)
GLUCOSE URINE: NEGATIVE
HCT VFR BLD CALC: 32.7 % (ref 36.3–47.1)
HEMOGLOBIN: 10.9 G/DL (ref 11.9–15.1)
IMMATURE GRANULOCYTES: 0 %
KETONES, URINE: NEGATIVE
LACTIC ACID, SEPSIS WHOLE BLOOD: NORMAL MMOL/L (ref 0.5–1.9)
LACTIC ACID, SEPSIS: 1 MMOL/L (ref 0.5–1.9)
LEUKOCYTE ESTERASE, URINE: ABNORMAL
LYMPHOCYTES # BLD: 4 % (ref 16–46)
Lab: NORMAL
MCH RBC QN AUTO: 31.4 PG (ref 25.2–33.5)
MCHC RBC AUTO-ENTMCNC: 33.3 G/DL (ref 25.2–33.5)
MCV RBC AUTO: 94.2 FL (ref 82.6–102.9)
MONOCYTES # BLD: 6 % (ref 4–11)
MORPHOLOGY: ABNORMAL
MUCUS: ABNORMAL
NITRITE, URINE: POSITIVE
NRBC AUTOMATED: 0 PER 100 WBC
OTHER OBSERVATIONS UA: ABNORMAL
PARTIAL THROMBOPLASTIN TIME: 26.7 SEC (ref 27–35)
PDW BLD-RTO: 13.8 % (ref 11.8–14.4)
PH UA: 6 (ref 5–6)
PLATELET # BLD: 273 K/UL (ref 138–453)
PLATELET ESTIMATE: ABNORMAL
PMV BLD AUTO: 9 FL (ref 8.1–13.5)
POTASSIUM SERPL-SCNC: 3.8 MMOL/L (ref 3.7–5.3)
PROTEIN UA: ABNORMAL
RBC # BLD: 3.47 M/UL (ref 3.95–5.11)
RBC # BLD: ABNORMAL 10*6/UL
RBC UA: ABNORMAL /HPF (ref 0–4)
RENAL EPITHELIAL, UA: ABNORMAL /HPF
SEG NEUTROPHILS: 90 % (ref 43–77)
SEGMENTED NEUTROPHILS ABSOLUTE COUNT: 15.84 K/UL (ref 1.8–7.7)
SODIUM BLD-SCNC: 130 MMOL/L (ref 135–144)
SPECIFIC GRAVITY UA: 1.01 (ref 1.01–1.02)
SPECIMEN DESCRIPTION: NORMAL
TRICHOMONAS: ABNORMAL
TROPONIN INTERP: ABNORMAL
TROPONIN T: ABNORMAL NG/ML
TROPONIN, HIGH SENSITIVITY: 98 NG/L (ref 0–14)
TURBIDITY: ABNORMAL
URINE HGB: ABNORMAL
UROBILINOGEN, URINE: NORMAL
WBC # BLD: 17.6 K/UL (ref 3.5–11.3)
WBC # BLD: ABNORMAL 10*3/UL
WBC UA: >50 /HPF (ref 0–4)
YEAST: ABNORMAL

## 2020-03-18 PROCEDURE — 84484 ASSAY OF TROPONIN QUANT: CPT

## 2020-03-18 PROCEDURE — 87150 DNA/RNA AMPLIFIED PROBE: CPT

## 2020-03-18 PROCEDURE — 2580000003 HC RX 258: Performed by: EMERGENCY MEDICINE

## 2020-03-18 PROCEDURE — 87088 URINE BACTERIA CULTURE: CPT

## 2020-03-18 PROCEDURE — 81001 URINALYSIS AUTO W/SCOPE: CPT

## 2020-03-18 PROCEDURE — 71260 CT THORAX DX C+: CPT

## 2020-03-18 PROCEDURE — 87205 SMEAR GRAM STAIN: CPT

## 2020-03-18 PROCEDURE — 85379 FIBRIN DEGRADATION QUANT: CPT

## 2020-03-18 PROCEDURE — 96365 THER/PROPH/DIAG IV INF INIT: CPT

## 2020-03-18 PROCEDURE — 87040 BLOOD CULTURE FOR BACTERIA: CPT

## 2020-03-18 PROCEDURE — 6360000004 HC RX CONTRAST MEDICATION: Performed by: EMERGENCY MEDICINE

## 2020-03-18 PROCEDURE — 85025 COMPLETE CBC W/AUTO DIFF WBC: CPT

## 2020-03-18 PROCEDURE — 87086 URINE CULTURE/COLONY COUNT: CPT

## 2020-03-18 PROCEDURE — 93005 ELECTROCARDIOGRAM TRACING: CPT | Performed by: EMERGENCY MEDICINE

## 2020-03-18 PROCEDURE — 99285 EMERGENCY DEPT VISIT HI MDM: CPT

## 2020-03-18 PROCEDURE — 70450 CT HEAD/BRAIN W/O DYE: CPT

## 2020-03-18 PROCEDURE — 6360000002 HC RX W HCPCS: Performed by: EMERGENCY MEDICINE

## 2020-03-18 PROCEDURE — 71045 X-RAY EXAM CHEST 1 VIEW: CPT

## 2020-03-18 PROCEDURE — 87804 INFLUENZA ASSAY W/OPTIC: CPT

## 2020-03-18 PROCEDURE — 6370000000 HC RX 637 (ALT 250 FOR IP): Performed by: EMERGENCY MEDICINE

## 2020-03-18 PROCEDURE — 36415 COLL VENOUS BLD VENIPUNCTURE: CPT

## 2020-03-18 PROCEDURE — 80048 BASIC METABOLIC PNL TOTAL CA: CPT

## 2020-03-18 PROCEDURE — 85730 THROMBOPLASTIN TIME PARTIAL: CPT

## 2020-03-18 PROCEDURE — 83605 ASSAY OF LACTIC ACID: CPT

## 2020-03-18 PROCEDURE — 87186 SC STD MICRODIL/AGAR DIL: CPT

## 2020-03-18 RX ORDER — HEPARIN SODIUM 10000 [USP'U]/100ML
12 INJECTION, SOLUTION INTRAVENOUS CONTINUOUS
Status: DISCONTINUED | OUTPATIENT
Start: 2020-03-18 | End: 2020-03-19

## 2020-03-18 RX ORDER — HEPARIN SODIUM 1000 [USP'U]/ML
30 INJECTION, SOLUTION INTRAVENOUS; SUBCUTANEOUS PRN
Status: DISCONTINUED | OUTPATIENT
Start: 2020-03-18 | End: 2020-03-19

## 2020-03-18 RX ORDER — ASPIRIN 81 MG/1
324 TABLET, CHEWABLE ORAL ONCE
Status: COMPLETED | OUTPATIENT
Start: 2020-03-18 | End: 2020-03-18

## 2020-03-18 RX ORDER — HEPARIN SODIUM 1000 [USP'U]/ML
60 INJECTION, SOLUTION INTRAVENOUS; SUBCUTANEOUS ONCE
Status: DISCONTINUED | OUTPATIENT
Start: 2020-03-18 | End: 2020-03-19

## 2020-03-18 RX ORDER — HEPARIN SODIUM 1000 [USP'U]/ML
60 INJECTION, SOLUTION INTRAVENOUS; SUBCUTANEOUS PRN
Status: DISCONTINUED | OUTPATIENT
Start: 2020-03-18 | End: 2020-03-19

## 2020-03-18 RX ORDER — ACETAMINOPHEN 500 MG
1000 TABLET ORAL ONCE
Status: COMPLETED | OUTPATIENT
Start: 2020-03-18 | End: 2020-03-18

## 2020-03-18 RX ADMIN — ASPIRIN 81 MG 324 MG: 81 TABLET ORAL at 21:57

## 2020-03-18 RX ADMIN — IOPAMIDOL 80 ML: 755 INJECTION, SOLUTION INTRAVENOUS at 23:23

## 2020-03-18 RX ADMIN — ACETAMINOPHEN 1000 MG: 500 TABLET, FILM COATED ORAL at 21:33

## 2020-03-18 RX ADMIN — CEFTRIAXONE 1 G: 1 INJECTION, POWDER, FOR SOLUTION INTRAMUSCULAR; INTRAVENOUS at 22:34

## 2020-03-18 ASSESSMENT — ENCOUNTER SYMPTOMS
ABDOMINAL PAIN: 0
NAUSEA: 0
COUGH: 0
SHORTNESS OF BREATH: 0
VOMITING: 0

## 2020-03-18 ASSESSMENT — PAIN SCALES - GENERAL: PAINLEVEL_OUTOF10: 0

## 2020-03-19 ENCOUNTER — APPOINTMENT (OUTPATIENT)
Dept: INTERVENTIONAL RADIOLOGY/VASCULAR | Age: 85
DRG: 205 | End: 2020-03-19
Payer: MEDICARE

## 2020-03-19 PROBLEM — I26.99 PULMONARY EMBOLISM ON LEFT (HCC): Status: ACTIVE | Noted: 2020-03-19

## 2020-03-19 PROBLEM — N30.01 ACUTE CYSTITIS WITH HEMATURIA: Status: ACTIVE | Noted: 2020-03-19

## 2020-03-19 PROBLEM — S72.011A CLOSED SUBCAPITAL FRACTURE OF FEMUR, RIGHT, INITIAL ENCOUNTER (HCC): Status: RESOLVED | Noted: 2020-01-22 | Resolved: 2020-03-19

## 2020-03-19 LAB
ABSOLUTE EOS #: 0.04 K/UL (ref 0–0.44)
ABSOLUTE IMMATURE GRANULOCYTE: 0.06 K/UL (ref 0–0.3)
ABSOLUTE LYMPH #: 1 K/UL (ref 1.1–3.7)
ABSOLUTE MONO #: 1.08 K/UL (ref 0.1–1.2)
ANION GAP SERPL CALCULATED.3IONS-SCNC: 12 MMOL/L (ref 9–17)
BASOPHILS # BLD: 0 % (ref 0–2)
BASOPHILS ABSOLUTE: 0.04 K/UL (ref 0–0.2)
BUN BLDV-MCNC: 18 MG/DL (ref 8–23)
BUN/CREAT BLD: 27 (ref 9–20)
CALCIUM SERPL-MCNC: 9.2 MG/DL (ref 8.6–10.4)
CHLORIDE BLD-SCNC: 96 MMOL/L (ref 98–107)
CO2: 28 MMOL/L (ref 20–31)
CREAT SERPL-MCNC: 0.66 MG/DL (ref 0.5–0.9)
DIFFERENTIAL TYPE: ABNORMAL
EKG ATRIAL RATE: 114 BPM
EKG ATRIAL RATE: 92 BPM
EKG P AXIS: 57 DEGREES
EKG P AXIS: 68 DEGREES
EKG P-R INTERVAL: 152 MS
EKG P-R INTERVAL: 166 MS
EKG Q-T INTERVAL: 334 MS
EKG Q-T INTERVAL: 396 MS
EKG QRS DURATION: 68 MS
EKG QRS DURATION: 70 MS
EKG QTC CALCULATION (BAZETT): 460 MS
EKG QTC CALCULATION (BAZETT): 489 MS
EKG R AXIS: 22 DEGREES
EKG R AXIS: 35 DEGREES
EKG T AXIS: 22 DEGREES
EKG T AXIS: 30 DEGREES
EKG VENTRICULAR RATE: 114 BPM
EKG VENTRICULAR RATE: 92 BPM
EOSINOPHILS RELATIVE PERCENT: 0 % (ref 1–4)
GFR AFRICAN AMERICAN: >60 ML/MIN
GFR NON-AFRICAN AMERICAN: >60 ML/MIN
GFR SERPL CREATININE-BSD FRML MDRD: ABNORMAL ML/MIN/{1.73_M2}
GFR SERPL CREATININE-BSD FRML MDRD: ABNORMAL ML/MIN/{1.73_M2}
GLUCOSE BLD-MCNC: 100 MG/DL (ref 70–99)
HCT VFR BLD CALC: 35.3 % (ref 36.3–47.1)
HEMOGLOBIN: 11.4 G/DL (ref 11.9–15.1)
IMMATURE GRANULOCYTES: 0 %
LACTIC ACID, SEPSIS WHOLE BLOOD: NORMAL MMOL/L (ref 0.5–1.9)
LACTIC ACID, SEPSIS: 1.1 MMOL/L (ref 0.5–1.9)
LV EF: 70 %
LVEF MODALITY: NORMAL
LYMPHOCYTES # BLD: 7 % (ref 24–43)
MCH RBC QN AUTO: 31.1 PG (ref 25.2–33.5)
MCHC RBC AUTO-ENTMCNC: 32.3 G/DL (ref 25.2–33.5)
MCV RBC AUTO: 96.4 FL (ref 82.6–102.9)
MONOCYTES # BLD: 7 % (ref 3–12)
NRBC AUTOMATED: 0 PER 100 WBC
PARTIAL THROMBOPLASTIN TIME: 120.7 SEC (ref 27–35)
PDW BLD-RTO: 13.9 % (ref 11.8–14.4)
PLATELET # BLD: 271 K/UL (ref 138–453)
PLATELET ESTIMATE: ABNORMAL
PMV BLD AUTO: 9.3 FL (ref 8.1–13.5)
POTASSIUM SERPL-SCNC: 3.9 MMOL/L (ref 3.7–5.3)
RBC # BLD: 3.66 M/UL (ref 3.95–5.11)
RBC # BLD: ABNORMAL 10*6/UL
SEG NEUTROPHILS: 85 % (ref 36–65)
SEGMENTED NEUTROPHILS ABSOLUTE COUNT: 12.63 K/UL (ref 1.5–8.1)
SODIUM BLD-SCNC: 136 MMOL/L (ref 135–144)
TROPONIN INTERP: ABNORMAL
TROPONIN T: ABNORMAL NG/ML
TROPONIN, HIGH SENSITIVITY: 51 NG/L (ref 0–14)
TROPONIN, HIGH SENSITIVITY: 61 NG/L (ref 0–14)
TROPONIN, HIGH SENSITIVITY: 76 NG/L (ref 0–14)
WBC # BLD: 14.9 K/UL (ref 3.5–11.3)
WBC # BLD: ABNORMAL 10*3/UL

## 2020-03-19 PROCEDURE — APPSS45 APP SPLIT SHARED TIME 31-45 MINUTES: Performed by: NURSE PRACTITIONER

## 2020-03-19 PROCEDURE — 6370000000 HC RX 637 (ALT 250 FOR IP): Performed by: NURSE PRACTITIONER

## 2020-03-19 PROCEDURE — 85730 THROMBOPLASTIN TIME PARTIAL: CPT

## 2020-03-19 PROCEDURE — 93306 TTE W/DOPPLER COMPLETE: CPT

## 2020-03-19 PROCEDURE — 6360000002 HC RX W HCPCS: Performed by: EMERGENCY MEDICINE

## 2020-03-19 PROCEDURE — 6360000002 HC RX W HCPCS: Performed by: NURSE PRACTITIONER

## 2020-03-19 PROCEDURE — 94760 N-INVAS EAR/PLS OXIMETRY 1: CPT

## 2020-03-19 PROCEDURE — 85025 COMPLETE CBC W/AUTO DIFF WBC: CPT

## 2020-03-19 PROCEDURE — 93970 EXTREMITY STUDY: CPT

## 2020-03-19 PROCEDURE — 84484 ASSAY OF TROPONIN QUANT: CPT

## 2020-03-19 PROCEDURE — 99223 1ST HOSP IP/OBS HIGH 75: CPT | Performed by: INTERNAL MEDICINE

## 2020-03-19 PROCEDURE — 80048 BASIC METABOLIC PNL TOTAL CA: CPT

## 2020-03-19 PROCEDURE — 36415 COLL VENOUS BLD VENIPUNCTURE: CPT

## 2020-03-19 PROCEDURE — 6360000002 HC RX W HCPCS: Performed by: INTERNAL MEDICINE

## 2020-03-19 PROCEDURE — 2060000000 HC ICU INTERMEDIATE R&B

## 2020-03-19 PROCEDURE — 99222 1ST HOSP IP/OBS MODERATE 55: CPT | Performed by: INTERNAL MEDICINE

## 2020-03-19 PROCEDURE — 2580000003 HC RX 258: Performed by: NURSE PRACTITIONER

## 2020-03-19 RX ORDER — ONDANSETRON 2 MG/ML
4 INJECTION INTRAMUSCULAR; INTRAVENOUS EVERY 6 HOURS PRN
Status: DISCONTINUED | OUTPATIENT
Start: 2020-03-19 | End: 2020-03-21 | Stop reason: HOSPADM

## 2020-03-19 RX ORDER — ACETAMINOPHEN 650 MG/1
650 SUPPOSITORY RECTAL EVERY 6 HOURS PRN
Status: DISCONTINUED | OUTPATIENT
Start: 2020-03-19 | End: 2020-03-21 | Stop reason: HOSPADM

## 2020-03-19 RX ORDER — VITAMIN B COMPLEX
4000 TABLET ORAL DAILY
Status: DISCONTINUED | OUTPATIENT
Start: 2020-03-19 | End: 2020-03-21 | Stop reason: HOSPADM

## 2020-03-19 RX ORDER — MAGNESIUM SULFATE 1 G/100ML
1 INJECTION INTRAVENOUS PRN
Status: DISCONTINUED | OUTPATIENT
Start: 2020-03-19 | End: 2020-03-19

## 2020-03-19 RX ORDER — ATORVASTATIN CALCIUM 10 MG/1
10 TABLET, FILM COATED ORAL DAILY
Status: DISCONTINUED | OUTPATIENT
Start: 2020-03-19 | End: 2020-03-21 | Stop reason: HOSPADM

## 2020-03-19 RX ORDER — ASPIRIN 325 MG
162.5 TABLET ORAL DAILY
Status: DISCONTINUED | OUTPATIENT
Start: 2020-03-19 | End: 2020-03-21 | Stop reason: HOSPADM

## 2020-03-19 RX ORDER — SODIUM CHLORIDE 0.9 % (FLUSH) 0.9 %
10 SYRINGE (ML) INJECTION EVERY 12 HOURS SCHEDULED
Status: DISCONTINUED | OUTPATIENT
Start: 2020-03-19 | End: 2020-03-21 | Stop reason: HOSPADM

## 2020-03-19 RX ORDER — POTASSIUM CHLORIDE 7.45 MG/ML
10 INJECTION INTRAVENOUS PRN
Status: DISCONTINUED | OUTPATIENT
Start: 2020-03-19 | End: 2020-03-19

## 2020-03-19 RX ORDER — SODIUM CHLORIDE 0.9 % (FLUSH) 0.9 %
10 SYRINGE (ML) INJECTION PRN
Status: DISCONTINUED | OUTPATIENT
Start: 2020-03-19 | End: 2020-03-21 | Stop reason: HOSPADM

## 2020-03-19 RX ORDER — CEFTRIAXONE 1 G/1
1 INJECTION, POWDER, FOR SOLUTION INTRAMUSCULAR; INTRAVENOUS EVERY 24 HOURS
Status: DISCONTINUED | OUTPATIENT
Start: 2020-03-19 | End: 2020-03-19

## 2020-03-19 RX ORDER — PROMETHAZINE HYDROCHLORIDE 25 MG/1
12.5 TABLET ORAL EVERY 6 HOURS PRN
Status: DISCONTINUED | OUTPATIENT
Start: 2020-03-19 | End: 2020-03-19

## 2020-03-19 RX ORDER — HEPARIN SODIUM 1000 [USP'U]/ML
80 INJECTION, SOLUTION INTRAVENOUS; SUBCUTANEOUS ONCE
Status: COMPLETED | OUTPATIENT
Start: 2020-03-19 | End: 2020-03-19

## 2020-03-19 RX ORDER — LEVOTHYROXINE SODIUM 0.03 MG/1
50 TABLET ORAL DAILY
Status: DISCONTINUED | OUTPATIENT
Start: 2020-03-19 | End: 2020-03-21 | Stop reason: HOSPADM

## 2020-03-19 RX ORDER — HEPARIN SODIUM 1000 [USP'U]/ML
80 INJECTION, SOLUTION INTRAVENOUS; SUBCUTANEOUS PRN
Status: DISCONTINUED | OUTPATIENT
Start: 2020-03-19 | End: 2020-03-21 | Stop reason: HOSPADM

## 2020-03-19 RX ORDER — NITROGLYCERIN 0.4 MG/1
0.4 TABLET SUBLINGUAL EVERY 5 MIN PRN
Status: DISCONTINUED | OUTPATIENT
Start: 2020-03-19 | End: 2020-03-21 | Stop reason: HOSPADM

## 2020-03-19 RX ORDER — POTASSIUM CHLORIDE 20 MEQ/1
40 TABLET, EXTENDED RELEASE ORAL PRN
Status: DISCONTINUED | OUTPATIENT
Start: 2020-03-19 | End: 2020-03-19

## 2020-03-19 RX ORDER — HEPARIN SODIUM 10000 [USP'U]/100ML
18 INJECTION, SOLUTION INTRAVENOUS CONTINUOUS
Status: DISCONTINUED | OUTPATIENT
Start: 2020-03-19 | End: 2020-03-19

## 2020-03-19 RX ORDER — HEPARIN SODIUM 1000 [USP'U]/ML
40 INJECTION, SOLUTION INTRAVENOUS; SUBCUTANEOUS PRN
Status: DISCONTINUED | OUTPATIENT
Start: 2020-03-19 | End: 2020-03-21 | Stop reason: HOSPADM

## 2020-03-19 RX ORDER — ACETAMINOPHEN 325 MG/1
650 TABLET ORAL EVERY 6 HOURS PRN
Status: DISCONTINUED | OUTPATIENT
Start: 2020-03-19 | End: 2020-03-21 | Stop reason: HOSPADM

## 2020-03-19 RX ADMIN — ENOXAPARIN SODIUM 70 MG: 80 INJECTION SUBCUTANEOUS at 13:03

## 2020-03-19 RX ADMIN — ENOXAPARIN SODIUM 70 MG: 80 INJECTION SUBCUTANEOUS at 21:07

## 2020-03-19 RX ADMIN — SODIUM CHLORIDE, PRESERVATIVE FREE 10 ML: 5 INJECTION INTRAVENOUS at 21:07

## 2020-03-19 RX ADMIN — ACETAMINOPHEN 650 MG: 325 TABLET ORAL at 09:05

## 2020-03-19 RX ADMIN — HEPARIN SODIUM AND DEXTROSE 18 UNITS/KG/HR: 10000; 5 INJECTION INTRAVENOUS at 01:01

## 2020-03-19 RX ADMIN — VITAMIN D, TAB 1000IU (100/BT) 4000 UNITS: 25 TAB at 08:35

## 2020-03-19 RX ADMIN — LEVOTHYROXINE SODIUM 50 MCG: 0.03 TABLET ORAL at 08:35

## 2020-03-19 RX ADMIN — ATORVASTATIN CALCIUM 10 MG: 10 TABLET, FILM COATED ORAL at 08:35

## 2020-03-19 RX ADMIN — CEFTRIAXONE 1 G: 1 INJECTION, POWDER, FOR SOLUTION INTRAMUSCULAR; INTRAVENOUS at 21:30

## 2020-03-19 RX ADMIN — HEPARIN SODIUM 5080 UNITS: 1000 INJECTION INTRAVENOUS; SUBCUTANEOUS at 01:00

## 2020-03-19 RX ADMIN — ASPIRIN 162.5 MG: 325 TABLET, FILM COATED ORAL at 08:35

## 2020-03-19 RX ADMIN — ACETAMINOPHEN 650 MG: 325 TABLET ORAL at 17:09

## 2020-03-19 RX ADMIN — SODIUM CHLORIDE, PRESERVATIVE FREE 10 ML: 5 INJECTION INTRAVENOUS at 13:04

## 2020-03-19 ASSESSMENT — PAIN SCALES - GENERAL
PAINLEVEL_OUTOF10: 3
PAINLEVEL_OUTOF10: 3
PAINLEVEL_OUTOF10: 0

## 2020-03-19 ASSESSMENT — PAIN DESCRIPTION - ORIENTATION: ORIENTATION: RIGHT

## 2020-03-19 ASSESSMENT — PAIN DESCRIPTION - ONSET: ONSET: ON-GOING

## 2020-03-19 ASSESSMENT — PAIN DESCRIPTION - DESCRIPTORS: DESCRIPTORS: ACHING

## 2020-03-19 ASSESSMENT — PAIN DESCRIPTION - LOCATION: LOCATION: HIP

## 2020-03-19 NOTE — PROGRESS NOTES
Subjective:      Patient ID: Yisel Mcdermott is a 80 y.o. female. Fever    This is a new problem. The current episode started today. The maximum temperature noted was 102 to 102.9 F. She has tried acetaminophen for the symptoms. Review of Systems   Constitutional: Positive for fever. Genitourinary:        Incontinence       Objective:   Physical Exam    Patient unable to leave urine specimen. Assessment:      1. Fever, unspecified fever cause    2. Tachycardia            Plan: To ER for evaluation. Discussed concerns for delaying treatment given outpatient pharmacies closed for next twelve hours.         MARIE Aguilar

## 2020-03-19 NOTE — H&P
03/19/2020    BASOPCT 0 03/19/2020    MONOSABS 1.08 03/19/2020    MONOSABS 1.0 02/02/2020    LYMPHSABS 1.00 03/19/2020    LYMPHSABS 1.2 02/02/2020    EOSABS 0.04 03/19/2020    EOSABS 0.2 02/02/2020    BASOSABS 0.04 03/19/2020    DIFFTYPE NOT REPORTED 03/19/2020     CMP:    Lab Results   Component Value Date     03/19/2020    K 3.9 03/19/2020    CL 96 03/19/2020    CO2 28 03/19/2020    BUN 18 03/19/2020    CREATININE 0.66 03/19/2020    GFRAA >60 03/19/2020    LABGLOM >60 03/19/2020    LABGLOM 79 02/02/2020    GLUCOSE 100 03/19/2020    PROT 7.5 05/10/2019    LABALBU 4.2 05/10/2019    CALCIUM 9.2 03/19/2020    BILITOT 0.3 05/10/2019    ALKPHOS 81 05/10/2019    AST 21 05/10/2019    ALT 15 05/10/2019       ASSESSMENT:      Patient Active Problem List   Diagnosis    Hyperlipidemia    Hypothyroidism    Osteoarthritis    Breast cancer ([facility] [address]    Edema extremities    Vitamin D deficiency    Use of anastrozole (Arimidex)    Encounter for monitoring aromatase inhibitor therapy    Left arm pain    Closed subcapital fracture of femur, right, initial encounter ([facility] [address] [facility]    Hypertension    History of total right hip arthroplasty    Hyponatremia    Acute pulmonary embolism without acute cor pulmonale (HCC)    Elevated troponin   Patient usim-rnehssbhcr-[id number]-available records reviewed, including, but not limited to, ER reports--labs--imaging---EKG---office records---personal notes  Attending Supervising Physician's Attestation Statement  I performed a history and physical examination on the patient and discussed the management with the nurse practitioner. I reviewed and agree with the findings and plan as documented in her note .     Electronically signed by Andrea Sainz on 3/19/20 at 12:35 PM Jaron OLIVEROS  88  WF  [RAYNE Spencer---[facility];  [facility]iology---[facility]]  FULL CODE      HEPARIN gtt--dcd  LOVENOX    Anti-infectives:  Rocephin IV     Pulmonary embolus---3.18.2020

## 2020-03-19 NOTE — ED PROVIDER NOTES
and Unspecified vitamin D deficiency. SURGICAL HISTORY      has a past surgical history that includes sergo and bso (cervix removed) (1978); Appendectomy; Colonoscopy (2007); Mastectomy, radical (Right, 2/2014); Breast reduction surgery (Left, 2/2014); Breast reconstruction (Right, 2/2014); Breast biopsy (Right, 2/2014); Cataract removal with implant (Right, 09/19/2019); eye surgery; Hysterectomy; and Total hip arthroplasty (Right, 1/23/2020). CURRENT MEDICATIONS       Current Discharge Medication List      CONTINUE these medications which have NOT CHANGED    Details   traMADol (ULTRAM) 50 MG tablet Take 50 mg by mouth every 6 hours as needed for Pain. Cholecalciferol (VITAMIN D3) 50 MCG (2000 UT) TABS Take 4,000 Units by mouth daily      AZO-CRANBERRY PO Take 1 tablet by mouth daily as needed (urinary health)       Multiple Vitamins-Minerals (PRESERVISION AREDS 2 PO) Take 1 tablet by mouth daily      triamterene-hydrochlorothiazide (MAXZIDE-25) 37.5-25 MG per tablet Take 1 tablet by mouth daily  Qty: 30 tablet, Refills: 11    Associated Diagnoses: Essential hypertension      simvastatin (ZOCOR) 20 MG tablet TAKE 0.5 TABLETS BY MOUTH EVERY EVENING  Qty: 90 tablet, Refills: 3    Associated Diagnoses: Hyperlipidemia, unspecified hyperlipidemia type      levothyroxine (SYNTHROID) 50 MCG tablet TAKE 1 TABLET BY MOUTH DAILY  Qty: 30 tablet, Refills: 11    Associated Diagnoses: Hypothyroidism, unspecified type      Lactobacillus (PROBIOTIC ACIDOPHILUS) CAPS Take by mouth 2 times daily      Handicap Placard MISC by Does not apply route . Expires in 5 years  Qty: 1 each, Refills: 0    Associated Diagnoses: Osteoarthritis, unspecified osteoarthritis type, unspecified site      CALCIUM-MAGNESIUM-VITAMIN D PO Take by mouth daily      acetaminophen (TYLENOL) 500 MG tablet Take 500 mg by mouth every 6 hours as needed for Pain.  Uses approx once a week      Multiple Vitamin (MULTI-VITAMIN DAILY PO) Take 1 tablet by mouth Pulses: Normal pulses. Heart sounds: Normal heart sounds. Pulmonary:      Effort: Pulmonary effort is normal.      Breath sounds: Normal breath sounds. No wheezing, rhonchi or rales. Abdominal:      Palpations: Abdomen is soft. Tenderness: There is no abdominal tenderness. There is no guarding or rebound. Musculoskeletal: Normal range of motion. General: No swelling or tenderness. Right lower leg: No edema. Left lower leg: No edema. Skin:     General: Skin is warm and dry. Capillary Refill: Capillary refill takes less than 2 seconds. Findings: No rash. Neurological:      General: No focal deficit present. Mental Status: She is alert and oriented to person, place, and time. Cranial Nerves: No cranial nerve deficit. Motor: No weakness. Psychiatric:         Mood and Affect: Mood normal.         Behavior: Behavior normal.       DIFFERENTIAL DIAGNOSIS / MDM / EMERGENCY DEPARTMENT COURSE:     Plan for influenza chest x-ray troponin EKG basic labs urinalysis urine culture and blood cultures given fever. Patient's fever is treated with acetaminophen. Tachycardia and temperature improved. Patient recently had surgery was found to have elevated troponin. No chest pain or shortness of breath. I feel would be prudent to get a d-dimer as the troponin could be elevated secondary to that. This was markedly elevated. Plan for noncontrasted CT of the brain given earlier headache and age and plan for anticoagulation. Plan was discussed with cardiology before d-dimer was back. It was felt at that time that the elevated troponin was secondary to infection and tachycardia. Head CT was negative. Unfortunately the CT scan of the chest did show an acute pulmonary embolism with no evidence of right heart strain. We will now do high intensity heparin instead of low intensity heparin. Patient was also started on Rocephin for antibiotic coverage.   Cardiology  [facility]   team was okay with the patient getting admitted in the community hospital setting as this was not thought to be a primary cardiac finding. Case discussed with My Collins CNP on the hospital service who was kind enough to admit the patient. I have reviewed the disposition diagnosis with the patient and or their family/guardian. I have answered their questions and givendischarge instructions. They voiced understanding of these instructions and did not have any further questions or complaints. DIAGNOSTIC RESULTS     EKG: All EKG's are interpreted by the Emergency Department Physician who either signs or Co-signs this chart inthe absence of a cardiologist.    Initial twelve-lead EKG in the emergency department shows sinus tachycardia at 114 bpm.  Normal intervals normal axis. No acute ST elevations or depressions but nonspecific ST-T wave changes. Interpretation sinus tachycardia with nonspecific ST-T wave changes. Repeat twelve-lead EKG in the emergency department shows normal sinus rhythm at 92 bpm.  Normal intervals and axis. No acute ST elevations depressions or T wave inversions interpretation is nonacute twelve-lead EKG. RADIOLOGY:   I directly visualized the following plain film images and reviewed the radiologistinterpretations of radiologic studies:  Ct Head Wo Contrast    Result Date: 3/18/2020  EXAMINATION: CT OF THE HEAD WITHOUT CONTRAST  3/18/2020 10:15 pm TECHNIQUE: CT of the head was performed without the administration of intravenous contrast. Dose modulation, iterative reconstruction, and/or weight based adjustment of the mA/kV was utilized to reduce the radiation dose to as low as reasonably achievable. COMPARISON: MRI of brain dated December 22, 2014 HISTORY: ORDERING SYSTEM PROVIDED HISTORY: Headache / need for anticoagulation TECHNOLOGIST PROVIDED HISTORY: Headache / need for anticoagulation Reason for Exam: Temporal pain. No blurred vision or dizziness. No injury. Acuity: Acute Type of Exam: Initial FINDINGS: BRAIN/VENTRICLES: There is no acute intracranial hemorrhage, mass effect or midline shift. No abnormal extra-axial fluid collection. The gray-white differentiation is maintained without evidence of an acute infarct. There is no evidence of hydrocephalus. ORBITS: The visualized portion of the orbits demonstrate no acute abnormality. SINUSES: The visualized paranasal sinuses and mastoid air cells demonstrate no acute abnormality. SOFT TISSUES/SKULL:  No acute abnormality of the visualized skull or soft tissues. No acute intracranial abnormality. Xr Chest Portable    Result Date: 3/18/2020  EXAMINATION: ONE XRAY VIEW OF THE CHEST 3/18/2020 9:54 pm COMPARISON: 01/30/2018. HISTORY: ORDERING SYSTEM PROVIDED HISTORY: fever TECHNOLOGIST PROVIDED HISTORY: fever Reason for Exam: Shakiness, fever. No chest pain. Acuity: Acute Type of Exam: Initial FINDINGS: The cardiomediastinal silhouette is unremarkable. The lungs are clear. No infiltrate, pleural fluid or focal process is identified. No acute osseous findings. No acute cardiopulmonary disease. Ct Chest Pulmonary Embolism W Contrast    Result Date: 3/18/2020  EXAMINATION: CTA OF THE CHEST 3/18/2020 11:20 pm TECHNIQUE: CTA of the chest was performed after the administration of intravenous contrast.  Multiplanar reformatted images are provided for review. MIP images are provided for review. Dose modulation, iterative reconstruction, and/or weight based adjustment of the mA/kV was utilized to reduce the radiation dose to as low as reasonably achievable. COMPARISON: None. HISTORY: ORDERING SYSTEM PROVIDED HISTORY: Chest Pain TECHNOLOGIST PROVIDED HISTORY: Chest Pain Reason for Exam: Cough and shortness of breath. No history of blood clot. Acuity: Acute Type of Exam: Initial FINDINGS: Pulmonary Arteries: The main pulmonary artery is widely patent.   The central right and left pulmonary arteries opacified normally. An acute embolus is identified at the bifurcation of the right upper lobe segmental portion of the right pulmonary artery. Left pulmonary arterial system is widely patent. Main pulmonary artery is normal in caliber. RV to LV ratio measures 1.18, with straightening of the interventricular septum, consistent with RV strain. Mediastinum: No acute abnormality seen involving the heart or pericardium. No mediastinal hemorrhage is present. No evidence of thoracic aortic aneurysm or dissection is present. Lungs/pleura: Calcified granuloma seen within the right upper lobe. No focal area of consolidation or pneumothorax is present. Dependent changes are seen within the lung bases. Upper Abdomen: Images through the upper abdomen demonstrate a 4 cm cyst within the posterior segment of the right lobe of the liver. Soft Tissues/Bones: No acute osseous abnormality is present. Right breast implant is noted. 1. Acute embolus, right upper lobe pulmonary arterial branch, without evidence of RV strain. RV to LV ratio measures 1.18. 2.  Critical results were called by Dr. Sofia Jiménez to [address] [place] [address] on 3/18/2020 at 23:57. LABS:  Results for orders placed or performed during the hospital encounter of 03/18/20   Rapid influenza A/B antigens   Result Value Ref Range    Specimen Description . NASOPHARYNGEAL SWAB     Special Requests NOT REPORTED     Direct Exam       NEGATIVE FOR INFLUENZA A AND B ANTIGEN. * A negative result does not exclude Influenza infection. Negative results should be confirmed by PCR testing.    CBC Auto Differential   Result Value Ref Range    WBC 17.6 (H) 3.5 - 11.3 k/uL    RBC 3.47 (L) 3.95 - 5.11 m/uL    Hemoglobin 10.9 (L) 11.9 - 15.1 g/dL    Hematocrit 32.7 (L) 36.3 - 47.1 %    MCV 94.2 82.6 - 102.9 fL    MCH 31.4 25.2 - 33.5 pg    MCHC 33.3 25.2 - 33.5 g/dL    RDW 13.8 11.8 - 14.4 %    Platelets 099 984 - 408 k/uL    MPV 9.0 8.1 - 13.5 fL    NRBC Automated 0.0 0.0 per 100 WBC    Differential Type NOT REPORTED     WBC Morphology NOT REPORTED     RBC Morphology NOT REPORTED     Platelet Estimate NOT REPORTED     Monocytes 6 4 - 11 %    Lymphocytes 4 (L) 16 - 46 %    Seg Neutrophils 90 (H) 43 - 77 %    Eosinophils % 0 (L) 1 - 7 %    Basophils 0 0 - 1 %    Immature Granulocytes 0 0 %    Absolute Mono # 1.06 0.1 - 1.2 k/uL    Absolute Lymph # 0.70 (L) 1.0 - 4.8 k/uL    Segs Absolute 15.84 (H) 1.8 - 7.7 k/uL    Absolute Eos # 0.00 0.0 - 0.4 k/uL    Basophils Absolute 0.00 0.0 - 0.2 k/uL    Absolute Immature Granulocyte 0.00 0.00 - 0.30 k/uL    Morphology INCREASED BANDS PRESENT     Morphology Platelet count adequate     Morphology RBC morphology normal.    Basic Metabolic Panel   Result Value Ref Range    Glucose 163 (H) 70 - 99 mg/dL    BUN 26 (H) 8 - 23 mg/dL    CREATININE 0.62 0.50 - 0.90 mg/dL    Bun/Cre Ratio 42 (H) 9 - 20    Calcium 8.8 8.6 - 10.4 mg/dL    Sodium 130 (L) 135 - 144 mmol/L    Potassium 3.8 3.7 - 5.3 mmol/L    Chloride 92 (L) 98 - 107 mmol/L    CO2 25 20 - 31 mmol/L    Anion Gap 13 9 - 17 mmol/L    GFR Non-African American >60 >60 mL/min    GFR African American >60 >60 mL/min    GFR Comment          GFR Staging NOT REPORTED    Troponin   Result Value Ref Range    Troponin, High Sensitivity 98 (HH) 0 - 14 ng/L    Troponin T NOT REPORTED <0.03 ng/mL    Troponin Interp NOT REPORTED    Urinalysis Reflex to Culture   Result Value Ref Range    Color, UA NOT REPORTED YELLOW    Turbidity UA NOT REPORTED CLEAR    Glucose, Ur NEGATIVE NEGATIVE    Bilirubin Urine NEGATIVE NEGATIVE    Ketones, Urine NEGATIVE NEGATIVE    Specific [place], UA 1.010 1.010 - 1.025    Urine Hgb 3+ (A) NEGATIVE    pH, UA 6.0 5.0 - 6.0    Protein, UA TRACE (A) NEGATIVE    Urobilinogen, Urine Normal Normal    Nitrite, Urine POSITIVE (A) NEGATIVE    Leukocyte Esterase, Urine 1+ (A) NEGATIVE    Urinalysis Comments NOT REPORTED    Lactate, Sepsis   Result Value Ref Range    Lactic Acid, Sepsis 1.0 0.5 - 1.9 mmol/L    Lactic Acid, Sepsis, Whole Blood NOT REPORTED 0.5 - 1.9 mmol/L   Lactate, Sepsis   Result Value Ref Range    Lactic Acid, Sepsis 1.1 0.5 - 1.9 mmol/L    Lactic Acid, Sepsis, Whole Blood NOT REPORTED 0.5 - 1.9 mmol/L   APTT   Result Value Ref Range    PTT 26.7 (L) 27.0 - 35.0 sec   D-Dimer   Result Value Ref Range    D-Dimer, Quant 1690 (H) <400 ng/mL   Microscopic Urinalysis   Result Value Ref Range    -          WBC, UA >50 0 - 4 /HPF    RBC, UA 10 TO 25 0 - 4 /HPF    Casts UA NOT REPORTED 0 - 2 /LPF    Crystals, UA NOT REPORTED None /HPF    Epithelial Cells UA 0 TO 4 0 - 5 /HPF    Renal Epithelial, UA NOT REPORTED 0 /HPF    Bacteria, UA 4+ (A) None    Mucus, UA NOT REPORTED None    Trichomonas, UA NOT REPORTED None    Amorphous, UA NOT REPORTED None    Other Observations UA Specimen Cultured (A) NOT REQ.     Yeast, UA NOT REPORTED None   Troponin   Result Value Ref Range    Troponin, High Sensitivity 76 (HH) 0 - 14 ng/L    Troponin T NOT REPORTED <0.03 ng/mL    Troponin Interp NOT REPORTED    EKG 12 Lead   Result Value Ref Range    Ventricular Rate 114 BPM    Atrial Rate 114 BPM    P-R Interval 152 ms    QRS Duration 68 ms    Q-T Interval 334 ms    QTc Calculation (Bazett) 460 ms    P Axis 57 degrees    R Axis 22 degrees    T Axis 22 degrees   EKG 12 Lead   Result Value Ref Range    Ventricular Rate 92 BPM    Atrial Rate 92 BPM    P-R Interval 166 ms    QRS Duration 70 ms    Q-T Interval 396 ms    QTc Calculation (Bazett) 489 ms    P Axis 68 degrees    R Axis 35 degrees    T Axis 30 degrees       EMERGENCY DEPARTMENT COURSE:   Vitals:    Vitals:    03/19/20 0028 03/19/20 0103 03/19/20 0142 03/19/20 0146   BP: 125/86 (!) 122/58  110/70   Pulse: 83 78  87   Resp: 18 18  18   Temp:    97.9 °F (36.6 °C)   TempSrc:    Tympanic   SpO2: 97% 98%  97%   Weight:   144 lb 9.6 oz (65.6 kg)    Height:   5' 3\" (1.6 m)      -------------------------  BP: 110/70, Temp: 97.9 °F (36.6 °C), Pulse: 87, Resp: 18      CONSULTS:  Cardiology, radiology, hospitalist service    PROCEDURES:  None    FINAL IMPRESSION      1. Acute pulmonary embolism without acute cor pulmonale, unspecified pulmonary embolism type (HCC)    2. Elevated troponin    3. Fever, unspecified fever cause    4. Acute cystitis without hematuria    5. Transient alteration of awareness          DISPOSITION/PLAN   DISPOSITION Admitted 03/19/2020 12:20:48 AM      PATIENT REFERRED TO:  Eliseo Goodrich MD  [facility] [address]  [place] [phone]            DISCHARGE MEDICATIONS:  Current Discharge Medication List        discussion.     Active Hospital Problems    Diagnosis Date Noted    Pulmonary embolism on [facility]) [I26.99] 03/19/2020       (Please note that portions of this note were completed with avoice recognition program.  Efforts were made to edit the dictations but occasionally words are mis-transcribed.)    John Boggs MD, [address] [facility]  Attending Emergency Medicine Physician        John Boggs MD  03/19/20 [id number]

## 2020-03-19 NOTE — PLAN OF CARE
Problem: Discharge Planning:  Goal: Patients continuum of care needs are met  Description: Patients continuum of care needs are met  Outcome: Met This Shift   Pt currently using [facility] for her home health services. Pt would like to continue with them upon discharge. [facility] called and notified of admission.

## 2020-03-19 NOTE — CONSULTS
troponin due to PE, doubt ACS  Sinus tachycardia due to PE/UTI  PE  UTI  S/P R LINDA 1/2020  HPL  Patient Active Problem List   Diagnosis    Hyperlipidemia    Hypothyroidism    Osteoarthritis    [facility] ([facility] [address] [facility]    Edema extremities    Vitamin D deficiency    Use of anastrozole (Arimidex)    Encounter for monitoring aromatase inhibitor therapy    Left arm pain    Closed subcapital fracture of femur, right, initial encounter ([facility] [address] [facility]    Hypertension    History of total right hip arthroplasty    Hyponatremia    Pulmonary embolism on left [facility])         RECOMMENDATIONS:     A/C per primary service  Echo to assess LVEF, wall motion  If ECHO is unremarkable will f/u as op    Jen Abdullahi M.D.

## 2020-03-19 NOTE — PLAN OF CARE
Problem: Falls - Risk of:  Goal: Will remain free from falls  Description: Will remain free from falls   3/19/2020 1935 by Katlyn Arriaza RN  Outcome: Ongoing  3/19/2020 1555 by Bonifacio Singleton RN  Reactivated  3/19/2020 1459 by Bonifacio Singleton RN  Outcome: Completed  Goal: Absence of physical injury  Description: Absence of physical injury   3/19/2020 1935 by Katlyn Arriaza RN  Outcome: Ongoing  3/19/2020 1555 by Bonifacio Singleton RN  Reactivated  3/19/2020 1459 by Bonifacio Singleton RN  Outcome: Completed     Problem: Airway Clearance - Ineffective:  Goal: Ability to maintain a clear airway will improve  Description: Ability to maintain a clear airway will improve   3/19/2020 1935 by Katlyn Arriaza RN  Outcome: Ongoing  3/19/2020 1601 by Bonifacio Singleton RN  Reactivated  3/19/2020 1458 by Bonifacio Singleton RN  Outcome: Completed     Problem: Urinary Elimination:  Goal: Signs and symptoms of infection will decrease  Description: Signs and symptoms of infection will decrease  Outcome: Ongoing  Goal: Ability to reestablish a normal urinary elimination pattern will improve - after catheter removal  Description: Ability to reestablish a normal urinary elimination pattern will improve  Outcome: Ongoing  Goal: Complications related to the disease process, condition or treatment will be avoided or minimized  Description: Complications related to the disease process, condition or treatment will be avoided or minimized  Outcome: Ongoing

## 2020-03-19 NOTE — PLAN OF CARE
Problem: Falls - Risk of:  Goal: Will remain free from falls  Description: Will remain free from falls  Outcome: Ongoing  Goal: Absence of physical injury  Description: Absence of physical injury  Outcome: Ongoing     Problem: Discharge Planning:  Goal: Participates in care planning  Description: Participates in care planning  Outcome: Ongoing  Goal: Discharged to appropriate level of care  Description: Discharged to appropriate level of care  Outcome: Ongoing     Problem: Airway Clearance - Ineffective:  Goal: Ability to maintain a clear airway will improve  Description: Ability to maintain a clear airway will improve  Outcome: Ongoing     Problem: Anxiety/Stress:  Goal: Level of anxiety will decrease  Description: Level of anxiety will decrease  Outcome: Ongoing     Problem: Aspiration:  Goal: Absence of aspiration  Description: Absence of aspiration  Outcome: Ongoing     Problem:  Bowel Function - Altered:  Goal: Bowel elimination is within specified parameters  Description: Bowel elimination is within specified parameters  Outcome: Ongoing     Problem: Cardiac Output - Decreased:  Goal: Hemodynamic stability will improve  Description: Hemodynamic stability will improve  Outcome: Ongoing     Problem: Fluid Volume - Imbalance:  Goal: Absence of imbalanced fluid volume signs and symptoms  Description: Absence of imbalanced fluid volume signs and symptoms  Outcome: Ongoing     Problem: Gas Exchange - Impaired:  Goal: Levels of oxygenation will improve  Description: Levels of oxygenation will improve  Outcome: Ongoing     Problem: Mental Status - Impaired:  Goal: Mental status will be restored to baseline  Description: Mental status will be restored to baseline  Outcome: Ongoing     Problem: Nutrition Deficit:  Goal: Ability to achieve adequate nutritional intake will improve  Description: Ability to achieve adequate nutritional intake will improve  Outcome: Ongoing     Problem: Pain:  Description: Pain management should include both nonpharmacologic and pharmacologic interventions.   Goal: Pain level will decrease  Description: Pain level will decrease  Outcome: Ongoing  Goal: Recognizes and communicates pain  Description: Recognizes and communicates pain  Outcome: Ongoing  Goal: Control of acute pain  Description: Control of acute pain  Outcome: Ongoing  Goal: Control of chronic pain  Description: Control of chronic pain  Outcome: Ongoing     Problem: Serum Glucose Level - Abnormal:  Goal: Ability to maintain appropriate glucose levels will improve to within specified parameters  Description: Ability to maintain appropriate glucose levels will improve to within specified parameters  Outcome: Ongoing     Problem: Skin Integrity - Impaired:  Goal: Will show no infection signs and symptoms  Description: Will show no infection signs and symptoms  Outcome: Ongoing  Goal: Absence of new skin breakdown  Description: Absence of new skin breakdown  Outcome: Ongoing     Problem: Sleep Pattern Disturbance:  Goal: Appears well-rested  Description: Appears well-rested  Outcome: Ongoing     Problem: Tissue Perfusion, Altered:  Goal: Circulatory function within specified parameters  Description: Circulatory function within specified parameters  Outcome: Ongoing     Problem: Tissue Perfusion - Cardiopulmonary, Altered:  Goal: Absence of angina  Description: Absence of angina  Outcome: Ongoing  Goal: Hemodynamic stability will improve  Description: Hemodynamic stability will improve  Outcome: Ongoing

## 2020-03-20 ENCOUNTER — APPOINTMENT (OUTPATIENT)
Dept: GENERAL RADIOLOGY | Age: 85
DRG: 205 | End: 2020-03-20
Payer: MEDICARE

## 2020-03-20 LAB
ALBUMIN SERPL-MCNC: 3.4 G/DL (ref 3.5–5.2)
ALBUMIN/GLOBULIN RATIO: 1.1 (ref 1–2.5)
ALP BLD-CCNC: 73 U/L (ref 35–104)
ALT SERPL-CCNC: 27 U/L (ref 5–33)
ANION GAP SERPL CALCULATED.3IONS-SCNC: 10 MMOL/L (ref 9–17)
AST SERPL-CCNC: 31 U/L
BILIRUB SERPL-MCNC: 0.23 MG/DL (ref 0.3–1.2)
BUN BLDV-MCNC: 19 MG/DL (ref 8–23)
BUN/CREAT BLD: 28 (ref 9–20)
CALCIUM SERPL-MCNC: 8.9 MG/DL (ref 8.6–10.4)
CHLORIDE BLD-SCNC: 97 MMOL/L (ref 98–107)
CHOLESTEROL/HDL RATIO: 1.8
CHOLESTEROL: 150 MG/DL
CO2: 30 MMOL/L (ref 20–31)
CREAT SERPL-MCNC: 0.67 MG/DL (ref 0.5–0.9)
CULTURE: ABNORMAL
EKG ATRIAL RATE: 88 BPM
EKG P AXIS: 53 DEGREES
EKG P-R INTERVAL: 158 MS
EKG Q-T INTERVAL: 390 MS
EKG QRS DURATION: 68 MS
EKG QTC CALCULATION (BAZETT): 471 MS
EKG R AXIS: 24 DEGREES
EKG T AXIS: 34 DEGREES
EKG VENTRICULAR RATE: 88 BPM
GFR AFRICAN AMERICAN: >60 ML/MIN
GFR NON-AFRICAN AMERICAN: >60 ML/MIN
GFR SERPL CREATININE-BSD FRML MDRD: ABNORMAL ML/MIN/{1.73_M2}
GFR SERPL CREATININE-BSD FRML MDRD: ABNORMAL ML/MIN/{1.73_M2}
GLUCOSE BLD-MCNC: 131 MG/DL (ref 70–99)
HCT VFR BLD CALC: 32.6 % (ref 36.3–47.1)
HDLC SERPL-MCNC: 84 MG/DL
HEMOGLOBIN: 10.6 G/DL (ref 11.9–15.1)
LDL CHOLESTEROL: 53 MG/DL (ref 0–130)
Lab: ABNORMAL
MAGNESIUM: 2.2 MG/DL (ref 1.6–2.6)
MCH RBC QN AUTO: 31.2 PG (ref 25.2–33.5)
MCHC RBC AUTO-ENTMCNC: 32.5 G/DL (ref 25.2–33.5)
MCV RBC AUTO: 95.9 FL (ref 82.6–102.9)
NRBC AUTOMATED: 0 PER 100 WBC
PDW BLD-RTO: 13.9 % (ref 11.8–14.4)
PLATELET # BLD: 252 K/UL (ref 138–453)
PMV BLD AUTO: 9.5 FL (ref 8.1–13.5)
POTASSIUM SERPL-SCNC: 3.8 MMOL/L (ref 3.7–5.3)
RBC # BLD: 3.4 M/UL (ref 3.95–5.11)
SODIUM BLD-SCNC: 137 MMOL/L (ref 135–144)
SPECIMEN DESCRIPTION: ABNORMAL
TOTAL PROTEIN: 6.6 G/DL (ref 6.4–8.3)
TRIGL SERPL-MCNC: 65 MG/DL
VLDLC SERPL CALC-MCNC: NORMAL MG/DL (ref 1–30)
WBC # BLD: 9.7 K/UL (ref 3.5–11.3)

## 2020-03-20 PROCEDURE — 94761 N-INVAS EAR/PLS OXIMETRY MLT: CPT

## 2020-03-20 PROCEDURE — 6360000002 HC RX W HCPCS: Performed by: INTERNAL MEDICINE

## 2020-03-20 PROCEDURE — 2580000003 HC RX 258: Performed by: NURSE PRACTITIONER

## 2020-03-20 PROCEDURE — 80061 LIPID PANEL: CPT

## 2020-03-20 PROCEDURE — 6370000000 HC RX 637 (ALT 250 FOR IP): Performed by: NURSE PRACTITIONER

## 2020-03-20 PROCEDURE — 80053 COMPREHEN METABOLIC PANEL: CPT

## 2020-03-20 PROCEDURE — 36415 COLL VENOUS BLD VENIPUNCTURE: CPT

## 2020-03-20 PROCEDURE — 71046 X-RAY EXAM CHEST 2 VIEWS: CPT

## 2020-03-20 PROCEDURE — 94760 N-INVAS EAR/PLS OXIMETRY 1: CPT

## 2020-03-20 PROCEDURE — 6360000002 HC RX W HCPCS: Performed by: NURSE PRACTITIONER

## 2020-03-20 PROCEDURE — 2060000000 HC ICU INTERMEDIATE R&B

## 2020-03-20 PROCEDURE — 85027 COMPLETE CBC AUTOMATED: CPT

## 2020-03-20 PROCEDURE — 99232 SBSQ HOSP IP/OBS MODERATE 35: CPT | Performed by: INTERNAL MEDICINE

## 2020-03-20 PROCEDURE — 83735 ASSAY OF MAGNESIUM: CPT

## 2020-03-20 PROCEDURE — 93005 ELECTROCARDIOGRAM TRACING: CPT | Performed by: NURSE PRACTITIONER

## 2020-03-20 RX ADMIN — SODIUM CHLORIDE, PRESERVATIVE FREE 10 ML: 5 INJECTION INTRAVENOUS at 08:27

## 2020-03-20 RX ADMIN — ENOXAPARIN SODIUM 70 MG: 80 INJECTION SUBCUTANEOUS at 20:38

## 2020-03-20 RX ADMIN — CEFTRIAXONE 1 G: 1 INJECTION, POWDER, FOR SOLUTION INTRAMUSCULAR; INTRAVENOUS at 21:30

## 2020-03-20 RX ADMIN — SODIUM CHLORIDE, PRESERVATIVE FREE 10 ML: 5 INJECTION INTRAVENOUS at 21:30

## 2020-03-20 RX ADMIN — VITAMIN D, TAB 1000IU (100/BT) 4000 UNITS: 25 TAB at 08:25

## 2020-03-20 RX ADMIN — ACETAMINOPHEN 650 MG: 325 TABLET ORAL at 20:38

## 2020-03-20 RX ADMIN — ATORVASTATIN CALCIUM 10 MG: 10 TABLET, FILM COATED ORAL at 08:26

## 2020-03-20 RX ADMIN — ACETAMINOPHEN 650 MG: 325 TABLET ORAL at 08:35

## 2020-03-20 RX ADMIN — ACETAMINOPHEN 650 MG: 325 TABLET ORAL at 00:04

## 2020-03-20 RX ADMIN — ENOXAPARIN SODIUM 70 MG: 80 INJECTION SUBCUTANEOUS at 08:26

## 2020-03-20 RX ADMIN — ASPIRIN 162.5 MG: 325 TABLET, FILM COATED ORAL at 08:25

## 2020-03-20 RX ADMIN — LEVOTHYROXINE SODIUM 50 MCG: 0.03 TABLET ORAL at 05:36

## 2020-03-20 ASSESSMENT — PAIN SCALES - GENERAL
PAINLEVEL_OUTOF10: 0
PAINLEVEL_OUTOF10: 2
PAINLEVEL_OUTOF10: 0
PAINLEVEL_OUTOF10: 0
PAINLEVEL_OUTOF10: 3
PAINLEVEL_OUTOF10: 3
PAINLEVEL_OUTOF10: 0
PAINLEVEL_OUTOF10: 0

## 2020-03-20 ASSESSMENT — PAIN DESCRIPTION - PAIN TYPE
TYPE: CHRONIC PAIN
TYPE: ACUTE PAIN;CHRONIC PAIN

## 2020-03-20 ASSESSMENT — PAIN DESCRIPTION - ORIENTATION
ORIENTATION: RIGHT
ORIENTATION: RIGHT

## 2020-03-20 ASSESSMENT — PAIN DESCRIPTION - FREQUENCY: FREQUENCY: INTERMITTENT

## 2020-03-20 ASSESSMENT — PAIN DESCRIPTION - ONSET: ONSET: ON-GOING

## 2020-03-20 ASSESSMENT — PAIN DESCRIPTION - LOCATION
LOCATION: HIP
LOCATION: BACK;HIP

## 2020-03-20 ASSESSMENT — PAIN DESCRIPTION - PROGRESSION: CLINICAL_PROGRESSION: GRADUALLY WORSENING

## 2020-03-20 ASSESSMENT — PAIN DESCRIPTION - DESCRIPTORS
DESCRIPTORS: ACHING
DESCRIPTORS: ACHING

## 2020-03-20 NOTE — PROGRESS NOTES
Hospitalist Progress Note    Patient:  April Galvan     YOB: 1931    MRN: [id number]   Admit date: 3/18/2020     Acct: [de-identified]     PCP: Marbella Araiza MD    CC--Interval History:    RUL pulmonary embolus--likely due to and associated with right hip fracture and LINDA--on Lovenox--anticipate converting to Eliquis at discharge----RA O2sat = 95%    E coli UTI---POA-fever--on Rocephin IV---BLOOD CULTURE--[+] 1/2----sensitivity pending----2D ECHO----3.19.2020--see below---not suspicious for endocarditis    Transient alteration of awareness PTA---resolved----no recurrence    Hyponatremia---resolved---sodium = 137    See note below     All other ROS negative except noted in HPI    Diet:  DIET CARDIAC; No Caffeine    Medications:  Scheduled Meds:   aspirin  162.5 mg Oral Daily    Vitamin D  4,000 Units Oral Daily    levothyroxine  50 mcg Oral Daily    atorvastatin  10 mg Oral Daily    sodium chloride flush  10 mL Intravenous 2 times per day    cefTRIAXone (ROCEPHIN) IV  1 g Intravenous Q24H    enoxaparin  1 mg/kg Subcutaneous BID     Continuous Infusions:  PRN Meds:heparin (porcine), heparin (porcine), sodium chloride flush, acetaminophen **OR** acetaminophen, magnesium hydroxide, [DISCONTINUED] promethazine **OR** ondansetron, nitroGLYCERIN    Objective:  Labs:  CBC with Differential:    Lab Results   Component Value Date    WBC 9.7 03/20/2020    RBC 3.40 03/20/2020    HGB 10.6 03/20/2020    HCT 32.6 03/20/2020     03/20/2020    MCV 95.9 03/20/2020    MCH 31.2 03/20/2020    MCHC 32.5 03/20/2020    RDW 13.9 03/20/2020    NRBC 0 02/02/2020    SEGSPCT 78.3 02/02/2020    LYMPHOPCT 7 03/19/2020    MONOPCT 7 03/19/2020    BASOPCT 0 03/19/2020    MONOSABS 1.08 03/19/2020    MONOSABS 1.0 02/02/2020    LYMPHSABS 1.00 03/19/2020    LYMPHSABS 1.2 02/02/2020    EOSABS 0.04 03/19/2020    EOSABS 0.2 02/02/2020    BASOSABS 0.04 03/19/2020    DIFFTYPE NOT REPORTED 03/19/2020     BMP:    Lab Results Component Value Date     03/20/2020    K 3.8 03/20/2020    CL 97 03/20/2020    CO2 30 03/20/2020    BUN 19 03/20/2020    LABALBU 3.4 03/20/2020    CREATININE 0.67 03/20/2020    CALCIUM 8.9 03/20/2020    GFRAA >60 03/20/2020    LABGLOM >60 03/20/2020    LABGLOM 79 02/02/2020    GLUCOSE 131 03/20/2020           Physical Exam:  Vitals: BP (!) 141/64   Pulse 90   Temp 98.6 °F (37 °C) (Oral)   Resp 18   Ht 5' 3\" (1.6 m)   Wt 144 lb (65.3 kg)   SpO2 95%   BMI 25.51 kg/m²   24 hour intake/output:    Intake/Output Summary (Last 24 hours) at 3/20/2020 0913  Last data filed at 3/19/2020 2222  Gross per 24 hour   Intake 305 ml   Output --   Net 305 ml     Last 3 weights: Wt Readings from Last 3 Encounters:   03/20/20 144 lb (65.3 kg)   02/14/20 142 lb (64.4 kg)   02/03/20 137 lb 6.4 oz (62.3 kg)     HEENT: Normocephalic and Atraumatic  Neck: Supple, No Masses, Tenderness, Nodularity and No Lymphadenopathy  Chest/Lungs: Clear to Auscultation without Rales, Rhonchi, or Wheezes  Cardiac: Regular Rate and Rhythm  GI/Abdomen: Bowel Sounds Present and Soft, Non-tender, without Guarding or Rebound Tenderness  : Not examined  EXT/Skin: No Edema, No Cyanosis and No Clubbing  Neuro: Alert and Oriented and No Localizing Signs/Symptoms      Assessment:    Principal Problem:    Acute pulmonary embolism without acute cor pulmonale (HCC)  Active Problems:    Elevated troponin    Acute cystitis with hematuria  Resolved Problems:    * No resolved hospital problems.  *    [facility],  [address] [facility]  [RAYNE Spencer---[facility];  [facility]]  FULL CODE      HEPARIN gtt--dcd  LOVENOX    Anti-infectives:  Rocephin IV     Pulmonary embolus--RUL---3.18.2020--likely related to associated with left hip fracture--LINDA            CXR----3.20.2020---NACs            EKG---3.20.2020---NSR--88---NACs            DUS---BLE----3.19.2020--no BLE DVT            2D ECHO----3.19.2020--hyperdynamic LVSF---NRVSF--- ENDY but opens well--mild AI--mild-to-moderate TR----                            RVSP ~ 32 mm Hg---IVC normal diameter--normal inspiratory                            collapse---Grade 1 mild DD----LVEF ~ 70%            CTA chest---pulmonary-----3.18.2020---acute PE---RUL arterial branch                        IVS straightening RV strain            CXR----3.18.2020---[-]            EKG----3.18.2020---NSR--92---possible LAE            Elevated troponin---3.18.2020--due to PE---non-cardiac  E coli UTI----POA----3.18.2020---fever             BLOOD CULTURE---[+] x 1/2--E coli--sensitivity pending  Transient alteration of awareness--3.18.2020----resolved             CT head----3.18.2020---no MBS--[-]  Hyponatremia---resolved   CKD--2    POD _____  right TKA---1.23.2020  Closed subcapital fracture right femur--1.22.2020    Hypertension  Hyperlipidemia  Hypothyroidism  Breast carcinoma---right--2014  PMH:  BLE edema, hyponatremia, left arm pain, OA, Vitamin D deficiency  PSH:    see above,  right cataract--IOL--2019, right radical mastectomy--2014,               left breast reduction--2014, right breast reconstruction--2014, right                breast biopsy--2014, colonoscopy---2007, [facility]---1978--cervix unknown,               appendectomy    Allergies:  NKDA          Plan:  1. PE---anticoagulation--convert to PO at DC  2. Blood culture---[+] E coli UTI---cont'd Rocephin IV pending sensitivity  3. Have discussed medical issues with patient's daughter, Rosa Watson, at patient's request  4.    See orders     Electronically signed by Zaida Amaya on 3/20/2020 at 9:13 AM    Hospitalist

## 2020-03-21 VITALS
OXYGEN SATURATION: 95 % | SYSTOLIC BLOOD PRESSURE: 136 MMHG | RESPIRATION RATE: 16 BRPM | HEIGHT: 63 IN | HEART RATE: 98 BPM | DIASTOLIC BLOOD PRESSURE: 70 MMHG | TEMPERATURE: 97.7 F | WEIGHT: 145.2 LBS | BODY MASS INDEX: 25.73 KG/M2

## 2020-03-21 LAB
ABSOLUTE EOS #: 0.18 K/UL (ref 0–0.44)
ABSOLUTE IMMATURE GRANULOCYTE: 0.04 K/UL (ref 0–0.3)
ABSOLUTE LYMPH #: 1.06 K/UL (ref 1.1–3.7)
ABSOLUTE MONO #: 1.07 K/UL (ref 0.1–1.2)
ANION GAP SERPL CALCULATED.3IONS-SCNC: 12 MMOL/L (ref 9–17)
BASOPHILS # BLD: 0 % (ref 0–2)
BASOPHILS ABSOLUTE: 0.03 K/UL (ref 0–0.2)
BUN BLDV-MCNC: 17 MG/DL (ref 8–23)
BUN/CREAT BLD: 26 (ref 9–20)
CALCIUM SERPL-MCNC: 8.9 MG/DL (ref 8.6–10.4)
CHLORIDE BLD-SCNC: 98 MMOL/L (ref 98–107)
CO2: 27 MMOL/L (ref 20–31)
CREAT SERPL-MCNC: 0.65 MG/DL (ref 0.5–0.9)
CULTURE: ABNORMAL
DIFFERENTIAL TYPE: ABNORMAL
EOSINOPHILS RELATIVE PERCENT: 3 % (ref 1–4)
GFR AFRICAN AMERICAN: >60 ML/MIN
GFR NON-AFRICAN AMERICAN: >60 ML/MIN
GFR SERPL CREATININE-BSD FRML MDRD: ABNORMAL ML/MIN/{1.73_M2}
GFR SERPL CREATININE-BSD FRML MDRD: ABNORMAL ML/MIN/{1.73_M2}
GLUCOSE BLD-MCNC: 121 MG/DL (ref 65–105)
GLUCOSE BLD-MCNC: 92 MG/DL (ref 70–99)
HCT VFR BLD CALC: 34.6 % (ref 36.3–47.1)
HEMOGLOBIN: 11.1 G/DL (ref 11.9–15.1)
IMMATURE GRANULOCYTES: 1 %
LYMPHOCYTES # BLD: 15 % (ref 24–43)
Lab: ABNORMAL
MCH RBC QN AUTO: 30.7 PG (ref 25.2–33.5)
MCHC RBC AUTO-ENTMCNC: 32.1 G/DL (ref 25.2–33.5)
MCV RBC AUTO: 95.6 FL (ref 82.6–102.9)
MONOCYTES # BLD: 15 % (ref 3–12)
NRBC AUTOMATED: 0 PER 100 WBC
PDW BLD-RTO: 13.9 % (ref 11.8–14.4)
PLATELET # BLD: 270 K/UL (ref 138–453)
PLATELET ESTIMATE: ABNORMAL
PMV BLD AUTO: 9.5 FL (ref 8.1–13.5)
POTASSIUM SERPL-SCNC: 3.8 MMOL/L (ref 3.7–5.3)
RBC # BLD: 3.62 M/UL (ref 3.95–5.11)
RBC # BLD: ABNORMAL 10*6/UL
SEG NEUTROPHILS: 66 % (ref 36–65)
SEGMENTED NEUTROPHILS ABSOLUTE COUNT: 4.66 K/UL (ref 1.5–8.1)
SODIUM BLD-SCNC: 137 MMOL/L (ref 135–144)
SPECIMEN DESCRIPTION: ABNORMAL
WBC # BLD: 7 K/UL (ref 3.5–11.3)
WBC # BLD: ABNORMAL 10*3/UL

## 2020-03-21 PROCEDURE — 99238 HOSP IP/OBS DSCHRG MGMT 30/<: CPT | Performed by: INTERNAL MEDICINE

## 2020-03-21 PROCEDURE — 2580000003 HC RX 258: Performed by: NURSE PRACTITIONER

## 2020-03-21 PROCEDURE — 80048 BASIC METABOLIC PNL TOTAL CA: CPT

## 2020-03-21 PROCEDURE — 36415 COLL VENOUS BLD VENIPUNCTURE: CPT

## 2020-03-21 PROCEDURE — 85025 COMPLETE CBC W/AUTO DIFF WBC: CPT

## 2020-03-21 PROCEDURE — 94760 N-INVAS EAR/PLS OXIMETRY 1: CPT

## 2020-03-21 PROCEDURE — 6370000000 HC RX 637 (ALT 250 FOR IP): Performed by: NURSE PRACTITIONER

## 2020-03-21 PROCEDURE — 6360000002 HC RX W HCPCS: Performed by: INTERNAL MEDICINE

## 2020-03-21 PROCEDURE — 82947 ASSAY GLUCOSE BLOOD QUANT: CPT

## 2020-03-21 RX ORDER — CIPROFLOXACIN 500 MG/1
500 TABLET, FILM COATED ORAL 2 TIMES DAILY
Qty: 14 TABLET | Refills: 0 | Status: SHIPPED | OUTPATIENT
Start: 2020-03-21 | End: 2020-03-28

## 2020-03-21 RX ADMIN — LEVOTHYROXINE SODIUM 50 MCG: 0.03 TABLET ORAL at 05:05

## 2020-03-21 RX ADMIN — ENOXAPARIN SODIUM 70 MG: 80 INJECTION SUBCUTANEOUS at 08:06

## 2020-03-21 RX ADMIN — ATORVASTATIN CALCIUM 10 MG: 10 TABLET, FILM COATED ORAL at 08:06

## 2020-03-21 RX ADMIN — VITAMIN D, TAB 1000IU (100/BT) 4000 UNITS: 25 TAB at 08:05

## 2020-03-21 RX ADMIN — SODIUM CHLORIDE, PRESERVATIVE FREE 10 ML: 5 INJECTION INTRAVENOUS at 08:06

## 2020-03-21 RX ADMIN — ASPIRIN 162.5 MG: 325 TABLET, FILM COATED ORAL at 08:06

## 2020-03-21 ASSESSMENT — PAIN SCALES - GENERAL
PAINLEVEL_OUTOF10: 0
PAINLEVEL_OUTOF10: 0

## 2020-03-21 ASSESSMENT — PAIN SCALES - WONG BAKER
WONGBAKER_NUMERICALRESPONSE: 0
WONGBAKER_NUMERICALRESPONSE: 0

## 2020-03-21 NOTE — PLAN OF CARE
Problem: Falls - Risk of:  Goal: Will remain free from falls  Description: Will remain free from falls   3/21/2020 0919 by Traci Jain RN  Outcome: Ongoing  3/20/2020 2058 by Macy Hugo RN  Outcome: Ongoing  Goal: Absence of physical injury  Description: Absence of physical injury   3/21/2020 0919 by Traci Jain RN  Outcome: Ongoing  3/20/2020 2058 by Macy Hugo RN  Outcome: Ongoing     Problem: Airway Clearance - Ineffective:  Goal: Ability to maintain a clear airway will improve  Description: Ability to maintain a clear airway will improve   3/21/2020 0919 by Traic Jain RN  Outcome: Ongoing  3/20/2020 2058 by Macy Hugo RN  Outcome: Ongoing     Problem: Urinary Elimination:  Goal: Signs and symptoms of infection will decrease  Description: Signs and symptoms of infection will decrease  3/21/2020 0919 by Traci Jain RN  Outcome: Ongoing  3/20/2020 2058 by Macy Hugo RN  Outcome: Ongoing  Goal: Ability to reestablish a normal urinary elimination pattern will improve - after catheter removal  Description: Ability to reestablish a normal urinary elimination pattern will improve  3/21/2020 0919 by Traci Jain RN  Outcome: Ongoing  3/20/2020 2058 by Macy Hugo RN  Outcome: Ongoing  Goal: Complications related to the disease process, condition or treatment will be avoided or minimized  Description: Complications related to the disease process, condition or treatment will be avoided or minimized  3/21/2020 0919 by Traci Jain RN  Outcome: Ongoing  3/20/2020 2058 by Macy Hugo RN  Outcome: Ongoing

## 2020-03-21 NOTE — PLAN OF CARE
Problem: Falls - Risk of:  Goal: Will remain free from falls  Description: Will remain free from falls   Outcome: Ongoing  Goal: Absence of physical injury  Description: Absence of physical injury   Outcome: Ongoing     Problem: Airway Clearance - Ineffective:  Goal: Ability to maintain a clear airway will improve  Description: Ability to maintain a clear airway will improve   Outcome: Ongoing     Problem: Urinary Elimination:  Goal: Signs and symptoms of infection will decrease  Description: Signs and symptoms of infection will decrease  Outcome: Ongoing  Goal: Ability to reestablish a normal urinary elimination pattern will improve - after catheter removal  Description: Ability to reestablish a normal urinary elimination pattern will improve  Outcome: Ongoing  Goal: Complications related to the disease process, condition or treatment will be avoided or minimized  Description: Complications related to the disease process, condition or treatment will be avoided or minimized  Outcome: Ongoing

## 2020-03-22 NOTE — DISCHARGE SUMMARY
mg  dosage of the Eliquis. The patient at the time of my assuming the  service on Saturday morning was sitting up, cheerful, and anxious to get  out of the hospital.  She was not short of breath. She was not having  any chest pain. She was ambulating in the halls without difficulty. We  talked about follow up as an outpatient. We talked about the treatment  with Cipro given the UTI and with the positive blood culture, and we  also discussed treatment with the patient's _____aspirin that will be  stopped and with the Eliquis. The patient will follow up with her  primary doctor, Dr. Yuliana Spear, in the outpatient setting. She will let us  know if she is having any problems prior to followup.         Kim Habermann    D: 03/21/2020 13:31:36       T: 03/21/2020 14:14:17     [email or web address]  Job#: [id number]     Doc#: [id number]    CC:

## 2020-03-23 ENCOUNTER — CARE COORDINATION (OUTPATIENT)
Dept: CASE MANAGEMENT | Age: 85
End: 2020-03-23

## 2020-03-23 NOTE — CARE COORDINATION
contact with pets & animals: You should restrict contact with pets and other animals while you are sick with COVID-19, just like you would around other people. Although there have not been reports of pets or other animals becoming sick with COVID-19, it is still recommended that people sick with COVID-19 limit contact with animals until more information is known about the virus. ; When possible, have another member of your household care for your animals while you are sick. If you are sick with COVID-19, avoid contact with your pet, including petting, snuggling, being kissed or licked, and sharing food. If you must care for your pet or be around animals while you are sick, wash your hands before and after you interact with pets and wear a facemask. See COVID-19 and Animals for more information. Other considerations   The ill person should eat/be fed in their room if possible. Non-disposable  items used should be handled with gloves and washed with hot water or in a . Clean hands after handling used  items.  If possible, dedicate a lined trash can for the ill person. Use gloves when removing garbage bags, handling, and disposing of trash. Wash hands after handling or disposing of trash.  Consider consulting with your local health department about trash disposal guidance if available. Information for Household Members and Caregivers of Someone who is Sick   Call ahead before visiting your doctor   Call ahead: If you have a medical appointment, call the healthcare provider and tell them that you have or may have COVID-19. This will help the healthcare provider's office take steps to keep other people from getting infected or exposed. Wear a facemask if you are sick   ; If you are sick: You should wear a facemask when you are around other people (e.g., sharing a room or vehicle) or pets and before you enter a healthcare provider's office.    ; If you are caring for others: If the person who is sick is not able to wear a facemask (for example, because it causes trouble breathing), then people who live with the person who is sick should not stay in the same room with them, or they should wear a facemask if they enter a room with the person who is sick. Cover your coughs and sneezes   ; Cover: Cover your mouth and nose with a tissue when you cough or sneeze.   ; Dispose: Throw used tissues in a lined trash can.   ; Wash hands: Immediately wash your hands with soap and water for at least 20 seconds or, if soap and water are not available, clean your hands with an alcohol-based hand  that contains at least 60% alcohol. Clean your hands often   ; Wash hands: Wash your hands often with soap and water for at least 20 seconds, especially after blowing your nose, coughing, or sneezing; going to the bathroom; and before eating or preparing food.   ; Hand : If soap and water are not readily available, use an alcohol-based hand  with at least 60% alcohol, covering all surfaces of your hands and rubbing them together until they feel dry.   ; Soap and water: Soap and water are the best option if hands are visibly dirty.   ; Avoid touching: Avoid touching your eyes, nose, and mouth with unwashed hands. Handwashing Tips   ; Wet your hands with clean, running water (warm or cold), turn off the tap, and apply soap.  ; Lather your hands by rubbing them together with the soap. Lather the backs of your hands, between your fingers, and under your nails. ; Scrub your hands for at least 20 seconds. Need a timer? Hum the [place] from beginning to end twice.  ; Rinse your hands well under clean, running water.  ; Dry your hands using a clean towel or air dry them. Avoid sharing personal household items   ; Do not share:  You should not share dishes, drinking glasses, cups, eating utensils, towels, or bedding with other people or pets in your home.   ; [facility] appointment with PCP-pt dtr going to check if follow up needed with COVID concerns  Scheduled appointment with Specialist-pt dtr going to check if follow up needed with COVID concerns  Obtained and reviewed discharge summary and/or continuity of care documents  Assessment and support for treatment adherence and medication management-confirms meds picked up from [address] [place] [address] Transitions 24 Hour Call    Do you have any ongoing symptoms?:  No  Do you have a copy of your discharge instructions?:  Yes  Do you have all of your prescriptions and are they filled?:  Yes  Have you been contacted by a [address]?:  No  Have you scheduled your follow up appointment?:  No  Were you discharged with any Home Care or Post Acute Services:  Yes  Post Acute Services:  Home Health (Comment: [facility])  Do you feel like you have everything you need to keep you well at home?:  Yes  Care Transitions Interventions         Follow Up  Future Appointments   Date Time Provider Jonnathan Moreira   4/23/2020  1:15 PM Docia Cooks, MD Oncology [facility] - [facility]   6/18/2020  1:40 PM Ronnie Pierson MD [facility]       Neto Cotto, RN

## 2020-03-25 LAB
CULTURE: NORMAL
Lab: NORMAL
SPECIMEN DESCRIPTION: NORMAL

## 2020-03-26 ENCOUNTER — TELEPHONE (OUTPATIENT)
Dept: INTERNAL MEDICINE | Age: 85
End: 2020-03-26

## 2020-03-26 NOTE — TELEPHONE ENCOUNTER
FYI: Daughter called and stated that patient was in hospital recently. Does not want to bring her in for follow up due to Covid-19. Home Health is in home. VS reported 110/60, 98.5, 98% RA, 80. Clear lungs. Patient is doing very well. Patient also wants to know if she can have regular coffee, was told in hospital she couldn't have any while inpatient.  Let her know [phone]

## 2020-03-27 ENCOUNTER — TELEPHONE (OUTPATIENT)
Dept: INTERNAL MEDICINE | Age: 85
End: 2020-03-27
Payer: MEDICARE

## 2020-03-27 PROCEDURE — G0180 MD CERTIFICATION HHA PATIENT: HCPCS | Performed by: INTERNAL MEDICINE

## 2020-03-30 ENCOUNTER — CARE COORDINATION (OUTPATIENT)
Dept: CASE MANAGEMENT | Age: 85
End: 2020-03-30

## 2020-03-30 NOTE — CARE COORDINATION
[facility] Transitions Follow Up Call    3/30/2020    Patient: April Galvan  Patient : 1931   MRN: [id number]  Reason for Admission: PE   Discharge Date: 3/21/20 RARS: Readmission Risk Score: 21         Spoke with: Wiliam Shore     Call to pt who states she is doing [place] lot better\"  Denies CP, SOB, fever, chills  States understanding about staying home and prevention measures with regards to COVID-19   Denies needs  Encouraged to call writer/ CTC or providers if questions or concerns- v/u     Care Transitions Subsequent and Final Call    Subsequent and Final Calls  Do you have any ongoing symptoms?:  No  Have your medications changed?:  No  Do you have any questions related to your medications?:  No  Do you currently have any active services?:  Yes  Are you currently active with any services?:  Home Health  Do you have any needs or concerns that I can assist you with?:  No  Identified Barriers:  Lack of Education  Care Transitions Interventions  Other Interventions:             Follow Up  Future Appointments   Date Time Provider Department Center   2020  1:15 PM Cheral Frankel, MD Oncology [facility] - [facility]   2020  1:40 PM [facility]       Jonas Mills RN

## 2020-04-08 ENCOUNTER — CARE COORDINATION (OUTPATIENT)
Dept: CASE MANAGEMENT | Age: 85
End: 2020-04-08

## 2020-04-08 NOTE — CARE COORDINATION
instructions. Labels contain instructions for safe and effective use of the cleaning product including precautions you should take when applying the product, such as wearing gloves and making sure you have good ventilation during use of the product. Monitor your symptoms   Seek medical attention: Seek prompt medical attention if your illness is worsening     (e.g., difficulty breathing).   ; Call your doctor: Before seeking care, call your healthcare provider and tell them that you have, or are being evaluated for, COVID-19.   ; Wear a facemask when sick: Put on a facemask before you enter the facility. These steps will help the healthcare provider's office to keep other people in the office or waiting room from getting infected or exposed. ; Alert health department: Ask your healthcare provider to call the local or state health department. Persons who are placed under active monitoring or facilitated self-monitoring should follow instructions provided by their local health department or occupational health professionals, as appropriate.  ; Call 911 if you have a medical emergency: If you have a medical emergency and need to call 911, notify the dispatch personnel that you have, or are being evaluated for COVID-19. If possible, put on a facemask before emergency medical services arrive. Care Transitions Subsequent and Final Call    Subsequent and Final Calls  Do you have any ongoing symptoms?:  No  Have your medications changed?:  No  Do you have any questions related to your medications?:  No  Do you currently have any active services?:  No  Are you currently active with any services?:  Home Health  Do you have any needs or concerns that I can assist you with?:  No  Identified Barriers:  Lack of Education  Care Transitions Interventions  Other Interventions:             Follow Up  Future Appointments   Date Time Provider Jonnathan Moreira   4/23/2020  1:15 PM Jules Ledesma MD Oncology [facility] Conor Curiel 6/18/2020  1:40 PM [facility]       Mona Aviles RN

## 2020-04-20 ENCOUNTER — CARE COORDINATION (OUTPATIENT)
Dept: CASE MANAGEMENT | Age: 85
End: 2020-04-20

## 2020-04-20 NOTE — CARE COORDINATION
[facility] Transitions Follow Up Call- final call     2020    Patient: Stefan Russian  Patient : 1931   MRN: [id number]  Reason for Admission: PE   Discharge Date: 3/21/20 RARS: Readmission Risk Score: 21         Attempted to reach patient for final transitional call. Message- \"you have reached a number that has been disconnected or is no longer in service\".    Episode closed       Follow Up  Future Appointments   Date Time Provider Jonnathan Moreira   2020  1:15 PM Jennifer Dudley MD Oncology [facility] - [facility]   2020  1:40 PM [facility]       Verito Ahumada RN

## 2020-04-23 ENCOUNTER — OFFICE VISIT (OUTPATIENT)
Dept: ONCOLOGY | Age: 85
End: 2020-04-23

## 2020-04-23 ENCOUNTER — VIRTUAL VISIT (OUTPATIENT)
Dept: ONCOLOGY | Age: 85
End: 2020-04-23
Payer: MEDICARE

## 2020-04-23 PROCEDURE — 99213 OFFICE O/P EST LOW 20 MIN: CPT | Performed by: INTERNAL MEDICINE

## 2020-04-23 PROCEDURE — 99999 PR OFFICE/OUTPT VISIT,PROCEDURE ONLY: CPT | Performed by: INTERNAL MEDICINE

## 2020-04-23 NOTE — PROGRESS NOTES
[facility] PROFESSIONAL SERVICES  [facility]  [address] [facility] [address]  [address] [address]  Dept: [phone]  Dept Fax: [phone]  Loc: [phone]    Subjective:      Chief Complaint: Eric Sanchez is a 80 y.o. female. In December, 2013, the patient noticed an abnormality in her right breast. She sought medical attention and had a mammogram and ultrasound completed in [facility]. The mammogram was noted to have dense breast tissue and the ultrasound found a mass in the right breast measuring 3.1 cm in greatest diameter. The patent elected to see Dr. Genoveva Wolfe in [facility] and had a core biopsy of the mass performed. 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 The patient underwent a right mastectomy and sentinel lymph node biopsy on 02/05/2014. 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 The sentinel lymph nodes analysis was negative for malignancy. Review of Systems   Constitutional: Fatigue. HENT: Negative. Eyes: Negative. Respiratory: Negative. Cardiovascular: Negative. Gastrointestinal: Negative. Genitourinary: Negative. Musculoskeletal: Skeletal pain. Skin: Negative. Neurological: General weakness. Hematological: Negative. Psychiatric/Behavioral: Negative. Assessment:   1. Invasive breast cancer. Stage I. Estrogen receptor positive. 2.  Use of Arimidex. Plan:   1. Continue Arimidex until five years of therapy has been completed. 2.  Monitor for recurrence or malignancy. 3.  Schedule mammogram in August, 2020. 4.  Monitor for side effects and toxicity from Arimidex. 5.  Return to medical oncology clinic in September 2020 for review of mammogram and follow-up evaluation.     Gonzalo Levine M.D. Medical Director: [facility]  [address], [address] [facility], [address] [place] [address], [address] [facility], [address] [place] [address] of the [facility] at [facility]      **This report has been created using voice recognition software. It may contain minor errors which are inherent in voice recognition technology. **    2020    TELEHEALTH EVALUATION -- Audio/Visual (During YKTNH-22 public health emergency)    HPI: Molly Walsh has a history of breast cancer and has been on therapy with Arimidex. The patient recently has been on Arimidex for a total of five years. She also recently suffered a fall and fractured her hip. She has underwent surgical repair and physical therapy rehabilitation. The patient is now at home continue with physical therapy at home. She has no signs or symptoms today's evaluation that be suggestive recurrence of her breast cancer. The patient denies skeletal pain. She has not had fever, cough, shortness of breath, or other signs of infection. Both her bowel and bladder habits have been stable. ECOG performance status is level 1-2, primarily limited by her skeletal pain involving the hip. Benny Greenfield (:  1931) has requested an audio/video evaluation for the following concern(s): breast cancer      Prior to Visit Medications    Medication Sig Taking?  Authorizing Provider   apixaban (ELIQUIS) 5 MG TABS tablet Take 1 tablet by mouth 2 times daily Yes Constantine Cushing, MD   Cholecalciferol (VITAMIN D3) 50 MCG (2000) TABS Take 4,000 Units by mouth daily Yes Historical Provider, MD   AZO-CRANBERRY PO Take 1 tablet by mouth daily as needed (urinary health)  Yes Historical Provider, MD   Multiple Vitamins-Minerals (PRESERVISION legal guardian) has also been advised to contact this office for worsening conditions or problems, and seek emergency medical treatment and/or call 911 if deemed necessary. Patient identification was verified at the start of the visit: YES    Total time spent on this encounter: 15 minutes    Services were provided through a video synchronous discussion virtually to substitute for in-person clinic visit. Patient and provider were located at their individual home and office. --Magalie Conway MD on 5/1/2020 at 12:01 PM    An electronic signature was used to authenticate this note.

## 2020-05-26 RX ORDER — APIXABAN 5 MG/1
TABLET, FILM COATED ORAL
Qty: 60 TABLET | Refills: 0 | Status: SHIPPED | OUTPATIENT
Start: 2020-05-26 | End: 2020-06-18 | Stop reason: SDUPTHER

## 2020-06-23 ENCOUNTER — OFFICE VISIT (OUTPATIENT)
Dept: INTERNAL MEDICINE | Age: 85
End: 2020-06-23
Payer: MEDICARE

## 2020-06-23 VITALS
HEIGHT: 63 IN | BODY MASS INDEX: 25.34 KG/M2 | DIASTOLIC BLOOD PRESSURE: 70 MMHG | WEIGHT: 143 LBS | HEART RATE: 88 BPM | RESPIRATION RATE: 14 BRPM | SYSTOLIC BLOOD PRESSURE: 124 MMHG

## 2020-06-23 PROCEDURE — 99214 OFFICE O/P EST MOD 30 MIN: CPT

## 2020-06-23 PROCEDURE — 99214 OFFICE O/P EST MOD 30 MIN: CPT | Performed by: INTERNAL MEDICINE

## 2020-06-23 ASSESSMENT — ENCOUNTER SYMPTOMS
BACK PAIN: 0
SHORTNESS OF BREATH: 0
VOMITING: 0
CONSTIPATION: 0
DIARRHEA: 0
NAUSEA: 0
ABDOMINAL PAIN: 0
COUGH: 0
BLOOD IN STOOL: 0
EYE PAIN: 0

## 2020-06-23 ASSESSMENT — PATIENT HEALTH QUESTIONNAIRE - PHQ9
1. LITTLE INTEREST OR PLEASURE IN DOING THINGS: 0
SUM OF ALL RESPONSES TO PHQ QUESTIONS 1-9: 0
SUM OF ALL RESPONSES TO PHQ QUESTIONS 1-9: 0
SUM OF ALL RESPONSES TO PHQ9 QUESTIONS 1 & 2: 0
2. FEELING DOWN, DEPRESSED OR HOPELESS: 0

## 2020-06-23 NOTE — PROGRESS NOTES
Vitamins-Minerals (PRESERVISION AREDS 2 PO) Take 1 tablet by mouth daily      triamterene-hydrochlorothiazide (MAXZIDE-25) 37.5-25 MG per tablet Take 1 tablet by mouth daily 30 tablet 11    simvastatin (ZOCOR) 20 MG tablet TAKE 0.5 TABLETS BY MOUTH EVERY EVENING 90 tablet 3    levothyroxine (SYNTHROID) 50 MCG tablet TAKE 1 TABLET BY MOUTH DAILY 30 tablet 11    CALCIUM-MAGNESIUM-VITAMIN D PO Take by mouth daily      acetaminophen (TYLENOL) 500 MG tablet Take 500 mg by mouth every 6 hours as needed for Pain. Uses approx once a week      Multiple Vitamin (MULTI-VITAMIN DAILY PO) Take 1 tablet by mouth daily.  Handicap Placard MISC by Does not apply route . Expires in 5 years 1 each 0     No current facility-administered medications for this visit. No Known Allergies    Health Maintenance   Topic Date Due    Shingles Vaccine (1 of 2) 07/26/1981    Pneumococcal 65+ years Vaccine (2 of 2 - PPSV23) 09/11/2018    TSH testing  09/04/2020    Annual Wellness Visit (AWV)  12/13/2020    Lipid screen  03/20/2021    Potassium monitoring  03/21/2021    Creatinine monitoring  03/21/2021    DTaP/Tdap/Td vaccine (2 - Td) 12/19/2021    Flu vaccine  Completed    Hepatitis A vaccine  Aged Out    Hepatitis B vaccine  Aged Out    Hib vaccine  Aged Out    Meningococcal (ACWY) vaccine  Aged Out       Subjective:      Review of Systems   Constitutional: Negative for chills and fever. HENT: Negative for hearing loss. Eyes: Negative for pain and visual disturbance. Respiratory: Negative for cough and shortness of breath. Cardiovascular: Negative for chest pain, palpitations and leg swelling. Gastrointestinal: Negative for abdominal pain, blood in stool, constipation, diarrhea, nausea and vomiting. Endocrine: Negative for cold intolerance, polydipsia and polyuria. Genitourinary: Negative for difficulty urinating, dysuria and hematuria.    Musculoskeletal: Negative for arthralgias, back pain, gait

## 2020-09-01 ENCOUNTER — HOSPITAL ENCOUNTER (OUTPATIENT)
Dept: MAMMOGRAPHY | Age: 85
Discharge: HOME | End: 2020-09-01
Payer: MEDICARE

## 2020-09-01 ENCOUNTER — HOSPITAL ENCOUNTER (OUTPATIENT)
Dept: WOMENS IMAGING | Age: 85
Discharge: HOME | End: 2020-09-01

## 2020-09-01 PROCEDURE — 77063 BREAST TOMOSYNTHESIS BI: CPT

## 2020-09-01 PROCEDURE — 77067 SCR MAMMO BI INCL CAD: CPT

## 2020-09-01 PROCEDURE — 3209999900 MAM COMPARISON OF OUTSIDE IMAGES

## 2020-10-01 RX ORDER — LEVOTHYROXINE SODIUM 0.05 MG/1
50 TABLET ORAL DAILY
Qty: 30 TABLET | Refills: 11 | Status: SHIPPED | OUTPATIENT
Start: 2020-10-01 | End: 2021-10-28

## 2020-10-16 RX ORDER — SIMVASTATIN 20 MG
10 TABLET ORAL EVERY EVENING
Qty: 15 TABLET | Refills: 3 | Status: SHIPPED | OUTPATIENT
Start: 2020-10-16 | End: 2021-02-18

## 2020-10-22 ENCOUNTER — HOSPITAL ENCOUNTER (OUTPATIENT)
Dept: INFUSION THERAPY | Age: 85
Discharge: HOME | End: 2020-10-22
Payer: MEDICARE

## 2020-10-22 ENCOUNTER — OFFICE VISIT (OUTPATIENT)
Dept: ONCOLOGY | Age: 85
End: 2020-10-22
Payer: MEDICARE

## 2020-10-22 VITALS
BODY MASS INDEX: 25.91 KG/M2 | RESPIRATION RATE: 16 BRPM | HEART RATE: 77 BPM | DIASTOLIC BLOOD PRESSURE: 80 MMHG | HEIGHT: 63 IN | OXYGEN SATURATION: 98 % | WEIGHT: 146.2 LBS | TEMPERATURE: 97.7 F | SYSTOLIC BLOOD PRESSURE: 153 MMHG

## 2020-10-22 DIAGNOSIS — Z17.0 MALIGNANT NEOPLASM OF UPPER-OUTER QUADRANT OF RIGHT BREAST IN FEMALE, ESTROGEN RECEPTOR POSITIVE (HCC): ICD-10-CM

## 2020-10-22 DIAGNOSIS — C50.411 MALIGNANT NEOPLASM OF UPPER-OUTER QUADRANT OF RIGHT BREAST IN FEMALE, ESTROGEN RECEPTOR POSITIVE (HCC): ICD-10-CM

## 2020-10-22 LAB
ALBUMIN SERPL-MCNC: 4.1 G/DL (ref 3.5–5.1)
ALP BLD-CCNC: 71 U/L (ref 38–126)
ALT SERPL-CCNC: 12 U/L (ref 11–66)
AST SERPL-CCNC: 18 U/L (ref 5–40)
BILIRUB SERPL-MCNC: 0.3 MG/DL (ref 0.3–1.2)
BILIRUBIN DIRECT: < 0.2 MG/DL (ref 0–0.3)
BUN, WHOLE BLOOD: 24 MG/DL (ref 8–26)
CHLORIDE, WHOLE BLOOD: 97 MEQ/L (ref 98–109)
CREATININE, WHOLE BLOOD: 0.8 MG/DL (ref 0.5–1.2)
GFR, ESTIMATED: 72 ML/MIN/1.73M2
GLUCOSE, WHOLE BLOOD: 99 MG/DL (ref 70–108)
HCT VFR BLD CALC: 37.6 % (ref 37–47)
HEMOGLOBIN: 12.3 GM/DL (ref 12–16)
IONIZED CALCIUM, WHOLE BLOOD: 1.16 MMOL/L (ref 1.12–1.32)
MCH RBC QN AUTO: 31.5 PG (ref 26–33)
MCHC RBC AUTO-ENTMCNC: 32.7 GM/DL (ref 32.2–35.5)
MCV RBC AUTO: 96 FL (ref 81–99)
PDW BLD-RTO: 12.9 % (ref 11.5–14.5)
PLATELET # BLD: 302 THOU/MM3 (ref 130–400)
PMV BLD AUTO: 9 FL (ref 9.4–12.4)
POTASSIUM, WHOLE BLOOD: 3.9 MEQ/L (ref 3.5–4.9)
RBC # BLD: 3.9 MILL/MM3 (ref 4.2–5.4)
SEG NEUTROPHILS: 65 % (ref 43–75)
SEGMENTED NEUTROPHILS ABSOLUTE COUNT: 3.9 THOU/MM3 (ref 1.8–7.7)
SODIUM, WHOLE BLOOD: 137 MEQ/L (ref 138–146)
TOTAL CO2, WHOLE BLOOD: 31 MEQ/L (ref 23–33)
TOTAL PROTEIN: 7.2 G/DL (ref 6.1–8)
WBC # BLD: 6 THOU/MM3 (ref 4.8–10.8)

## 2020-10-22 PROCEDURE — 80047 BASIC METABLC PNL IONIZED CA: CPT

## 2020-10-22 PROCEDURE — 99213 OFFICE O/P EST LOW 20 MIN: CPT | Performed by: INTERNAL MEDICINE

## 2020-10-22 PROCEDURE — 80076 HEPATIC FUNCTION PANEL: CPT

## 2020-10-22 PROCEDURE — 36415 COLL VENOUS BLD VENIPUNCTURE: CPT

## 2020-10-22 PROCEDURE — 85027 COMPLETE CBC AUTOMATED: CPT

## 2020-10-22 PROCEDURE — 99211 OFF/OP EST MAY X REQ PHY/QHP: CPT

## 2020-10-22 NOTE — PROGRESS NOTES
[facility] PROFESSIONAL SERVICES  [facility]  [address] [facility] [address]  [address] [address]  Dept: [phone]  Dept Fax: [phone]  Loc: [phone]    Subjective:      Chief Complaint: Ankur Gaitan is a 80 y.o. female with breast cancer, In December, 2013, the patient noticed an abnormality in her right breast. She sought medical attention and had a mammogram and ultrasound completed in [facility]. The mammogram was noted to have dense breast tissue and the ultrasound found a mass in the right breast measuring 3.1 cm in greatest diameter. The patent elected to see Dr. Antonette Askew in [place] and had a core biopsy of the mass performed. 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 The patient underwent a right mastectomy and sentinel lymph node biopsy on 02/05/2014. 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 The sentinel lymph nodes analysis was negative for malignancy. HPI:  The patient is here today for follow up evaluation. She is here for follow up of her history of breast cancer. Her overall health has proved. Earlier this year the patient did suffer a hip fracture and has recovered fairly well from this traumatic accident. She has no signs or symptoms that are suggestive of recurrence of her breast cancer. The patient has not had fever or other signs of infection. She denies shortness of breath, chest pain, a change in bowel habits or a change in bladder habits. The patient has generalized weakness and fatigue that she relates primarily to her advanced age. ECOG performance status is level 1-2. Her most recent mammogram was on 09/01/2020. This was reported as a benign appearing mammogram.  The results of the mammogram reviewed with the patient today. PMH, SH, and FH:   I [address] [facility], [address] [place] [address], [address] of the [facility] [place] [facility]      **This report has been created using voice recognition software. It may contain minor errors which are inherent in voice recognition technology. **

## 2020-10-30 RX ORDER — LEVOTHYROXINE SODIUM 0.05 MG/1
50 TABLET ORAL DAILY
Qty: 30 TABLET | Refills: 11 | OUTPATIENT
Start: 2020-10-30

## 2021-01-06 ENCOUNTER — HOSPITAL ENCOUNTER (OUTPATIENT)
Dept: LAB | Age: 86
Discharge: HOME | End: 2021-01-06
Payer: MEDICARE

## 2021-01-06 DIAGNOSIS — E03.9 ACQUIRED HYPOTHYROIDISM: ICD-10-CM

## 2021-01-06 DIAGNOSIS — C50.411 MALIGNANT NEOPLASM OF UPPER-OUTER QUADRANT OF RIGHT BREAST IN FEMALE, ESTROGEN RECEPTOR POSITIVE (HCC): ICD-10-CM

## 2021-01-06 DIAGNOSIS — I10 ESSENTIAL HYPERTENSION: ICD-10-CM

## 2021-01-06 DIAGNOSIS — E78.49 OTHER HYPERLIPIDEMIA: ICD-10-CM

## 2021-01-06 DIAGNOSIS — Z17.0 MALIGNANT NEOPLASM OF UPPER-OUTER QUADRANT OF RIGHT BREAST IN FEMALE, ESTROGEN RECEPTOR POSITIVE (HCC): ICD-10-CM

## 2021-01-06 DIAGNOSIS — E55.9 VITAMIN D DEFICIENCY: ICD-10-CM

## 2021-01-06 LAB
ALBUMIN SERPL-MCNC: 4.4 G/DL (ref 3.5–5.2)
ALBUMIN SERPL-MCNC: 4.4 G/DL (ref 3.5–5.2)
ALBUMIN/GLOBULIN RATIO: 1.3 (ref 1–2.5)
ALBUMIN/GLOBULIN RATIO: 1.3 (ref 1–2.5)
ALP BLD-CCNC: 76 U/L (ref 35–104)
ALP BLD-CCNC: 76 U/L (ref 35–104)
ALT SERPL-CCNC: 11 U/L (ref 5–33)
ALT SERPL-CCNC: 11 U/L (ref 5–33)
ANION GAP SERPL CALCULATED.3IONS-SCNC: 8 MMOL/L (ref 9–17)
ANION GAP SERPL CALCULATED.3IONS-SCNC: 8 MMOL/L (ref 9–17)
AST SERPL-CCNC: 19 U/L
AST SERPL-CCNC: 19 U/L
BILIRUB SERPL-MCNC: 0.43 MG/DL (ref 0.3–1.2)
BILIRUB SERPL-MCNC: 0.43 MG/DL (ref 0.3–1.2)
BILIRUBIN DIRECT: 0.11 MG/DL
BILIRUBIN, INDIRECT: 0.32 MG/DL (ref 0–1)
BUN BLDV-MCNC: 23 MG/DL (ref 8–23)
BUN BLDV-MCNC: 23 MG/DL (ref 8–23)
BUN/CREAT BLD: 29 (ref 9–20)
BUN/CREAT BLD: 29 (ref 9–20)
CALCIUM IONIZED: 1.35 MMOL/L (ref 1.13–1.33)
CALCIUM SERPL-MCNC: 10.3 MG/DL (ref 8.6–10.4)
CALCIUM SERPL-MCNC: 10.3 MG/DL (ref 8.6–10.4)
CHLORIDE BLD-SCNC: 98 MMOL/L (ref 98–107)
CHLORIDE BLD-SCNC: 98 MMOL/L (ref 98–107)
CHOLESTEROL/HDL RATIO: 2
CHOLESTEROL: 232 MG/DL
CO2: 31 MMOL/L (ref 20–31)
CO2: 31 MMOL/L (ref 20–31)
CREAT SERPL-MCNC: 0.78 MG/DL (ref 0.5–0.9)
CREAT SERPL-MCNC: 0.78 MG/DL (ref 0.5–0.9)
GFR AFRICAN AMERICAN: >60 ML/MIN
GFR AFRICAN AMERICAN: >60 ML/MIN
GFR NON-AFRICAN AMERICAN: >60 ML/MIN
GFR NON-AFRICAN AMERICAN: >60 ML/MIN
GFR SERPL CREATININE-BSD FRML MDRD: ABNORMAL ML/MIN/{1.73_M2}
GLOBULIN: 3.5 G/DL (ref 1.5–3.8)
GLUCOSE BLD-MCNC: 103 MG/DL (ref 70–99)
GLUCOSE BLD-MCNC: 103 MG/DL (ref 70–99)
HCT VFR BLD CALC: 40.1 % (ref 36.3–47.1)
HDLC SERPL-MCNC: 114 MG/DL
HEMOGLOBIN: 13.1 G/DL (ref 11.9–15.1)
LDL CHOLESTEROL: 104 MG/DL (ref 0–130)
MCH RBC QN AUTO: 31.7 PG (ref 25.2–33.5)
MCHC RBC AUTO-ENTMCNC: 32.7 G/DL (ref 25.2–33.5)
MCV RBC AUTO: 97.1 FL (ref 82.6–102.9)
NRBC AUTOMATED: 0 PER 100 WBC
PDW BLD-RTO: 13.2 % (ref 11.8–14.4)
PLATELET # BLD: 305 K/UL (ref 138–453)
PMV BLD AUTO: 8.7 FL (ref 8.1–13.5)
POTASSIUM SERPL-SCNC: 4.6 MMOL/L (ref 3.7–5.3)
POTASSIUM SERPL-SCNC: 4.6 MMOL/L (ref 3.7–5.3)
RBC # BLD: 4.13 M/UL (ref 3.95–5.11)
SODIUM BLD-SCNC: 137 MMOL/L (ref 135–144)
SODIUM BLD-SCNC: 137 MMOL/L (ref 135–144)
TOTAL PROTEIN: 7.9 G/DL (ref 6.4–8.3)
TOTAL PROTEIN: 7.9 G/DL (ref 6.4–8.3)
TRIGL SERPL-MCNC: 70 MG/DL
TSH SERPL DL<=0.05 MIU/L-ACNC: 4.66 MIU/L (ref 0.3–5)
VITAMIN D 25-HYDROXY: 94.5 NG/ML (ref 30–100)
VLDLC SERPL CALC-MCNC: ABNORMAL MG/DL (ref 1–30)
WBC # BLD: 7.4 K/UL (ref 3.5–11.3)

## 2021-01-06 PROCEDURE — 82330 ASSAY OF CALCIUM: CPT

## 2021-01-06 PROCEDURE — 80061 LIPID PANEL: CPT

## 2021-01-06 PROCEDURE — 80076 HEPATIC FUNCTION PANEL: CPT

## 2021-01-06 PROCEDURE — 36415 COLL VENOUS BLD VENIPUNCTURE: CPT

## 2021-01-06 PROCEDURE — 85027 COMPLETE CBC AUTOMATED: CPT

## 2021-01-06 PROCEDURE — 84443 ASSAY THYROID STIM HORMONE: CPT

## 2021-01-06 PROCEDURE — 82306 VITAMIN D 25 HYDROXY: CPT

## 2021-01-06 PROCEDURE — 80053 COMPREHEN METABOLIC PANEL: CPT

## 2021-01-06 PROCEDURE — 80048 BASIC METABOLIC PNL TOTAL CA: CPT

## 2021-01-08 ENCOUNTER — OFFICE VISIT (OUTPATIENT)
Dept: INTERNAL MEDICINE | Age: 86
End: 2021-01-08
Payer: MEDICARE

## 2021-01-08 VITALS
BODY MASS INDEX: 25.16 KG/M2 | SYSTOLIC BLOOD PRESSURE: 132 MMHG | HEART RATE: 75 BPM | DIASTOLIC BLOOD PRESSURE: 72 MMHG | WEIGHT: 142 LBS | RESPIRATION RATE: 16 BRPM | HEIGHT: 63 IN

## 2021-01-08 DIAGNOSIS — E55.9 VITAMIN D DEFICIENCY: ICD-10-CM

## 2021-01-08 DIAGNOSIS — G89.29 CHRONIC RIGHT HIP PAIN: ICD-10-CM

## 2021-01-08 DIAGNOSIS — M25.551 CHRONIC RIGHT HIP PAIN: ICD-10-CM

## 2021-01-08 DIAGNOSIS — Z00.00 MEDICARE ANNUAL WELLNESS VISIT, SUBSEQUENT: ICD-10-CM

## 2021-01-08 DIAGNOSIS — Z00.00 ROUTINE GENERAL MEDICAL EXAMINATION AT A HEALTH CARE FACILITY: Primary | ICD-10-CM

## 2021-01-08 DIAGNOSIS — E03.9 ACQUIRED HYPOTHYROIDISM: ICD-10-CM

## 2021-01-08 DIAGNOSIS — E78.00 PURE HYPERCHOLESTEROLEMIA: ICD-10-CM

## 2021-01-08 PROCEDURE — 90694 VACC AIIV4 NO PRSRV 0.5ML IM: CPT | Performed by: INTERNAL MEDICINE

## 2021-01-08 PROCEDURE — G0439 PPPS, SUBSEQ VISIT: HCPCS | Performed by: INTERNAL MEDICINE

## 2021-01-08 PROCEDURE — 99214 OFFICE O/P EST MOD 30 MIN: CPT | Performed by: INTERNAL MEDICINE

## 2021-01-08 ASSESSMENT — ENCOUNTER SYMPTOMS
EYE PAIN: 0
NAUSEA: 0
VOMITING: 0
BLOOD IN STOOL: 0
ABDOMINAL PAIN: 0
SHORTNESS OF BREATH: 0
COUGH: 0
BACK PAIN: 0
CONSTIPATION: 0
DIARRHEA: 0

## 2021-01-08 ASSESSMENT — PATIENT HEALTH QUESTIONNAIRE - PHQ9
3. TROUBLE FALLING OR STAYING ASLEEP: 0
SUM OF ALL RESPONSES TO PHQ9 QUESTIONS 1 & 2: 3
SUM OF ALL RESPONSES TO PHQ QUESTIONS 1-9: 3
8. MOVING OR SPEAKING SO SLOWLY THAT OTHER PEOPLE COULD HAVE NOTICED. OR THE OPPOSITE, BEING SO FIGETY OR RESTLESS THAT YOU HAVE BEEN MOVING AROUND A LOT MORE THAN USUAL: 0
1. LITTLE INTEREST OR PLEASURE IN DOING THINGS: 1
5. POOR APPETITE OR OVEREATING: 0
SUM OF ALL RESPONSES TO PHQ QUESTIONS 1-9: 0
4. FEELING TIRED OR HAVING LITTLE ENERGY: 0
10. IF YOU CHECKED OFF ANY PROBLEMS, HOW DIFFICULT HAVE THESE PROBLEMS MADE IT FOR YOU TO DO YOUR WORK, TAKE CARE OF THINGS AT HOME, OR GET ALONG WITH OTHER PEOPLE: 0

## 2021-01-08 ASSESSMENT — LIFESTYLE VARIABLES: HOW OFTEN DO YOU HAVE A DRINK CONTAINING ALCOHOL: 0

## 2021-01-08 NOTE — PROGRESS NOTES
[facility]  [address] [place] [address]  Dept: [phone]  Dept Fax: [phone]  Loc: [phone] Duglas Pastrana is a 80 y.o. female who presents today for her medical conditions/complaintsas noted below. Elvira Ndiaye is c/o of   Chief Complaint   Patient presents with    Medicare AWV     6 month    Hyperlipidemia    Hip Pain     chronic right    Hypothyroidism         HPI:     Hyperlipidemia  This is a chronic (Pure hypercholesterolemia) problem. The current episode started more than 1 year ago. The problem is controlled. Recent lipid tests were reviewed and are variable. Pertinent negatives include no chest pain or shortness of breath. Hip Pain   The incident occurred more than 1 week ago. The incident occurred at home. The injury mechanism is unknown. The pain is present in the right hip. The pain is mild. The pain has been improving since onset. Pertinent negatives include no numbness. Other  This is a recurrent (3-General medical exam, 4 -hypothyroidism) problem. The current episode started today. The problem occurs intermittently. The problem has been waxing and waning. Pertinent negatives include no abdominal pain, arthralgias, chest pain, chills, coughing, fever, headaches, nausea, neck pain, numbness, rash, vomiting or weakness. No results found for: LABA1C         No results found for: LABMICR  LDL Cholesterol (mg/dL)   Date Value   01/06/2021 104   03/20/2020 53   09/04/2019 116         AST (U/L)   Date Value   01/06/2021 19   01/06/2021 19     ALT (U/L)   Date Value   01/06/2021 11   01/06/2021 11     BUN (mg/dL)   Date Value   01/06/2021 23   01/06/2021 23     BP Readings from Last 3 Encounters:   01/08/21 132/72   10/22/20 (!) 153/80   06/23/20 124/70              Past Medical History:   Diagnosis Date    Breast cancer ([facility] [address] 2014    s/p mastectomy and reconstruction surgery.     Closed subcapital fracture of femur, right, initial encounter ([facility] [address] 2020    Edema extremities     takes diuretics prn    Hyperlipidemia     Hypertension     Hypothyroidism     Osteoarthritis     mild    Unspecified vitamin D deficiency       Past Surgical History:   Procedure Laterality Date    APPENDECTOMY      at age 5   Joan Horn BREAST BIOPSY Right 2014    before she had her surgery    BREAST RECONSTRUCTION Right 2014    Pt had done at [facility] in [address] Left 2014    Pt had done at [facility] in [address] Right 2019    COLONOSCOPY  2007    showed mild diverticular disease, otherwise negative, repeat in 2017   [place], RADICAL Right 2014    Pt had done at [facility] in [address]    due to fibroids   [address] Right 2020    RIGHT TOTAL HIP performed by Ana Granger MD at [facility] [address] History   Problem Relation Age of Onset    Alzheimer's Disease Mother          at age 80    Emphysema Father          at age 68    Diabetes Father     COPD Sister     Other Sister         lung disease from tobacco abuse    Stroke Sister         history of a hemorrhagic cerebrovascular accident    Cancer Daughter         CML    Other Daughter         kidney stones    Hypertension Daughter     Heart Disease Maternal Grandmother     Heart Disease Maternal Grandfather     Diabetes Maternal Grandfather           Social History     Tobacco Use    Smoking status: Never Smoker    Smokeless tobacco: Never Used   Substance Use Topics    Alcohol use: No         Current Outpatient Medications   Medication Sig Dispense Refill    simvastatin (ZOCOR) 20 MG tablet TAKE 0.5 TABLETS BY MOUTH EVERY EVENING 15 tablet 3    levothyroxine (SYNTHROID) 50 MCG tablet TAKE 1 TABLET BY MOUTH DAILY 30 tablet 11    apixaban (ELIQUIS) 5 MG TABS tablet TAKE 1 TABLET BY MOUTH 2 TIMES DAILY 180 tablet 3    Cholecalciferol (VITAMIN D3) 50 MCG (2000 UT) TABS Take 2,000 Units by mouth daily       AZO-CRANBERRY PO Take 1 tablet by mouth daily as needed (urinary health)       Multiple Vitamins-Minerals (PRESERVISION AREDS 2 PO) Take 1 tablet by mouth daily      triamterene-hydrochlorothiazide (MAXZIDE-25) 37.5-25 MG per tablet Take 1 tablet by mouth daily 30 tablet 11    CALCIUM-MAGNESIUM-VITAMIN D PO Take by mouth daily      acetaminophen (TYLENOL) 500 MG tablet Take 500 mg by mouth every 6 hours as needed for Pain. Uses approx once a week      Multiple Vitamin (MULTI-VITAMIN DAILY PO) Take 1 tablet by mouth daily.  Handicap Placard MISC by Does not apply route . Expires in 5 years 1 each 0     No current facility-administered medications for this visit. No Known Allergies    Health Maintenance   Topic Date Due    COVID-19 Vaccine (1 of 2) 07/26/1947    Shingles Vaccine (1 of 2) 07/26/1981    Pneumococcal 65+ years Vaccine (2 of 2 - PPSV23) 09/11/2018    DTaP/Tdap/Td vaccine (2 - Td) 12/19/2021    Lipid screen  01/06/2022    TSH testing  01/06/2022    Potassium monitoring  01/06/2022    Creatinine monitoring  01/06/2022    Annual Wellness Visit (AWV)  01/09/2022    Flu vaccine  Completed    Hepatitis A vaccine  Aged Out    Hepatitis B vaccine  Aged Out    Hib vaccine  Aged Out    Meningococcal (ACWY) vaccine  Aged Out       Subjective:      Review of Systems   Constitutional: Negative for chills and fever. HENT: Negative for hearing loss. Eyes: Negative for pain and visual disturbance. Respiratory: Negative for cough and shortness of breath. Cardiovascular: Negative for chest pain, palpitations and leg swelling. Gastrointestinal: Negative for abdominal pain, blood in stool, constipation, diarrhea, nausea and vomiting. Endocrine: Negative for cold intolerance, polydipsia and polyuria. deficiency  Vitamin D 25 Hydroxy   Reviewed the results including 1/6/2021 total cholesterol 232, but excellent  and normal .    1/6/2021 glucose okay at 103.    1-6-21 MD at the upper end of the normal range at 94.5 and we therefore decreased her vitamin D supplementation from 4000 units to 2000 units. Patient was told to continue her prescriptions for Zocor and Synthroid. Plan:       Return in about 6 months (around 7/8/2021) for Hyperlipidemia, hypothyroidism. Orders Placed This Encounter   Procedures    INFLUENZA, QUADV, ADJUVANTED, 72 YRS =, IM, PF, PREFILL SYR, 0.5ML (FLUAD)    Vitamin D 25 Hydroxy     Standing Status:   Future     Standing Expiration Date:   1/8/2022     No orders of the defined types were placed in this encounter. Patientgiven educational materials - see patient instructions. Discussed use, benefit,and side effects of prescribed medications. All patient questions answered. Ptvoiced understanding. Reviewed health maintenance. Instructed to continue currentmedications, diet and exercise. Patient agreed with treatment plan. Follow up asdirected.      Electronically signed by Inessa Meier MD on 1/22/2021 at 9:02 PM

## 2021-01-08 NOTE — PATIENT INSTRUCTIONS
Personalized Preventive Plan for Cristóbal Monique - 1/8/2021  Medicare offers a range of preventive health benefits. Some of the tests and screenings are paid in full while other may be subject to a deductible, co-insurance, and/or copay. Some of these benefits include a comprehensive review of your medical history including lifestyle, illnesses that may run in your family, and various assessments and screenings as appropriate. After reviewing your medical record and screening and assessments performed today your provider may have ordered immunizations, labs, imaging, and/or referrals for you. A list of these orders (if applicable) as well as your Preventive Care list are included within your After Visit Summary for your review. Other Preventive Recommendations:    · A preventive eye exam performed by an eye specialist is recommended every 1-2 years to screen for glaucoma; cataracts, macular degeneration, and other eye disorders. · A preventive dental visit is recommended every 6 months. · Try to get at least 150 minutes of exercise per week or 10,000 steps per day on a pedometer . · Order or download the FREE \"Exercise & Physical Activity: Your Everyday Guide\" from [employer]. Call 5-470.952.7297 or search [employer] online. · You need 7583-2284 mg of calcium and 9193-1626 IU of vitamin D per day. It is possible to meet your calcium requirement with diet alone, but a vitamin D supplement is usually necessary to meet this goal.  · When exposed to the sun, use a sunscreen that protects against both UVA and UVB radiation with an SPF of 30 or greater. Reapply every 2 to 3 hours or after sweating, drying off with a towel, or swimming. · Always wear a seat belt when traveling in a car. Always wear a helmet when riding a bicycle or motorcycle.

## 2021-01-08 NOTE — PROGRESS NOTES
Have you had an allergic reaction to the flu (influenza) shot? no  Are you allergic to eggs or any component of the flu vaccine? no  Do you have a history of Guillain-Mobile Syndrome (GBS), which is paralysis after receiving the flu vaccine? no  Are you feeling well today? yes  Flu vaccine given as ordered. Patient tolerated it well. No questions re: VIS information.

## 2021-01-08 NOTE — PROGRESS NOTES
Medicare Annual Wellness Visit  Name: Shubham Centeno Date: 2021   MRN: [id number] Sex: Female   Age: 80 y.o. Ethnicity: Non-/Non    : 1931 Race: Nahum Titus is here for Medicare AWV (6 month), Hyperlipidemia, Hip Pain (chronic right), and Hypothyroidism    Screenings for behavioral, psychosocial and functional/safety risks, and cognitive dysfunction are all negative except as indicated below. These results, as well as other patient data from the [address] form, are documented in Flowsheets linked to this Encounter. No Known Allergies      Prior to Visit Medications    Medication Sig Taking? Authorizing Provider   simvastatin (ZOCOR) 20 MG tablet TAKE 0.5 TABLETS BY MOUTH EVERY EVENING Yes Inessa Meier MD   levothyroxine (SYNTHROID) 50 MCG tablet TAKE 1 TABLET BY MOUTH DAILY Yes Inessa Meier MD   apixaban (ELIQUIS) 5 MG TABS tablet TAKE 1 TABLET BY MOUTH 2 TIMES DAILY Yes Inessa Meier MD   Cholecalciferol (VITAMIN D3) 50 MCG (2000 UT) TABS Take 2,000 Units by mouth daily  Yes Historical Provider, MD   AZO-CRANBERRY PO Take 1 tablet by mouth daily as needed (urinary health)  Yes Historical Provider, MD   Multiple Vitamins-Minerals (PRESERVISION AREDS 2 PO) Take 1 tablet by mouth daily Yes Historical Provider, MD   triamterene-hydrochlorothiazide (MAXZIDE-25) 37.5-25 MG per tablet Take 1 tablet by mouth daily Yes Inessa Meier MD   CALCIUM-MAGNESIUM-VITAMIN D PO Take by mouth daily Yes Historical Provider, MD   acetaminophen (TYLENOL) 500 MG tablet Take 500 mg by mouth every 6 hours as needed for Pain. Uses approx once a week Yes Historical Provider, MD   Multiple Vitamin (MULTI-VITAMIN DAILY PO) Take 1 tablet by mouth daily. Yes Historical Provider, MD   Handicap Placard MISC by Does not apply route .   Expires in 5 years  Inessa Meier MD         Past Medical History:   Diagnosis Date    Breast cancer [facility])     s/p mastectomy and reconstruction surgery.     Closed subcapital fracture of femur, right, initial encounter ([facility] [address] 2020    Edema extremities     takes diuretics prn    Hyperlipidemia     Hypertension     Hypothyroidism     Osteoarthritis     mild    Unspecified vitamin D deficiency        Past Surgical History:   Procedure Laterality Date    APPENDECTOMY      at age 5   Tatyana Walters BREAST BIOPSY Right 2014    before she had her surgery    BREAST RECONSTRUCTION Right 2014    Pt had done at [facility] in [address] Left 2014    Pt had done at [facility] in [address] Right 2019    COLONOSCOPY      showed mild diverticular disease, otherwise negative, repeat in 2017   [place], RADICAL Right 2014    Pt had done at [facility] in [address]    due to fibroids   [address] Right 2020    RIGHT TOTAL HIP performed by Lizzeth Bedolla MD at [facility] History   Problem Relation Age of Onset    Alzheimer's Disease Mother          at age 80    Emphysema Father          at age 68    Diabetes Father     COPD Sister     Other Sister         lung disease from tobacco abuse    Stroke Sister         history of a hemorrhagic cerebrovascular accident    Cancer Daughter         CML    Other Daughter         kidney stones    Hypertension Daughter     Heart Disease Maternal Grandmother     Heart Disease Maternal Grandfather     Diabetes Maternal Grandfather        CareTeam (Including outside providers/suppliers regularly involved in providing care):   Patient Care Team:  Nick Harris MD as PCP - General (Internal Medicine)  Nick Harris MD as PCP - [facility] Empaneled Provider  DO Brayan Samaniego MD as Consulting Physician (Hematology and Oncology)    Wt Readings from Last 3 Encounters:   21 142 lb (64.4 kg)   10/22/20 146 lb 3.2 oz (66.3 kg)   06/23/20 143 lb (64.9 kg)     Vitals:    01/08/21 1614   BP: 132/72   Site: Right Upper Arm   Position: Sitting   Cuff Size: Medium Adult   Pulse: 75   Resp: 16   Weight: 142 lb (64.4 kg)   Height: 5' 3\" (1.6 m)     Body mass index is 25.15 kg/m². Based upon direct observation of the patient, evaluation of cognition reveals none. Patient's complete Health Risk Assessment and screening values have been reviewed and are found in Flowsheets. The following problems were reviewed today and where indicated follow up appointments were made and/or referrals ordered. Positive Risk Factor Screenings with Interventions:            General Health and ACP:  General  In general, how would you say your health is?: Very Good  In the past 7 days, have you experienced any of the following? New or Increased Pain, New or Increased Fatigue, Loneliness, Social Isolation, Stress or Anger?: (!) Social Isolation  Do you get the social and emotional support that you need?: Yes  Do you have a Living Will?: Yes  Advance Directives     Power of [address] [place] [address] Will ACP-Advance Directive ACP-Power of     Not on File Not on File Not on File Not on File      General Health Risk Interventions:  · Social isolation: due to covid     Hearing/Vision:  No exam data present  Hearing/Vision  Do you or your family notice any trouble with your hearing?: (!) Yes  Do you have difficulty driving, watching TV, or doing any of your daily activities because of your eyesight?: No  Have you had an eye exam within the past year?: Yes  Hearing/Vision Interventions:  · Hearing concerns:  ca not hear out of her left ear  · .       Personalized Preventive Plan   Current Health Maintenance Status  Immunization History   Administered Date(s) Administered    Influenza Virus Vaccine 12/08/2006, 10/31/2007, 09/28/2009, 10/23/2014    Influenza, High Dose (Fluzone 65 yrs and older) 09/11/2017    Influenza, Quadv, adjuvanted, 65 yrs +, IM, PF (Fluad) 01/08/2021    Influenza, Triv, inactivated, subunit, adjuvanted, IM (Fluad 65 yrs and older) 12/13/2019    PPD Test 01/28/2020, 02/04/2020    Pneumococcal Conjugate 13-valent (Uxialfa64) 09/11/2017    Pneumococcal Conjugate 7-valent (Prevnar7) 12/19/2011    Tdap (Boostrix, Adacel) 12/19/2011        Health Maintenance   Topic Date Due    Shingles Vaccine (1 of 2) 07/26/1981    Pneumococcal 65+ years Vaccine (2 of 2 - PPSV23) 09/11/2018    Annual Wellness Visit (AWV)  05/29/2019    DTaP/Tdap/Td vaccine (2 - Td) 12/19/2021    Lipid screen  01/06/2022    TSH testing  01/06/2022    Potassium monitoring  01/06/2022    Creatinine monitoring  01/06/2022    Flu vaccine  Completed    Hepatitis A vaccine  Aged Out    Hepatitis B vaccine  Aged Out    Hib vaccine  Aged Out    Meningococcal (ACWY) vaccine  Aged Out     Recommendations for [employer] Due: see orders and patient instructions/AVS.  . Recommended screening schedule for the next 5-10 years is provided to the patient in written form: see Patient Instructions/AVS.    ICalli LPN, 7/7/2530, performed the documented evaluation under the direct supervision of the attending physician. I, Dr. Cristhian Fu, directly supervised the performance of this Medicare annual wellness visit.

## 2021-02-17 DIAGNOSIS — E78.5 HYPERLIPIDEMIA, UNSPECIFIED HYPERLIPIDEMIA TYPE: ICD-10-CM

## 2021-02-18 RX ORDER — SIMVASTATIN 20 MG
10 TABLET ORAL EVERY EVENING
Qty: 15 TABLET | Refills: 3 | Status: SHIPPED | OUTPATIENT
Start: 2021-02-18 | End: 2021-06-15

## 2021-03-09 DIAGNOSIS — I10 ESSENTIAL HYPERTENSION: ICD-10-CM

## 2021-03-09 RX ORDER — TRIAMTERENE AND HYDROCHLOROTHIAZIDE 37.5; 25 MG/1; MG/1
1 TABLET ORAL DAILY
Qty: 30 TABLET | Refills: 11 | Status: SHIPPED | OUTPATIENT
Start: 2021-03-09 | End: 2022-06-21

## 2021-04-22 ENCOUNTER — OFFICE VISIT (OUTPATIENT)
Dept: ONCOLOGY | Age: 86
End: 2021-04-22
Payer: MEDICARE

## 2021-04-22 ENCOUNTER — HOSPITAL ENCOUNTER (OUTPATIENT)
Dept: INFUSION THERAPY | Age: 86
Discharge: HOME | End: 2021-04-22
Payer: MEDICARE

## 2021-04-22 VITALS
BODY MASS INDEX: 25.48 KG/M2 | HEART RATE: 73 BPM | HEIGHT: 63 IN | RESPIRATION RATE: 18 BRPM | OXYGEN SATURATION: 95 % | DIASTOLIC BLOOD PRESSURE: 65 MMHG | SYSTOLIC BLOOD PRESSURE: 148 MMHG | TEMPERATURE: 98.6 F | WEIGHT: 143.8 LBS

## 2021-04-22 DIAGNOSIS — C50.411 MALIGNANT NEOPLASM OF UPPER-OUTER QUADRANT OF RIGHT BREAST IN FEMALE, ESTROGEN RECEPTOR POSITIVE (HCC): ICD-10-CM

## 2021-04-22 DIAGNOSIS — Z17.0 MALIGNANT NEOPLASM OF UPPER-OUTER QUADRANT OF RIGHT BREAST IN FEMALE, ESTROGEN RECEPTOR POSITIVE (HCC): Primary | ICD-10-CM

## 2021-04-22 DIAGNOSIS — Z17.0 MALIGNANT NEOPLASM OF UPPER-OUTER QUADRANT OF RIGHT BREAST IN FEMALE, ESTROGEN RECEPTOR POSITIVE (HCC): ICD-10-CM

## 2021-04-22 DIAGNOSIS — C50.411 MALIGNANT NEOPLASM OF UPPER-OUTER QUADRANT OF RIGHT BREAST IN FEMALE, ESTROGEN RECEPTOR POSITIVE (HCC): Primary | ICD-10-CM

## 2021-04-22 DIAGNOSIS — Z12.31 VISIT FOR SCREENING MAMMOGRAM: ICD-10-CM

## 2021-04-22 LAB
ABSOLUTE IMMATURE GRANULOCYTE: 0.01 THOU/MM3 (ref 0–0.07)
ALBUMIN SERPL-MCNC: 4.4 G/DL (ref 3.5–5.1)
ALP BLD-CCNC: 74 U/L (ref 38–126)
ALT SERPL-CCNC: 15 U/L (ref 11–66)
AST SERPL-CCNC: 23 U/L (ref 5–40)
BASINOPHIL, AUTOMATED: 0 % (ref 0–3)
BASOPHILS ABSOLUTE: 0 THOU/MM3 (ref 0–0.1)
BILIRUB SERPL-MCNC: 0.3 MG/DL (ref 0.3–1.2)
BILIRUBIN DIRECT: < 0.2 MG/DL (ref 0–0.3)
BUN, WHOLE BLOOD: 23 MG/DL (ref 8–26)
CHLORIDE, WHOLE BLOOD: 100 MEQ/L (ref 98–109)
CREATININE, WHOLE BLOOD: 0.8 MG/DL (ref 0.5–1.2)
EOSINOPHILS ABSOLUTE: 0.1 THOU/MM3 (ref 0–0.4)
EOSINOPHILS RELATIVE PERCENT: 2 % (ref 0–4)
GFR, ESTIMATED: 72 ML/MIN/1.73M2
GLUCOSE, WHOLE BLOOD: 104 MG/DL (ref 70–108)
HCT VFR BLD CALC: 40.7 % (ref 37–47)
HEMOGLOBIN: 13.2 GM/DL (ref 12–16)
IMMATURE GRANULOCYTES: 0 %
IONIZED CALCIUM, WHOLE BLOOD: 1.18 MMOL/L (ref 1.12–1.32)
LYMPHOCYTES # BLD: 22 % (ref 15–47)
LYMPHOCYTES ABSOLUTE: 1.6 THOU/MM3 (ref 1–4.8)
MCH RBC QN AUTO: 31.6 PG (ref 26–33)
MCHC RBC AUTO-ENTMCNC: 32.4 GM/DL (ref 32.2–35.5)
MCV RBC AUTO: 97 FL (ref 81–99)
MONOCYTES ABSOLUTE: 0.5 THOU/MM3 (ref 0.4–1.3)
MONOCYTES: 7 % (ref 0–12)
PDW BLD-RTO: 13.1 % (ref 11.5–14.5)
PLATELET # BLD: 310 THOU/MM3 (ref 130–400)
PMV BLD AUTO: 8.7 FL (ref 9.4–12.4)
POTASSIUM, WHOLE BLOOD: 4.7 MEQ/L (ref 3.5–4.9)
RBC # BLD: 4.18 MILL/MM3 (ref 4.2–5.4)
SEG NEUTROPHILS: 70 % (ref 43–75)
SEGMENTED NEUTROPHILS ABSOLUTE COUNT: 5 THOU/MM3 (ref 1.8–7.7)
SODIUM, WHOLE BLOOD: 139 MEQ/L (ref 138–146)
TOTAL CO2, WHOLE BLOOD: 30 MEQ/L (ref 23–33)
TOTAL PROTEIN: 7.7 G/DL (ref 6.1–8)
WBC # BLD: 7.2 THOU/MM3 (ref 4.8–10.8)

## 2021-04-22 PROCEDURE — 80076 HEPATIC FUNCTION PANEL: CPT

## 2021-04-22 PROCEDURE — 85025 COMPLETE CBC W/AUTO DIFF WBC: CPT

## 2021-04-22 PROCEDURE — 80047 BASIC METABLC PNL IONIZED CA: CPT

## 2021-04-22 PROCEDURE — 99211 OFF/OP EST MAY X REQ PHY/QHP: CPT

## 2021-04-22 PROCEDURE — 36415 COLL VENOUS BLD VENIPUNCTURE: CPT

## 2021-04-22 PROCEDURE — 99213 OFFICE O/P EST LOW 20 MIN: CPT | Performed by: INTERNAL MEDICINE

## 2021-04-22 NOTE — PROGRESS NOTES
[facility] PROFESSIONAL SERVICES  ONCOLOGY SPECIALISTS OF [facility]  Via [facility] [address] [facility] 83,[address] 200  [address] [address]  Dept: [phone]  Dept Fax: [phone]  Loc: [phone]    Subjective:      Chief Complaint: Arin Daley is a 80 y.o. female with breast cancer, In December, 2013, the patient noticed an abnormality in her right breast. She sought medical attention and had a mammogram and ultrasound completed in [facility]. The mammogram was noted to have dense breast tissue and the ultrasound found a mass in the right breast measuring 3.1 cm in greatest diameter. The patent elected to see Dr. Zoila Myles in [facility] and had a core biopsy of the mass performed. 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 The patient underwent a right mastectomy and sentinel lymph node biopsy on 02/05/2014. 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 The sentinel lymph nodes analysis was negative for malignancy. HPI:    Zenon Cruz is here today for follow-up regarding her history of breast cancer. On today's evaluation she has no signs or symptoms that be suggestive recurrence of her malignancy. The patient has complaints of fatigue and generalized weakness that she relates to her advanced age. She has not had fever, cough, shortness of breath or other signs of infection. The patient has recovered fairly well from a hip fracture that she experienced last year. Both her bowel and bladder habits have been stable and she does not see any blood in her stool or urine. Laboratory studies from today were reviewed with the patient and they were essentially unremarkable. The patient remains active with an ECOG performance status of 1-2. She continues to receive annual mammograms and does want to continue this planned evaluation. Her next mammogram will be scheduled in September 2021. PMH, SH, and FH:  I reviewed the patients medication list and allergy list as noted on the electronic medical record. The PMH, SH and FH were also reviewed as noted on the EMR. Review of Systems   Constitutional: Fatigue. HENT: Negative. Eyes: Negative. Respiratory: Negative. Cardiovascular: Negative. Gastrointestinal: Negative. Genitourinary: Negative. Musculoskeletal: Negative. Skin: Negative. Neurological: General weakness. Hematological: Negative. Psychiatric/Behavioral: Negative. Physical Exam:  Vitals:    04/22/21 1050   BP: (!) 148/65   Pulse: 73   Resp: 18   Temp: 98.6 °F (37 °C)   SpO2: 95%   Vitals reviewed and are stable. Constitutional: Elderly. No acute distress. HENT: Normocephalic and atraumatic. Eyes: Pupils appear equal. No scleral icterus. Neck: Bilateral appearance is symmetrical. No identifiable masses. Chest: Left breast exam displays no palpable abnormalities. Right breast is surgically absent. Surgical incisions are well-healed. There is no evidence of any localized recurrence. Pulmonary: Effort normal. No respiratory distress. Musculoskeletal: Gait is abnormal. Muscle strength and tone grossly decreased. Neurological: Alert and oriented to person, place, and time. Judgment and thought content normal.  Skin: Scattered ecchymosis noted on bilateral upper forearms. Psychiatric: Mood and affect appropriate for the clinical situation. .      Data Analysis: The following studies were reviewed with the patient today:    Hematology 4/22/2021 1/6/2021 10/22/2020   WBC 7.2 7.4 6.0   RBC 4.18 (L) 4.13 3.90 (L)   HGB 13.2 13.1 12.3   HCT 40.7 40.1 37.6   MCV 97 97.1 96   RDW 13.1 13.2 12.9    305 302     Assessment:   1. Invasive breast cancer. Stage I. Estrogen receptor positive. Plan:   1. Monitor for recurrence or malignancy.   2.  Continue annual mammogram.    Kaushik Keene RADHA Hopson Director: [facility]  [address], 1 [facility], [address] [place] [address], [address] [place] [address], [address] of the [facility] at the [facility]      **This report has been created using voice recognition software. It may contain minor errors which are inherent in voice recognition technology. **

## 2021-06-15 DIAGNOSIS — E78.5 HYPERLIPIDEMIA, UNSPECIFIED HYPERLIPIDEMIA TYPE: ICD-10-CM

## 2021-06-15 RX ORDER — SIMVASTATIN 20 MG
10 TABLET ORAL EVERY EVENING
Qty: 45 TABLET | Refills: 3 | Status: SHIPPED | OUTPATIENT
Start: 2021-06-15 | End: 2022-04-12

## 2021-07-03 DIAGNOSIS — I26.99 ACUTE PULMONARY EMBOLISM WITHOUT ACUTE COR PULMONALE, UNSPECIFIED PULMONARY EMBOLISM TYPE (HCC): ICD-10-CM

## 2021-08-24 ENCOUNTER — OFFICE VISIT (OUTPATIENT)
Dept: INTERNAL MEDICINE | Age: 86
End: 2021-08-24
Payer: MEDICARE

## 2021-08-24 ENCOUNTER — HOSPITAL ENCOUNTER (OUTPATIENT)
Dept: LAB | Age: 86
Discharge: HOME | End: 2021-08-24
Payer: MEDICARE

## 2021-08-24 VITALS
DIASTOLIC BLOOD PRESSURE: 70 MMHG | RESPIRATION RATE: 16 BRPM | BODY MASS INDEX: 25.16 KG/M2 | WEIGHT: 142 LBS | HEART RATE: 77 BPM | HEIGHT: 63 IN | SYSTOLIC BLOOD PRESSURE: 132 MMHG

## 2021-08-24 DIAGNOSIS — G89.29 CHRONIC RIGHT HIP PAIN: ICD-10-CM

## 2021-08-24 DIAGNOSIS — E78.00 PURE HYPERCHOLESTEROLEMIA: Primary | ICD-10-CM

## 2021-08-24 DIAGNOSIS — R26.89 BALANCE PROBLEMS: ICD-10-CM

## 2021-08-24 DIAGNOSIS — Z00.00 GENERAL MEDICAL EXAM: ICD-10-CM

## 2021-08-24 DIAGNOSIS — M25.551 CHRONIC RIGHT HIP PAIN: ICD-10-CM

## 2021-08-24 DIAGNOSIS — E55.9 VITAMIN D DEFICIENCY: ICD-10-CM

## 2021-08-24 DIAGNOSIS — E03.9 ACQUIRED HYPOTHYROIDISM: ICD-10-CM

## 2021-08-24 PROCEDURE — 99214 OFFICE O/P EST MOD 30 MIN: CPT | Performed by: INTERNAL MEDICINE

## 2021-08-24 PROCEDURE — 36415 COLL VENOUS BLD VENIPUNCTURE: CPT

## 2021-08-24 PROCEDURE — 82306 VITAMIN D 25 HYDROXY: CPT

## 2021-08-24 PROCEDURE — 99212 OFFICE O/P EST SF 10 MIN: CPT | Performed by: INTERNAL MEDICINE

## 2021-08-24 SDOH — ECONOMIC STABILITY: FOOD INSECURITY: WITHIN THE PAST 12 MONTHS, YOU WORRIED THAT YOUR FOOD WOULD RUN OUT BEFORE YOU GOT MONEY TO BUY MORE.: NEVER TRUE

## 2021-08-24 SDOH — ECONOMIC STABILITY: FOOD INSECURITY: WITHIN THE PAST 12 MONTHS, THE FOOD YOU BOUGHT JUST DIDN'T LAST AND YOU DIDN'T HAVE MONEY TO GET MORE.: NEVER TRUE

## 2021-08-24 ASSESSMENT — SOCIAL DETERMINANTS OF HEALTH (SDOH): HOW HARD IS IT FOR YOU TO PAY FOR THE VERY BASICS LIKE FOOD, HOUSING, MEDICAL CARE, AND HEATING?: NOT HARD AT ALL

## 2021-08-24 ASSESSMENT — ENCOUNTER SYMPTOMS
EYE PAIN: 0
DIARRHEA: 0
BACK PAIN: 0
VOMITING: 0
BLOOD IN STOOL: 0
SHORTNESS OF BREATH: 0
CONSTIPATION: 0
COUGH: 0
ABDOMINAL PAIN: 0
NAUSEA: 0

## 2021-08-24 NOTE — PROGRESS NOTES
[facility]  [address] [place] [address]  Dept: [phone]  Dept Fax: [phone]  Loc: [address] is a 80 y.o. female who presents today for her medical conditions/complaintsas noted below. Chris Benites is c/o of   Chief Complaint   Patient presents with    Hyperlipidemia     6 month    Hip Pain     chronic right    Hypothyroidism         HPI:     Hyperlipidemia  This is a chronic (Pure hypercholesterolemia) problem. The current episode started more than 1 year ago. The problem is controlled. Recent lipid tests were reviewed and are variable. Pertinent negatives include no chest pain or shortness of breath. Hip Pain   The incident occurred more than 1 week ago. The incident occurred at home. There was no injury mechanism. The pain is present in the right hip. The pain is mild. The pain has been improving since onset. Pertinent negatives include no numbness. Other  This is a chronic (3-hypothyroidism) problem. The current episode started more than 1 year ago. The problem occurs constantly. The problem has been waxing and waning. Pertinent negatives include no abdominal pain, arthralgias, chest pain, chills, coughing, fever, headaches, nausea, neck pain, numbness, rash, vomiting or weakness. No results found for: LABA1C         No results found for: LABMICR  LDL Cholesterol (mg/dL)   Date Value   01/06/2021 104   03/20/2020 53   09/04/2019 116         AST (U/L)   Date Value   04/22/2021 23     ALT (U/L)   Date Value   04/22/2021 15     BUN (mg/dL)   Date Value   01/06/2021 23   01/06/2021 23     BP Readings from Last 3 Encounters:   08/24/21 132/70   04/22/21 (!) 148/65   01/08/21 132/72              Past Medical History:   Diagnosis Date    Breast cancer ([facility] 2014    s/p mastectomy and reconstruction surgery.     Closed subcapital fracture of femur, right, initial encounter ([facility] TABLET BY MOUTH DAILY 30 tablet 11    levothyroxine (SYNTHROID) 50 MCG tablet TAKE 1 TABLET BY MOUTH DAILY 30 tablet 11    Cholecalciferol (VITAMIN D3) 50 MCG (2000 UT) TABS Take 2,000 Units by mouth daily       AZO-CRANBERRY PO Take 1 tablet by mouth daily as needed (urinary health)       Multiple Vitamins-Minerals (PRESERVISION AREDS 2 PO) Take 1 tablet by mouth daily      Handicap Placard MISC by Does not apply route . Expires in 5 years 1 each 0    CALCIUM-MAGNESIUM-VITAMIN D PO Take by mouth daily      acetaminophen (TYLENOL) 500 MG tablet Take 500 mg by mouth every 6 hours as needed for Pain. Uses approx once a week      Multiple Vitamin (MULTI-VITAMIN DAILY PO) Take 1 tablet by mouth daily. No current facility-administered medications for this visit. No Known Allergies    Health Maintenance   Topic Date Due    Shingles Vaccine (1 of 2) Never done    Pneumococcal 65+ years Vaccine (2 of 2 - PPSV23) 09/11/2018    Flu vaccine (1) 09/01/2021    DTaP/Tdap/Td vaccine (2 - Td or Tdap) 12/19/2021    Lipid screen  01/06/2022    TSH testing  01/06/2022    Potassium monitoring  01/06/2022    Creatinine monitoring  01/06/2022    Annual Wellness Visit (AWV)  01/09/2022    COVID-19 Vaccine  Completed    Hepatitis A vaccine  Aged Out    Hepatitis B vaccine  Aged Out    Hib vaccine  Aged Out    Meningococcal (ACWY) vaccine  Aged Out       Subjective:      Review of Systems   Constitutional: Negative for chills and fever. HENT: Negative for hearing loss. Eyes: Negative for pain and visual disturbance. Respiratory: Negative for cough and shortness of breath. Cardiovascular: Negative for chest pain, palpitations and leg swelling. Gastrointestinal: Negative for abdominal pain, blood in stool, constipation, diarrhea, nausea and vomiting. Endocrine: Negative for cold intolerance, polydipsia and polyuria. Genitourinary: Negative for difficulty urinating, dysuria and hematuria. Musculoskeletal: Negative for arthralgias, back pain, gait problem and neck pain. Skin: Negative for pallor and rash. Neurological: Negative for dizziness, weakness, numbness and headaches. Hematological: Negative for adenopathy. Does not bruise/bleed easily. Psychiatric/Behavioral: Negative for confusion. The patient is not nervous/anxious. Objective:     Physical Exam  Vitals reviewed. Constitutional:       Appearance: She is well-developed. HENT:      Head: Normocephalic and atraumatic. Eyes:      Pupils: Pupils are equal, round, and reactive to light. Cardiovascular:      Rate and Rhythm: Normal rate and regular rhythm. Heart sounds: No murmur heard. No friction rub. No gallop. Pulmonary:      Effort: Pulmonary effort is normal.      Breath sounds: Normal breath sounds. No wheezing or rales. Abdominal:      General: There is no distension. Palpations: Abdomen is soft. There is no mass. Tenderness: There is no abdominal tenderness. There is no rebound. Musculoskeletal:         General: Normal range of motion. Cervical back: Neck supple. Lymphadenopathy:      Cervical: No cervical adenopathy. Skin:     General: Skin is warm and dry. Findings: No rash. Neurological:      Mental Status: She is alert and oriented to person, place, and time. Cranial Nerves: No cranial nerve deficit (grossly). Psychiatric:         Thought Content: Thought content normal.        /70 (Site: Left Upper Arm, Position: Sitting, Cuff Size: Large Adult)   Pulse 77   Resp 16   Ht 5' 3\" (1.6 m)   Wt 142 lb (64.4 kg)   BMI 25.15 kg/m²     Assessment:       Diagnosis Orders   1. Pure hypercholesterolemia  Lipid Panel   2. Acquired hypothyroidism  TSH   3. Chronic right hip pain     4. General medical exam  Comprehensive Metabolic Panel   5. Balance problems        Reviewed multiple test results).     4/22/2021 normal hemoglobin 13.2.    4/22/2021 normal glucose 104.    1/6/2021 normal TSH at 4.66. Patient told to continue Zocor and Synthroid. Plan:       Return in about 6 months (around 2/24/2022) for medicare AWV, Hyperlipidemia, hypothyroidism. Orders Placed This Encounter   Procedures    TSH     Standing Status:   Future     Standing Expiration Date:   8/24/2022    Comprehensive Metabolic Panel     Standing Status:   Future     Standing Expiration Date:   8/24/2022    Lipid Panel     Standing Status:   Future     Standing Expiration Date:   8/24/2022     Order Specific Question:   Is Patient Fasting?/# of Hours     Answer:   yes     No orders of the defined types were placed in this encounter. Patientgiven educational materials - see patient instructions. Discussed use, benefit,and side effects of prescribed medications. All patient questions answered. Ptvoiced understanding. Reviewed health maintenance. Instructed to continue currentmedications, diet and exercise. Patient agreed with treatment plan. Follow up asdirected.      Electronically signed by Altaf Valero MD on 8/24/2021 at 4:18 PM

## 2021-08-25 LAB — VITAMIN D 25-HYDROXY: 74.6 NG/ML (ref 30–100)

## 2021-09-11 ENCOUNTER — HOSPITAL ENCOUNTER (OUTPATIENT)
Dept: MAMMOGRAPHY | Age: 86
Discharge: HOME | End: 2021-09-13
Payer: MEDICARE

## 2021-09-11 VITALS — WEIGHT: 142 LBS | BODY MASS INDEX: 25.16 KG/M2 | HEIGHT: 63 IN

## 2021-09-11 DIAGNOSIS — Z12.31 VISIT FOR SCREENING MAMMOGRAM: ICD-10-CM

## 2021-09-11 PROCEDURE — 77067 SCR MAMMO BI INCL CAD: CPT

## 2021-09-11 PROCEDURE — 77063 BREAST TOMOSYNTHESIS BI: CPT

## 2021-09-24 ENCOUNTER — TELEPHONE (OUTPATIENT)
Dept: ONCOLOGY | Age: 86
End: 2021-09-24

## 2021-10-28 DIAGNOSIS — E03.9 HYPOTHYROIDISM, UNSPECIFIED TYPE: ICD-10-CM

## 2021-10-28 RX ORDER — LEVOTHYROXINE SODIUM 0.05 MG/1
50 TABLET ORAL DAILY
Qty: 30 TABLET | Refills: 11 | Status: SHIPPED | OUTPATIENT
Start: 2021-10-28

## 2021-11-11 ENCOUNTER — HOSPITAL ENCOUNTER (OUTPATIENT)
Dept: LAB | Age: 86
Discharge: HOME | End: 2021-11-11
Payer: MEDICARE

## 2021-11-11 ENCOUNTER — TELEPHONE (OUTPATIENT)
Dept: ONCOLOGY | Age: 86
End: 2021-11-11

## 2021-11-11 DIAGNOSIS — Z17.0 MALIGNANT NEOPLASM OF UPPER-OUTER QUADRANT OF RIGHT BREAST IN FEMALE, ESTROGEN RECEPTOR POSITIVE (HCC): Primary | ICD-10-CM

## 2021-11-11 DIAGNOSIS — C50.411 MALIGNANT NEOPLASM OF UPPER-OUTER QUADRANT OF RIGHT BREAST IN FEMALE, ESTROGEN RECEPTOR POSITIVE (HCC): ICD-10-CM

## 2021-11-11 DIAGNOSIS — C50.411 MALIGNANT NEOPLASM OF UPPER-OUTER QUADRANT OF RIGHT BREAST IN FEMALE, ESTROGEN RECEPTOR POSITIVE (HCC): Primary | ICD-10-CM

## 2021-11-11 DIAGNOSIS — Z17.0 MALIGNANT NEOPLASM OF UPPER-OUTER QUADRANT OF RIGHT BREAST IN FEMALE, ESTROGEN RECEPTOR POSITIVE (HCC): ICD-10-CM

## 2021-11-11 LAB
ABSOLUTE EOS #: 0.12 K/UL (ref 0–0.44)
ABSOLUTE IMMATURE GRANULOCYTE: <0.03 K/UL (ref 0–0.3)
ABSOLUTE LYMPH #: 1.44 K/UL (ref 1.1–3.7)
ABSOLUTE MONO #: 0.52 K/UL (ref 0.1–1.2)
ALBUMIN SERPL-MCNC: 4.3 G/DL (ref 3.5–5.2)
ALBUMIN/GLOBULIN RATIO: 1.3 (ref 1–2.5)
ALP BLD-CCNC: 80 U/L (ref 35–104)
ALT SERPL-CCNC: 15 U/L (ref 5–33)
ANION GAP SERPL CALCULATED.3IONS-SCNC: 11 MMOL/L (ref 9–17)
AST SERPL-CCNC: 22 U/L
BASOPHILS # BLD: 1 % (ref 0–2)
BASOPHILS ABSOLUTE: 0.04 K/UL (ref 0–0.2)
BILIRUB SERPL-MCNC: 0.32 MG/DL (ref 0.3–1.2)
BILIRUBIN DIRECT: 0.08 MG/DL
BILIRUBIN, INDIRECT: 0.24 MG/DL (ref 0–1)
BUN BLDV-MCNC: 20 MG/DL (ref 8–23)
BUN/CREAT BLD: 28 (ref 9–20)
CALCIUM SERPL-MCNC: 10 MG/DL (ref 8.6–10.4)
CHLORIDE BLD-SCNC: 97 MMOL/L (ref 98–107)
CO2: 28 MMOL/L (ref 20–31)
CREAT SERPL-MCNC: 0.72 MG/DL (ref 0.5–0.9)
DIFFERENTIAL TYPE: ABNORMAL
EOSINOPHILS RELATIVE PERCENT: 2 % (ref 1–4)
GFR AFRICAN AMERICAN: >60 ML/MIN
GFR NON-AFRICAN AMERICAN: >60 ML/MIN
GFR SERPL CREATININE-BSD FRML MDRD: ABNORMAL ML/MIN/{1.73_M2}
GFR SERPL CREATININE-BSD FRML MDRD: ABNORMAL ML/MIN/{1.73_M2}
GLOBULIN: 3.4 G/DL (ref 1.5–3.8)
GLUCOSE BLD-MCNC: 99 MG/DL (ref 70–99)
HCT VFR BLD CALC: 38.7 % (ref 36.3–47.1)
HEMOGLOBIN: 12.9 G/DL (ref 11.9–15.1)
IMMATURE GRANULOCYTES: 0 %
LYMPHOCYTES # BLD: 23 % (ref 24–43)
MCH RBC QN AUTO: 32 PG (ref 25.2–33.5)
MCHC RBC AUTO-ENTMCNC: 33.3 G/DL (ref 25.2–33.5)
MCV RBC AUTO: 96 FL (ref 82.6–102.9)
MONOCYTES # BLD: 9 % (ref 3–12)
NRBC AUTOMATED: 0 PER 100 WBC
PDW BLD-RTO: 13.2 % (ref 11.8–14.4)
PLATELET # BLD: 308 K/UL (ref 138–453)
PLATELET ESTIMATE: ABNORMAL
PMV BLD AUTO: 8.8 FL (ref 8.1–13.5)
POTASSIUM SERPL-SCNC: 4.5 MMOL/L (ref 3.7–5.3)
RBC # BLD: 4.03 M/UL (ref 3.95–5.11)
RBC # BLD: ABNORMAL 10*6/UL
SEG NEUTROPHILS: 65 % (ref 36–65)
SEGMENTED NEUTROPHILS ABSOLUTE COUNT: 4.01 K/UL (ref 1.5–8.1)
SODIUM BLD-SCNC: 136 MMOL/L (ref 135–144)
TOTAL PROTEIN: 7.7 G/DL (ref 6.4–8.3)
WBC # BLD: 6.2 K/UL (ref 3.5–11.3)
WBC # BLD: ABNORMAL 10*3/UL

## 2021-11-11 PROCEDURE — 80076 HEPATIC FUNCTION PANEL: CPT

## 2021-11-11 PROCEDURE — 36415 COLL VENOUS BLD VENIPUNCTURE: CPT

## 2021-11-11 PROCEDURE — 85025 COMPLETE CBC W/AUTO DIFF WBC: CPT

## 2021-11-11 PROCEDURE — 80048 BASIC METABOLIC PNL TOTAL CA: CPT

## 2021-11-18 ENCOUNTER — OFFICE VISIT (OUTPATIENT)
Dept: ONCOLOGY | Age: 86
End: 2021-11-18
Payer: MEDICARE

## 2021-11-18 VITALS
TEMPERATURE: 98 F | SYSTOLIC BLOOD PRESSURE: 141 MMHG | HEIGHT: 63 IN | RESPIRATION RATE: 18 BRPM | WEIGHT: 142.2 LBS | OXYGEN SATURATION: 98 % | BODY MASS INDEX: 25.2 KG/M2 | DIASTOLIC BLOOD PRESSURE: 76 MMHG | HEART RATE: 72 BPM

## 2021-11-18 DIAGNOSIS — C50.411 MALIGNANT NEOPLASM OF UPPER-OUTER QUADRANT OF RIGHT BREAST IN FEMALE, ESTROGEN RECEPTOR POSITIVE (HCC): Primary | ICD-10-CM

## 2021-11-18 DIAGNOSIS — Z17.0 MALIGNANT NEOPLASM OF UPPER-OUTER QUADRANT OF RIGHT BREAST IN FEMALE, ESTROGEN RECEPTOR POSITIVE (HCC): Primary | ICD-10-CM

## 2021-11-18 PROCEDURE — 99213 OFFICE O/P EST LOW 20 MIN: CPT | Performed by: INTERNAL MEDICINE

## 2021-12-04 NOTE — PROGRESS NOTES
[facility] PROFESSIONAL SERVICES  ONCOLOGY SPECIALISTS OF [facility]  Via [facility] [address] [facility] 83,[address] 200  [address] [address]  Dept: [phone]  Dept Fax: [phone]  Loc: [phone]    Subjective:      Chief Complaint: Rhett Seymour is a 80 y.o. female with breast cancer, In December, 2013, the patient noticed an abnormality in her right breast. She sought medical attention and had a mammogram and ultrasound completed in [facility]. The mammogram was noted to have dense breast tissue and the ultrasound found a mass in the right breast measuring 3.1 cm in greatest diameter. The patent elected to see Dr. Onesimo Brasher in [place] and had a core biopsy of the mass performed. 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 The patient underwent a right mastectomy and sentinel lymph node biopsy on 02/05/2014. 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 The sentinel lymph nodes analysis was negative for malignancy. HPI:    Daylin Brown is a pleasant elderly female who is here today for follow-up regarding her history of breast cancer. On today's evaluation she does not have any signs or symptoms that be suggestive recurrence of her malignancy. She does complain of generalized weakness and fatigue. The patient has not had skeletal pain. She has not had fever, cough, shortness of breath or other signs of infection. The patient's bowel and bladder habits have been normal.  She has not seen blood in her stool or urine. The patient remains active with an ECOG performance status of level 0. Her most recent mammogram was on November 11, 2021 and was reported as benign appearing mammogram mammogram was reviewed with the patient today.     PMH, SH, and FH:  I reviewed the patients medication list and allergy list as noted on the electronic medical record. The PMH, SH and FH were also reviewed as noted on the EMR. Review of Systems   Constitutional: Fatigue. HENT: Negative. Eyes: Negative. Respiratory: Negative. Cardiovascular: Negative. Gastrointestinal: Negative. Genitourinary: Negative. Musculoskeletal: Negative. Skin: Negative. Neurological: General weakness. Hematological: Negative. Psychiatric/Behavioral: Negative. Physical Exam:  Vitals:    11/18/21 1104   BP: (!) 141/76   Pulse: 72   Resp: 18   Temp: 98 °F (36.7 °C)   SpO2: 98%   Vitals reviewed and are stable. Constitutional: Elderly. No acute distress. HENT: Normocephalic and atraumatic. Eyes: Pupils appear equal. No scleral icterus. Neck: Bilateral appearance is symmetrical. No identifiable masses. Chest: Left breast exam displays no palpable abnormalities. Right breast is surgically absent. Surgical incisions are well-healed. There is no evidence of any localized recurrence. Pulmonary: Effort normal. No respiratory distress. Musculoskeletal: Gait is abnormal. Muscle strength and tone grossly decreased. Neurological: Alert and oriented to person, place, and time. Judgment and thought content normal.  Skin: Scattered ecchymosis noted on bilateral upper forearms. Psychiatric: Mood and affect appropriate for the clinical situation. .      Data Analysis: The following studies were reviewed with the patient today:    Hematology 11/11/2021 4/22/2021 1/6/2021   WBC 6.2 7.2 7.4   RBC 4.03 4.18 (L) 4.13   HGB 12.9 13.2 13.1   HCT 38.7 40.7 40.1   MCV 96.0 97 97.1   RDW 13.2 13.1 13.2    310 305     Assessment:   1. Invasive breast cancer. Stage I. Estrogen receptor positive. Plan:   1. Monitor for recurrence or malignancy. Debbi Tran M.D.                                                                          Medical Director: [address] [place] [address] of [facility] [address] [place], [address] [place] [address], [address] [place] [address], [address] [place] [address], [address] [facility] [address] of the [facility] at the [facility]      **This report has been created using voice recognition software. It may contain minor errors which are inherent in voice recognition technology. **

## 2022-03-03 ENCOUNTER — HOSPITAL ENCOUNTER (OUTPATIENT)
Dept: LAB | Age: 87
Discharge: HOME | End: 2022-03-03
Payer: MEDICARE

## 2022-03-03 DIAGNOSIS — E03.9 ACQUIRED HYPOTHYROIDISM: ICD-10-CM

## 2022-03-03 DIAGNOSIS — E78.00 PURE HYPERCHOLESTEROLEMIA: ICD-10-CM

## 2022-03-03 DIAGNOSIS — Z00.00 GENERAL MEDICAL EXAM: ICD-10-CM

## 2022-03-03 LAB
ALBUMIN SERPL-MCNC: 4.3 G/DL (ref 3.5–5.2)
ALBUMIN/GLOBULIN RATIO: 1.3 (ref 1–2.5)
ALP BLD-CCNC: 71 U/L (ref 35–104)
ALT SERPL-CCNC: 13 U/L (ref 5–33)
ANION GAP SERPL CALCULATED.3IONS-SCNC: 8 MMOL/L (ref 9–17)
AST SERPL-CCNC: 20 U/L
BILIRUB SERPL-MCNC: 0.44 MG/DL (ref 0.3–1.2)
BUN BLDV-MCNC: 22 MG/DL (ref 8–23)
BUN/CREAT BLD: 27 (ref 9–20)
CALCIUM SERPL-MCNC: 9.8 MG/DL (ref 8.6–10.4)
CHLORIDE BLD-SCNC: 98 MMOL/L (ref 98–107)
CHOLESTEROL/HDL RATIO: 2
CHOLESTEROL: 200 MG/DL
CO2: 32 MMOL/L (ref 20–31)
CREAT SERPL-MCNC: 0.82 MG/DL (ref 0.5–0.9)
GFR AFRICAN AMERICAN: >60 ML/MIN
GFR NON-AFRICAN AMERICAN: >60 ML/MIN
GFR SERPL CREATININE-BSD FRML MDRD: ABNORMAL ML/MIN/{1.73_M2}
GLUCOSE BLD-MCNC: 98 MG/DL (ref 70–99)
HDLC SERPL-MCNC: 102 MG/DL
LDL CHOLESTEROL: 83 MG/DL (ref 0–130)
POTASSIUM SERPL-SCNC: 4.3 MMOL/L (ref 3.7–5.3)
SODIUM BLD-SCNC: 138 MMOL/L (ref 135–144)
TOTAL PROTEIN: 7.6 G/DL (ref 6.4–8.3)
TRIGL SERPL-MCNC: 75 MG/DL
TSH SERPL DL<=0.05 MIU/L-ACNC: 3.17 MIU/L (ref 0.3–5)

## 2022-03-03 PROCEDURE — 80061 LIPID PANEL: CPT

## 2022-03-03 PROCEDURE — 36415 COLL VENOUS BLD VENIPUNCTURE: CPT

## 2022-03-03 PROCEDURE — 84443 ASSAY THYROID STIM HORMONE: CPT

## 2022-03-03 PROCEDURE — 80053 COMPREHEN METABOLIC PANEL: CPT

## 2022-03-08 ENCOUNTER — OFFICE VISIT (OUTPATIENT)
Dept: INTERNAL MEDICINE | Age: 87
End: 2022-03-08
Payer: MEDICARE

## 2022-03-08 VITALS
HEART RATE: 77 BPM | SYSTOLIC BLOOD PRESSURE: 130 MMHG | RESPIRATION RATE: 16 BRPM | WEIGHT: 146 LBS | BODY MASS INDEX: 25.87 KG/M2 | HEIGHT: 63 IN | DIASTOLIC BLOOD PRESSURE: 68 MMHG

## 2022-03-08 DIAGNOSIS — R26.89 BALANCE PROBLEMS: ICD-10-CM

## 2022-03-08 DIAGNOSIS — Z78.0 POSTMENOPAUSAL: ICD-10-CM

## 2022-03-08 DIAGNOSIS — I10 PRIMARY HYPERTENSION: ICD-10-CM

## 2022-03-08 DIAGNOSIS — C50.411 MALIGNANT NEOPLASM OF UPPER-OUTER QUADRANT OF RIGHT BREAST IN FEMALE, ESTROGEN RECEPTOR POSITIVE (HCC): ICD-10-CM

## 2022-03-08 DIAGNOSIS — Z17.0 MALIGNANT NEOPLASM OF UPPER-OUTER QUADRANT OF RIGHT BREAST IN FEMALE, ESTROGEN RECEPTOR POSITIVE (HCC): ICD-10-CM

## 2022-03-08 DIAGNOSIS — E55.9 VITAMIN D DEFICIENCY: ICD-10-CM

## 2022-03-08 DIAGNOSIS — Z00.00 MEDICARE ANNUAL WELLNESS VISIT, SUBSEQUENT: ICD-10-CM

## 2022-03-08 DIAGNOSIS — E03.9 ACQUIRED HYPOTHYROIDISM: ICD-10-CM

## 2022-03-08 DIAGNOSIS — Z00.00 GENERAL MEDICAL EXAM: Primary | ICD-10-CM

## 2022-03-08 PROBLEM — I26.99 ACUTE PULMONARY EMBOLISM WITHOUT ACUTE COR PULMONALE (HCC): Status: RESOLVED | Noted: 2020-03-19 | Resolved: 2022-03-08

## 2022-03-08 PROCEDURE — G0439 PPPS, SUBSEQ VISIT: HCPCS | Performed by: INTERNAL MEDICINE

## 2022-03-08 PROCEDURE — G0463 HOSPITAL OUTPT CLINIC VISIT: HCPCS | Performed by: INTERNAL MEDICINE

## 2022-03-08 PROCEDURE — 99214 OFFICE O/P EST MOD 30 MIN: CPT | Performed by: INTERNAL MEDICINE

## 2022-03-08 PROCEDURE — 99397 PER PM REEVAL EST PAT 65+ YR: CPT | Performed by: INTERNAL MEDICINE

## 2022-03-08 RX ORDER — TRIAMTERENE AND HYDROCHLOROTHIAZIDE 37.5; 25 MG/1; MG/1
1 TABLET ORAL DAILY
Qty: 30 TABLET | Refills: 11 | Status: CANCELLED | OUTPATIENT
Start: 2022-03-08

## 2022-03-08 ASSESSMENT — PATIENT HEALTH QUESTIONNAIRE - PHQ9
SUM OF ALL RESPONSES TO PHQ QUESTIONS 1-9: 0
SUM OF ALL RESPONSES TO PHQ9 QUESTIONS 1 & 2: 0
SUM OF ALL RESPONSES TO PHQ QUESTIONS 1-9: 0
2. FEELING DOWN, DEPRESSED OR HOPELESS: 0
SUM OF ALL RESPONSES TO PHQ QUESTIONS 1-9: 0
SUM OF ALL RESPONSES TO PHQ QUESTIONS 1-9: 0
1. LITTLE INTEREST OR PLEASURE IN DOING THINGS: 0

## 2022-03-08 ASSESSMENT — ENCOUNTER SYMPTOMS
NAUSEA: 0
SHORTNESS OF BREATH: 0
BACK PAIN: 0
DIARRHEA: 0
BLOOD IN STOOL: 0
VOMITING: 0
COUGH: 0
EYE PAIN: 0
CONSTIPATION: 0
ABDOMINAL PAIN: 0

## 2022-03-08 ASSESSMENT — LIFESTYLE VARIABLES: HOW OFTEN DO YOU HAVE A DRINK CONTAINING ALCOHOL: NEVER

## 2022-03-08 NOTE — PATIENT INSTRUCTIONS
Personalized Preventive Plan for Beena Albarran - 3/8/2022  Medicare offers a range of preventive health benefits. Some of the tests and screenings are paid in full while other may be subject to a deductible, co-insurance, and/or copay. Some of these benefits include a comprehensive review of your medical history including lifestyle, illnesses that may run in your family, and various assessments and screenings as appropriate. After reviewing your medical record and screening and assessments performed today your provider may have ordered immunizations, labs, imaging, and/or referrals for you. A list of these orders (if applicable) as well as your Preventive Care list are included within your After Visit Summary for your review. Other Preventive Recommendations:    · A preventive eye exam performed by an eye specialist is recommended every 1-2 years to screen for glaucoma; cataracts, macular degeneration, and other eye disorders. · A preventive dental visit is recommended every 6 months. · Try to get at least 150 minutes of exercise per week or 10,000 steps per day on a pedometer . · Order or download the FREE \"Exercise & Physical Activity: Your Everyday Guide\" from [employer]. Call 3-592.680.7391 or search [employer] online. · You need 8569-8801 mg of calcium and 8355-1248 IU of vitamin D per day. It is possible to meet your calcium requirement with diet alone, but a vitamin D supplement is usually necessary to meet this goal.  · When exposed to the sun, use a sunscreen that protects against both UVA and UVB radiation with an SPF of 30 or greater. Reapply every 2 to 3 hours or after sweating, drying off with a towel, or swimming. · Always wear a seat belt when traveling in a car. Always wear a helmet when riding a bicycle or motorcycle.

## 2022-03-08 NOTE — PROGRESS NOTES
[place]  [address] [place] [address]  Dept: [phone]  Dept Fax: [phone]  Loc: [id number] Duglas Pastrana is a 80 y.o. female who presents today for her medical conditions/complaintsas noted below. Kit Pulido is c/o of   Chief Complaint   Patient presents with    Medicare AWV     6 month    Hyperlipidemia    Hypothyroidism         HPI:     Hyperlipidemia  This is a chronic problem. The current episode started more than 1 year ago. The problem is controlled. Recent lipid tests were reviewed and are variable. Pertinent negatives include no chest pain or shortness of breath. Other  This is a recurrent (2-General medical exam, 3-hypothyroidism, 4-balance problem, 5-malignant neoplasm of upper outer quadrant of right breast in female, estrogen receptor positive) problem. The current episode started today. The problem occurs intermittently. The problem has been waxing and waning. Pertinent negatives include no abdominal pain, arthralgias, chest pain, chills, coughing, fever, headaches, nausea, neck pain, numbness, rash, vomiting or weakness. No results found for: LABA1C         No results found for: LABMICR  LDL Cholesterol (mg/dL)   Date Value   03/03/2022 83   01/06/2021 104   03/20/2020 53         AST (U/L)   Date Value   03/03/2022 20     ALT (U/L)   Date Value   03/03/2022 13     BUN (mg/dL)   Date Value   03/03/2022 22     BP Readings from Last 3 Encounters:   03/08/22 130/68   11/18/21 (!) 141/76   08/24/21 132/70              Past Medical History:   Diagnosis Date    Breast [facility] ([facility] 2014    s/p mastectomy and reconstruction surgery.     Closed subcapital fracture of femur, right, initial encounter ([facility] 1/22/2020    Edema extremities     takes diuretics prn    Hyperlipidemia     Hypertension     Hypothyroidism     Osteoarthritis     mild    Unspecified vitamin D deficiency 2,000 Units by mouth daily       AZO-CRANBERRY PO Take 1 tablet by mouth daily as needed (urinary health)       Multiple Vitamins-Minerals (PRESERVISION AREDS 2 PO) Take 1 tablet by mouth daily      Handicap Placard MISC by Does not apply route . Expires in 5 years 1 each 0    CALCIUM-MAGNESIUM-VITAMIN D PO Take by mouth daily      acetaminophen (TYLENOL) 500 MG tablet Take 500 mg by mouth every 6 hours as needed for Pain. Uses approx once a week      Multiple Vitamin (MULTI-VITAMIN DAILY PO) Take 1 tablet by mouth daily. No current facility-administered medications for this visit. No Known Allergies    Health Maintenance   Topic Date Due    Depression Screen  Never done    Shingles Vaccine (1 of 2) Never done    Pneumococcal 65+ years Vaccine (2 of 2 - PPSV23) 09/11/2018    COVID-19 Vaccine (2 - Moderna 3-dose series) 03/06/2021    Flu vaccine (1) 09/01/2021    DTaP/Tdap/Td vaccine (2 - Td or Tdap) 12/19/2021    Annual Wellness Visit (AWV)  01/09/2022    Lipid screen  03/03/2023    TSH testing  03/03/2023    Potassium monitoring  03/03/2023    Creatinine monitoring  03/03/2023    Hepatitis A vaccine  Aged Out    Hepatitis B vaccine  Aged Out    Hib vaccine  Aged Out    Meningococcal (ACWY) vaccine  Aged Out       Subjective:      Review of Systems   Constitutional: Negative for chills and fever. HENT: Negative for hearing loss. Eyes: Negative for pain and visual disturbance. Respiratory: Negative for cough and shortness of breath. Cardiovascular: Negative for chest pain, palpitations and leg swelling. Gastrointestinal: Negative for abdominal pain, blood in stool, constipation, diarrhea, nausea and vomiting. Endocrine: Negative for cold intolerance, polydipsia and polyuria. Genitourinary: Negative for difficulty urinating, dysuria and hematuria. Musculoskeletal: Negative for arthralgias, back pain, gait problem and neck pain. Skin: Negative for pallor and rash. Neurological: Negative for dizziness, weakness, numbness and headaches. Hematological: Negative for adenopathy. Does not bruise/bleed easily. Psychiatric/Behavioral: Negative for confusion. The patient is not nervous/anxious. Objective:     Physical Exam  Vitals reviewed. Constitutional:       Appearance: She is well-developed. HENT:      Head: Normocephalic and atraumatic. Eyes:      Pupils: Pupils are equal, round, and reactive to light. Cardiovascular:      Rate and Rhythm: Normal rate and regular rhythm. Heart sounds: No murmur heard. No friction rub. No gallop. Pulmonary:      Effort: Pulmonary effort is normal.      Breath sounds: Normal breath sounds. No wheezing or rales. Abdominal:      General: There is no distension. Palpations: Abdomen is soft. There is no mass. Tenderness: There is no abdominal tenderness. There is no rebound. Musculoskeletal:         General: Normal range of motion. Cervical back: Neck supple. Lymphadenopathy:      Cervical: No cervical adenopathy. Skin:     General: Skin is warm and dry. Findings: No rash. Neurological:      Mental Status: She is alert and oriented to person, place, and time. Cranial Nerves: No cranial nerve deficit (grossly). Psychiatric:         Thought Content: Thought content normal.        /68 (Site: Left Upper Arm, Position: Sitting, Cuff Size: Large Adult)   Pulse 77   Resp 16   Ht 5' 3\" (1.6 m)   Wt 146 lb (66.2 kg)   BMI 25.86 kg/m²     Assessment:       Diagnosis Orders   1. General medical exam     2. Medicare annual wellness visit, subsequent     3. Balance problems     4. Malignant neoplasm of upper-outer quadrant of right breast in female, estrogen receptor positive ([facility]     5. Primary hypertension     6. Acquired hypothyroidism     7. Postmenopausal  DEXA BONE DENSITY AXIAL SKELETON   8.  Vitamin D deficiency  Vitamin D 25 Hydroxy      Reviewed multiple test results for this appointment. 11/11/2021 normal hemoglobin 12.9.    3/3/2022 normal glucose 98.    3-3-22 normal TSH 3.17. Patient told to continue Synthroid and Zocor. Plan:       Return in about 6 months (around 9/12/2022) for Hyperlipidemia, hypothyroidism. Orders Placed This Encounter   Procedures    DEXA BONE DENSITY AXIAL SKELETON     Standing Status:   Future     Standing Expiration Date:   3/8/2023     Order Specific Question:   Reason for exam:     Answer:   routine    Vitamin D 25 Hydroxy     Standing Status:   Future     Standing Expiration Date:   3/8/2023     No orders of the defined types were placed in this encounter. Patientgiven educational materials - see patient instructions. Discussed use, benefit,and side effects of prescribed medications. All patient questions answered. Ptvoiced understanding. Reviewed health maintenance. Instructed to continue currentmedications, diet and exercise. Patient agreed with treatment plan. Follow up asdirected.      Electronically signed by Yan Gibbs MD on 3/8/2022 at 1:40 PM

## 2022-03-08 NOTE — PROGRESS NOTES
Medicare Annual Wellness Visit    Alecia Aldridge is here for Medicare AWV (6 month), Hyperlipidemia, and Hypothyroidism    Assessment & Plan   Abisai Hook was seen today for medicare awv, hyperlipidemia and hypothyroidism. Diagnoses and all orders for this visit:    General medical exam    Medicare annual wellness visit, subsequent    Balance problems    Malignant neoplasm of upper-outer quadrant of right breast in female, estrogen receptor positive ([address]    Primary hypertension    Acquired hypothyroidism    Postmenopausal  -     DEXA BONE DENSITY AXIAL SKELETON; Future    Vitamin D deficiency  -     Vitamin D 25 Hydroxy; Future         Recommendations for Preventive Services Due: see orders and patient instructions/AVS.  Recommended screening schedule for the next 5-10 years is provided to the patient in written form: see Patient Instructions/AVS.     Return in about 6 months (around 9/12/2022) for Hyperlipidemia, hypothyroidism. Subjective       Patient's complete Health Risk Assessment and screening values have been reviewed and are found in Flowsheets. The following problems were reviewed today and where indicated follow up appointments were made and/or referrals ordered.     Positive Risk Factor Screenings with Interventions:               General Health and ACP:  General  In general, how would you say your health is?: Very Good  In the past 7 days, have you experienced any of the following: New or Increased Pain, New or Increased Fatigue, Loneliness, Social Isolation, Stress or Anger?: No  Do you get the social and emotional support that you need?: Yes  Do you have a Living Will?: Yes    Advance Directives     Power of  Living Will ACP-Advance Directive ACP-Power of     Not on File Not on File Not on File Not on File      General Health Risk Interventions:  · .              Objective   Vitals:    03/08/22 1321   BP: 130/68   Site: Left Upper Arm   Position: Sitting   Cuff Size: Large Adult Pulse: 77   Resp: 16   Weight: 146 lb (66.2 kg)   Height: 5' 3\" (1.6 m)      Body mass index is 25.86 kg/m². No Known Allergies  Prior to Visit Medications    Medication Sig Taking? Authorizing Provider   levothyroxine (SYNTHROID) 50 MCG tablet TAKE 1 TABLET BY MOUTH DAILY  Oscar Leone MD   apixaban (ELIQUIS) 5 MG TABS tablet TAKE 1 TABLET BY MOUTH 2 TIMES DAILY  Oscar Leone MD   simvastatin (ZOCOR) 20 MG tablet TAKE 0.5 TABLETS BY MOUTH EVERY Idalia Schilling MD   triamterene-hydroCHLOROthiazide (MAXZIDE-25) 37.5-25 MG per tablet TAKE 1 TABLET BY MOUTH DAILY  Oscar Leone MD   Cholecalciferol (VITAMIN D3) 50 MCG (2000 UT) TABS Take 2,000 Units by mouth daily   Historical Provider, MD   AZO-CRANBERRY PO Take 1 tablet by mouth daily as needed (urinary health)   Historical Provider, MD   Multiple Vitamins-Minerals (PRESERVISION AREDS 2 PO) Take 1 tablet by mouth daily  Historical Provider, MD   Handicap Placard MISC by Does not apply route . Expires in 5 years  Oscar Leone MD   CALCIUM-MAGNESIUM-VITAMIN D PO Take by mouth daily  Historical Provider, MD   acetaminophen (TYLENOL) 500 MG tablet Take 500 mg by mouth every 6 hours as needed for Pain. Uses approx once a week  Historical Provider, MD   Multiple Vitamin (MULTI-VITAMIN DAILY PO) Take 1 tablet by mouth daily. Historical Provider, MD Villavicencio (Including outside providers/suppliers regularly involved in providing care):   Patient Care Team:  Oscar Leone MD as PCP - General (Internal Medicine)  Oscar Leone MD as PCP - [address] [place] [address] Dr Sotoled Provider  Ham Marie MD as Consulting Physician (Hematology and Oncology)    Reviewed and updated this visit:  Tobacco  Allergies  Meds  Problems  Med Hx  Surg Hx  Soc Hx  Fam Hx                   I ,  Dr. Saul Lopez, directly supervised the performance of this Medicare annual wellness visit.

## 2022-04-06 ENCOUNTER — TELEPHONE (OUTPATIENT)
Dept: INTERNAL MEDICINE | Age: 87
End: 2022-04-06

## 2022-04-06 ENCOUNTER — HOSPITAL ENCOUNTER (OUTPATIENT)
Dept: LAB | Age: 87
Discharge: HOME | End: 2022-04-06
Payer: MEDICARE

## 2022-04-06 DIAGNOSIS — R30.0 BURNING WITH URINATION: ICD-10-CM

## 2022-04-06 DIAGNOSIS — R35.0 URINATION FREQUENCY: ICD-10-CM

## 2022-04-06 DIAGNOSIS — R30.0 BURNING WITH URINATION: Primary | ICD-10-CM

## 2022-04-06 DIAGNOSIS — R10.2 PELVIC PRESSURE IN FEMALE: Primary | ICD-10-CM

## 2022-04-06 LAB
-: ABNORMAL
BACTERIA: ABNORMAL
BILIRUBIN URINE: NEGATIVE
EPITHELIAL CELLS UA: ABNORMAL /HPF (ref 0–5)
GLUCOSE URINE: NEGATIVE
KETONES, URINE: NEGATIVE
LEUKOCYTE ESTERASE, URINE: ABNORMAL
NITRITE, URINE: POSITIVE
OTHER OBSERVATIONS UA: ABNORMAL
PH UA: 6 (ref 5–6)
PROTEIN UA: NEGATIVE
RBC UA: ABNORMAL /HPF (ref 0–4)
SPECIFIC GRAVITY UA: 1.01 (ref 1.01–1.02)
URINE HGB: ABNORMAL
UROBILINOGEN, URINE: NORMAL
WBC UA: >50 /HPF (ref 0–4)

## 2022-04-06 PROCEDURE — 87077 CULTURE AEROBIC IDENTIFY: CPT

## 2022-04-06 PROCEDURE — 81001 URINALYSIS AUTO W/SCOPE: CPT

## 2022-04-06 PROCEDURE — 87086 URINE CULTURE/COLONY COUNT: CPT

## 2022-04-06 PROCEDURE — 87186 SC STD MICRODIL/AGAR DIL: CPT

## 2022-04-06 RX ORDER — CEPHALEXIN 500 MG/1
500 CAPSULE ORAL 2 TIMES DAILY
Qty: 20 CAPSULE | Refills: 0 | Status: SHIPPED | OUTPATIENT
Start: 2022-04-06 | End: 2022-04-16

## 2022-04-06 NOTE — TELEPHONE ENCOUNTER
Last appt: 3/8/2022  Next appt: 9/8/2022    Patient came to the window and is asking for a referral to gynecology for a pelvic check. She has been having issues with frequent urination and burning and we sent her down for a urine waiting to see what that shows.   She has not had a pelvic check in over 15 years

## 2022-04-10 LAB
CULTURE: ABNORMAL
SPECIMEN DESCRIPTION: ABNORMAL

## 2022-04-12 DIAGNOSIS — E78.5 HYPERLIPIDEMIA, UNSPECIFIED HYPERLIPIDEMIA TYPE: ICD-10-CM

## 2022-04-12 RX ORDER — SIMVASTATIN 20 MG
10 TABLET ORAL EVERY EVENING
Qty: 45 TABLET | Refills: 3 | Status: SHIPPED | OUTPATIENT
Start: 2022-04-12

## 2022-04-19 ENCOUNTER — TELEPHONE (OUTPATIENT)
Dept: INTERNAL MEDICINE | Age: 87
End: 2022-04-19

## 2022-04-19 RX ORDER — CEPHALEXIN 500 MG/1
500 CAPSULE ORAL 2 TIMES DAILY
Qty: 14 CAPSULE | Refills: 0 | Status: SHIPPED | OUTPATIENT
Start: 2022-04-19 | End: 2022-04-21

## 2022-04-19 NOTE — TELEPHONE ENCOUNTER
Patient has been being treated for a UTI. She is still having symptoms. Says every now and again she is just incontinent. She does have an appt with Dr Ousmane Sweeney next week. She wants to know if you could call in another round of antibiotic for her?  Uses [facility].  raoul let her know

## 2022-04-21 ENCOUNTER — HOSPITAL ENCOUNTER (EMERGENCY)
Age: 87
Discharge: HOME | End: 2022-04-21
Payer: MEDICARE

## 2022-04-21 VITALS
HEART RATE: 66 BPM | WEIGHT: 140 LBS | BODY MASS INDEX: 24.8 KG/M2 | HEIGHT: 63 IN | SYSTOLIC BLOOD PRESSURE: 165 MMHG | RESPIRATION RATE: 18 BRPM | TEMPERATURE: 97.5 F | DIASTOLIC BLOOD PRESSURE: 67 MMHG | OXYGEN SATURATION: 95 %

## 2022-04-21 DIAGNOSIS — N30.01 ACUTE CYSTITIS WITH HEMATURIA: Primary | ICD-10-CM

## 2022-04-21 LAB
BACTERIA: ABNORMAL /HPF
BILIRUBIN URINE: NEGATIVE
BLOOD, URINE: ABNORMAL
CASTS 2: ABNORMAL /LPF
CASTS UA: ABNORMAL /LPF
CHARACTER, URINE: ABNORMAL
COLOR: YELLOW
CRYSTALS, UA: ABNORMAL
EPITHELIAL CELLS, UA: ABNORMAL /HPF
GLUCOSE URINE: NEGATIVE MG/DL
KETONES, URINE: NEGATIVE
LEUKOCYTE ESTERASE, URINE: ABNORMAL
MISCELLANEOUS 2: ABNORMAL
NITRITE, URINE: NEGATIVE
PH UA: 7 (ref 5–9)
PROTEIN UA: NEGATIVE
RBC URINE: ABNORMAL /HPF
RENAL EPITHELIAL, UA: ABNORMAL
SPECIFIC GRAVITY, URINE: 1.01 (ref 1–1.03)
UROBILINOGEN, URINE: 0.2 EU/DL (ref 0–1)
WBC UA: > 100 /HPF
YEAST: ABNORMAL

## 2022-04-21 PROCEDURE — 87186 SC STD MICRODIL/AGAR DIL: CPT

## 2022-04-21 PROCEDURE — 81001 URINALYSIS AUTO W/SCOPE: CPT

## 2022-04-21 PROCEDURE — 99283 EMERGENCY DEPT VISIT LOW MDM: CPT

## 2022-04-21 PROCEDURE — 87086 URINE CULTURE/COLONY COUNT: CPT

## 2022-04-21 RX ORDER — SULFAMETHOXAZOLE AND TRIMETHOPRIM 800; 160 MG/1; MG/1
1 TABLET ORAL 2 TIMES DAILY
Qty: 14 TABLET | Refills: 0 | Status: SHIPPED | OUTPATIENT
Start: 2022-04-21 | End: 2022-04-28

## 2022-04-21 ASSESSMENT — ENCOUNTER SYMPTOMS
ABDOMINAL PAIN: 0
SHORTNESS OF BREATH: 0
DIARRHEA: 0
EYES NEGATIVE: 1
NAUSEA: 0
SORE THROAT: 0
COUGH: 0
VOMITING: 0

## 2022-04-21 ASSESSMENT — PAIN - FUNCTIONAL ASSESSMENT: PAIN_FUNCTIONAL_ASSESSMENT: NONE - DENIES PAIN

## 2022-04-21 NOTE — ED TRIAGE NOTES
Presents to ED with c/o UTI that has been ongoing for the past couple weeks. States she has been on 2 antibiotics with no relief. Reports she feels \"something is falling out of me\". Alert and oriented. Respirations easy and unlabored.

## 2022-04-21 NOTE — ED PROVIDER NOTES
[facility] [address] [address] EMERGENCY DEPT    EMERGENCY MEDICINE     Pt Name: Bailey Crockett  MRN: [id number]  Patsygfjasmina 7/26/1931  Date of evaluation: 4/21/2022  Provider: BESSY House    CHIEF COMPLAINT       Chief Complaint   Patient presents with    Urinary Tract Infection       HISTORY OF PRESENT ILLNESS    Bailey Crockett is a pleasant 80 y.o. female who presents to the emergency department for dysuria. Patient states that approximately 2 to 3 weeks ago she was diagnosed with UTI and given antibiotics for it. She states after she used the Keflex she continued to have pain with urination and was given another prescription of Keflex 2 days ago. She has taken a few doses since then continues to have no improvement. She states when she started having the pain with urination she felt like there is a pressure and something wanted to \"come out\" whenever she urinated. She states the pain and pressure-like sensation is only when she urinates. She has complaint of a couple of episodes of incontinence in the past few days as well. No complaints of back pain, radicular symptoms, worsening paresthesias to lower extremities. No complaints of fevers or chills. No complaints of abdominal pain, chest pain, shortness of breath. She states that she currently has not felt the pain or pressure today but that it will come and go. She has an appointment set up next Thursday to see her GYN physician. Triage notes and Nursing notes were reviewed by myself. Any discrepancies are addressed above. PAST MEDICAL HISTORY     Past Medical History:   Diagnosis Date    Breast cancer [facility]) 2014    s/p mastectomy and reconstruction surgery.     Closed subcapital fracture of femur, right, initial encounter ([facility] [address] 1/22/2020    Edema extremities     takes diuretics prn    Hyperlipidemia     Hypertension     Hypothyroidism     Osteoarthritis     mild    Unspecified vitamin D deficiency        SURGICAL HISTORY       Past Surgical History:   Procedure Laterality Date    APPENDECTOMY      at age 5    BREAST BIOPSY Right 2/2014    before she had her surgery    BREAST RECONSTRUCTION Right 2/2014    Pt had done at [facility] in [address] Left 2/2014    Pt had done at [facility] in [address] Right 09/19/2019    COLONOSCOPY  2007    showed mild diverticular disease, otherwise negative, repeat in 2017    EYE SURGERY      HYSTERECTOMY      MASTECTOMY, RADICAL Right 2/2014    Pt had done at [facility] in [address]    due to fibroids   [address] Right 1/23/2020    RIGHT TOTAL HIP performed by Major Murray MD at [facility]       Discharge Medication List as of 4/21/2022  1:49 PM      CONTINUE these medications which have NOT CHANGED    Details   simvastatin (ZOCOR) 20 MG tablet TAKE 0.5 TABLETS BY MOUTH EVERY EVENING, Disp-45 tablet, R-3Normal      levothyroxine (SYNTHROID) 50 MCG tablet TAKE 1 TABLET BY MOUTH DAILY, Disp-30 tablet, R-11Normal      apixaban (ELIQUIS) 5 MG TABS tablet TAKE 1 TABLET BY MOUTH 2 TIMES DAILY, Disp-180 tablet, R-3Normal      triamterene-hydroCHLOROthiazide (MAXZIDE-25) 37.5-25 MG per tablet TAKE 1 TABLET BY MOUTH DAILY, Disp-30 tablet, R-11Normal      Cholecalciferol (VITAMIN D3) 50 MCG (2000 UT) TABS Take 2,000 Units by mouth daily Historical Med      AZO-CRANBERRY PO Take 1 tablet by mouth daily as needed (urinary health) Historical Med      Multiple Vitamins-Minerals (PRESERVISION AREDS 2 PO) Take 1 tablet by mouth dailyHistorical Med      Handicap Placard MISC Starting 3/2/2017, Until Discontinued, Disp-1 each, R-0, Print. Expires in 5 years      CALCIUM-MAGNESIUM-VITAMIN D PO Take by mouth daily      acetaminophen (TYLENOL) 500 MG tablet Take 500 mg by mouth every 6 hours as needed for Pain.  Uses approx once a week      Multiple Vitamin (MULTI-VITAMIN DAILY PO) Take 1 tablet by mouth daily. ALLERGIES     Patient has no known allergies. FAMILY HISTORY       Family History   Problem Relation Age of Onset    Alzheimer's Disease Mother          at age 80    Emphysema Father          at age 68    Diabetes Father     COPD Sister     Other Sister         lung disease from tobacco abuse    Stroke Sister         history of a hemorrhagic cerebrovascular accident    Cancer Daughter         CML    Other Daughter         kidney stones    Hypertension Daughter     Heart Disease Maternal Grandmother     Heart Disease Maternal Grandfather     Diabetes Maternal Grandfather         SOCIAL HISTORY       Social History     Socioeconomic History    Marital status:      Spouse name: pt actually     Number of children: 3    Years of education: None    Highest education level: None   Occupational History    Occupation: retired  and book-keeper   Tobacco Use    Smoking status: Never Smoker    Smokeless tobacco: Never Used   Vaping Use    Vaping Use: Never used   Substance and Sexual Activity    Alcohol use: No    Drug use: No    Sexual activity: None   Other Topics Concern    None   Social History Narrative    None     Social Determinants of Health     Financial Resource Strain: Low Risk     Difficulty of Paying Living Expenses: Not hard at all   Food Insecurity: No Food Insecurity    Worried About Running Out of Food in the Last Year: Never true    [address] in the Last Year: Never true   Transportation Needs:     Lack of Transportation (Medical): Not on file    Lack of Transportation (Non-Medical):  Not on file   Physical Activity: Unknown    Days of Exercise per Week: Not on file    Minutes of Exercise per Session: 30 min   Stress:     Feeling of Stress : Not on file   Social Connections:     Frequency of Communication with Friends and Family: Not on file    Frequency of Social Gatherings with Friends and Family: Not on file    Attends Yarsani Services: Not on file    Active Member of Clubs or Organizations: Not on file    Attends Club or Organization Meetings: Not on file    Marital Status: Not on file   Intimate Partner Violence:     Fear of Current or Ex-Partner: Not on file    Emotionally Abused: Not on file    Physically Abused: Not on file    Sexually Abused: Not on file   Housing Stability:     Unable to Pay for Housing in the Last Year: Not on file    Number of [place] in the Last Year: Not on file    Unstable Housing in the Last Year: Not on file       REVIEW OF SYSTEMS     Review of Systems   Constitutional: Negative for chills and fever. HENT: Negative for congestion, ear pain and sore throat. Eyes: Negative. Respiratory: Negative for cough and shortness of breath. Cardiovascular: Negative for chest pain and palpitations. Gastrointestinal: Negative for abdominal pain, diarrhea, nausea and vomiting. Endocrine: Negative. Genitourinary: Positive for dysuria, frequency and vaginal pain. Negative for decreased urine volume, difficulty urinating, dyspareunia, enuresis, flank pain, genital sores, hematuria, menstrual problem, pelvic pain, urgency, vaginal bleeding and vaginal discharge. Musculoskeletal: Negative for myalgias and neck pain. Skin: Negative for rash. Neurological: Negative for dizziness, light-headedness and headaches. Hematological: Negative. Psychiatric/Behavioral: Negative. All other systems reviewed and are negative. Except as noted above the remainder of the review of systems was reviewed and is. SCREENINGS        T[place]en Coma Scale  Eye Opening: Spontaneous  Best Verbal Response: Oriented  Best Motor Response: Obeys commands  [place] Coma Scale Score: 15                 PHYSICAL EXAM     INITIAL VITALS:  height is 5' 3\" (1.6 m) and weight is 140 lb (63.5 kg). Her temperature is 97.5 °F (36.4 °C).  Her blood pressure is 165/67 (abnormal) and her pulse is 66. Her respiration is 18 and oxygen saturation is 95%. Physical Exam  Vitals and nursing note reviewed. Exam conducted with a chaperone present. Constitutional:       General: She is not in acute distress. Appearance: Normal appearance. She is normal weight. She is not ill-appearing, toxic-appearing or diaphoretic. HENT:      Head: Normocephalic and atraumatic. Right Ear: External ear normal.      Left Ear: External ear normal.      Nose: Nose normal.      Mouth/Throat:      Mouth: Mucous membranes are moist.   Eyes:      Extraocular Movements: Extraocular movements intact. Pupils: Pupils are equal, round, and reactive to light. Cardiovascular:      Rate and Rhythm: Normal rate and regular rhythm. Pulses: Normal pulses. Heart sounds: Normal heart sounds. No murmur heard. Pulmonary:      Effort: Pulmonary effort is normal. No respiratory distress. Breath sounds: Normal breath sounds. Abdominal:      General: Bowel sounds are normal. There is no distension. Palpations: Abdomen is soft. Tenderness: There is no abdominal tenderness. Hernia: There is no hernia in the left inguinal area or right inguinal area. Genitourinary:     Exam position: Supine. Pubic Area: No rash or pubic lice. Labia:         Right: No rash, tenderness, lesion or injury. Left: No rash, tenderness, lesion or injury. Urethra: Urethral pain and urethral swelling present. No prolapse or urethral lesion. Vagina: Normal.      Cervix: Normal.      Rectum: Normal.   Musculoskeletal:         General: Normal range of motion. Cervical back: Normal range of motion and neck supple. Lymphadenopathy:      Lower Body: No right inguinal adenopathy. No left inguinal adenopathy. Skin:     General: Skin is warm and dry. Capillary Refill: Capillary refill takes less than 2 seconds.    Neurological:      Mental Status: She is alert and oriented to person, place, and time. GCS: GCS eye subscore is 4. GCS verbal subscore is 5. GCS motor subscore is 6. Psychiatric:         Mood and Affect: Mood normal.         Behavior: Behavior normal.         Thought Content: Thought content normal.         DIFFERENTIAL DIAGNOSIS:   Differential diagnoses are discussed    DIAGNOSTIC RESULTS     EKG:(none if blank)  All EKGs are interpreted by the Emergency Department Physician who either signs or Co-signs this chart in the absence of a cardiologist.    None      RADIOLOGY: (none if blank)   I directly visualized the following images and reviewed the radiologist interpretations. Interpretation per the Radiologist below, if available at the time of this note:  No orders to display       LABS:   [place] - Abnormal; Notable for the following components:       Result Value    Blood, Urine TRACE (*)     Leukocyte Esterase, Urine LARGE (*)     All other components within normal limits   CULTURE, REFLEXED, URINE       All other labs were within normal range or not returned as of this dictation. Please note, any cultures that may have been sent were not resulted at the time of this patient visit. EMERGENCY DEPARTMENT COURSE:   Vitals:    Vitals:    04/21/22 1057 04/21/22 1113 04/21/22 1330 04/21/22 1345   BP: (!) 159/80  (!) 164/80 (!) 165/67   Pulse: 84  66    Resp: 18  18    Temp: 97.7 °F (36.5 °C)   97.5 °F (36.4 °C)   TempSrc: Oral      SpO2: 96%  95%    Weight:  140 lb (63.5 kg)     Height:  5' 3\" (1.6 m)       3:21 PM EDT: The patient was seen and evaluated. PROCEDURES: (None if blank)  Procedures         ED Medications administered this visit:  Medications - No data to display    MDM:  Patient is 71-year-old female who came to the ED to be evaluated for dysuria.   Appropriate testing/imaging of urinalysis was done based on the patient's initial complaints, history, and physical exam.    Pertinent results were urinalysis demonstrating large leukocytes. Discussed findings with patient. At this point believe that patient has continued UTI and will change the antibiotics to more broad-spectrum and sent to pharmacy. Discussed with patient during pelvic exam noticed inflammation to the urethra and recommend continued follow-up with OB/GYN for further evaluation. May take Tylenol for pain control as needed. If worsening symptoms of intractable pain,, fevers may follow-up to the ER for reevaluation. Patient was seen independently by myself. The patient's final impression and disposition and plan was determined by myself. Strict return precautions and follow up instructions were discussed with the patient prior to discharge, with which the patient agrees. Physical assessment findings, diagnostic testing(s) if applicable, and vital signs reviewed with patient/patient representative. Questions answered. Medications as directed, including OTC medications for supportive care. Education provided on medications. Differential diagnosis(s) discussed with patient/patient representative. Home care/self care instructions reviewed with patient/patient representative. Patient is to go to the emergency department if symptoms worsen. Patient/patient representative is aware of care plan, questions answered, verbalizes understanding and is in agreement. CRITICAL CARE:   None    CONSULTS:  None    PROCEDURES:  None    FINAL IMPRESSION      1. Acute cystitis with hematuria          DISPOSITION/PLAN   Discharge home    PATIENT REFERRED TO:  GYN    In 1 week  For further evaluation possible vaginal prolapse.     [facility] [address] [address] EMERGENCY DEPT  [address]  In 2 days  If symptoms worsen    Estephanie Cisse MD  [facility]  [address] [place] [address]  [phone]    In 1 week  As needed      DISCHARGEMEDICATIONS:  Discharge Medication List as of 4/21/2022  1:49 PM START taking these medications    Details   sulfamethoxazole-trimethoprim (BACTRIM DS;SEPTRA DS) 800-160 MG per tablet Take 1 tablet by mouth 2 times daily for 7 days, Disp-14 tablet, R-0Normal                  (Please note that portions of this note were completed with a voice recognition program.  Efforts were made to edit the dictations but occasionallywords are mis-transcribed.)      Cheo Rosas PA-C(electronically signed)  Physician Associate, Emergency Department       Cheo Rosas PA-C  04/21/22 9605

## 2022-04-24 LAB
ORGANISM: ABNORMAL
URINE CULTURE REFLEX: ABNORMAL

## 2022-04-25 ENCOUNTER — TELEPHONE (OUTPATIENT)
Dept: PHARMACY | Age: 87
End: 2022-04-25

## 2022-04-25 NOTE — TELEPHONE ENCOUNTER
Pharmacy Note  ED Culture Follow-up    Aarti Garcia is a 80 y.o. female. Allergies: Patient has no known allergies. Labs:  Lab Results   Component Value Date    BUN 22 03/03/2022    CREATININE 0.82 03/03/2022    WBC 6.2 11/11/2021     Estimated Creatinine Clearance: 41 mL/min (based on SCr of 0.82 mg/dL). Current antimicrobials:   · Bactrim DS BID x 7 days    ASSESSMENT:  Micro results:   Urine culture: positive for Pseudomonas aeruginosa     PLAN:  Need for intervention: Yes  Discussed with: Arron Duke MD  Chosen treatment:    · Stop Bactrim  · Ciprofloxacin 500mg Q12h x 5 days    Patient response:   · Call attempt #1, did not reach patient    Called/sent in prescription to: Not applicable    Please call with any questions.  Claudene Chough, [facility], PharmD 6:24 PM 4/25/2022

## 2022-04-26 NOTE — TELEPHONE ENCOUNTER
Call attempt #2, did not reach patient. Left message for patient to call back at [phone].     DARELL Conner [facility] - [place], PharmD 4:03 PM 4/26/2022

## 2022-04-27 ENCOUNTER — TELEPHONE (OUTPATIENT)
Dept: INTERNAL MEDICINE | Age: 87
End: 2022-04-27

## 2022-04-27 ENCOUNTER — OFFICE VISIT (OUTPATIENT)
Dept: OBGYN | Age: 87
End: 2022-04-27
Payer: MEDICARE

## 2022-04-27 VITALS
SYSTOLIC BLOOD PRESSURE: 132 MMHG | HEIGHT: 63 IN | BODY MASS INDEX: 24.98 KG/M2 | WEIGHT: 141 LBS | HEART RATE: 64 BPM | DIASTOLIC BLOOD PRESSURE: 70 MMHG

## 2022-04-27 DIAGNOSIS — N30.01 ACUTE CYSTITIS WITH HEMATURIA: ICD-10-CM

## 2022-04-27 DIAGNOSIS — R10.2 PELVIC PRESSURE IN FEMALE: Primary | ICD-10-CM

## 2022-04-27 DIAGNOSIS — M19.90 OSTEOARTHRITIS, UNSPECIFIED OSTEOARTHRITIS TYPE, UNSPECIFIED SITE: Primary | ICD-10-CM

## 2022-04-27 PROCEDURE — 99203 OFFICE O/P NEW LOW 30 MIN: CPT | Performed by: OBSTETRICS & GYNECOLOGY

## 2022-04-27 PROCEDURE — 99213 OFFICE O/P EST LOW 20 MIN: CPT | Performed by: OBSTETRICS & GYNECOLOGY

## 2022-04-27 RX ORDER — CIPROFLOXACIN 500 MG/1
500 TABLET, FILM COATED ORAL 2 TIMES DAILY
Qty: 14 TABLET | Refills: 0 | Status: SHIPPED | OUTPATIENT
Start: 2022-04-27 | End: 2022-05-04

## 2022-04-27 NOTE — PROGRESS NOTES
Ankur Gaitan  4/27/2022      Ankur Gaitan is a 80 y.o. female [id number]      The patient was seen today. She was here to follow-up regarding her labs and diagnostics ordered at her last visit for the diagnosis of:    ICD-10-CM    1. Pelvic pressure in female  R10.2    2. Acute cystitis with hematuria  N30.01 Urinalysis with Reflex to Culture     ciprofloxacin (CIPRO) 500 MG tablet     Pt diagnosed with UTI, E.coli. I reviewed hygiene and made changes to her habits. She was treated with Keflex then Bactrim. She was then dx on UA with Pseudomonas which was resistant to the bactrim and she had hematuria microscopic. She complains of urgency with urination only. I spoke to her son at the pt request and he wishes me to check her at [facility] for a cystocele. Past Medical History:   Diagnosis Date    Breast cancer [facility]) 2014    s/p mastectomy and reconstruction surgery.     Closed subcapital fracture of femur, right, initial encounter ([facility] [address] 1/22/2020    Edema extremities     takes diuretics prn    Hyperlipidemia     Hypertension     Hypothyroidism     Osteoarthritis     mild    Unspecified vitamin D deficiency          Past Surgical History:   Procedure Laterality Date    APPENDECTOMY      at age 5   [place] [facility] BREAST BIOPSY Right 2/2014    before she had her surgery    BREAST RECONSTRUCTION Right 2/2014    Pt had done at [facility] in [address] Left 2/2014    Pt had done at [facility] in [address] Right 09/19/2019    COLONOSCOPY  2007    showed mild diverticular disease, otherwise negative, repeat in 2017    EYE SURGERY      HYSTERECTOMY      MASTECTOMY, RADICAL Right 2/2014    Pt had done at [facility] in [address]    due to fibroids   [address] Right 1/23/2020    RIGHT TOTAL HIP performed by Jarrod Appiah MD at [facility] History   Problem Relation Age of Onset  Alzheimer's Disease Mother          at age 80    Emphysema Father          at age 68    Diabetes Father     COPD Sister     Other Sister         lung disease from tobacco abuse    Stroke Sister         history of a hemorrhagic cerebrovascular accident    Cancer Daughter         CML    Other Daughter         kidney stones    Hypertension Daughter     Heart Disease Maternal Grandmother     Heart Disease Maternal Grandfather     Diabetes Maternal Grandfather          Social History     Tobacco Use    Smoking status: Never Smoker    Smokeless tobacco: Never Used   Vaping Use    Vaping Use: Never used   Substance Use Topics    Alcohol use: No    Drug use: No         MEDICATIONS:  Current Outpatient Medications   Medication Sig Dispense Refill    ciprofloxacin (CIPRO) 500 MG tablet Take 1 tablet by mouth 2 times daily for 7 days 14 tablet 0    sulfamethoxazole-trimethoprim (BACTRIM DS;SEPTRA DS) 800-160 MG per tablet Take 1 tablet by mouth 2 times daily for 7 days 14 tablet 0    simvastatin (ZOCOR) 20 MG tablet TAKE 0.5 TABLETS BY MOUTH EVERY EVENING 45 tablet 3    apixaban (ELIQUIS) 5 MG TABS tablet TAKE 1 TABLET BY MOUTH 2 TIMES DAILY 180 tablet 3    triamterene-hydroCHLOROthiazide (MAXZIDE-25) 37.5-25 MG per tablet TAKE 1 TABLET BY MOUTH DAILY 30 tablet 11    Cholecalciferol (VITAMIN D3) 50 MCG (2000 UT) TABS Take 2,000 Units by mouth daily       Multiple Vitamins-Minerals (PRESERVISION AREDS 2 PO) Take 1 tablet by mouth daily      CALCIUM-MAGNESIUM-VITAMIN D PO Take by mouth daily      Multiple Vitamin (MULTI-VITAMIN DAILY PO) Take 1 tablet by mouth daily.  levothyroxine (SYNTHROID) 50 MCG tablet TAKE 1 TABLET BY MOUTH DAILY (Patient not taking: Reported on 2022) 30 tablet 11    AZO-CRANBERRY PO Take 1 tablet by mouth daily as needed (urinary health)  (Patient not taking: Reported on 2022)      Handicap Placard MISC by Does not apply route .   Expires in 5 years (Patient not taking: Reported on 4/27/2022) 1 each 0    acetaminophen (TYLENOL) 500 MG tablet Take 500 mg by mouth every 6 hours as needed for Pain. Uses approx once a week (Patient not taking: Reported on 4/27/2022)       No current facility-administered medications for this visit. ALLERGIES:  Allergies as of 04/27/2022    (No Known Allergies)         Blood pressure 132/70, pulse 64, height 5' 3\" (1.6 m), weight 141 lb (64 kg), not currently breastfeeding. Abdomen: Soft non-tender; good bowel sounds. No guarding, rebound or rigidity. No CVA tenderness bilaterally. Extremities: No calf tenderness, DTR 2/4, and No edema bilaterally    Pelvic: Declined         Diagnostics:  No results found.       Lab Results:  Results for orders placed or performed during the hospital encounter of 04/21/22   Culture, Reflexed, Urine    Specimen: Urine voided   Result Value Ref Range    Organism Pseudomonas aeruginosa (A)     Urine Culture Reflex [place] count: 50,000-90,000 CFU/mL         Susceptibility    Pseudomonas aeruginosa - BACTERIAL SUSCEPTIBILITY PANEL BY LIANET     cefepime <=1 Sensitive mcg/mL     piperacillin-tazobactam <=4 Sensitive mcg/mL     gentamicin <=1 Sensitive mcg/mL     ciprofloxacin <=0.25 Sensitive mcg/mL     tobramycin <=1 Sensitive mcg/mL   Urine with Reflexed Micro   Result Value Ref Range    Glucose, Ur NEGATIVE NEGATIVE mg/dl    Bilirubin Urine NEGATIVE NEGATIVE    Ketones, Urine NEGATIVE NEGATIVE    Specific Gravity, Urine 1.011 1.002 - 1.030    Blood, Urine TRACE (A) NEGATIVE    pH, UA 7.0 5.0 - 9.0    Protein, UA NEGATIVE NEGATIVE    Urobilinogen, Urine 0.2 0.0 - 1.0 eu/dl    Nitrite, Urine NEGATIVE NEGATIVE    Leukocyte Esterase, Urine LARGE (A) NEGATIVE    Color, UA YELLOW STRAW-YELLOW    Character, Urine SL CLOUDY CLEAR-SL CLOUD    RBC, UA 3-5 0-2/hpf /hpf    WBC, UA > 100 0-4/hpf /hpf    Epithelial Cells, UA NONE SEEN 3-5/hpf /hpf    Bacteria, UA NONE SEEN FEW/NONE SEEN /hpf    Casts UA NONE SEEN NONE SEEN /lpf    Crystals, UA NONE SEEN NONE SEEN    Renal Epithelial, UA NONE SEEN NONE SEEN    Yeast, UA NONE SEEN NONE SEEN    CASTS 2 NONE SEEN NONE SEEN /lpf    MISCELLANEOUS 2 NONE SEEN            Assessment:   Diagnosis Orders   1. Pelvic pressure in female     2. Acute cystitis with hematuria  Urinalysis with Reflex to Culture    ciprofloxacin (CIPRO) 500 MG tablet   3. UTI was E.coli and Psuedomonas (tx Bactrim and I added Cipro today)  Chief Complaint   Patient presents with    Other     Pelvic pressure x1 mth. / burning pain during urination         Patient Active Problem List    Diagnosis Date Noted    Hypertension 01/22/2020     Priority: High    History of breast cancer 03/19/2014     Priority: High    Hypothyroidism      Priority: Medium    History of total right hip arthroplasty 01/27/2020     Priority: Low    Balance problems 08/24/2021    Chronic right hip pain 01/08/2021    Acute cystitis with hematuria 03/19/2020    Hyponatremia 02/02/2020    Left arm pain 08/31/2016    Encounter for monitoring aromatase inhibitor therapy 09/07/2015    Use of anastrozole (Arimidex) 07/24/2014    Vitamin D deficiency 07/22/2014    Edema extremities      takes diuretics prn      Hyperlipidemia     Osteoarthritis      mild         PLAN:  Return in about 3 weeks (around 5/18/2022) for Follow-Up On Current Problem. If hematuria persist after ciprofloxacin tx then referral to urology for cystoscopy  UA C&S 2 weeks  Return to the office in 3 weeks. check for cystocele at fu at sons request.  C/W Mammograms hx breast ca-sees oncology. Counseled on preventative health maintenance follow-up. Orders Placed This Encounter   Procedures    Urinalysis with Reflex to Culture     Standing Status:   Future     Standing Expiration Date:   6/27/2022     Order Specific Question:   SPECIFY(EX-CATH,MIDSTREAM,CYSTO,ETC)?      Answer:   MID           The patient, Abdon King is a 80 y.o. female, was seen with a total time spent of 30 minutes for the visit on this date of service by the E/M provider. The time component had both face to face and non face to face time spent in determining the total time component. Counseling and education regarding her diagnosis listed below and her options regarding those diagnoses were also included in determining her time component. Diagnosis Orders   1. Pelvic pressure in female     2. Acute cystitis with hematuria  Urinalysis with Reflex to Culture    ciprofloxacin (CIPRO) 500 MG tablet        The patient had her preventative health maintenance recommendations and follow-up reviewed with her at the completion of her visit.

## 2022-04-27 NOTE — TELEPHONE ENCOUNTER
Call attempt #3, did not reach patient. Unable to reach patient after 3 call attempts, sent letter on 4/27/22.       Georgia Campbell [facility], PharmD 5:27 PM 4/27/2022

## 2022-05-11 ENCOUNTER — HOSPITAL ENCOUNTER (OUTPATIENT)
Dept: LAB | Age: 87
Discharge: HOME | End: 2022-05-11
Payer: MEDICARE

## 2022-05-11 DIAGNOSIS — R10.2 PELVIC PRESSURE IN FEMALE: ICD-10-CM

## 2022-05-11 DIAGNOSIS — N30.01 ACUTE CYSTITIS WITH HEMATURIA: ICD-10-CM

## 2022-05-11 LAB
-: ABNORMAL
AMORPHOUS: ABNORMAL
BACTERIA: ABNORMAL
BILIRUBIN URINE: NEGATIVE
EPITHELIAL CELLS UA: ABNORMAL /HPF (ref 0–5)
GLUCOSE URINE: NEGATIVE
KETONES, URINE: NEGATIVE
LEUKOCYTE ESTERASE, URINE: ABNORMAL
NITRITE, URINE: NEGATIVE
PH UA: 6.5 (ref 5–6)
PROTEIN UA: NEGATIVE
RBC UA: ABNORMAL /HPF (ref 0–4)
SPECIFIC GRAVITY UA: 1.01 (ref 1.01–1.02)
URINE HGB: ABNORMAL
UROBILINOGEN, URINE: NORMAL
WBC UA: ABNORMAL /HPF (ref 0–4)

## 2022-05-11 PROCEDURE — 81001 URINALYSIS AUTO W/SCOPE: CPT

## 2022-05-12 ENCOUNTER — HOSPITAL ENCOUNTER (OUTPATIENT)
Dept: BONE DENSITY | Age: 87
Discharge: HOME | End: 2022-05-14
Payer: MEDICARE

## 2022-05-12 DIAGNOSIS — Z78.0 POSTMENOPAUSAL: ICD-10-CM

## 2022-05-12 PROCEDURE — 77080 DXA BONE DENSITY AXIAL: CPT

## 2022-05-24 ENCOUNTER — HOSPITAL ENCOUNTER (OUTPATIENT)
Dept: LAB | Age: 87
Discharge: HOME | End: 2022-05-24
Payer: MEDICARE

## 2022-05-24 DIAGNOSIS — N30.01 ACUTE CYSTITIS WITH HEMATURIA: Primary | ICD-10-CM

## 2022-05-24 DIAGNOSIS — N30.01 ACUTE CYSTITIS WITH HEMATURIA: ICD-10-CM

## 2022-05-24 LAB
-: ABNORMAL
BACTERIA: ABNORMAL
BILIRUBIN URINE: NEGATIVE
EPITHELIAL CELLS UA: ABNORMAL /HPF (ref 0–5)
GLUCOSE URINE: NEGATIVE
KETONES, URINE: NEGATIVE
LEUKOCYTE ESTERASE, URINE: ABNORMAL
NITRITE, URINE: POSITIVE
OTHER OBSERVATIONS UA: ABNORMAL
PH UA: 6.5 (ref 5–6)
PROTEIN UA: NEGATIVE
RBC UA: ABNORMAL /HPF (ref 0–4)
SPECIFIC GRAVITY UA: 1.02 (ref 1.01–1.02)
URINE HGB: ABNORMAL
UROBILINOGEN, URINE: NORMAL
WBC UA: >100 /HPF (ref 0–4)

## 2022-05-24 PROCEDURE — 87077 CULTURE AEROBIC IDENTIFY: CPT

## 2022-05-24 PROCEDURE — 87186 SC STD MICRODIL/AGAR DIL: CPT

## 2022-05-24 PROCEDURE — 81001 URINALYSIS AUTO W/SCOPE: CPT

## 2022-05-24 PROCEDURE — 87086 URINE CULTURE/COLONY COUNT: CPT

## 2022-05-24 PROCEDURE — 81003 URINALYSIS AUTO W/O SCOPE: CPT | Performed by: OBSTETRICS & GYNECOLOGY

## 2022-05-25 RX ORDER — CIPROFLOXACIN 250 MG/1
250 TABLET, FILM COATED ORAL 2 TIMES DAILY
COMMUNITY
Start: 2022-05-25 | End: 2022-05-31

## 2022-05-26 LAB
CULTURE: ABNORMAL
SPECIMEN DESCRIPTION: ABNORMAL

## 2022-06-21 DIAGNOSIS — I10 ESSENTIAL HYPERTENSION: ICD-10-CM

## 2022-06-21 RX ORDER — TRIAMTERENE AND HYDROCHLOROTHIAZIDE 37.5; 25 MG/1; MG/1
1 TABLET ORAL DAILY
Qty: 30 TABLET | Refills: 11 | Status: SHIPPED | OUTPATIENT
Start: 2022-06-21 | End: 2022-08-05

## 2022-08-02 ENCOUNTER — APPOINTMENT (OUTPATIENT)
Dept: CT IMAGING | Age: 87
DRG: 178 | End: 2022-08-02
Payer: MEDICARE

## 2022-08-02 ENCOUNTER — APPOINTMENT (OUTPATIENT)
Dept: GENERAL RADIOLOGY | Age: 87
DRG: 178 | End: 2022-08-02
Payer: MEDICARE

## 2022-08-02 ENCOUNTER — HOSPITAL ENCOUNTER (INPATIENT)
Age: 87
LOS: 3 days | Discharge: HOME | DRG: 178 | End: 2022-08-05
Attending: EMERGENCY MEDICINE | Admitting: INTERNAL MEDICINE
Payer: MEDICARE

## 2022-08-02 DIAGNOSIS — N30.00 ACUTE CYSTITIS WITHOUT HEMATURIA: Primary | ICD-10-CM

## 2022-08-02 DIAGNOSIS — U07.1 COVID: ICD-10-CM

## 2022-08-02 DIAGNOSIS — R55 SYNCOPE AND COLLAPSE: ICD-10-CM

## 2022-08-02 PROBLEM — S72.019A: Status: ACTIVE | Noted: 2022-08-02

## 2022-08-02 PROBLEM — H18.529 MAP-DOT-FINGERPRINT CORNEAL DYSTROPHY: Status: ACTIVE | Noted: 2019-09-20

## 2022-08-02 PROBLEM — H02.423 MYOGENIC PTOSIS OF EYELID OF BOTH EYES: Status: ACTIVE | Noted: 2017-12-21

## 2022-08-02 PROBLEM — S72.019A: Status: RESOLVED | Noted: 2022-08-02 | Resolved: 2022-08-02

## 2022-08-02 PROBLEM — H25.12 NUCLEAR SCLEROTIC CATARACT OF LEFT EYE: Status: ACTIVE | Noted: 2017-12-21

## 2022-08-02 PROBLEM — H33.312 RETINAL TEAR OF LEFT EYE: Status: ACTIVE | Noted: 2019-06-24

## 2022-08-02 PROBLEM — Z96.641 HISTORY OF RIGHT HIP REPLACEMENT: Status: ACTIVE | Noted: 2022-08-02

## 2022-08-02 PROBLEM — H35.40: Status: ACTIVE | Noted: 2019-06-24

## 2022-08-02 LAB
-: ABNORMAL
ABSOLUTE EOS #: 0.03 K/UL (ref 0–0.44)
ABSOLUTE IMMATURE GRANULOCYTE: 0.06 K/UL (ref 0–0.3)
ABSOLUTE LYMPH #: 0.75 K/UL (ref 1.1–3.7)
ABSOLUTE MONO #: 1.01 K/UL (ref 0.1–1.2)
ALBUMIN SERPL-MCNC: 3.5 G/DL (ref 3.5–5.2)
ALBUMIN SERPL-MCNC: 4.1 G/DL (ref 3.5–5.2)
ALBUMIN/GLOBULIN RATIO: 1.1 (ref 1–2.5)
ALBUMIN/GLOBULIN RATIO: 1.2 (ref 1–2.5)
ALP BLD-CCNC: 59 U/L (ref 35–104)
ALP BLD-CCNC: 68 U/L (ref 35–104)
ALT SERPL-CCNC: 17 U/L (ref 5–33)
ALT SERPL-CCNC: 19 U/L (ref 5–33)
ANION GAP SERPL CALCULATED.3IONS-SCNC: 10 MMOL/L (ref 9–17)
ANION GAP SERPL CALCULATED.3IONS-SCNC: 9 MMOL/L (ref 9–17)
AST SERPL-CCNC: 27 U/L
AST SERPL-CCNC: 27 U/L
BACTERIA: ABNORMAL
BASOPHILS # BLD: 0 % (ref 0–2)
BASOPHILS ABSOLUTE: 0.03 K/UL (ref 0–0.2)
BILIRUB SERPL-MCNC: 0.23 MG/DL (ref 0.3–1.2)
BILIRUB SERPL-MCNC: 0.3 MG/DL (ref 0.3–1.2)
BILIRUBIN DIRECT: 0.12 MG/DL
BILIRUBIN URINE: NEGATIVE
BILIRUBIN, INDIRECT: 0.18 MG/DL (ref 0–1)
BUN BLDV-MCNC: 14 MG/DL (ref 8–23)
BUN BLDV-MCNC: 17 MG/DL (ref 8–23)
BUN/CREAT BLD: 21 (ref 9–20)
CALCIUM SERPL-MCNC: 8.7 MG/DL (ref 8.6–10.4)
CALCIUM SERPL-MCNC: 9.3 MG/DL (ref 8.6–10.4)
CHLORIDE BLD-SCNC: 95 MMOL/L (ref 98–107)
CHLORIDE BLD-SCNC: 97 MMOL/L (ref 98–107)
CO2: 28 MMOL/L (ref 20–31)
CO2: 30 MMOL/L (ref 20–31)
CREAT SERPL-MCNC: 0.68 MG/DL (ref 0.5–0.9)
CREAT SERPL-MCNC: 0.81 MG/DL (ref 0.5–0.9)
D-DIMER QUANTITATIVE: 0.98 MG/L FEU (ref 0–0.59)
EOSINOPHILS RELATIVE PERCENT: 0 % (ref 1–4)
EPITHELIAL CELLS UA: ABNORMAL /HPF (ref 0–5)
FERRITIN: 152 NG/ML (ref 13–150)
FIBRINOGEN: 487 MG/DL (ref 140–420)
GFR AFRICAN AMERICAN: >60 ML/MIN
GFR AFRICAN AMERICAN: >60 ML/MIN
GFR NON-AFRICAN AMERICAN: >60 ML/MIN
GFR NON-AFRICAN AMERICAN: >60 ML/MIN
GFR SERPL CREATININE-BSD FRML MDRD: ABNORMAL ML/MIN/{1.73_M2}
GFR SERPL CREATININE-BSD FRML MDRD: ABNORMAL ML/MIN/{1.73_M2}
GLUCOSE BLD-MCNC: 110 MG/DL (ref 70–99)
GLUCOSE BLD-MCNC: 115 MG/DL (ref 70–99)
GLUCOSE URINE: NEGATIVE
HCT VFR BLD CALC: 35.2 % (ref 36.3–47.1)
HEMOGLOBIN: 12.1 G/DL (ref 11.9–15.1)
IMMATURE GRANULOCYTES: 1 %
INR BLD: 1.1
KETONES, URINE: NEGATIVE
LACTATE DEHYDROGENASE: 190 U/L (ref 135–214)
LACTIC ACID, SEPSIS: 0.7 MMOL/L (ref 0.5–1.9)
LACTIC ACID, SEPSIS: 1.3 MMOL/L (ref 0.5–1.9)
LEUKOCYTE ESTERASE, URINE: ABNORMAL
LYMPHOCYTES # BLD: 9 % (ref 24–43)
MAGNESIUM: 2 MG/DL (ref 1.6–2.6)
MCH RBC QN AUTO: 32.2 PG (ref 25.2–33.5)
MCHC RBC AUTO-ENTMCNC: 34.4 G/DL (ref 25.2–33.5)
MCV RBC AUTO: 93.6 FL (ref 82.6–102.9)
MONOCYTES # BLD: 12 % (ref 3–12)
NITRITE, URINE: POSITIVE
NRBC AUTOMATED: 0 PER 100 WBC
OTHER OBSERVATIONS UA: ABNORMAL
PARTIAL THROMBOPLASTIN TIME: 32.2 SEC (ref 23.9–33.8)
PDW BLD-RTO: 13.4 % (ref 11.8–14.4)
PH UA: 7 (ref 5–6)
PLATELET # BLD: 209 K/UL (ref 138–453)
PMV BLD AUTO: 9 FL (ref 8.1–13.5)
POTASSIUM SERPL-SCNC: 4.4 MMOL/L (ref 3.7–5.3)
POTASSIUM SERPL-SCNC: 4.4 MMOL/L (ref 3.7–5.3)
PROCALCITONIN: 0.08 NG/ML
PROTEIN UA: NEGATIVE
PROTHROMBIN TIME: 13.7 SEC (ref 11.5–14.2)
RBC # BLD: 3.76 M/UL (ref 3.95–5.11)
RBC UA: ABNORMAL /HPF (ref 0–4)
SARS-COV-2, RAPID: DETECTED
SEG NEUTROPHILS: 78 % (ref 36–65)
SEGMENTED NEUTROPHILS ABSOLUTE COUNT: 6.51 K/UL (ref 1.5–8.1)
SODIUM BLD-SCNC: 134 MMOL/L (ref 135–144)
SODIUM BLD-SCNC: 135 MMOL/L (ref 135–144)
SPECIFIC GRAVITY UA: 1.01 (ref 1.01–1.02)
SPECIMEN DESCRIPTION: ABNORMAL
TOTAL PROTEIN: 6.7 G/DL (ref 6.4–8.3)
TOTAL PROTEIN: 7.5 G/DL (ref 6.4–8.3)
TROPONIN, HIGH SENSITIVITY: 8 NG/L (ref 0–14)
TROPONIN, HIGH SENSITIVITY: 8 NG/L (ref 0–14)
TROPONIN, HIGH SENSITIVITY: 9 NG/L (ref 0–14)
URINE HGB: ABNORMAL
UROBILINOGEN, URINE: NORMAL
WBC # BLD: 8.4 K/UL (ref 3.5–11.3)
WBC UA: ABNORMAL /HPF (ref 0–4)

## 2022-08-02 PROCEDURE — 85025 COMPLETE CBC W/AUTO DIFF WBC: CPT

## 2022-08-02 PROCEDURE — 6360000002 HC RX W HCPCS: Performed by: INTERNAL MEDICINE

## 2022-08-02 PROCEDURE — 94761 N-INVAS EAR/PLS OXIMETRY MLT: CPT

## 2022-08-02 PROCEDURE — 70450 CT HEAD/BRAIN W/O DYE: CPT

## 2022-08-02 PROCEDURE — 2709999900 CT CHEST PULMONARY EMBOLISM W CONTRAST

## 2022-08-02 PROCEDURE — 83605 ASSAY OF LACTIC ACID: CPT

## 2022-08-02 PROCEDURE — 85610 PROTHROMBIN TIME: CPT

## 2022-08-02 PROCEDURE — 82248 BILIRUBIN DIRECT: CPT

## 2022-08-02 PROCEDURE — 82728 ASSAY OF FERRITIN: CPT

## 2022-08-02 PROCEDURE — 85384 FIBRINOGEN ACTIVITY: CPT

## 2022-08-02 PROCEDURE — 93005 ELECTROCARDIOGRAM TRACING: CPT | Performed by: EMERGENCY MEDICINE

## 2022-08-02 PROCEDURE — 6360000004 HC RX CONTRAST MEDICATION: Performed by: EMERGENCY MEDICINE

## 2022-08-02 PROCEDURE — 72125 CT NECK SPINE W/O DYE: CPT

## 2022-08-02 PROCEDURE — 83735 ASSAY OF MAGNESIUM: CPT

## 2022-08-02 PROCEDURE — 2700000000 HC OXYGEN THERAPY PER DAY

## 2022-08-02 PROCEDURE — 6370000000 HC RX 637 (ALT 250 FOR IP): Performed by: INTERNAL MEDICINE

## 2022-08-02 PROCEDURE — 71045 X-RAY EXAM CHEST 1 VIEW: CPT

## 2022-08-02 PROCEDURE — 2060000000 HC ICU INTERMEDIATE R&B

## 2022-08-02 PROCEDURE — 80053 COMPREHEN METABOLIC PANEL: CPT

## 2022-08-02 PROCEDURE — 99285 EMERGENCY DEPT VISIT HI MDM: CPT

## 2022-08-02 PROCEDURE — 2580000003 HC RX 258: Performed by: NURSE PRACTITIONER

## 2022-08-02 PROCEDURE — 87088 URINE BACTERIA CULTURE: CPT

## 2022-08-02 PROCEDURE — 71260 CT THORAX DX C+: CPT | Performed by: EMERGENCY MEDICINE

## 2022-08-02 PROCEDURE — 84484 ASSAY OF TROPONIN QUANT: CPT

## 2022-08-02 PROCEDURE — 84145 PROCALCITONIN (PCT): CPT

## 2022-08-02 PROCEDURE — 87186 SC STD MICRODIL/AGAR DIL: CPT

## 2022-08-02 PROCEDURE — 81001 URINALYSIS AUTO W/SCOPE: CPT

## 2022-08-02 PROCEDURE — 87040 BLOOD CULTURE FOR BACTERIA: CPT

## 2022-08-02 PROCEDURE — 2580000003 HC RX 258: Performed by: INTERNAL MEDICINE

## 2022-08-02 PROCEDURE — 36415 COLL VENOUS BLD VENIPUNCTURE: CPT

## 2022-08-02 PROCEDURE — 85379 FIBRIN DEGRADATION QUANT: CPT

## 2022-08-02 PROCEDURE — 73080 X-RAY EXAM OF ELBOW: CPT

## 2022-08-02 PROCEDURE — 87635 SARS-COV-2 COVID-19 AMP PRB: CPT

## 2022-08-02 PROCEDURE — APPNB15 APP NON BILLABLE TIME 0-15 MINS: Performed by: NURSE PRACTITIONER

## 2022-08-02 PROCEDURE — 99222 1ST HOSP IP/OBS MODERATE 55: CPT | Performed by: INTERNAL MEDICINE

## 2022-08-02 PROCEDURE — 87086 URINE CULTURE/COLONY COUNT: CPT

## 2022-08-02 PROCEDURE — 83615 LACTATE (LD) (LDH) ENZYME: CPT

## 2022-08-02 PROCEDURE — 85730 THROMBOPLASTIN TIME PARTIAL: CPT

## 2022-08-02 RX ORDER — ACETAMINOPHEN 325 MG/1
650 TABLET ORAL EVERY 6 HOURS PRN
Status: DISCONTINUED | OUTPATIENT
Start: 2022-08-02 | End: 2022-08-05 | Stop reason: HOSPADM

## 2022-08-02 RX ORDER — SODIUM CHLORIDE FOR INHALATION 0.9 %
3 VIAL, NEBULIZER (ML) INHALATION
Status: DISCONTINUED | OUTPATIENT
Start: 2022-08-02 | End: 2022-08-05 | Stop reason: HOSPADM

## 2022-08-02 RX ORDER — TRIAMTERENE AND HYDROCHLOROTHIAZIDE 37.5; 25 MG/1; MG/1
1 CAPSULE ORAL DAILY
Status: DISCONTINUED | OUTPATIENT
Start: 2022-08-02 | End: 2022-08-03

## 2022-08-02 RX ORDER — SODIUM CHLORIDE 0.9 % (FLUSH) 0.9 %
10 SYRINGE (ML) INJECTION PRN
Status: DISCONTINUED | OUTPATIENT
Start: 2022-08-02 | End: 2022-08-05 | Stop reason: HOSPADM

## 2022-08-02 RX ORDER — ATORVASTATIN CALCIUM 10 MG/1
10 TABLET, FILM COATED ORAL NIGHTLY
Status: DISCONTINUED | OUTPATIENT
Start: 2022-08-02 | End: 2022-08-05 | Stop reason: HOSPADM

## 2022-08-02 RX ORDER — SODIUM CHLORIDE 0.9 % (FLUSH) 0.9 %
10 SYRINGE (ML) INJECTION EVERY 12 HOURS SCHEDULED
Status: DISCONTINUED | OUTPATIENT
Start: 2022-08-02 | End: 2022-08-05 | Stop reason: HOSPADM

## 2022-08-02 RX ORDER — LEVOTHYROXINE SODIUM 0.03 MG/1
50 TABLET ORAL DAILY
Status: DISCONTINUED | OUTPATIENT
Start: 2022-08-03 | End: 2022-08-05 | Stop reason: HOSPADM

## 2022-08-02 RX ORDER — SODIUM CHLORIDE 9 MG/ML
INJECTION, SOLUTION INTRAVENOUS PRN
Status: DISCONTINUED | OUTPATIENT
Start: 2022-08-02 | End: 2022-08-05 | Stop reason: HOSPADM

## 2022-08-02 RX ORDER — ALBUTEROL SULFATE 90 UG/1
2 AEROSOL, METERED RESPIRATORY (INHALATION) EVERY 4 HOURS PRN
Status: DISCONTINUED | OUTPATIENT
Start: 2022-08-02 | End: 2022-08-05 | Stop reason: HOSPADM

## 2022-08-02 RX ORDER — 0.9 % SODIUM CHLORIDE 0.9 %
999 INTRAVENOUS SOLUTION INTRAVENOUS ONCE
Status: COMPLETED | OUTPATIENT
Start: 2022-08-02 | End: 2022-08-03

## 2022-08-02 RX ORDER — IPRATROPIUM BROMIDE AND ALBUTEROL SULFATE 2.5; .5 MG/3ML; MG/3ML
1 SOLUTION RESPIRATORY (INHALATION)
Status: DISCONTINUED | OUTPATIENT
Start: 2022-08-02 | End: 2022-08-02

## 2022-08-02 RX ORDER — ALBUTEROL SULFATE 90 UG/1
2 AEROSOL, METERED RESPIRATORY (INHALATION) EVERY 4 HOURS
Status: DISCONTINUED | OUTPATIENT
Start: 2022-08-02 | End: 2022-08-02

## 2022-08-02 RX ORDER — TRIAMTERENE AND HYDROCHLOROTHIAZIDE 37.5; 25 MG/1; MG/1
1 TABLET ORAL DAILY
Status: DISCONTINUED | OUTPATIENT
Start: 2022-08-02 | End: 2022-08-02 | Stop reason: CLARIF

## 2022-08-02 RX ORDER — ACETAMINOPHEN 650 MG/1
650 SUPPOSITORY RECTAL EVERY 6 HOURS PRN
Status: DISCONTINUED | OUTPATIENT
Start: 2022-08-02 | End: 2022-08-05 | Stop reason: HOSPADM

## 2022-08-02 RX ORDER — ALBUTEROL SULFATE 2.5 MG/3ML
2.5 SOLUTION RESPIRATORY (INHALATION)
Status: DISCONTINUED | OUTPATIENT
Start: 2022-08-02 | End: 2022-08-05 | Stop reason: HOSPADM

## 2022-08-02 RX ORDER — ONDANSETRON 2 MG/ML
4 INJECTION INTRAMUSCULAR; INTRAVENOUS EVERY 6 HOURS PRN
Status: DISCONTINUED | OUTPATIENT
Start: 2022-08-02 | End: 2022-08-05 | Stop reason: HOSPADM

## 2022-08-02 RX ADMIN — IOPAMIDOL 80 ML: 755 INJECTION, SOLUTION INTRAVENOUS at 10:04

## 2022-08-02 RX ADMIN — TRIAMTERENE AND HYDROCHLOROTHIAZIDE 1 CAPSULE: 37.5; 25 CAPSULE ORAL at 14:30

## 2022-08-02 RX ADMIN — SODIUM CHLORIDE, PRESERVATIVE FREE 10 ML: 5 INJECTION INTRAVENOUS at 20:00

## 2022-08-02 RX ADMIN — SODIUM CHLORIDE 1000 ML: 9 INJECTION, SOLUTION INTRAVENOUS at 23:05

## 2022-08-02 RX ADMIN — CEFTRIAXONE 1000 MG: 1 INJECTION, POWDER, FOR SOLUTION INTRAMUSCULAR; INTRAVENOUS at 20:01

## 2022-08-02 RX ADMIN — APIXABAN 5 MG: 5 TABLET, FILM COATED ORAL at 20:05

## 2022-08-02 RX ADMIN — ATORVASTATIN CALCIUM 10 MG: 10 TABLET, FILM COATED ORAL at 20:05

## 2022-08-02 ASSESSMENT — ENCOUNTER SYMPTOMS
BACK PAIN: 0
ABDOMINAL PAIN: 0
DIARRHEA: 0
EYE PAIN: 0
VOMITING: 0
CONSTIPATION: 0
NAUSEA: 1
BLOOD IN STOOL: 0
SHORTNESS OF BREATH: 1
COUGH: 1

## 2022-08-02 ASSESSMENT — PAIN DESCRIPTION - ORIENTATION: ORIENTATION: RIGHT

## 2022-08-02 ASSESSMENT — PAIN DESCRIPTION - PAIN TYPE: TYPE: ACUTE PAIN

## 2022-08-02 ASSESSMENT — PAIN - FUNCTIONAL ASSESSMENT: PAIN_FUNCTIONAL_ASSESSMENT: 0-10

## 2022-08-02 ASSESSMENT — PAIN DESCRIPTION - LOCATION: LOCATION: ELBOW

## 2022-08-02 ASSESSMENT — PAIN SCALES - GENERAL: PAINLEVEL_OUTOF10: 5

## 2022-08-02 NOTE — H&P
APPENDECTOMY      at age 5    BREAST BIOPSY Right 2/2014    before she had her surgery    BREAST RECONSTRUCTION Right 2/2014    Pt had done at [facility] in [address] Left 2/2014    Pt had done at [facility] in [address] Right 09/19/2019    COLONOSCOPY  2007    showed mild diverticular disease, otherwise negative, repeat in 2017    [address] ([address])      MASTECTOMY, RADICAL Right 2/2014    Pt had done at [facility] in [facility] ([address] [place] [address])  [facility]    due to fibroids    TOTAL HIP ARTHROPLASTY Right 1/23/2020    RIGHT TOTAL HIP performed by Christi Mercado MD at [facility]       Medications Prior to Admission:    Medications Prior to Admission: Probiotic Product (PROBIOTIC DAILY PO), Take by mouth  triamterene-hydroCHLOROthiazide (MAXZIDE-25) 37.5-25 MG per tablet, TAKE 1 TABLET BY MOUTH DAILY  Handicap Placard MISC, by Does not apply route Expires 4/27/2027  simvastatin (ZOCOR) 20 MG tablet, TAKE 0.5 TABLETS BY MOUTH EVERY EVENING  levothyroxine (SYNTHROID) 50 MCG tablet, TAKE 1 TABLET BY MOUTH DAILY  apixaban (ELIQUIS) 5 MG TABS tablet, TAKE 1 TABLET BY MOUTH 2 TIMES DAILY  Cholecalciferol (VITAMIN D3) 50 MCG (2000 UT) TABS, Take 2,000 Units by mouth daily   AZO-CRANBERRY PO, Take 1 tablet by mouth daily as needed (urinary health)  (Patient not taking: Reported on 4/27/2022)  Multiple Vitamins-Minerals (PRESERVISION AREDS 2 PO), Take 1 tablet by mouth daily  Handicap Placard MISC, by Does not apply route . Expires in 5 years (Patient not taking: Reported on 4/27/2022)  CALCIUM-MAGNESIUM-VITAMIN D PO, Take by mouth daily  acetaminophen (TYLENOL) 500 MG tablet, Take 500 mg by mouth every 6 hours as needed for Pain. Uses approx once a week (Patient not taking: Reported on 4/27/2022)  Multiple Vitamin (MULTI-VITAMIN DAILY PO), Take 1 tablet by mouth daily.     Allergies: Patient has no known allergies. Social History:    reports that she has never smoked. She has never used smokeless tobacco. She reports that she does not drink alcohol and does not use drugs. Family History:   family history includes Alzheimer's Disease in her mother; COPD in her sister; Cancer in her daughter; Diabetes in her father and maternal grandfather; Emphysema in her father; Heart Disease in her maternal grandfather and maternal grandmother; Hypertension in her daughter; Other in her daughter and sister; Stroke in her sister. REVIEW OF SYSTEMS:  See HPI and problem list; otherwise no other new complaints with respect to HEENT, neck, pulmonary, coronary, GI, , endocrine, musculoskeletal, immune system/connective tissue disease, hematologic, neuropsych, skin, lymphatics, or malignancies. PHYSICAL EXAM:  Vitals:  BP (!) 142/82   Pulse 89   Temp 97 °F (36.1 °C) (Oral)   Resp 16   Ht 5' 3\" (1.6 m)   Wt 140 lb 8 oz (63.7 kg)   SpO2 95%   BMI 24.89 kg/m²     HEENT: Normocephalic and Atraumatic  Neck: Supple, Carotid Pulses Present, No Bruits, No Masses, Tenderness, Nodularity, and No Lymphadenopathy  Chest/Lungs:  coarse breath sounds bases and Distant Breath Sounds  Cardiac: Regular Rate and Rhythm  GI/Abdomen:  Bowel Sounds Present and Soft, Non-tender, without Guarding or Rebound Tenderness  : Not examined  EXT/Skin: No Edema, No Cyanosis, and No Clubbing  Neuro:  alert--CN 2-12 grossly intact---other than subjective complaints of lighheadedness---- and No Localizing Signs/Symptoms        LABS:    CBC with Differential:    Lab Results   Component Value Date/Time    WBC 8.4 08/02/2022 09:03 AM    RBC 3.76 08/02/2022 09:03 AM    HGB 12.1 08/02/2022 09:03 AM    HCT 35.2 08/02/2022 09:03 AM     08/02/2022 09:03 AM    MCV 93.6 08/02/2022 09:03 AM    MCH 32.2 08/02/2022 09:03 AM    MCHC 34.4 08/02/2022 09:03 AM    RDW 13.4 08/02/2022 09:03 AM    NRBC 0 02/02/2020 12:39 PM    SEGSPCT 78.3 02/02/2020 12:39 PM    LYMPHOPCT 9 08/02/2022 09:03 AM    MONOPCT 12 08/02/2022 09:03 AM    BASOPCT 0 08/02/2022 09:03 AM    MONOSABS 1.01 08/02/2022 09:03 AM    MONOSABS 0.5 04/22/2021 10:55 AM    LYMPHSABS 0.75 08/02/2022 09:03 AM    LYMPHSABS 1.6 04/22/2021 10:55 AM    EOSABS 0.03 08/02/2022 09:03 AM    EOSABS 0.1 04/22/2021 10:55 AM    BASOSABS 0.03 08/02/2022 09:03 AM    DIFFTYPE NOT REPORTED 11/11/2021 11:44 AM     BMP:    Lab Results   Component Value Date/Time     08/02/2022 02:13 PM    K 4.4 08/02/2022 02:13 PM    CL 95 08/02/2022 02:13 PM    CO2 30 08/02/2022 02:13 PM    BUN 14 08/02/2022 02:13 PM    LABALBU 4.1 08/02/2022 02:13 PM    CREATININE 0.68 08/02/2022 02:13 PM    CALCIUM 9.3 08/02/2022 02:13 PM    GFRAA >60 08/02/2022 02:13 PM    LABGLOM >60 08/02/2022 02:13 PM    LABGLOM 79 02/02/2020 12:39 PM    GLUCOSE 115 08/02/2022 02:13 PM       ASSESSMENT:      Patient Active Problem List   Diagnosis    Hyperlipidemia    Hypothyroidism    Osteoarthritis    History of breast cancer    Edema extremities    Vitamin D deficiency    Use of anastrozole (Arimidex)    Encounter for monitoring aromatase inhibitor therapy    Left arm pain    Hypertension    History of total right hip arthroplasty    Hyponatremia    Acute cystitis with hematuria    Chronic right hip pain    Balance problems    Syncope and collapse     JEANNETTE SHEPHERD  89  WF  [RAYNE Spencer;   DC Cardiology---[facility]]  DNR-CCA      ELIQUIS    SUPPLEMENTAL OXYGEN  COVID-19--POSITIVE--8. 2.2022    Anti-infectives:  -Rocephin IV    Syncope--collapse---8.2.2022---standing in kitchen and fell to floor--brief LOC--no recollection   Hypoxia---8.2.2022----possibly due to improper Pox reading   UTI----POA---8.2022  COVID-19--INFECTION----8.2.2022         CT head--8. 2.2022----no MBS       CT c-spine--8. 2.2022----no fracture or dislocations       CXR---8.2.2022--basilar hyper density       CTA chest--pulmonary---8.2.2022---old RUL PE EKG----8.2.2022---SR--82---PACs       X-ray---right elbow---8.2.2022---no fracture    Prior:  Pulmonary embolus--RUL---3.18.2020--likely related to associated with left hip fracture--LINDA            CXR----3.20.2020---NACs            EKG---3.20.2020---NSR--88---NACs            DUS---BLE----3.19.2020--no BLE DVT            2D ECHO----3.19.2020--hyperdynamic LVSF---NRVSF---                           ENDY but opens well--mild AI--mild-to-moderate TR----                            RVSP ~ 32 mm Hg---IVC normal diameter--normal inspiratory                            collapse---Grade 1 mild DD----LVEF ~ 70%            CTA chest---pulmonary-----3.18.2020---acute PE---RUL arterial branch                        IVS straightening RV strain            CXR----3.18.2020---[-]            EKG----3.18.2020---NSR--92---possible LAE            Elevated troponin---3.18.2020--due to PE---non-cardiac            Transient alteration of awareness--3.18.2020      Hypertension  Hyperlipidemia  Hypothyroidism  CKD--2  Breast carcinoma---right--2014  HEARING IMPAIRMENT  PMH:    BLE edema, hyponatremia, left arm pain, OA, Vitamin D deficiency,               E coli UTI ----3.18.2020---fever---1/2 blood culture---[+],                closed subcapital fracture right femur--1.22.2020, hyponatremia  PSH:    see above, right cataract--IOL--2019, right radical mastectomy--2014,               left breast reduction--2014, right breast reconstruction--2014, right                breast biopsy--2014, colonoscopy---2007, ROBI-BSO---1978--cervix unknown,               appendectomy, right LINDA---1.23.2020    Allergies:  NKDA    CODE STATUS:   DNR-CCA  OARRS--8. 2.2022--[-]        PLAN:      Syncope---ACS regimen----2D ECHO---Cardiology    Home medications reviewed    UTI---POA---Rocephin IV pending cultures    Covid-19---supportive care     PE cont'd Eliquis--will need to weigh risk--benefit in light of falls  6.       See orders     Note that over 50 minutes was spent in evaluation of the patient, review of the chart and pertinent records, discussion with family/staff, etc    Katelynn Galdamez MD  4:35 PM  8/2/2022

## 2022-08-02 NOTE — ED PROVIDER NOTES
[facility]  eMERGENCY dEPARTMENT eNCOUnter      Pt Name: Antoni Pittman  MRN: [id number]  [place] 7/26/1931  Date of evaluation: 8/2/2022      CHIEF COMPLAINT       Chief Complaint   Patient presents with    Fall    Loss of Consciousness         HISTORY OF PRESENT ILLNESS    Antoni Pittman is a 80 y.o. female who presents chief complaint of syncope, patient states that she passed out this morning while she was getting ready to make breakfast she said yesterday she did not feel her normal self she had a bit of a cough some shortness of breath, she states that yesterday she was not feeling well she laid down took a nap that seemed to help this morning she got up and felt okay she started getting involved lightheaded while she was making her coffee  She is due family was standing over her on the floor in the kitchen she did strike her head patient is on Eliquis for a prior history of PE. She says she has a little bit of right elbow otherwise feels okay she was able to stand for paramedics but felt lightheaded, she felt a bit nauseated and received Zofran, there is been no vomiting no diarrhea no abdominal pain she denies any hip pain      REVIEW OF SYSTEMS         Review of Systems   Constitutional:  Negative for chills and fever. HENT:  Negative for congestion and ear pain. Eyes:  Negative for pain and visual disturbance. Respiratory:  Positive for cough and shortness of breath. Cardiovascular:  Negative for chest pain, palpitations and leg swelling. Gastrointestinal:  Positive for nausea. Negative for abdominal pain, blood in stool, constipation, diarrhea and vomiting. Endocrine: Negative for polydipsia and polyuria. Genitourinary:  Negative for difficulty urinating, dysuria and frequency. Musculoskeletal:  Negative for back pain, joint swelling, myalgias, neck pain and neck stiffness. Skin:  Negative for rash. Neurological:  Positive for syncope and light-headedness.  Negative for dizziness, weakness and headaches. Hematological:  Negative for adenopathy. Does not bruise/bleed easily. Psychiatric/Behavioral:  Negative for confusion, self-injury and suicidal ideas. PAST MEDICAL HISTORY    has a past medical history of Breast cancer ([facility] [address] Closed subcapital fracture of femur, right, initial encounter ([facility] [address] Edema extremities, Hyperlipidemia, Hypertension, Hypothyroidism, Osteoarthritis, and Unspecified vitamin D deficiency. SURGICAL HISTORY      has a past surgical history that includes Total abdominal hysterectomy w/ bilateral salpingoophorectomy (1978); Appendectomy; Colonoscopy (2007); Mastectomy, radical (Right, 2/2014); Breast reduction surgery (Left, 2/2014); Breast reconstruction (Right, 2/2014); Cataract removal with implant (Right, 09/19/2019); eye surgery; Hysterectomy; Total hip arthroplasty (Right, 1/23/2020); and Breast biopsy (Right, 2/2014). CURRENT MEDICATIONS       Previous Medications    ACETAMINOPHEN (TYLENOL) 500 MG TABLET    Take 500 mg by mouth every 6 hours as needed for Pain. Uses approx once a week    APIXABAN (ELIQUIS) 5 MG TABS TABLET    TAKE 1 TABLET BY MOUTH 2 TIMES DAILY    AZO-CRANBERRY PO    Take 1 tablet by mouth daily as needed (urinary health)     CALCIUM-MAGNESIUM-VITAMIN D PO    Take by mouth daily    CHOLECALCIFEROL (VITAMIN D3) 50 MCG (2000 UT) TABS    Take 2,000 Units by mouth daily     HANDICAP PLACARD MISC    by Does not apply route . Expires in 5 years    HANDICAP PLACARD MISC    by Does not apply route Expires 4/27/2027    LEVOTHYROXINE (SYNTHROID) 50 MCG TABLET    TAKE 1 TABLET BY MOUTH DAILY    MULTIPLE VITAMIN (MULTI-VITAMIN DAILY PO)    Take 1 tablet by mouth daily.     MULTIPLE VITAMINS-MINERALS (PRESERVISION AREDS 2 PO)    Take 1 tablet by mouth daily    PROBIOTIC PRODUCT (PROBIOTIC DAILY PO)    Take by mouth    SIMVASTATIN (ZOCOR) 20 MG TABLET    TAKE 0.5 TABLETS BY MOUTH EVERY EVENING    TRIAMTERENE-HYDROCHLOROTHIAZIDE material   within a right upper lobe segmental branch, compatible with chronic thrombus. No acute pulmonary embolism identified. Main pulmonary artery is normal in   caliber. Mediastinum: No evidence of mediastinal lymphadenopathy. The heart and   pericardium demonstrate no acute abnormality. There is no acute abnormality   of the thoracic aorta. Lungs/pleura: The lungs are without acute process. No focal consolidation or   pulmonary edema. No evidence of pleural effusion or pneumothorax. Upper Abdomen: No acute findings. Right hepatic lobe 4 cm cyst again   demonstrated. Soft Tissues/Bones: No acute bone or soft tissue abnormality. Implant   reconstruction of the right breast again noted. Impression   Chronic thrombus within a segmental branch in the right upper lobe, as   demonstrated on the 2020 exam.  No acute pulmonary embolism identified. No   acute airspace disease. EXAMINATION:   CT OF THE HEAD WITHOUT CONTRAST  8/2/2022 10:07 am       TECHNIQUE:   CT of the head was performed without the administration of intravenous   contrast. Automated exposure control, iterative reconstruction, and/or weight   based adjustment of the mA/kV was utilized to reduce the radiation dose to as   low as reasonably achievable. COMPARISON:   03/18/2020       HISTORY:   ORDERING SYSTEM PROVIDED HISTORY: syncope/fall   TECHNOLOGIST PROVIDED HISTORY:       syncope/fall   Decision Support Exception - unselect if not a suspected or confirmed   emergency medical condition->Emergency Medical Condition (MA)   Reason for Exam: syncope/fall       FINDINGS:   BRAIN/VENTRICLES: The ventricles and sulci are diffusely enlarged. Low   attenuation is seen in the periventricular and subcortical white matter. No   acute intracranial hemorrhage or acute infarct is identified. ORBITS: The visualized portion of the orbits demonstrate no acute abnormality.        SINUSES: The visualized paranasal sinuses and mastoid air cells demonstrate   no acute abnormality. SOFT TISSUES/SKULL:  No acute abnormality of the visualized skull or soft   tissues. Impression   No acute intracranial abnormality. Diffuse atrophic changes with findings suggesting chronic microvascular   ischemia        EXAMINATION:   CT OF THE CERVICAL SPINE WITHOUT CONTRAST 8/2/2022 10:07 am       TECHNIQUE:   CT of the cervical spine was performed without the administration of   intravenous contrast. Multiplanar reformatted images are provided for review. Automated exposure control, iterative reconstruction, and/or weight based   adjustment of the mA/kV was utilized to reduce the radiation dose to as low   as reasonably achievable. COMPARISON:   None. HISTORY:   ORDERING SYSTEM PROVIDED HISTORY: fall   TECHNOLOGIST PROVIDED HISTORY:   fall   Decision Support Exception - unselect if not a suspected or confirmed   emergency medical condition->Emergency Medical Condition (MA)   Reason for Exam: syncope/fall       FINDINGS:   BONES/ALIGNMENT: There is no acute fracture or traumatic malalignment. DEGENERATIVE CHANGES: Multilevel degenerative changes       SOFT TISSUES: There is no prevertebral soft tissue swelling. Impression   No acute abnormality of the cervical spine. ONE XRAY VIEW OF THE CHEST       8/2/2022 9:09 am       COMPARISON:   03/20/2020       HISTORY:   ORDERING SYSTEM PROVIDED HISTORY: shortness of breath   TECHNOLOGIST PROVIDED HISTORY:   shortness of breath   Reason for Exam: fall       FINDINGS:   The lungs are without acute focal process. There is no effusion or   pneumothorax. The cardiomediastinal silhouette is stable. The osseous   structures are stable. Impression   No acute process. Bibasilar hypoaeration            EXAMINATION:   THREE XRAY VIEWS OF THE RIGHT ELBOW       8/2/2022 9:09 am       COMPARISON:   None.        HISTORY: ORDERING SYSTEM PROVIDED HISTORY: fall   TECHNOLOGIST PROVIDED HISTORY:   fall   Reason for Exam: fall       FINDINGS:   Anatomic alignment. No definite joint effusion. No acute fracture. Calcification adjacent to the lateral epicondyle suggesting tendinopathy. Narrowing of the joint space involving the distal humerus and radius.            Impression   No acute bony or joint abnormality       ED BEDSIDE ULTRASOUND:       LABS:  Labs Reviewed   COVID-19, RAPID - Abnormal; Notable for the following components:       Result Value    SARS-CoV-2, Rapid DETECTED (*)     All other components within normal limits   URINALYSIS WITH REFLEX TO CULTURE - Abnormal; Notable for the following components:    Urine Hgb 2+ (*)     pH, UA 7.0 (*)     Nitrite, Urine POSITIVE (*)     Leukocyte Esterase, Urine 3+ (*)     All other components within normal limits   CBC WITH AUTO DIFFERENTIAL - Abnormal; Notable for the following components:    RBC 3.76 (*)     Hematocrit 35.2 (*)     MCHC 34.4 (*)     Seg Neutrophils 78 (*)     Lymphocytes 9 (*)     Eosinophils % 0 (*)     Immature Granulocytes 1 (*)     Absolute Lymph # 0.75 (*)     All other components within normal limits   COMPREHENSIVE METABOLIC PANEL - Abnormal; Notable for the following components:    Glucose 110 (*)     Bun/Cre Ratio 21 (*)     Sodium 134 (*)     Chloride 97 (*)     Total Bilirubin 0.23 (*)     All other components within normal limits   D-DIMER, QUANTITATIVE - Abnormal; Notable for the following components:    D-Dimer, Quant 0.98 (*)     All other components within normal limits   MICROSCOPIC URINALYSIS - Abnormal; Notable for the following components:    Bacteria, UA 2+ (*)     Other Observations UA Specimen Cultured (*)     All other components within normal limits   CULTURE, BLOOD 1   CULTURE, BLOOD 1   CULTURE, URINE   LACTATE, SEPSIS   LACTATE, SEPSIS   TROPONIN           EMERGENCY DEPARTMENT COURSE:   Vitals:    Vitals:    08/02/22 0844 08/02/22 0845 08/02/22 1100 08/02/22 1205   BP:  126/61 (!) 110/57 (!) 133/57   Pulse: 87 80 83 82   Resp: 21 23 20 16   Temp:       TempSrc:       SpO2: (!) 88% 97% 99% 94%   Weight:       Height:         -------------------------  BP: (!) 133/57, Temp: 97.3 °F (36.3 °C), Heart Rate: 82, Resp: 16        Re-evaluation Notes  Resting comfortably O2 saturation in the mid 90s on 2 L per nasal cannula      CRITICAL CARE:   IP CONSULT TO HOSPITALIST        CONSULTS:      PROCEDURES:  None    FINAL IMPRESSION      1. Syncope and collapse    2. COVID    3. Acute cystitis without hematuria          DISPOSITION/PLAN   DISPOSITION Decision To Admit    Condition on Disposition    Stable    PATIENT REFERRED TO:  No follow-up provider specified. DISCHARGE MEDICATIONS:  New Prescriptions    No medications on file       (Please note that portions of this note were completed with a voice recognition program.  Efforts were made to edit the dictations but occasionally words are mis-transcribed.)    Zoila Harris MD,, MD, F.A.A.E.M.   Attending Emergency Physician                           Zoila Harris MD  08/02/22 [id number]

## 2022-08-02 NOTE — RT PROTOCOL NOTE
RT Inhaler-Nebulizer Bronchodilator Protocol Note    There is a bronchodilator order in the chart from a provider indicating to follow the RT Bronchodilator Protocol and there is an Initiate RT Inhaler-Nebulizer Bronchodilator Protocol order as well (see protocol at bottom of note). CXR Findings:  XR CHEST PORTABLE    Result Date: 8/2/2022  No acute process. Bibasilar hypoaeration       The findings from the last RT Protocol Assessment were as follows:   History Pulmonary Disease: None or smoker <15 pack years  Respiratory Pattern: Regular pattern and RR 12-20 bpm  Breath Sounds: Slightly diminished and/or crackles  Cough: Strong, spontaneous, non-productive  Indication for Bronchodilator Therapy: None  Bronchodilator Assessment Score: 2    Aerosolized bronchodilator medication orders have been revised according to the RT Inhaler-Nebulizer Bronchodilator Protocol below. Respiratory Therapist to perform RT Therapy Protocol Assessment initially then follow the protocol. Repeat RT Therapy Protocol Assessment PRN for score 0-3 or on second treatment, BID, and PRN for scores above 3. No Indications - adjust the frequency to every 6 hours PRN wheezing or bronchospasm, if no treatments needed after 48 hours then discontinue using Per Protocol order mode. If indication present, adjust the RT bronchodilator orders based on the Bronchodilator Assessment Score as indicated below. Use Inhaler orders unless patient has one or more of the following: on home nebulizer, not able to hold breath for 10 seconds, is not alert and oriented, cannot activate and use MDI correctly, or respiratory rate 25 breaths per minute or more, then use the equivalent nebulizer order(s) with same Frequency and PRN reasons based on the score. If a patient is on this medication at home then do not decrease Frequency below that used at home.     0-3 - enter or revise RT bronchodilator order(s) to equivalent RT Bronchodilator order with Frequency of every 4 hours PRN for wheezing or increased work of breathing using Per Protocol order mode. 4-6 - enter or revise RT Bronchodilator order(s) to two equivalent RT bronchodilator orders with one order with BID Frequency and one order with Frequency of every 4 hours PRN wheezing or increased work of breathing using Per Protocol order mode. 7-10 - enter or revise RT Bronchodilator order(s) to two equivalent RT bronchodilator orders with one order with TID Frequency and one order with Frequency of every 4 hours PRN wheezing or increased work of breathing using Per Protocol order mode. 11-13 - enter or revise RT Bronchodilator order(s) to one equivalent RT bronchodilator order with QID Frequency and an Albuterol order with Frequency of every 4 hours PRN wheezing or increased work of breathing using Per Protocol order mode. Greater than 13 - enter or revise RT Bronchodilator order(s) to one equivalent RT bronchodilator order with every 4 hours Frequency and an Albuterol order with Frequency of every 2 hours PRN wheezing or increased work of breathing using Per Protocol order mode. RT to enter RT Home Evaluation for COPD & MDI Assessment order using Per Protocol order mode.     Electronically signed by Ragini Montiel RCP on 8/2/2022 at 7:26 PM

## 2022-08-03 ENCOUNTER — APPOINTMENT (OUTPATIENT)
Dept: GENERAL RADIOLOGY | Age: 87
DRG: 178 | End: 2022-08-03
Payer: MEDICARE

## 2022-08-03 LAB
ABSOLUTE EOS #: <0.03 K/UL (ref 0–0.44)
ABSOLUTE IMMATURE GRANULOCYTE: <0.03 K/UL (ref 0–0.3)
ABSOLUTE LYMPH #: 1.18 K/UL (ref 1.1–3.7)
ABSOLUTE MONO #: 0.69 K/UL (ref 0.1–1.2)
ALBUMIN SERPL-MCNC: 3.5 G/DL (ref 3.5–5.2)
ALBUMIN/GLOBULIN RATIO: 1.1 (ref 1–2.5)
ALP BLD-CCNC: 59 U/L (ref 35–104)
ALT SERPL-CCNC: 18 U/L (ref 5–33)
ANION GAP SERPL CALCULATED.3IONS-SCNC: 7 MMOL/L (ref 9–17)
AST SERPL-CCNC: 25 U/L
BASOPHILS # BLD: 0 % (ref 0–2)
BASOPHILS ABSOLUTE: <0.03 K/UL (ref 0–0.2)
BILIRUB SERPL-MCNC: 0.22 MG/DL (ref 0.3–1.2)
BILIRUBIN DIRECT: 0.08 MG/DL
BILIRUBIN, INDIRECT: 0.14 MG/DL (ref 0–1)
BUN BLDV-MCNC: 15 MG/DL (ref 8–23)
C-REACTIVE PROTEIN: 18.3 MG/L (ref 0–5)
CALCIUM SERPL-MCNC: 8.7 MG/DL (ref 8.6–10.4)
CHLORIDE BLD-SCNC: 95 MMOL/L (ref 98–107)
CO2: 28 MMOL/L (ref 20–31)
CREAT SERPL-MCNC: 0.72 MG/DL (ref 0.5–0.9)
D-DIMER QUANTITATIVE: 0.75 MG/L FEU (ref 0–0.59)
EKG ATRIAL RATE: 76 BPM
EKG ATRIAL RATE: 82 BPM
EKG P AXIS: 54 DEGREES
EKG P AXIS: 56 DEGREES
EKG P-R INTERVAL: 166 MS
EKG P-R INTERVAL: 176 MS
EKG Q-T INTERVAL: 406 MS
EKG Q-T INTERVAL: 408 MS
EKG QRS DURATION: 62 MS
EKG QRS DURATION: 68 MS
EKG QTC CALCULATION (BAZETT): 459 MS
EKG QTC CALCULATION (BAZETT): 474 MS
EKG R AXIS: 20 DEGREES
EKG R AXIS: 44 DEGREES
EKG T AXIS: 25 DEGREES
EKG T AXIS: 5 DEGREES
EKG VENTRICULAR RATE: 76 BPM
EKG VENTRICULAR RATE: 82 BPM
EOSINOPHILS RELATIVE PERCENT: 0 % (ref 1–4)
FERRITIN: 131 NG/ML (ref 13–150)
GFR AFRICAN AMERICAN: >60 ML/MIN
GFR NON-AFRICAN AMERICAN: >60 ML/MIN
GFR SERPL CREATININE-BSD FRML MDRD: ABNORMAL ML/MIN/{1.73_M2}
GLUCOSE BLD-MCNC: 112 MG/DL (ref 70–99)
HCT VFR BLD CALC: 34.9 % (ref 36.3–47.1)
HEMOGLOBIN: 11.9 G/DL (ref 11.9–15.1)
IMMATURE GRANULOCYTES: 0 %
LACTATE DEHYDROGENASE: 160 U/L (ref 135–214)
LV EF: 60 %
LVEF MODALITY: NORMAL
LYMPHOCYTES # BLD: 19 % (ref 24–43)
MAGNESIUM: 1.9 MG/DL (ref 1.6–2.6)
MCH RBC QN AUTO: 31.9 PG (ref 25.2–33.5)
MCHC RBC AUTO-ENTMCNC: 34.1 G/DL (ref 25.2–33.5)
MCV RBC AUTO: 93.6 FL (ref 82.6–102.9)
MONOCYTES # BLD: 11 % (ref 3–12)
NRBC AUTOMATED: 0 PER 100 WBC
PDW BLD-RTO: 13.2 % (ref 11.8–14.4)
PLATELET # BLD: 209 K/UL (ref 138–453)
PMV BLD AUTO: 9.1 FL (ref 8.1–13.5)
POTASSIUM SERPL-SCNC: 4.5 MMOL/L (ref 3.7–5.3)
RBC # BLD: 3.73 M/UL (ref 3.95–5.11)
SEG NEUTROPHILS: 70 % (ref 36–65)
SEGMENTED NEUTROPHILS ABSOLUTE COUNT: 4.29 K/UL (ref 1.5–8.1)
SODIUM BLD-SCNC: 130 MMOL/L (ref 135–144)
THYROXINE, FREE: 0.88 NG/DL (ref 0.93–1.7)
TOTAL PROTEIN: 6.6 G/DL (ref 6.4–8.3)
TROPONIN, HIGH SENSITIVITY: 8 NG/L (ref 0–14)
TSH SERPL DL<=0.05 MIU/L-ACNC: 1.83 UIU/ML (ref 0.3–5)
WBC # BLD: 6.2 K/UL (ref 3.5–11.3)

## 2022-08-03 PROCEDURE — 6370000000 HC RX 637 (ALT 250 FOR IP): Performed by: INTERNAL MEDICINE

## 2022-08-03 PROCEDURE — 80053 COMPREHEN METABOLIC PANEL: CPT

## 2022-08-03 PROCEDURE — 2580000003 HC RX 258: Performed by: INTERNAL MEDICINE

## 2022-08-03 PROCEDURE — 97165 OT EVAL LOW COMPLEX 30 MIN: CPT | Performed by: OCCUPATIONAL THERAPIST

## 2022-08-03 PROCEDURE — 85025 COMPLETE CBC W/AUTO DIFF WBC: CPT

## 2022-08-03 PROCEDURE — 93005 ELECTROCARDIOGRAM TRACING: CPT | Performed by: INTERNAL MEDICINE

## 2022-08-03 PROCEDURE — 83615 LACTATE (LD) (LDH) ENZYME: CPT

## 2022-08-03 PROCEDURE — 97161 PT EVAL LOW COMPLEX 20 MIN: CPT | Performed by: PHYSICAL THERAPIST

## 2022-08-03 PROCEDURE — 84484 ASSAY OF TROPONIN QUANT: CPT

## 2022-08-03 PROCEDURE — 36415 COLL VENOUS BLD VENIPUNCTURE: CPT

## 2022-08-03 PROCEDURE — 85379 FIBRIN DEGRADATION QUANT: CPT

## 2022-08-03 PROCEDURE — 86140 C-REACTIVE PROTEIN: CPT

## 2022-08-03 PROCEDURE — 2060000000 HC ICU INTERMEDIATE R&B

## 2022-08-03 PROCEDURE — 99222 1ST HOSP IP/OBS MODERATE 55: CPT | Performed by: INTERNAL MEDICINE

## 2022-08-03 PROCEDURE — 71045 X-RAY EXAM CHEST 1 VIEW: CPT

## 2022-08-03 PROCEDURE — 82248 BILIRUBIN DIRECT: CPT

## 2022-08-03 PROCEDURE — 84443 ASSAY THYROID STIM HORMONE: CPT

## 2022-08-03 PROCEDURE — 99232 SBSQ HOSP IP/OBS MODERATE 35: CPT | Performed by: INTERNAL MEDICINE

## 2022-08-03 PROCEDURE — 93306 TTE W/DOPPLER COMPLETE: CPT

## 2022-08-03 PROCEDURE — 83735 ASSAY OF MAGNESIUM: CPT

## 2022-08-03 PROCEDURE — 82728 ASSAY OF FERRITIN: CPT

## 2022-08-03 PROCEDURE — 84439 ASSAY OF FREE THYROXINE: CPT

## 2022-08-03 PROCEDURE — 6360000002 HC RX W HCPCS: Performed by: INTERNAL MEDICINE

## 2022-08-03 RX ADMIN — LEVOTHYROXINE SODIUM 50 MCG: 0.03 TABLET ORAL at 09:16

## 2022-08-03 RX ADMIN — ACETAMINOPHEN 650 MG: 325 TABLET ORAL at 16:07

## 2022-08-03 RX ADMIN — APIXABAN 5 MG: 5 TABLET, FILM COATED ORAL at 19:48

## 2022-08-03 RX ADMIN — SODIUM CHLORIDE, PRESERVATIVE FREE 10 ML: 5 INJECTION INTRAVENOUS at 19:47

## 2022-08-03 RX ADMIN — SODIUM CHLORIDE, PRESERVATIVE FREE 10 ML: 5 INJECTION INTRAVENOUS at 09:16

## 2022-08-03 RX ADMIN — CEFTRIAXONE 1000 MG: 1 INJECTION, POWDER, FOR SOLUTION INTRAMUSCULAR; INTRAVENOUS at 19:47

## 2022-08-03 RX ADMIN — ATORVASTATIN CALCIUM 10 MG: 10 TABLET, FILM COATED ORAL at 19:48

## 2022-08-03 RX ADMIN — APIXABAN 5 MG: 5 TABLET, FILM COATED ORAL at 09:16

## 2022-08-03 ASSESSMENT — PAIN DESCRIPTION - PAIN TYPE
TYPE: ACUTE PAIN

## 2022-08-03 ASSESSMENT — PAIN SCALES - WONG BAKER
WONGBAKER_NUMERICALRESPONSE: 0
WONGBAKER_NUMERICALRESPONSE: 0

## 2022-08-03 ASSESSMENT — PAIN DESCRIPTION - ORIENTATION
ORIENTATION: RIGHT;MID
ORIENTATION: RIGHT
ORIENTATION: RIGHT
ORIENTATION: RIGHT;MID

## 2022-08-03 ASSESSMENT — PAIN DESCRIPTION - DESCRIPTORS
DESCRIPTORS: ACHING

## 2022-08-03 ASSESSMENT — PAIN SCALES - GENERAL
PAINLEVEL_OUTOF10: 5
PAINLEVEL_OUTOF10: 3
PAINLEVEL_OUTOF10: 3
PAINLEVEL_OUTOF10: 6
PAINLEVEL_OUTOF10: 3

## 2022-08-03 ASSESSMENT — PAIN DESCRIPTION - FREQUENCY
FREQUENCY: CONTINUOUS

## 2022-08-03 ASSESSMENT — PAIN - FUNCTIONAL ASSESSMENT
PAIN_FUNCTIONAL_ASSESSMENT: ACTIVITIES ARE NOT PREVENTED
PAIN_FUNCTIONAL_ASSESSMENT: ACTIVITIES ARE NOT PREVENTED

## 2022-08-03 ASSESSMENT — PAIN DESCRIPTION - LOCATION
LOCATION: ELBOW
LOCATION: ELBOW
LOCATION: ELBOW;HEAD
LOCATION: ELBOW;HEAD

## 2022-08-03 NOTE — PROGRESS NOTES
POST FALL MANAGEMENT     Dr Jose Gurrola [facility] RECORD NUMBER:  [id number]  AGE: 80 y.o. GENDER: female  : 1931  TODAYS DATE:  8/3/2022    Details     Fall Occurred: Yes    Was the Fall Witnessed:  Yes      Brief Review of Event: Pt lost consciousness while standing in bathroom and collapsed into arms of Kirstin Byers RN. Pt was then slowly lowered to the floor. Who found the patient: Kirstin Byers RN      Where was the patient at the time of the fall: Patient bathroom      Patient Comments: c/o \"wooziness\" at time of incident     Date Fall Occurred:  2022 . Time Fall Occurred: 10:37p.m.      Assessment     Post Fall Head to Toe Assessment Completed: Yes    Post Rik Bills and ABCDS Completed: Yes     Post Fall Vitals Completed: Yes    Post Fall Neuro Checks Completed: Yes:     Injury Occurred(if yes, describe injury):  no           Did the Patient Experience:(Check William Marshall all that apply)    [] Patient hit head  [x] Loss of consciousness  [] Change in mental status following the fall  [] Patient is on an anticoagulant medication      CT Performed:  no    Follow-up     Persons Notified of Fall:  (Provide names of persons notified)   [] Physician:   [x] ARIK: Nestor Vázquez NP  [x] Nursing Supervisior: Inessa Gayle RN  [] Manager:  [] Pharmacist:  [] Family:  [] Other:      Electronically signed by Kirstin Byers RN 8/3/2022 at 12:08 AM

## 2022-08-03 NOTE — PROGRESS NOTES
Hospitalist Progress Note    Patient:  Gregor Saucedo     YOB: 1931    MRN: [id number]   Admit date: 8/2/2022     Acct: [de-identified]     PCP: Eh Chow MD    CC--Interval History:     Syncopal episodes associated with antecedent nausea--sensation of lightheadedness---witnessed events in which patient had brief syncopal events---no obvious seizure activity---no loss of bowel-bladder control----sensed that she was beginning to feel weak---had to be lifted to bed---while slightly bradycardic, BP maintained. Seen by Cardiology---UTI---Covid contributing factors. MI ruled out.     PE on Eliquis    CKD--Stage 2    HTN-----104/58    Hypothyroidism----TSH---normal----1.83    Covid--19---supportive care    See note below     All other ROS negative except noted in HPI    Diet:  Diet NPO    Medications:  Scheduled Meds:   sodium chloride flush  10 mL IntraVENous 2 times per day    apixaban  5 mg Oral BID    levothyroxine  50 mcg Oral Daily    atorvastatin  10 mg Oral Nightly    triamterene-hydroCHLOROthiazide  1 capsule Oral Daily    cefTRIAXone (ROCEPHIN) IV  1,000 mg IntraVENous Q24H     Continuous Infusions:   sodium chloride       PRN Meds:sodium chloride flush, sodium chloride, ondansetron, acetaminophen **OR** acetaminophen, sodium chloride nebulizer, albuterol, albuterol sulfate HFA    Objective:  Labs:  CBC with Differential:    Lab Results   Component Value Date/Time    WBC 6.2 08/03/2022 05:34 AM    RBC 3.73 08/03/2022 05:34 AM    HGB 11.9 08/03/2022 05:34 AM    HCT 34.9 08/03/2022 05:34 AM     08/03/2022 05:34 AM    MCV 93.6 08/03/2022 05:34 AM    MCH 31.9 08/03/2022 05:34 AM    MCHC 34.1 08/03/2022 05:34 AM    RDW 13.2 08/03/2022 05:34 AM    NRBC 0 02/02/2020 12:39 PM    SEGSPCT 78.3 02/02/2020 12:39 PM    LYMPHOPCT 19 08/03/2022 05:34 AM    MONOPCT 11 08/03/2022 05:34 AM    BASOPCT 0 08/03/2022 05:34 AM    MONOSABS 0.69 08/03/2022 05:34 AM    MONOSABS 0.5 04/22/2021 10:55 AM LYMPHSABS 1.18 08/03/2022 05:34 AM    LYMPHSABS 1.6 04/22/2021 10:55 AM    EOSABS <0.03 08/03/2022 05:34 AM    EOSABS 0.1 04/22/2021 10:55 AM    BASOSABS <0.03 08/03/2022 05:34 AM    DIFFTYPE NOT REPORTED 11/11/2021 11:44 AM     BMP:    Lab Results   Component Value Date/Time     08/03/2022 05:34 AM    K 4.5 08/03/2022 05:34 AM    CL 95 08/03/2022 05:34 AM    CO2 28 08/03/2022 05:34 AM    BUN 15 08/03/2022 05:34 AM    LABALBU 3.5 08/03/2022 05:34 AM    CREATININE 0.72 08/03/2022 05:34 AM    CALCIUM 8.7 08/03/2022 05:34 AM    GFRAA >60 08/03/2022 05:34 AM    LABGLOM >60 08/03/2022 05:34 AM    LABGLOM 79 02/02/2020 12:39 PM    GLUCOSE 112 08/03/2022 05:34 AM           Physical Exam:  Vitals: BP (!) 104/58   Pulse 73   Temp 98.1 °F (36.7 °C) (Oral)   Resp 16   Ht 5' 3\" (1.6 m)   Wt 139 lb 12.8 oz (63.4 kg)   SpO2 98%   BMI 24.76 kg/m²   24 hour intake/output:  Intake/Output Summary (Last 24 hours) at 8/3/2022 0719  Last data filed at 8/2/2022 1724  Gross per 24 hour   Intake 240 ml   Output --   Net 240 ml     Last 3 weights: Wt Readings from Last 3 Encounters:   08/03/22 139 lb 12.8 oz (63.4 kg)   04/27/22 141 lb (64 kg)   04/21/22 140 lb (63.5 kg)     HEENT: Normocephalic and Atraumatic  Neck: Supple, No Masses, Tenderness, Nodularity, and No Lymphadenopathy  Chest/Lungs: Clear to Auscultation without Rales, Rhonchi, or Wheezes and Distant Breath Sounds  Cardiac: Regular Rate and Rhythm  GI/Abdomen: Bowel Sounds Present and Soft, Non-tender, without Guarding or Rebound Tenderness  : Not examined  EXT/Skin: No Edema, No Cyanosis, and No Clubbing  Neuro:  hard of hearing----generalized weakness----- and Alert and Oriented      Assessment:    Principal Problem:    Syncope and collapse  Resolved Problems:    * No resolved hospital problems. *    JEANNETTE SHEPHERD  89  WF  [rose Cotto, IM;   DC Cardiology---TCC]  DNR-CCA      ELIQUIS    SUPPLEMENTAL OXYGEN  COVID-19--POSITIVE--8. 2.2022    Anti-infectives: Rocephin IV    Syncope--collapse---8.2.2022---standing in kitchen and fell to floor--brief LOC--no recollection--                several witnessed events antecedent nausea---dizziness---8.2.2022--8. 3.2022  Hypoxia---8.2.2022----possibly due to improper Pox reading   UTI--POA---8.2.2022  COVID-19--INFECTION----8.2.2022         EKG---8.3.2022---SR--76--PACs       CT head--8. 2.2022----no MBS       CT c-spine--8. 2.2022----no fracture or dislocations       CXR---8.2.2022--basilar hyper density       CTA chest--pulmonary---8.2.2022---old RUL PE       EKG----8.2.2022---SR--82---PACs       X-ray---right elbow---8.2.2022---no fracture       MI ruled out----8.3.2022       CXR---8.3.2022--no focal consolidation         2D ECHO----8.3.2022---pending   Hyponatremia     Prior:  Pulmonary embolus--RUL---3.18.2020--likely related to associated with left hip fracture--LINDA            CXR----3.20.2020---NACs            EKG---3.20.2020---NSR--88---NACs            DUS---BLE----3.19.2020--no BLE DVT            2D ECHO----3.19.2020--hyperdynamic LVSF---NRVSF---                           ENDY but opens well--mild AI--mild-to-moderate TR----                            RVSP ~ 32 mm Hg---IVC normal diameter--normal inspiratory                            collapse---Grade 1 mild DD----LVEF ~ 70%            CTA chest---pulmonary-----3.18.2020---acute PE---RUL arterial branch                        IVS straightening RV strain            CXR----3.18.2020---[-]            EKG----3.18.2020---NSR--92---possible LAE            Elevated troponin---3.18.2020--due to PE---non-cardiac            Transient alteration of awareness--3.18.2020      Hypertension  Hyperlipidemia  Hypothyroidism  CKD--2  Breast carcinoma---right--2014  HEARING IMPAIRMENT  PMH:    BLE edema, hyponatremia, left arm pain, OA, Vitamin D deficiency,               E coli UTI ----3.18.2020---fever---1/2 blood culture---[+],                closed subcapital fracture right femur--1.22.2020, hyponatremia  PSH:    see above, right cataract--IOL--2019, right radical mastectomy--2014,               left breast reduction--2014, right breast reconstruction--2014, right                breast biopsy--2014, colonoscopy---2007, ROBI-BSO---1978--cervix unknown,               appendectomy, right LINDA---1.23.2020    Allergies:  NKDA          Plan:      Syncope---push fluids      Per Cardiology---stop Maxzide to allow BP to rise       Covid---supportive care      See orders     Electronically signed by Eyad Carr on 8/3/2022 at 7:19 AM    Hospitalist

## 2022-08-03 NOTE — CARE COORDINATION
Case Management Initial Discharge Plan  Haritha Negrete             Met with:patient to discuss discharge plans. Information verified: address, contacts, phone number, , and insurance: Yes  Insurance Provider: Primary:  Payor: Lilly Starr / Plan: Mellisa James PPO / Product Type: Medicare /                                         Emergency Contact/Next of Kin name & number: Alex Rocha, see chart  Who are involved in patient's support system? Family    PCP: Gissel Barillas MD  Date of last visit: see chart    Discharge Planning    Living Arrangements:  [address] has 1 stories  2 stairs to climb to get into front door, n/a stairs to climb to reach second floor  Location of bedroom/bathroom in home first    Patient able to perform ADL's:Independent    Current Services (outpatient & in home) none  DME equipment: walker, cane, life alert button  DME provider: n/a    Is patient receiving oral anticoagulation therapy? No    If indicated:   Physician managing anticoagulation treatment: n/a  Where does patient obtain lab work for ATC treatment? N/a      Potential Assistance Needed:  Home Care    Patient agreeable to home care: Unsure  [place] of choice provided:  yes    Prior SNF/Rehab Placement and Facility: none  Agreeable to SNF/Rehab: No  [place] of choice provided: no      Expected Discharge date:       Patient expects to be discharged to: If home: is the family and/or caregiver wiling & able to provide support at home? Yes  Who will be providing this support? daughter    Follow Up Appointment: Best Day/ Time:      Transportation provider: self  Transportation arrangements needed for discharge: No    Readmission Risk              Risk of Unplanned Readmission:  [id number]             Does patient have a readmission risk score greater than 20?: No  If yes, follow-up appointment must be made within 7 days of discharge.      Goals of Care:       Educated patient on transitional options, provided freedom of choice and are agreeable with plan      Discharge Plan: Patient lives at home alone and is independent. Pt has received home health services in the past and is unsure if she needs them again. PT/OT orders placed. Pt denies any other needs at present time.           Electronically signed by Heidi Gandara RN on 8/3/22 at 10:47 AM EDT

## 2022-08-03 NOTE — PROGRESS NOTES
2238 Notified by RN, Oleksandr Malagon, that patient had a witnessed syncopal event while standing in the bathroom washing her hands. She noticed she was feeling weak and \"woozy\" and requested to sit down, became pale, and was assisted to the floor by Geovanna Hou RN and then she did briefly lose consciousness. Within a minute of laying down she began to regain consciousness, was oriented and appropriate, denied any pain, shortness of breath, chest pain, or nausea. She continued to feel \"woozy\". Blood pressure 120's/60's, HR 74. Per nursing, telemetry did alarm bradycardia with HR in the 30's and 40's during this event. After a few minutes, patient was able to sit up, but then became lightheaded after a couple minutes and requested to lay back down -- she did not lose consciousness this time. The patient was lifted by 4 nurses back into bed and tolerated this well. She states she feels \"woozy\", tired, and slightly nauseous. Her oxygen saturation was 88% on room air and she was placed on 2L nasal cannula oxygen with improvement in her oxygen saturation to 95%.

## 2022-08-03 NOTE — PROGRESS NOTES
Physical Therapy  Facility/Department: [facility]  Physical Therapy Initial Assessment    Name: Lyn Singletary  : 1931  MRN: [id number]  Date of Service: 8/3/2022    Discharge Recommendations:  Home with assist PRN          Patient Diagnosis(es): The primary encounter diagnosis was Syncope and collapse. Diagnoses of COVID and Acute cystitis without hematuria were also pertinent to this visit. Past Medical History:  has a past medical history of Breast cancer ([facility] [address] Closed fracture of intracapsular section of femur ([facility] [address] Closed subcapital fracture of femur, right, initial encounter ([facility] [address] Edema extremities, Hyperlipidemia, Hypertension, Hypothyroidism, Osteoarthritis, and Unspecified vitamin D deficiency. Past Surgical History:  has a past surgical history that includes Total abdominal hysterectomy w/ bilateral salpingoophorectomy (); Appendectomy; Colonoscopy (); Mastectomy, radical (Right, 2014); Breast reduction surgery (Left, 2014); Breast reconstruction (Right, 2014); Cataract removal with implant (Right, 2019); eye surgery; Hysterectomy; Total hip arthroplasty (Right, 2020); and Breast biopsy (Right, 2014).     Assessment   Body Structures, Functions, Activity Limitations Requiring Skilled Therapeutic Intervention: Decreased endurance;Decreased functional mobility   Therapy Prognosis: Good  Decision Making: Low Complexity  Activity Tolerance  Activity Tolerance: Patient limited by fatigue     Plan   Plan  Plan: 1 time a day 7 days a week  Current Treatment Recommendations: Gait training, Transfer training  Safety Devices  Type of Devices: Left in bed, Call light within reach     Restrictions        Subjective   General  Chart Reviewed: Yes  Patient assessed for rehabilitation services?: Yes  Response To Previous Treatment: Not applicable  Family / Caregiver Present: No  Follows Commands: Within Functional Limits         Social/Functional History  Social/Functional History  Lives With: Alone  Type of Home: House  Home Layout: One level  Home Access: Stairs to enter with rails  Entrance Stairs - Number of Steps: 2  Bathroom Accessibility: Accessible  Home Equipment: ricardo Hector  Receives Help From: Family  ADL Assistance: Independent  Homemaking Assistance: Independent  Ambulation Assistance: Independent  Transfer Assistance: Independent  Active : Yes  Mode of Transportation: Car  Occupation: Retired  Vision/Hearing       Cognition         Objective   Heart Rate: 75  [address]: Monitor;Telemetry  BP: 121/71  BP Location: Left upper arm  BP Method: Automatic  Patient Position: Semi fowlers  MAP (Calculated): 87.67  Resp: 16  SpO2: 97 %  O2 Device: Nasal cannula     Observation/Palpation  Observation: In bed, no O2 on at SpO2 95%                       Bed mobility  Supine to Sit: Independent  Sit to Supine: Independent  Scooting: Independent  Transfers  Sit to Stand: Independent  Stand to sit:  Independent  Ambulation  Surface: level tile  Device: No Device  Assistance: Stand by assistance  Quality of Gait: Feels \" woozy\"  Gait Deviations: Slow Meseret  Distance: 25 ft  Comments: Stood 4' total. SpO2 stayed 94-96%     Balance  Sitting - Static: Good  Sitting - Dynamic: Good  Standing - Static: Good  Standing - Dynamic: Fair           OutComes Score                                                  AM-PAC Score             Tinneti Score       Goals  Short Term Goals  Time Frame for Short term goals: 1 day  Short term goal 1: Assess functional status  Long Term Goals  Time Frame for Long term goals : 2-3 days  Long term goal 1: Gait 40-50 ft       Education         Therapy Time   Individual Concurrent Group Co-treatment   Time In           Time Out           Minutes                   Max Kaye, PT

## 2022-08-03 NOTE — PLAN OF CARE
Problem: Safety - Adult  Goal: Free from fall injury  8/3/2022 1045 by Joey Mendez RN  Note: Patient will remain free from injuries and falls. Bed is in low position, wheels locked and bed alarm is on. Call light and personal belongings are within reach. Problem: Pain  Goal: Verbalizes/displays adequate comfort level or baseline comfort level  Note: Patient is able to use 0-10 pain scale to rate pain. PRN meds are available. Problem: Skin/Tissue Integrity  Goal: Absence of new skin breakdown  Description: 1. Monitor for areas of redness and/or skin breakdown  2. Assess vascular access sites hourly  3. Every 4-6 hours minimum:  Change oxygen saturation probe site  4. Every 4-6 hours:  If on nasal continuous positive airway pressure, respiratory therapy assess nares and determine need for appliance change or resting period. Note: Will continue to monitor skin for any signs of breakdown and or infection.      Problem: Discharge Planning  Goal: Discharge to home or other facility with appropriate resources  8/3/2022 1045 by Joey Mendez RN  [facility] (Taken 8/3/2022 [id number])  Discharge to home or other facility with appropriate resources: Refer to discharge planning if patient needs post-hospital services based on physician order or complex needs related to functional status, cognitive ability or social support system  Note:  and  are assigned to case and are available upon request.

## 2022-08-03 NOTE — ACP (ADVANCE CARE PLANNING)
Advance Care Planning     Advance Care Planning Inpatient Note  [facility] Department    Today's Date: 8/3/2022  Unit: [address] [place]    Received request from patient. Upon review of chart and communication with care team, patient's decision making abilities are not in question. . Patient was/were present in the room during visit. Goals of ACP Conversation:  Discuss advance care planning documents    Health Care Decision Makers:       Primary Decision Maker: Carmelina Freddie - [phone]    Secondary Decision Maker: [facility] [phone]  Summary:  Verified Healthcare Decision Maker    Advance Care Planning Documents (Patient Wishes):  Patient requested information. She was given information sheets and Advance Directive documents      Assessment:  Patient appears a competent 80year old. She is currently DNR-CCA and indicated that she will continue that status. During visit she took a call from her son. Conversation appeared normal with not confusion. She was able to explain her illness in that conversation. She explained that her son is an anaesthesiologist in [place]. Interventions:  Provided education on documents for clarity and greater understanding  Reviewed but did not complete ACP document    Care Preferences Communicated:   Ventilation:   If the patient, in their present state of health, suddenly became very ill and unable to breathe on their own,     the patient would NOT desire the use of a ventilator (breathing machine). If their health worsens and it becomes clear that the change of recovery is unlikely,     the patient would NOT desire the use of a ventilator (breathing machine).     Outcomes/Plan:  ACP Discussion: Postponed    Electronically signed by Neto Ding on 8/3/2022 at 10:13 AM

## 2022-08-03 NOTE — PLAN OF CARE
Problem: Discharge Planning  Goal: Discharge to home or other facility with appropriate resources  Outcome: Progressing  Flowsheets  Taken 8/2/2022 2000 by Bryan Gomez RN  Discharge to home or other facility with appropriate resources:   Refer to discharge planning if patient needs post-hospital services based on physician order or complex needs related to functional status, cognitive ability or social support system   Identify barriers to discharge with patient and caregiver  Taken 8/2/2022 1331 by Richard Dumont RN  Discharge to home or other facility with appropriate resources: Arrange for needed discharge resources and transportation as appropriate     Problem: Safety - Adult  Goal: Free from fall injury  Outcome: Progressing     Problem: ABCDS Injury Assessment  Goal: Absence of physical injury  Outcome: Progressing

## 2022-08-03 NOTE — CONSULTS
[facility]    Consult                        Today's Date: 8/3/2022  Patient Name: Padmini Ludwig  Date of admission: 8/2/2022  8:31 AM  Patient's age: 80 y.o., 7/26/1931  Admission Dx: Syncope and collapse [R55]  Acute cystitis without hematuria [N30.00]  COVID [U07.1]    Reason for Consult:  Cardiac evaluation    Requesting Physician: Estrellita Adam    CHIEF COMPLAINT:  Syncope    History Obtained From:  patient, electronic medical record    HISTORY OF PRESENT ILLNESS:      The patient is a 80 y.o.  female who presented with syncope while in kitchen making herself coffee. She reports that she was out for few seconds only. She reports progressive weakness leading up to this and has been sleeping more. She denies any chest pain or dyspnea. She was found to have COVID infection. She has h/o PE and on eliquis. PE still noted on CTA 8/2/22. Also noted to have UTI. Past Medical History:   has a past medical history of Breast cancer ([address] Closed fracture of intracapsular section of femur ([address] Closed subcapital fracture of femur, right, initial encounter ([address] Edema extremities, Hyperlipidemia, Hypertension, Hypothyroidism, Osteoarthritis, and Unspecified vitamin D deficiency. Past Surgical History:   has a past surgical history that includes Total abdominal hysterectomy w/ bilateral salpingoophorectomy (1978); Appendectomy; Colonoscopy (2007); Mastectomy, radical (Right, 2/2014); Breast reduction surgery (Left, 2/2014); Breast reconstruction (Right, 2/2014); Cataract removal with implant (Right, 09/19/2019); eye surgery; Hysterectomy; Total hip arthroplasty (Right, 1/23/2020); and Breast biopsy (Right, 2/2014). Home Medications:    Prior to Admission medications    Medication Sig Start Date End Date Taking?  Authorizing Provider   Probiotic Product (PROBIOTIC DAILY PO) Take by mouth   Yes Historical Provider, MD   triamterene-hydroCHLOROthiazide (MAXZIDE-25) 37.5-25 MG per tablet TAKE 1 TABLET BY MOUTH DAILY 6/21/22   MD Sandra Jones MISC by Does not apply route Expires 4/27/2027 4/28/22   Trixie Caldwell MD   simvastatin (ZOCOR) 20 MG tablet TAKE 0.5 TABLETS BY MOUTH EVERY EVENING 4/12/22   Trixie Caldwell MD   levothyroxine (SYNTHROID) 50 MCG tablet TAKE 1 TABLET BY MOUTH DAILY 10/28/21   Trixie Caldwell MD   apixaban (ELIQUIS) 5 MG TABS tablet TAKE 1 TABLET BY MOUTH 2 TIMES DAILY 7/6/21   Trixie Caldwell MD   Cholecalciferol (VITAMIN D3) 50 MCG (2000 UT) TABS Take 2,000 Units by mouth daily     Historical Provider, MD   AZO-CRANBERRY PO Take 1 tablet by mouth daily as needed (urinary health)   Patient not taking: Reported on 4/27/2022    Historical Provider, MD   Multiple Vitamins-Minerals (PRESERVISION AREDS 2 PO) Take 1 tablet by mouth daily    Historical Provider, MD   Handicap Placard MISC by Does not apply route . Expires in 5 years  Patient not taking: Reported on 4/27/2022 3/2/17   Trixie Caldwell MD   CALCIUM-MAGNESIUM-VITAMIN D PO Take by mouth daily    Historical Provider, MD   acetaminophen (TYLENOL) 500 MG tablet Take 500 mg by mouth every 6 hours as needed for Pain. Uses approx once a week  Patient not taking: Reported on 4/27/2022    Historical Provider, MD   Multiple Vitamin (MULTI-VITAMIN DAILY PO) Take 1 tablet by mouth daily. Historical Provider, MD       Allergies:  Patient has no known allergies. Social History:   reports that she has never smoked. She has never used smokeless tobacco. She reports that she does not drink alcohol and does not use drugs. Family History: family history includes Alzheimer's Disease in her mother; COPD in her sister; Cancer in her daughter; Diabetes in her father and maternal grandfather; Emphysema in her father; Heart Disease in her maternal grandfather and maternal grandmother; Hypertension in her daughter; Other in her daughter and sister; Stroke in her sister.  No h/o sudden cardiac death.No for premature CAD    REVIEW OF SYSTEMS:    Constitutional: there has been no unanticipated weight loss. There's been No change in energy level, No change in activity level. Eyes: No visual changes or diplopia. No scleral icterus. ENT: No Headaches, hearing loss or vertigo. No mouth sores or sore throat. Cardiovascular: see above  Respiratory: see above  Gastrointestinal: No abdominal pain, appetite loss, blood in stools. Genitourinary: No dysuria, trouble voiding, or hematuria. Musculoskeletal:  No gait disturbance, No weakness or joint complaints. Integumentary: No rash or pruritis. Neurological: No headache or diplopia. No tingling  Psychiatric: No anxiety, or depression. Endocrine: No temperature intolerance. Hematologic/Lymphatic: No abnormal bruising or bleeding, blood clots or swollen lymph nodes. Allergic/Immunologic: No nasal congestion or hives. PHYSICAL EXAM:      BP (!) 104/58   Pulse 83   Temp 98.1 °F (36.7 °C) (Oral)   Resp 16   Ht 5' 3\" (1.6 m)   Wt 139 lb 12.8 oz (63.4 kg)   SpO2 98%   BMI 24.76 kg/m²    Constitutional and General Appearance: alert, cooperative, no distress and appears stated age  HEENT: PERRL, no cervical lymphadenopathy. No masses palpable. Normal oral mucosa  Respiratory:  Normal excursion and expansion without use of accessory muscles  Resp Auscultation: Good respiratory effort. No for increased work of breathing. On auscultation: clear to auscultation bilaterally  Cardiovascular:  Heart tones are crisp and normal. regular S1 and S2.  Jugular venous pulsation Normal  The carotid upstroke is normal in amplitude and contour without delay or bruit   Abdomen:   soft  Bowel sounds present  Extremities:   No edema  Neurological:  Alert and oriented.       DATA:    Diagnostics:    EKG: SR, PAC    Labs:     CBC:   Recent Labs     08/02/22  0903 08/03/22  0534   WBC 8.4 6.2   HGB 12.1 11.9   HCT 35.2* 34.9*    209     BMP:   Recent Labs 08/02/22  1413 08/03/22  0534    130*   K 4.4 4.5   CO2 30 28   BUN 14 15   CREATININE 0.68 0.72   LABGLOM >60 >60   GLUCOSE 115* 112*     BNP: No results for input(s): BNP in the last 72 hours. PT/INR:   Recent Labs     08/02/22  1413   PROTIME 13.7   INR 1.1     APTT:  Recent Labs     08/02/22  1413   APTT 32.2     CARDIAC ENZYMES:No results for input(s): CKTOTAL, CKMB, CKMBINDEX, TROPONINI in the last 72 hours. FASTING LIPID PANEL:  Lab Results   Component Value Date/Time     03/03/2022 09:20 AM    TRIG 75 03/03/2022 09:20 AM     LIVER PROFILE:  Recent Labs     08/02/22  1413 08/03/22  0534   AST 27 25   ALT 19 18   LABALBU 4.1 3.5       IMPRESSION:    Patient Active Problem List   Diagnosis    Hyperlipidemia    Hypothyroidism    Osteoarthritis    History of breast cancer    Edema extremities    Vitamin D deficiency    Use of anastrozole (Arimidex)    Encounter for monitoring aromatase inhibitor therapy    Left arm pain    Hypertension    History of total right hip arthroplasty    Hyponatremia    Acute cystitis with hematuria    Chronic right hip pain    Balance problems    Syncope and collapse    History of right hip replacement    Map-dot-fingerprint corneal dystrophy    Retinal degeneration, peripheral    Nuclear sclerotic cataract of left eye    Myogenic ptosis of eyelid of both eyes    Retinal tear of left eye     Syncope  COVID infection  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 PCP managing. On IV antibiotics for UTI      Discussed with patient and Nurse.     Elian Schwab [facility] Card[facility] Consult           [phone]

## 2022-08-03 NOTE — PROGRESS NOTES
rounding on PCU. Patient requested Advance Directive information. Assessment: Patient is awake and appears to be oriented. She has strong support of family and she has been in contact with her . ACP: see note    Intervention: Engaged in conversation.  prayed with patient. Patient expressed appreciation for visit and offer of continued prayer. Plan: Chaplains are available on site or on call 24/7 for spiritual and emotional support.

## 2022-08-03 NOTE — PROGRESS NOTES
Occupational Therapy  Facility/Department: [address] [place] [address]  Occupational Therapy Initial Assessment    Name: Paulette Al  : 1931  MRN: [id number]  Date of Service: 8/3/2022    Discharge Recommendations:  Home with assist PRN        Patient Diagnosis(es): The primary encounter diagnosis was Syncope and collapse. Diagnoses of COVID and Acute cystitis without hematuria were also pertinent to this visit. Past Medical History:  has a past medical history of Breast cancer ([facility] Closed fracture of intracapsular section of femur ([facility] Closed subcapital fracture of femur, right, initial encounter ([facility] Edema extremities, Hyperlipidemia, Hypertension, Hypothyroidism, Osteoarthritis, and Unspecified vitamin D deficiency. Past Surgical History:  has a past surgical history that includes Total abdominal hysterectomy w/ bilateral salpingoophorectomy (); Appendectomy; Colonoscopy (); Mastectomy, radical (Right, 2014); Breast reduction surgery (Left, 2014); Breast reconstruction (Right, 2014); Cataract removal with implant (Right, 2019); eye surgery; Hysterectomy; Total hip arthroplasty (Right, 2020); and Breast biopsy (Right, 2014).        Assessment   Decision Making: Low Complexity  No Skilled OT: Independent with ADL's        Plan   Plan  Times per Week: acute care OT not required     Restrictions  Restrictions/Precautions  Restrictions/Precautions: Contact Precautions  Implants present? : Metal implants    Subjective   General  Chart Reviewed: Yes  Patient assessed for rehabilitation services?: Yes  Family / Caregiver Present: Yes  Referring Practitioner: VELVET Douglas  Diagnosis: syncope and collapse  Subjective  Subjective: Patient rec'd in bed, pleasant and cooperative 80 yr old female with recent fall     Social/Functional History  Social/Functional History  Lives With: Alone  Type of Home: House  Home Layout: One level  Home Access: Stairs to enter with rails  Entrance Stairs - Number of Steps: 2  Bathroom Accessibility: Accessible  Home Equipment: Alert Candance Lacyricardo  Receives Help From: Family  ADL Assistance: Independent  Homemaking Assistance: Independent  Ambulation Assistance: Independent  Transfer Assistance: Independent  Active : Yes  Mode of Transportation: Car  Occupation: Retired       Objective   Heart Rate: 75  Heart Rate Source: Monitor;Telemetry  BP: 127/69  BP Location: Left upper arm  BP Method: Automatic  Patient Position: Semi fowlers  MAP (Calculated): 88.33  Resp: 16  SpO2: 92 %  O2 Device: Nasal cannula          Observation/Palpation  Observation: In bed, no O2 on at SpO2 95%  Safety Devices  Type of Devices: Left in bed;Call light within reach           ADL  LE Dressing: Independent  Toileting: Independent     Activity Tolerance  Activity Tolerance: Patient limited by fatigue  Bed mobility  Supine to Sit: Independent  Sit to Supine: Independent  Scooting: Independent  Transfers  Sit to stand: Independent  Stand to sit:  Independent  Vision  Vision: Within Functional Limits  Hearing  Hearing: Exceptions to [facility]  Hearing Exceptions: Hard of hearing/hearing concerns  Cognition  Overall Cognitive Status: WFL  Orientation  Overall Orientation Status: Within Functional Limits          LUE AROM (degrees)  LUE AROM : WFL  Left Hand AROM (degrees)  Left Hand AROM: WFL  RUE AROM (degrees)  RUE AROM : WFL  Right Hand AROM (degrees)  Right Hand AROM: WFL         Goals  Short Term Goals  Time Frame for Short term goals: eval only       Therapy Time   Individual Concurrent Group Co-treatment   Time In 1538         Time Out 1548         Minutes 10         Timed Code Treatment Minutes: 0 Minutes       MARIELOS HRENANDEZ OTR, OT

## 2022-08-03 NOTE — FLOWSHEET NOTE
08/03/22 1034   Encounter Summary   Encounter Overview/Reason  Advance Care Planning   Service Provided For: Patient   Referral/Consult From: Patient   Support System Family members; /   Last Encounter  08/03/22   Complexity of Encounter Moderate   Begin Time 0930   End Time  1036   Total Time Calculated 66 min   Encounter    Type Initial Screen/Assessment   Advance Care Planning   Type ACP conversation   Assessment/Intervention/Outcome   Assessment Calm   Intervention Active listening;Prayer (assurance of)/[place];Sustaining Presence/Ministry of presence   Outcome Acceptance;Engaged in conversation;Expressed Gratitude

## 2022-08-03 NOTE — PROGRESS NOTES
At approximately 2237, pt was standing in bathroom washing hands with writer within arms reach. Pt stated she suddenly felt \"woozy\" and requested to sit down on the toilet before returning to bed. Pt then lost consciousness and collapsed into arms of writer. Writer then slowly lowered pt onto bathroom floor. Writer called out for help by pushing call light. BRIDGET Lombarid responded and notified Petty Austin NP, who then came to bedside. Pt regained consciousness within approximately one minute. Pt denied pain, shortness of breath, chest pain, and nausea at this time. Upon sitting pt up a few minutes later, pt lost consciousness again and was lowered back to the lying position. BP at this time 136/64 manual. A few minutes later, pt was able to sit up but requested to lay back down. Pt states lying down did not help the \"wooziness\". Pt was then lifted back into bed via 4 nurses. Upon getting back in bed, pt stated she felt \"woozy\", tired, and slightly nauseous. Oxygen saturation 88% on room air at this time. Pt placed on 2L nasal cannula and oxygen saturation improved to 95%.    1L normal saline bolus started per Petty Austin NP.

## 2022-08-04 LAB
ABSOLUTE EOS #: 0.11 K/UL (ref 0–0.44)
ABSOLUTE IMMATURE GRANULOCYTE: <0.03 K/UL (ref 0–0.3)
ABSOLUTE LYMPH #: 1.02 K/UL (ref 1.1–3.7)
ABSOLUTE MONO #: 0.57 K/UL (ref 0.1–1.2)
ALBUMIN SERPL-MCNC: 3.5 G/DL (ref 3.5–5.2)
ALBUMIN/GLOBULIN RATIO: 1.2 (ref 1–2.5)
ALP BLD-CCNC: 59 U/L (ref 35–104)
ALT SERPL-CCNC: 13 U/L (ref 5–33)
ANION GAP SERPL CALCULATED.3IONS-SCNC: 9 MMOL/L (ref 9–17)
AST SERPL-CCNC: 20 U/L
BASOPHILS # BLD: 0 % (ref 0–2)
BASOPHILS ABSOLUTE: <0.03 K/UL (ref 0–0.2)
BILIRUB SERPL-MCNC: 0.18 MG/DL (ref 0.3–1.2)
BILIRUBIN DIRECT: <0.08 MG/DL
BILIRUBIN, INDIRECT: ABNORMAL MG/DL (ref 0–1)
BUN BLDV-MCNC: 15 MG/DL (ref 8–23)
C-REACTIVE PROTEIN: 14.6 MG/L (ref 0–5)
CALCIUM SERPL-MCNC: 8.8 MG/DL (ref 8.6–10.4)
CHLORIDE BLD-SCNC: 96 MMOL/L (ref 98–107)
CO2: 28 MMOL/L (ref 20–31)
CREAT SERPL-MCNC: 0.53 MG/DL (ref 0.5–0.9)
D-DIMER QUANTITATIVE: 0.49 MG/L FEU (ref 0–0.59)
EOSINOPHILS RELATIVE PERCENT: 3 % (ref 1–4)
FERRITIN: 132 NG/ML (ref 13–150)
GFR AFRICAN AMERICAN: >60 ML/MIN
GFR NON-AFRICAN AMERICAN: >60 ML/MIN
GFR SERPL CREATININE-BSD FRML MDRD: ABNORMAL ML/MIN/{1.73_M2}
GLUCOSE BLD-MCNC: 96 MG/DL (ref 70–99)
HCT VFR BLD CALC: 35.3 % (ref 36.3–47.1)
HEMOGLOBIN: 12.1 G/DL (ref 11.9–15.1)
IMMATURE GRANULOCYTES: 0 %
LACTATE DEHYDROGENASE: 166 U/L (ref 135–214)
LYMPHOCYTES # BLD: 25 % (ref 24–43)
MCH RBC QN AUTO: 31.7 PG (ref 25.2–33.5)
MCHC RBC AUTO-ENTMCNC: 34.3 G/DL (ref 25.2–33.5)
MCV RBC AUTO: 92.4 FL (ref 82.6–102.9)
MONOCYTES # BLD: 14 % (ref 3–12)
NRBC AUTOMATED: 0 PER 100 WBC
PDW BLD-RTO: 13.1 % (ref 11.8–14.4)
PLATELET # BLD: 229 K/UL (ref 138–453)
PMV BLD AUTO: 9.4 FL (ref 8.1–13.5)
POTASSIUM SERPL-SCNC: 4.1 MMOL/L (ref 3.7–5.3)
RBC # BLD: 3.82 M/UL (ref 3.95–5.11)
SEG NEUTROPHILS: 58 % (ref 36–65)
SEGMENTED NEUTROPHILS ABSOLUTE COUNT: 2.34 K/UL (ref 1.5–8.1)
SODIUM BLD-SCNC: 133 MMOL/L (ref 135–144)
TOTAL PROTEIN: 6.4 G/DL (ref 6.4–8.3)
WBC # BLD: 4.1 K/UL (ref 3.5–11.3)

## 2022-08-04 PROCEDURE — 83615 LACTATE (LD) (LDH) ENZYME: CPT

## 2022-08-04 PROCEDURE — 6370000000 HC RX 637 (ALT 250 FOR IP): Performed by: INTERNAL MEDICINE

## 2022-08-04 PROCEDURE — 97116 GAIT TRAINING THERAPY: CPT

## 2022-08-04 PROCEDURE — 6360000002 HC RX W HCPCS: Performed by: INTERNAL MEDICINE

## 2022-08-04 PROCEDURE — 82248 BILIRUBIN DIRECT: CPT

## 2022-08-04 PROCEDURE — 82728 ASSAY OF FERRITIN: CPT

## 2022-08-04 PROCEDURE — 80053 COMPREHEN METABOLIC PANEL: CPT

## 2022-08-04 PROCEDURE — 99232 SBSQ HOSP IP/OBS MODERATE 35: CPT | Performed by: INTERNAL MEDICINE

## 2022-08-04 PROCEDURE — 85025 COMPLETE CBC W/AUTO DIFF WBC: CPT

## 2022-08-04 PROCEDURE — 93005 ELECTROCARDIOGRAM TRACING: CPT | Performed by: INTERNAL MEDICINE

## 2022-08-04 PROCEDURE — 85379 FIBRIN DEGRADATION QUANT: CPT

## 2022-08-04 PROCEDURE — 86140 C-REACTIVE PROTEIN: CPT

## 2022-08-04 PROCEDURE — 97110 THERAPEUTIC EXERCISES: CPT

## 2022-08-04 PROCEDURE — 2580000003 HC RX 258: Performed by: INTERNAL MEDICINE

## 2022-08-04 PROCEDURE — 36415 COLL VENOUS BLD VENIPUNCTURE: CPT

## 2022-08-04 PROCEDURE — 2060000000 HC ICU INTERMEDIATE R&B

## 2022-08-04 RX ORDER — MIDODRINE HYDROCHLORIDE 5 MG/1
5 TABLET ORAL 2 TIMES DAILY WITH MEALS
Status: DISCONTINUED | OUTPATIENT
Start: 2022-08-04 | End: 2022-08-05

## 2022-08-04 RX ADMIN — CEFTRIAXONE 1000 MG: 1 INJECTION, POWDER, FOR SOLUTION INTRAMUSCULAR; INTRAVENOUS at 21:36

## 2022-08-04 RX ADMIN — MIDODRINE HYDROCHLORIDE 5 MG: 5 TABLET ORAL at 13:05

## 2022-08-04 RX ADMIN — APIXABAN 5 MG: 5 TABLET, FILM COATED ORAL at 21:35

## 2022-08-04 RX ADMIN — ATORVASTATIN CALCIUM 10 MG: 10 TABLET, FILM COATED ORAL at 21:35

## 2022-08-04 RX ADMIN — SODIUM CHLORIDE, PRESERVATIVE FREE 10 ML: 5 INJECTION INTRAVENOUS at 21:42

## 2022-08-04 RX ADMIN — APIXABAN 5 MG: 5 TABLET, FILM COATED ORAL at 09:28

## 2022-08-04 RX ADMIN — ACETAMINOPHEN 650 MG: 325 TABLET ORAL at 06:09

## 2022-08-04 RX ADMIN — MIDODRINE HYDROCHLORIDE 5 MG: 5 TABLET ORAL at 16:42

## 2022-08-04 RX ADMIN — LEVOTHYROXINE SODIUM 50 MCG: 0.03 TABLET ORAL at 09:28

## 2022-08-04 RX ADMIN — SODIUM CHLORIDE, PRESERVATIVE FREE 10 ML: 5 INJECTION INTRAVENOUS at 09:28

## 2022-08-04 ASSESSMENT — PAIN DESCRIPTION - FREQUENCY
FREQUENCY: CONTINUOUS

## 2022-08-04 ASSESSMENT — PAIN SCALES - GENERAL
PAINLEVEL_OUTOF10: 2
PAINLEVEL_OUTOF10: 2
PAINLEVEL_OUTOF10: 5

## 2022-08-04 ASSESSMENT — PAIN DESCRIPTION - ORIENTATION
ORIENTATION: RIGHT;MID;OTHER (COMMENT)
ORIENTATION: OTHER (COMMENT)
ORIENTATION: OTHER (COMMENT)

## 2022-08-04 ASSESSMENT — PAIN DESCRIPTION - LOCATION
LOCATION: OTHER (COMMENT)
LOCATION: HEAD;OTHER (COMMENT)

## 2022-08-04 ASSESSMENT — PAIN DESCRIPTION - PAIN TYPE
TYPE: ACUTE PAIN

## 2022-08-04 ASSESSMENT — PAIN - FUNCTIONAL ASSESSMENT
PAIN_FUNCTIONAL_ASSESSMENT: ACTIVITIES ARE NOT PREVENTED

## 2022-08-04 ASSESSMENT — PAIN SCALES - WONG BAKER
WONGBAKER_NUMERICALRESPONSE: 0
WONGBAKER_NUMERICALRESPONSE: 0

## 2022-08-04 ASSESSMENT — PAIN DESCRIPTION - DESCRIPTORS
DESCRIPTORS: ACHING

## 2022-08-04 NOTE — PROGRESS NOTES
Hospitalist Progress Note    Patient:  Marry Sher     YOB: 1931    MRN: [id number]   Admit date: 8/2/2022     Acct: [de-identified]     PCP: Ly Carrion MD    CC--Interval History:  Syncope--collapse---no syncope or collapse, but still with dizziness = lightheadedness, not vertigo--ProAmatine ---check orthostatics    UTI---POA--on Rocephin IV pending IDS    Covid-19---supportive care    See note below     All other ROS negative except noted in HPI    Diet:  ADULT DIET;  Regular; Low Fat/Low Chol/High Fiber/ERIN    Medications:  Scheduled Meds:   sodium chloride flush  10 mL IntraVENous 2 times per day    apixaban  5 mg Oral BID    levothyroxine  50 mcg Oral Daily    atorvastatin  10 mg Oral Nightly    cefTRIAXone (ROCEPHIN) IV  1,000 mg IntraVENous Q24H     Continuous Infusions:   sodium chloride       PRN Meds:sodium chloride flush, sodium chloride, ondansetron, acetaminophen **OR** acetaminophen, sodium chloride nebulizer, albuterol, albuterol sulfate HFA    Objective:  Labs:  CBC with Differential:    Lab Results   Component Value Date/Time    WBC 4.1 08/04/2022 05:03 AM    RBC 3.82 08/04/2022 05:03 AM    HGB 12.1 08/04/2022 05:03 AM    HCT 35.3 08/04/2022 05:03 AM     08/04/2022 05:03 AM    MCV 92.4 08/04/2022 05:03 AM    MCH 31.7 08/04/2022 05:03 AM    MCHC 34.3 08/04/2022 05:03 AM    RDW 13.1 08/04/2022 05:03 AM    NRBC 0 02/02/2020 12:39 PM    SEGSPCT 78.3 02/02/2020 12:39 PM    LYMPHOPCT 25 08/04/2022 05:03 AM    MONOPCT 14 08/04/2022 05:03 AM    BASOPCT 0 08/04/2022 05:03 AM    MONOSABS 0.57 08/04/2022 05:03 AM    MONOSABS 0.5 04/22/2021 10:55 AM    LYMPHSABS 1.02 08/04/2022 05:03 AM    LYMPHSABS 1.6 04/22/2021 10:55 AM    EOSABS 0.11 08/04/2022 05:03 AM    EOSABS 0.1 04/22/2021 10:55 AM    BASOSABS <0.03 08/04/2022 05:03 AM    DIFFTYPE NOT REPORTED 11/11/2021 11:44 AM     BMP:    Lab Results   Component Value Date/Time     08/04/2022 05:03 AM    K 4.1 08/04/2022 05:03 AM CL 96 08/04/2022 05:03 AM    CO2 28 08/04/2022 05:03 AM    BUN 15 08/04/2022 05:03 AM    LABALBU 3.5 08/04/2022 05:03 AM    CREATININE 0.53 08/04/2022 05:03 AM    CALCIUM 8.8 08/04/2022 05:03 AM    GFRAA >60 08/04/2022 05:03 AM    LABGLOM >60 08/04/2022 05:03 AM    LABGLOM 79 02/02/2020 12:39 PM    GLUCOSE 96 08/04/2022 05:03 AM           Physical Exam:  Vitals: BP (!) 120/50   Pulse 80   Temp 98 °F (36.7 °C) (Oral)   Resp 16   Ht 5' 3\" (1.6 m)   Wt 140 lb 9.6 oz (63.8 kg)   SpO2 94%   BMI 24.91 kg/m²   24 hour intake/output:  Intake/Output Summary (Last 24 hours) at 8/4/2022 0735  Last data filed at 8/4/2022 6840  Gross per 24 hour   Intake 920 ml   Output --   Net 920 ml     Last 3 weights: Wt Readings from Last 3 Encounters:   08/04/22 140 lb 9.6 oz (63.8 kg)   04/27/22 141 lb (64 kg)   04/21/22 140 lb (63.5 kg)     HEENT: Normocephalic and Atraumatic  Neck: Supple, No Masses, Tenderness, Nodularity, and No Lymphadenopathy  Chest/Lungs: Clear to Auscultation without Rales, Rhonchi, or Wheezes and Distant Breath Sounds  Cardiac: Regular Rate and Rhythm  GI/Abdomen: Bowel Sounds Present and Soft, Non-tender, without Guarding or Rebound Tenderness  : Not examined  EXT/Skin: No Edema, No Cyanosis, and No Clubbing  Neuro:  alert----hard of hearing---- and No Localizing Signs/Symptoms      Assessment:    Principal Problem:    Syncope and collapse  Resolved Problems:    * No resolved hospital problems. *    JEANNETTE SHEPHERD  89  WF  [[facility];   [facility]---[facility]]  DNR-CCA      ELIQUIS    SUPPLEMENTAL OXYGEN  COVID-19--POSITIVE--8. 2.2022    Anti-infectives:  Rocephin IV    Syncope--collapse---8.2.2022---standing in kitchen and fell to floor--brief LOC--no recollection--                several witnessed events antecedent nausea---dizziness---8.2.2022--8. 3.2022  Dizziness    Hypoxia---8.2.2022----possibly due to improper POx reading   UTI--POA---8.2.2022  COVID-19--INFECTION----8.2.2022 EKG---8.4.2022---NSR---86       EKG---8.3.2022---SR--76--PACs       CT head--8. 2.2022----no MBS       CT c-spine--8. 2.2022----no fracture or dislocations       CXR---8.2.2022--basilar hyper density       CTA chest--pulmonary---8.2.2022---old RUL PE       EKG----8.2.2022---SR--82---PACs       X-ray---right elbow---8.2.2022---no fracture       MI ruled out----8.3.2022       CXR---8.3.2022--no focal consolidation         2D ECHO----8.3.2022--NLVSF---NRVSF--MAC--mild MR--ENDY without stenosis--                                mild TR--RVSP ~ 26 mm Hg--AR upper limits normal 3.7 cm--                                normal IVC diameter and inspiratory collapse---no DD----                                LVEF ~ 60%   Hyponatremia     Prior:  Pulmonary embolus--RUL---3.18.2020--likely related to associated with left hip fracture--LINDA            CXR----3.20.2020---NACs            EKG---3.20.2020---NSR--88---NACs            DUS---BLE----3.19.2020--no BLE DVT            2D ECHO----3.19.2020--hyperdynamic LVSF---NRVSF---                           ENDY but opens well--mild AI--mild-to-moderate TR----                            RVSP ~ 32 mm Hg---IVC normal diameter--normal inspiratory                            collapse---Grade 1 mild DD----LVEF ~ 70%            CTA chest---pulmonary-----3.18.2020---acute PE---RUL arterial branch                        IVS straightening RV strain            CXR----3.18.2020---[-]            EKG----3.18.2020---NSR--92---possible LAE            Elevated troponin---3.18.2020--due to PE---non-cardiac            Transient alteration of awareness--3.18.2020      Hypertension  Hyperlipidemia  Hypothyroidism  CKD--2  Breast carcinoma---right--2014  HEARING IMPAIRMENT  PMH:    BLE edema, hyponatremia, left arm pain, OA, Vitamin D deficiency,               E coli UTI ----3.18.2020---fever---1/2 blood culture---[+],                closed subcapital fracture right femur--1.22.2020, hyponatremia  PSH:    see above, right cataract--IOL--2019, right radical mastectomy--2014,               left breast reduction--2014, right breast reconstruction--2014, right                breast biopsy--2014, colonoscopy---2007, [facility]---1978--cervix unknown,               appendectomy, right LINDA---1.23.2020    Allergies:  NKDA          Plan:    Still with dizziness---adding ProAmatine     Covid--supportive care     UTI--POA----Rocephin pending culture    See orders    Electronically signed by Neptali Martínez on 8/4/2022 at 7:35 AM    Hospitalist

## 2022-08-04 NOTE — PLAN OF CARE
Problem: Discharge Planning  Goal: Discharge to home or other facility with appropriate resources  Flowsheets (Taken 8/4/2022 8097)  Discharge to home or other facility with appropriate resources: Identify barriers to discharge with patient and caregiver  Note:  and  are assigned to patient and available upon request.     Problem: Safety - Adult  Goal: Free from fall injury  Note: Patient will remain free from injury, bed wheels are locked and bed is in low position. Call light and personal belongings are within reach. Problem: Pain  Goal: Verbalizes/displays adequate comfort level or baseline comfort level  Note: Patient is able to use 0-10 pain scale to rate pain. PRN meds. Problem: Skin/Tissue Integrity  Goal: Absence of new skin breakdown  Description: 1. Monitor for areas of redness and/or skin breakdown  2. Assess vascular access sites hourly  3. Every 4-6 hours minimum:  Change oxygen saturation probe site  4. Every 4-6 hours:  If on nasal continuous positive airway pressure, respiratory therapy assess nares and determine need for appliance change or resting period. Note: Will continue to monitor for any signs of skin infection and skin breakdown.

## 2022-08-04 NOTE — PROGRESS NOTES
Physical Therapy  Facility/Department: [facility]  PROGRESSIVE CARE  Daily Treatment Note  NAME: Hailey Rose  : 1931  MRN: [id number]    Date of Service: 2022    Discharge Recommendations:  Home with assist PRN        Patient Diagnosis(es): The primary encounter diagnosis was Syncope and collapse. Diagnoses of COVID and Acute cystitis without hematuria were also pertinent to this visit. Assessment   Activity Tolerance: Patient limited by fatigue     Plan    Plan  Plan: 1 time a day 7 days a week  Current Treatment Recommendations: Gait training;Transfer training     Restrictions  Restrictions/Precautions  Restrictions/Precautions: Contact Precautions  Implants present? : Metal implants     Subjective    Subjective  Subjective: Pt in bed upon arrival with daughter present. Pt pleasant and agreeable to session. Pain: denies  Orientation  Overall Orientation Status: Within Normal Limits     Objective   Vitals  O2 Device: None (Room air)  Bed Mobility Training  Bed Mobility Training: Yes  Overall Level of Assistance: Independent  Supine to Sit: Independent  Sit to Supine: Independent  Scooting: Independent  Balance  Sitting: Intact  Standing: Intact  **pt able to complete pericare, handwashing, and clothing management with 0-1 UE support and no sway  Gait Training  Gait Training: Yes  Gait  Overall Level of Assistance: Contact-guard assistance;Stand-by assistance  Base of Support: Narrowed  Speed/M[place]: Shuffled  Distance (ft):  (10', ~60' around room)  Assistive Device:  (HHA)  Neuromuscular Education  NDT Treatment: Gait ;Sitting;Standing  PT Exercises  A/AROM Exercises: All completed 15x: LAQ, seated abduction, adduction, seated marches, glute sets  Resistive Exercises: HS curls 15x     Safety Devices  Type of Devices: Left in bed;Call light within reach;Nurse notified; Bed alarm in place       Goals  Short Term Goals  Time Frame for Short term goals: 1 day  Short term goal 1: Assess functional status  Long Term Goals  Time Frame for Long term goals : 2-3 days  Long term goal 1: Gait 40-50 ft    Education       Therapy Time   Individual Concurrent Group Co-treatment   Time In 1326         Time Out 1349         Minutes 23                 Aleja Celestin, PTA

## 2022-08-05 VITALS
BODY MASS INDEX: 24.91 KG/M2 | SYSTOLIC BLOOD PRESSURE: 161 MMHG | TEMPERATURE: 98.1 F | HEART RATE: 86 BPM | OXYGEN SATURATION: 96 % | WEIGHT: 140.6 LBS | RESPIRATION RATE: 16 BRPM | DIASTOLIC BLOOD PRESSURE: 81 MMHG | HEIGHT: 63 IN

## 2022-08-05 LAB
ABSOLUTE EOS #: 0.11 K/UL (ref 0–0.44)
ABSOLUTE IMMATURE GRANULOCYTE: <0.03 K/UL (ref 0–0.3)
ABSOLUTE LYMPH #: 1.21 K/UL (ref 1.1–3.7)
ABSOLUTE MONO #: 0.54 K/UL (ref 0.1–1.2)
ALBUMIN SERPL-MCNC: 3.5 G/DL (ref 3.5–5.2)
ALBUMIN/GLOBULIN RATIO: 1.2 (ref 1–2.5)
ALP BLD-CCNC: 60 U/L (ref 35–104)
ALT SERPL-CCNC: 14 U/L (ref 5–33)
ANION GAP SERPL CALCULATED.3IONS-SCNC: 8 MMOL/L (ref 9–17)
AST SERPL-CCNC: 19 U/L
BASOPHILS # BLD: 1 % (ref 0–2)
BASOPHILS ABSOLUTE: <0.03 K/UL (ref 0–0.2)
BILIRUB SERPL-MCNC: 0.16 MG/DL (ref 0.3–1.2)
BILIRUBIN DIRECT: <0.08 MG/DL
BILIRUBIN, INDIRECT: ABNORMAL MG/DL (ref 0–1)
BUN BLDV-MCNC: 14 MG/DL (ref 8–23)
C-REACTIVE PROTEIN: 8 MG/L (ref 0–5)
CALCIUM SERPL-MCNC: 8.7 MG/DL (ref 8.6–10.4)
CHLORIDE BLD-SCNC: 98 MMOL/L (ref 98–107)
CO2: 28 MMOL/L (ref 20–31)
CREAT SERPL-MCNC: 0.57 MG/DL (ref 0.5–0.9)
CULTURE: ABNORMAL
CULTURE: ABNORMAL
EOSINOPHILS RELATIVE PERCENT: 3 % (ref 1–4)
GFR AFRICAN AMERICAN: >60 ML/MIN
GFR NON-AFRICAN AMERICAN: >60 ML/MIN
GFR SERPL CREATININE-BSD FRML MDRD: ABNORMAL ML/MIN/{1.73_M2}
GLUCOSE BLD-MCNC: 92 MG/DL (ref 70–99)
HCT VFR BLD CALC: 35.8 % (ref 36.3–47.1)
HEMOGLOBIN: 12.4 G/DL (ref 11.9–15.1)
IMMATURE GRANULOCYTES: 0 %
LACTATE DEHYDROGENASE: 153 U/L (ref 135–214)
LYMPHOCYTES # BLD: 35 % (ref 24–43)
MCH RBC QN AUTO: 32 PG (ref 25.2–33.5)
MCHC RBC AUTO-ENTMCNC: 34.6 G/DL (ref 25.2–33.5)
MCV RBC AUTO: 92.5 FL (ref 82.6–102.9)
MONOCYTES # BLD: 16 % (ref 3–12)
NRBC AUTOMATED: 0 PER 100 WBC
PDW BLD-RTO: 13 % (ref 11.8–14.4)
PLATELET # BLD: 248 K/UL (ref 138–453)
PMV BLD AUTO: 9.3 FL (ref 8.1–13.5)
POTASSIUM SERPL-SCNC: 4.1 MMOL/L (ref 3.7–5.3)
RBC # BLD: 3.87 M/UL (ref 3.95–5.11)
SEG NEUTROPHILS: 45 % (ref 36–65)
SEGMENTED NEUTROPHILS ABSOLUTE COUNT: 1.56 K/UL (ref 1.5–8.1)
SODIUM BLD-SCNC: 134 MMOL/L (ref 135–144)
SPECIMEN DESCRIPTION: ABNORMAL
TOTAL PROTEIN: 6.5 G/DL (ref 6.4–8.3)
WBC # BLD: 3.5 K/UL (ref 3.5–11.3)

## 2022-08-05 PROCEDURE — 97116 GAIT TRAINING THERAPY: CPT

## 2022-08-05 PROCEDURE — 36415 COLL VENOUS BLD VENIPUNCTURE: CPT

## 2022-08-05 PROCEDURE — 83615 LACTATE (LD) (LDH) ENZYME: CPT

## 2022-08-05 PROCEDURE — 85025 COMPLETE CBC W/AUTO DIFF WBC: CPT

## 2022-08-05 PROCEDURE — 86140 C-REACTIVE PROTEIN: CPT

## 2022-08-05 PROCEDURE — 80053 COMPREHEN METABOLIC PANEL: CPT

## 2022-08-05 PROCEDURE — 94761 N-INVAS EAR/PLS OXIMETRY MLT: CPT

## 2022-08-05 PROCEDURE — 82248 BILIRUBIN DIRECT: CPT

## 2022-08-05 PROCEDURE — 99238 HOSP IP/OBS DSCHRG MGMT 30/<: CPT | Performed by: INTERNAL MEDICINE

## 2022-08-05 PROCEDURE — 6370000000 HC RX 637 (ALT 250 FOR IP): Performed by: INTERNAL MEDICINE

## 2022-08-05 PROCEDURE — 99232 SBSQ HOSP IP/OBS MODERATE 35: CPT | Performed by: INTERNAL MEDICINE

## 2022-08-05 PROCEDURE — 2580000003 HC RX 258: Performed by: INTERNAL MEDICINE

## 2022-08-05 RX ORDER — GREEN TEA/HOODIA GORDONII 315-12.5MG
1 CAPSULE ORAL DAILY
Qty: 30 TABLET | Refills: 0 | Status: SHIPPED | OUTPATIENT
Start: 2022-08-05 | End: 2022-09-04

## 2022-08-05 RX ORDER — 0.9 % SODIUM CHLORIDE 0.9 %
1000 INTRAVENOUS SOLUTION INTRAVENOUS ONCE
Status: COMPLETED | OUTPATIENT
Start: 2022-08-05 | End: 2022-08-05

## 2022-08-05 RX ORDER — MIDODRINE HYDROCHLORIDE 5 MG/1
5 TABLET ORAL
Status: DISCONTINUED | OUTPATIENT
Start: 2022-08-05 | End: 2022-08-05 | Stop reason: HOSPADM

## 2022-08-05 RX ORDER — MIDODRINE HYDROCHLORIDE 5 MG/1
5 TABLET ORAL
Qty: 90 TABLET | Refills: 0 | Status: SHIPPED | OUTPATIENT
Start: 2022-08-05 | End: 2022-09-02 | Stop reason: SDUPTHER

## 2022-08-05 RX ORDER — CEFDINIR 300 MG/1
300 CAPSULE ORAL 2 TIMES DAILY
Qty: 8 CAPSULE | Refills: 0 | Status: SHIPPED | OUTPATIENT
Start: 2022-08-05 | End: 2022-08-09

## 2022-08-05 RX ADMIN — MIDODRINE HYDROCHLORIDE 5 MG: 5 TABLET ORAL at 12:26

## 2022-08-05 RX ADMIN — LEVOTHYROXINE SODIUM 50 MCG: 0.03 TABLET ORAL at 07:55

## 2022-08-05 RX ADMIN — MIDODRINE HYDROCHLORIDE 5 MG: 5 TABLET ORAL at 07:55

## 2022-08-05 RX ADMIN — APIXABAN 5 MG: 5 TABLET, FILM COATED ORAL at 07:55

## 2022-08-05 RX ADMIN — SODIUM CHLORIDE, PRESERVATIVE FREE 10 ML: 5 INJECTION INTRAVENOUS at 07:56

## 2022-08-05 RX ADMIN — SODIUM CHLORIDE 1000 ML: 9 INJECTION, SOLUTION INTRAVENOUS at 11:53

## 2022-08-05 ASSESSMENT — PAIN DESCRIPTION - LOCATION: LOCATION: HEAD

## 2022-08-05 ASSESSMENT — PAIN SCALES - GENERAL
PAINLEVEL_OUTOF10: 0
PAINLEVEL_OUTOF10: 2

## 2022-08-05 ASSESSMENT — PAIN SCALES - WONG BAKER: WONGBAKER_NUMERICALRESPONSE: 0

## 2022-08-05 ASSESSMENT — PAIN DESCRIPTION - DESCRIPTORS: DESCRIPTORS: ACHING

## 2022-08-05 NOTE — PLAN OF CARE
Problem: Discharge Planning  Goal: Discharge to home or other facility with appropriate resources  8/5/2022 1504 by Saira Bolton RN  Outcome: Completed  8/5/2022 0847 by Nat Munroe RN  Outcome: Progressing     Problem: Safety - Adult  Goal: Free from fall injury  8/5/2022 1504 by Saira Bolton RN  Outcome: Completed  8/5/2022 0847 by Nat Munroe RN  Outcome: Progressing     Problem: ABCDS Injury Assessment  Goal: Absence of physical injury  8/5/2022 1504 by Saira Bolton RN  Outcome: Completed  8/5/2022 0847 by Nat Munroe RN  Outcome: Progressing     Problem: Pain  Goal: Verbalizes/displays adequate comfort level or baseline comfort level  8/5/2022 1504 by Saira Bolton RN  Outcome: Completed  8/5/2022 0847 by Nat Munroe RN  Outcome: Progressing     Problem: Skin/Tissue Integrity  Goal: Absence of new skin breakdown  Description: 1. Monitor for areas of redness and/or skin breakdown  2. Assess vascular access sites hourly  3. Every 4-6 hours minimum:  Change oxygen saturation probe site  4. Every 4-6 hours:  If on nasal continuous positive airway pressure, respiratory therapy assess nares and determine need for appliance change or resting period.   8/5/2022 1504 by Saira Bolton RN  Outcome: Completed  8/5/2022 0847 by Nat Munroe RN  Outcome: Progressing

## 2022-08-05 NOTE — PROGRESS NOTES
Hospitalist Progress Note    Patient:  Lyn Singletary     YOB: 1931    MRN: [id number]   Admit date: 8/2/2022     Acct: [de-identified]     PCP: Markus Navarrete MD    CC--Interval History:     Home---8.5.2022----Covid quarantine     Syncope--collapse--no further events----with fluids and increased ProAmatine, orthostatics stable and without light-headedness--diuretics dc'd---evaluated by Cardiology this hospitalization    Hypoxia---resolved---may have been inconsistent POx readings    E coli UTI---[S] Rocephin ---> Omnicef----wished to avoid Cipro    Covid--19---fortunately, mild, but will need to quarantine. Hyponatremia--sodium replacement---dietary    HTN--see orthostatics    See note below     All other ROS negative except noted in HPI    Diet:  ADULT DIET;  Regular; Low Fat/Low Chol/High Fiber/ERIN    Medications:  Scheduled Meds:   midodrine  5 mg Oral BID WC    sodium chloride flush  10 mL IntraVENous 2 times per day    apixaban  5 mg Oral BID    levothyroxine  50 mcg Oral Daily    atorvastatin  10 mg Oral Nightly    cefTRIAXone (ROCEPHIN) IV  1,000 mg IntraVENous Q24H     Continuous Infusions:   sodium chloride       PRN Meds:sodium chloride flush, sodium chloride, ondansetron, acetaminophen **OR** acetaminophen, sodium chloride nebulizer, albuterol, albuterol sulfate HFA    Objective:  Labs:  CBC with Differential:    Lab Results   Component Value Date/Time    WBC 3.5 08/05/2022 05:23 AM    RBC 3.87 08/05/2022 05:23 AM    HGB 12.4 08/05/2022 05:23 AM    HCT 35.8 08/05/2022 05:23 AM     08/05/2022 05:23 AM    MCV 92.5 08/05/2022 05:23 AM    MCH 32.0 08/05/2022 05:23 AM    MCHC 34.6 08/05/2022 05:23 AM    RDW 13.0 08/05/2022 05:23 AM    NRBC 0 02/02/2020 12:39 PM    SEGSPCT 78.3 02/02/2020 12:39 PM    LYMPHOPCT 35 08/05/2022 05:23 AM    MONOPCT 16 08/05/2022 05:23 AM    BASOPCT 1 08/05/2022 05:23 AM    MONOSABS 0.54 08/05/2022 05:23 AM    MONOSABS 0.5 04/22/2021 10:55 AM    LYMPHSABS 1.21 08/05/2022 05:23 AM    LYMPHSABS 1.6 04/22/2021 10:55 AM    EOSABS 0.11 08/05/2022 05:23 AM    EOSABS 0.1 04/22/2021 10:55 AM    BASOSABS <0.03 08/05/2022 05:23 AM    DIFFTYPE NOT REPORTED 11/11/2021 11:44 AM     BMP:    Lab Results   Component Value Date/Time     08/05/2022 05:23 AM    K 4.1 08/05/2022 05:23 AM    CL 98 08/05/2022 05:23 AM    CO2 28 08/05/2022 05:23 AM    BUN 14 08/05/2022 05:23 AM    LABALBU 3.5 08/05/2022 05:23 AM    CREATININE 0.57 08/05/2022 05:23 AM    CALCIUM 8.7 08/05/2022 05:23 AM    GFRAA >60 08/05/2022 05:23 AM    LABGLOM >60 08/05/2022 05:23 AM    LABGLOM 79 02/02/2020 12:39 PM    GLUCOSE 92 08/05/2022 05:23 AM           Physical Exam:  Vitals: BP (!) 140/63   Pulse 85   Temp 97.8 °F (36.6 °C) (Tympanic)   Resp 16   Ht 5' 3\" (1.6 m)   Wt 140 lb 9.6 oz (63.8 kg)   SpO2 95%   BMI 24.91 kg/m²   24 hour intake/output:  Intake/Output Summary (Last 24 hours) at 8/5/2022 0718  Last data filed at 8/5/2022 0320  Gross per 24 hour   Intake 491.67 ml   Output --   Net 491.67 ml     Last 3 weights: Wt Readings from Last 3 Encounters:   08/04/22 140 lb 9.6 oz (63.8 kg)   04/27/22 141 lb (64 kg)   04/21/22 140 lb (63.5 kg)     HEENT: Normocephalic and Atraumatic  Neck: Supple, No Masses, Tenderness, Nodularity, and No Lymphadenopathy  Chest/Lungs: Clear to Auscultation without Rales, Rhonchi, or Wheezes  Cardiac: Regular Rate and Rhythm  GI/Abdomen: Bowel Sounds Present and Soft, Non-tender, without Guarding or Rebound Tenderness  : Not examined  EXT/Skin: No Edema, No Cyanosis, and No Clubbing  Neuro:  alert---hard of hearing---generalized weakness      Assessment:    Principal Problem:    Syncope and collapse  Resolved Problems:    * No resolved hospital problems. *    JEANNETTE SHEPHERD  89  WF  [rose Cotto, IM;   DC Cardiology---TCC]  DNR-CCA      ELIQUIS    SUPPLEMENTAL OXYGEN  COVID-19--POSITIVE--8. 2.2022    Anti-infectives:  Rocephin IV    Syncope--collapse---8.2.2022---standing in kitchen and fell to floor--brief LOC--no recollection--                several witnessed events antecedent nausea---dizziness---8.2.2022--8. 3.2022  Dizziness---orthostatic hypotension     Hypoxia---8.2.2022----possibly due to improper POx reading   E coli UTI--POA---8.2.2022---[S] Rocephin   COVID-19--INFECTION----8.2.2022       EKG---8.4.2022---NSR---86       EKG---8.3.2022---SR--76--PACs       CT head--8. 2.2022----no MBS       CT c-spine--8. 2.2022----no fracture or dislocations       CXR---8.2.2022--basilar hyper density       CTA chest--pulmonary---8.2.2022---old RUL PE       EKG----8.2.2022---SR--82---PACs       X-ray---right elbow---8.2.2022---no fracture       MI ruled out----8.3.2022       CXR---8.3.2022--no focal consolidation         2D ECHO----8.3.2022--NLVSF---NRVSF--MAC--mild MR--ENDY without stenosis--                                mild TR--RVSP ~ 26 mm Hg--AR upper limits normal 3.7 cm--                                normal IVC diameter and inspiratory collapse---no DD----                                LVEF ~ 60%   Hyponatremia     Prior:  Pulmonary embolus--RUL---3.18.2020--likely related to associated with left hip fracture--LINDA            CXR----3.20.2020---NACs            EKG---3.20.2020---NSR--88---NACs            DUS---BLE----3.19.2020--no BLE DVT            2D ECHO----3.19.2020--hyperdynamic LVSF---NRVSF---                           ENDY but opens well--mild AI--mild-to-moderate TR----                            RVSP ~ 32 mm Hg---IVC normal diameter--normal inspiratory                            collapse---Grade 1 mild DD----LVEF ~ 70%            CTA chest---pulmonary-----3.18.2020---acute PE---RUL arterial branch                        IVS straightening RV strain            CXR----3.18.2020---[-]            EKG----3.18.2020---NSR--92---possible LAE            Elevated troponin---3.18.2020--due to PE---non-cardiac            Transient alteration of

## 2022-08-05 NOTE — PROGRESS NOTES
Physical Therapy  Facility/Department: [facility]  PROGRESSIVE CARE  Daily Treatment Note  NAME: Doe Zavaleta  : 1931  MRN: [id number]    Date of Service: 2022    Discharge Recommendations:  Home with assist PRN        Patient Diagnosis(es): The primary encounter diagnosis was Syncope and collapse. Diagnoses of COVID and Acute cystitis without hematuria were also pertinent to this visit. Assessment   Activity Tolerance: Patient tolerated treatment well     Plan    Plan  Plan: 1 time a day 7 days a week  Current Treatment Recommendations: Gait training;Transfer training     Restrictions  Restrictions/Precautions  Restrictions/Precautions: Contact Precautions  Implants present? : Metal implants     Subjective    Subjective  Subjective: Pt in chair upon arrival with daughter present. Pt pleasant and agreeble to session. Pain: denies  Orientation  Overall Orientation Status: Within Normal Limits     Objective   Vitals  O2 Device: None (Room air)  Bed Mobility Training  Bed Mobility Training: No  Balance  Sitting: Intact  Transfer Training  Transfer Training: Yes  Overall Level of Assistance: Independent  Sit to Stand: Independent  Stand to Sit: Independent  Gait Training  Gait Training: Yes  Gait  Overall Level of Assistance: Contact-guard assistance;Stand-by assistance  Base of Support: Narrowed  Speed/Meseret: Shuffled  Step Length: Left shortened;Right shortened  Distance (ft): 60 Feet (around room)  Assistive Device:  (1 HHA)  Neuromuscular Education  NDT Treatment: Gait ;Sitting;Standing  PT Exercises  A/AROM Exercises: All completed 20x: LAQ, seated abduction, adduction, seated marches, glute sets, ankle pumps  Resistive Exercises: HS curls 20x     Safety Devices  Type of Devices: Call light within reach; Left in chair       Goals  Short Term Goals  Time Frame for Short term goals: 1 day  Short term goal 1: Assess functional status  Long Term Goals  Time Frame for Long term goals : 2-3 days  Long term

## 2022-08-05 NOTE — PROGRESS NOTES
and collapse     History of right hip replacement     Map-dot-fingerprint corneal dystrophy     Retinal degeneration, peripheral     Nuclear sclerotic cataract of left eye     Myogenic ptosis of eyelid of both eyes     Retinal tear of left eye    Echocardiogram 8/3/2022:  Normal left ventricular diameter. Left ventricular systolic function is normal.  Left ventricular ejection fraction 60 %. No doppler evidence of diastolic dysfunction. Aortic valve sclerosis without stenosis. Moderate aortic insufficiency. Mitral annular calcification. Mild mitral regurgitation. Normal tricuspid valve leaflets. Mild tricuspid regurgitation. Estimated right ventricular systolic pressure is 26 mmHg. No significant pericardial effusion is seen. Aortic root dimension is at upper limit of normal.    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 Plan of Treatment:     Syncope likely due to COIVD infection and UTI  Liberalize po fluid intake  Discontinue diuretics  Allow for higher blood pressure in the 140s  On Eliquis for PE.    Can be discharged from cardiac standpoint  Follow-up with cardiology as outpatient    Electronically signed by Cristóbal Medina MD on 8/5/2022 at 2:37 PM      [facility]  [phone]

## 2022-08-05 NOTE — PROGRESS NOTES
CLINICAL PHARMACY NOTE: MEDS TO BEDS    Total # of Prescriptions Filled: 3   The following medications were delivered to the patient:  Acidophilus/Pectin  Cefdinir 300mg  Midodrine 5mg    Additional Documentation:

## 2022-08-05 NOTE — FLOWSHEET NOTE
08/05/22 1409   Vital Signs   Orthostatic B/P and Pulse? Yes   Blood Pressure Lying 136/57   Pulse Lying 73 PER MINUTE   Blood Pressure Sitting 144/64   Pulse Sitting 78 PER MINUTE   Blood Pressure Standing 161/81   Pulse Standing 86 PER MINUTE   Level of Consciousness Alert (0)   Pt  denies dizziness or lightheadedness with standing. Ambulated to BR with walker and SBA without difficulty. Pt back to chair, denies other needs, call light in reach and daughter at bedside.

## 2022-08-06 DIAGNOSIS — I26.99 ACUTE PULMONARY EMBOLISM WITHOUT ACUTE COR PULMONALE, UNSPECIFIED PULMONARY EMBOLISM TYPE (HCC): ICD-10-CM

## 2022-08-06 NOTE — DISCHARGE SUMMARY
Cayla [address]                 [address] [place] [id number]                               DISCHARGE SUMMARY    PATIENT NAME: Mary Kay Solomon                   :        1931  MED REC NO:   [id number]                             ROOM:       0202  ACCOUNT NO:   [de-identified]                           ADMIT DATE: 2022  PROVIDER:     Yina Kevin MD                  DISCHARGE DATE:  2022    ATTENDING PHYSICIAN OF HOSPITALIZATION:  Myra Long MD    PERSONAL PHYSICIAN:  Ranjith Thomspon, Internal Medicine, [facility]. The patient has seen [facility], [facility]. DIAGNOSES:  1. Syncope, collapse, 2022, standing in the kitchen, fell to the  floor, brief loss of consciousness, no recollection, several witnessed  events, antecedent nausea, dizziness, 2022, 2022, dizziness  with orthostatic hypotension. 2.  Hypoxia, 2022, possibly due to improper pulse ox readings. 3.  E. coli urinary tract infection, POA, sensitive to Rocephin. 4.  COVID-19 infection, 2022. EKG, 2022, normal sinus  rhythm, rate 86. EKG, 2022, sinus rhythm, rate 76, PACs. CT  head, 2022, no mass, bleed or shift. CT C-spine, 2022, no  fracture or dislocation. 5.  Chest x-ray, 2022, basilar hyperdensity. 6.  CTA chest, pulmonary, 2022, old right upper lobe pulmonary  embolus. EKG, 2022, sinus rhythm, rate 82, PACs. X-ray of the  right elbow, 2022, no fracture. 7.  MI ruled out, 2022. Chest x-ray, 2022, no focal  consolidation. A 2D echo, 2022, normal left ventricular systolic  function, normal right ventricular systolic function, MAC, mild MR, ENDY  without stenosis, mild TR, RVSP 26 mmHg, AR upper limits of normal 3.7  cm, normal IVC diameter and inspiratory collapse, no diastolic  dysfunction, LVEF 60%.   8.  Hyponatremia, replacement therapy is given. 9.  Prior history of pulmonary embolus, right upper lobe, 03/18/2020,  and has been on Eliquis. 10.  Hypertension. 11.  Hyperlipidemia. 12.  Hypothyroidism. 13.  Chronic kidney disease, stage II.  14.  Hearing impairment. Other medical problems set forth in the progress note, 08/05/2022,  incorporated for reference herein. HISTORY OF PRESENT ILLNESS AND HOSPITAL COURSE:  This is a 55-year-old  white female, patient of Nancy Crocker, Internal Medicine, [facility], seen [facility], [facility]. The patient presented with syncope and collapse, 08/02/2022, a  combination of her COVID-19 together with urinary tract infection with  orthostatic hypotension. The patient received IV fluids, was placed on  ProAmatine and seen by Cardiology. Recommended at that time that the  patient push fluids, compression stockings, ProAmatine was increased on  08/05/2022, together with additional fluid given. Diuretics will be  Discontinued. Recommended that the patient should have higher systolic blood pressure in the  140 range. continued on Eliquis for pulmonary embolus. Follow up  with Cardiology in the outpatient setting for further recommendations. Urinary tract infection E. coli, fairly pansensitive, sensitive to  Rocephin which she was treated, was converted to ROMAN WENDIE at the time of  discharge. Given age, I wished to avoid Cipro in this patient due to  potential kidney issues. Pulmonary embolus, continued on Eliquis. Hypertension, blood pressure 113/74. most recent  orthostatic heart rate and blood pressure, supine 136/57, rate 73;  seated 144/64, heart rate 78; standing 161/81, heart rate 86 without  signs or symptoms of lightheadedness or dizziness. Chronic kidney disease has remained stable at a stage II level.     LABORATORY DATA:  Around the time of discharge, white cell count was  3.5, hemoglobin 12.4, hematocrit 35.8, platelets 097,349. Sodium 134,  potassium 4.1, chloride 98, CO2 28, BUN 14, creatinine 0.57, glucose 92,  calcium 8.7, GFR greater than 60. The patient's CRP was initially 14.6  and down to 8.0.    DISCHARGE INSTRUCTIONS/FOLLOWUP:  Discharged to home on 08/05/2022. DIET:  Cardiac. ACTIVITY:  As tolerated. The patient should avoid hazardous activities  such as climbing ladders and the like. CONDITION AT DISCHARGE:  Fair, improved. MEDICATIONS:  NEW:  Cefdinir (Omnicef) 300 mg p.o. twice daily 4 days; midodrine  (ProAmatine) 5 mg p.o. three times a day; probiotic acidophilus one p.o.  three times a day. FOLLOWING MEDICATIONS CONTINUED WITHOUT CHANGE:  Apixaban (Eliquis) 5 mg  p.o. twice daily; calcium magnesium and vitamin D one p.o. daily;  levothyroxine (Synthroid) 50 mcg (0.05 mg) p.o. daily; multivitamin p.o.  daily; PreserVision AREDS 2 one tablet daily, simvastatin (Zocor) 10 mg  p.o. q.p.m.; vitamin D3 50 mcg (2000 units) p.o. daily. SPECIFICALLY DISCONTINUED AT THIS TIME:  Acetaminophen, AZO Cranberry,  and triamterene hydrochlorothiazide diuretic. Follow up with the patient's personal physician, Ly Carrion,  Internal Medicine, [facility]. Follow up with [facility], [facility]. Any aspect of the patient's care not discussed in the chart and/or  dictation will be addressed and treated as an outpatient. The patient's medications have been reviewed including, but not limited  to, pre-hospital, hospital and discharge medications. The patient  and/or the patient's personal representatives were specifically advised  the only medications to be taken are those set forth in the discharge  orders and no other medications should be taken. Any prior medications  not on the discharge orders are specifically discontinued.         Shalini Blackburn MD    D: 08/05/2022 17:48:54       T: 08/05/2022 17:52:14 /[email or web address]  Job#: [id number]     Doc#: [id number]    CC:

## 2022-08-07 LAB
CULTURE: NORMAL
CULTURE: NORMAL
EKG ATRIAL RATE: 86 BPM
EKG P AXIS: 59 DEGREES
EKG P-R INTERVAL: 170 MS
EKG Q-T INTERVAL: 394 MS
EKG QRS DURATION: 70 MS
EKG QTC CALCULATION (BAZETT): 471 MS
EKG R AXIS: 23 DEGREES
EKG T AXIS: 13 DEGREES
EKG VENTRICULAR RATE: 86 BPM
SPECIMEN DESCRIPTION: NORMAL
SPECIMEN DESCRIPTION: NORMAL

## 2022-08-08 ENCOUNTER — CARE COORDINATION (OUTPATIENT)
Dept: CASE MANAGEMENT | Age: 87
End: 2022-08-08

## 2022-08-08 DIAGNOSIS — U07.1 COVID: ICD-10-CM

## 2022-08-08 DIAGNOSIS — R55 SYNCOPE AND COLLAPSE: Primary | ICD-10-CM

## 2022-08-08 PROCEDURE — 1111F DSCHRG MED/CURRENT MED MERGE: CPT | Performed by: INTERNAL MEDICINE

## 2022-08-08 NOTE — CARE COORDINATION
Charissa [facility] Transitions Initial Follow Up Call- COVID risk follow up call      Call within 2 business days of discharge: Yes    Patient: Chandra Irving Patient : 1931   MRN: [id number]  Reason for Admission: COVID, acute cystitis, syncope and collapse (fall, loss of consciousness)  Discharge Date: 22 RARS: Readmission Risk Score: 9.2      Last Discharge [facility]       Date Complaint Diagnosis Description Type Department Provider    22 Fall; Loss of Consciousness Acute cystitis without hematuria . .. ED to Hosp-Admission (Discharged) (ADMITTED) Gerda Bhatt. .. Spoke with: Melisa Marrufo     Attempted to reach patient for initial transitional call. VM left to return call to [phone]     Patient returned call. States she is tired but denies SOB, dizziness, lightheaded, fever/ chills, headache  Confirms isolation through 8/15 (told to her in hospital)   States appetite is fair but she is eating small meals throughout the day   States she has not had a BM since coming home from hospital- pt states she is eating fruit and drinking fluids. She denies discomfort in abdomen or rectum. She does not have a stool softener at home. Agreeable to writer messaging to PCP about this   She denies pain or burning when voiding. States she has not had any falls at home since going home and has been using her walker for now (normally does not need it)   Confirms new and DC meds, 1111F complete   She has had covid vacc x 2 and 1 booster dose. Confirms cardiology appt  and she has not heard from PCP office about appt. Writer messaged PCP scheduling pool to call for appt   Denies needs  Encouraged to call writer/ CTC or providers if questions or concerns- v/u     Transitions of Care Initial Call    Was this an external facility discharge?  No Discharge Facility: [facility]     Challenges to be reviewed by the provider   Additional needs identified to be addressed with provider: Yes  medications-stool softener              Method of communication with provider : chart routing    Advance Care Planning:   Does patient have an Advance Directive: not on file; education provided. Care Transition Nurse contacted the patient by telephone to perform post hospital discharge assessment. Verified name and  with patient as identifiers. Provided introduction to self, and explanation of the CTN role. CTN reviewed discharge instructions, medical action plan and red flags with patient who verbalized understanding. Patient given an opportunity to ask questions and does not have any further questions or concerns at this time. Were discharge instructions available to patient? Yes. Reviewed appropriate site of care based on symptoms and resources available to patient including: PCP  Specialist  When to call 911  ctn . The patient agrees to contact the PCP office for questions related to their healthcare. Medication reconciliation was performed with patient, who verbalizes understanding of administration of home medications. Advised obtaining a 90-day supply of all daily and as-needed medications. Was patient discharged with a pulse oximeter? no    CTN provided contact information. Plan for follow-up call in 5-7 days based on severity of symptoms and risk factors.   Plan for next call: symptom management-routine follow up  follow up appointment-PCP appt         Non-face-to-face services provided:  Scheduled appointment with PCP-writer messaging Dr Laureano King and scheduling pool to call for appt   Scheduled appointment with Specialist-staff message to PCP scheduling pool to call pt for appt   Obtained and reviewed discharge summary and/or continuity of care documents  Assessment and support for treatment adherence and medication management-[employer] com[plete    Care Transitions 24 Hour Call    Do you have a copy of your discharge instructions?: Yes  Do you have all of your prescriptions and are they filled?: Yes  Have you scheduled your follow up appointment?: Yes  How are you going to get to your appointment?: Car - family or friend to transport  [address]: [address] (Comment: [facility])  Do you feel like you have everything you need to keep you well at home?: Yes  Care Transitions Interventions         Follow Up  Future Appointments   Date Time Provider Jonnathan Moreira   8/12/2022 12:00 PM [facility]   9/8/2022 11:00 AM [facility]       Gerard Goldberg, RN

## 2022-08-11 ENCOUNTER — CARE COORDINATION (OUTPATIENT)
Dept: CASE MANAGEMENT | Age: 87
End: 2022-08-11

## 2022-08-11 NOTE — CARE COORDINATION
[facility] Transitions Follow Up Call    2022    Patient: Yuli Rubalcava  Patient : 1931   MRN: [id number]  Reason for Admission: COVID, acute cystitis, syncope and collapse (fall, loss of consciousness)  Discharge Date: 22 RARS: Readmission Risk Score: 9.2         Spoke with: Joie Sutton stated she is still pretty fatigues, is trying to slowly increased activity. Pt completed atb. BP has improved, on midodrine. Pt has Cardiology appt tomorrow and PCP on 8/15, has transportation. Staying hydrated, appetite improved. No falls since discharge. No dizziness, light headedness. Needs to be reviewed by the provider   Additional needs identified to be addressed with provider: No  none             Method of communication with provider : none      Care Transition Nurse contacted the patient by telephone to follow up after admission on 22. Verified name and  with patient as identifiers. Addressed changes since last contact:  still fatigued, has Card , PCP 8/15  Discussed follow-up appointments. CTN reviewed discharge instructions, medical action plan and red flags with patient and discussed any barriers to care and/or understanding of plan of care after discharge. Discussed appropriate site of care based on symptoms and resources available to patient including: PCP  Specialist  When to call [address] The patient agrees to contact the PCP office for questions related to their healthcare. CTN provided contact information for future needs. Plan for follow-up call in 5-7 days based on severity of symptoms and risk factors. Plan for next call: symptom management-fatigue, COVID symptoms?  follow up appointment-Card and PCP appt review. Med changes?           Care Transitions Subsequent and Final Call    Subsequent and Final Calls  Do you have any ongoing symptoms?: Yes  Onset of Patient-reported symptoms: Other  Patient-reported symptoms: Fatigue  Interventions for patient-reported symptoms: Other  Have your medications changed?: No  Do you have any questions related to your medications?: No  Do you currently have any active services?: No  Are you currently active with any services?: Home Health  Do you have any needs or concerns that I can assist you with?: No  Identified Barriers: None  Care Transitions Interventions  Other Interventions:              Follow Up  Future Appointments   Date Time Provider Jonnathan Moreira   8/12/2022 12:00 PM Korina Palomares MD [facility]   8/15/2022  3:30 PM MD EMEKA Moody [facility]   9/8/2022 11:00 AM MD [facility]       Kris Page RN

## 2022-08-12 ENCOUNTER — OFFICE VISIT (OUTPATIENT)
Dept: CARDIOLOGY | Age: 87
End: 2022-08-12
Payer: MEDICARE

## 2022-08-12 VITALS
WEIGHT: 136.6 LBS | DIASTOLIC BLOOD PRESSURE: 68 MMHG | BODY MASS INDEX: 24.2 KG/M2 | HEART RATE: 78 BPM | SYSTOLIC BLOOD PRESSURE: 136 MMHG

## 2022-08-12 DIAGNOSIS — R55 SYNCOPE AND COLLAPSE: ICD-10-CM

## 2022-08-12 DIAGNOSIS — R55 FAINTING SPELL: Primary | ICD-10-CM

## 2022-08-12 DIAGNOSIS — G90.3 NEUROGENIC ORTHOSTATIC HYPOTENSION (HCC): ICD-10-CM

## 2022-08-12 PROCEDURE — 99212 OFFICE O/P EST SF 10 MIN: CPT | Performed by: INTERNAL MEDICINE

## 2022-08-12 PROCEDURE — 1124F ACP DISCUSS-NO DSCNMKR DOCD: CPT | Performed by: INTERNAL MEDICINE

## 2022-08-12 PROCEDURE — 99214 OFFICE O/P EST MOD 30 MIN: CPT | Performed by: INTERNAL MEDICINE

## 2022-08-12 PROCEDURE — 93000 ELECTROCARDIOGRAM COMPLETE: CPT | Performed by: INTERNAL MEDICINE

## 2022-08-12 PROCEDURE — 93005 ELECTROCARDIOGRAM TRACING: CPT | Performed by: INTERNAL MEDICINE

## 2022-08-12 RX ORDER — GARLIC EXTRACT 500 MG
CAPSULE ORAL
COMMUNITY
Start: 2022-08-05 | End: 2022-09-08

## 2022-08-12 NOTE — PROGRESS NOTES
Today's Date: 8/12/2022  Patient's Name: Doe Zavaleta  Patient's age: 80 y.o., 7/26/1931    Subjective: The patient is a 80y.o. year old, , female is in the office for F/U post recent hospital admission for syncope COVID-19 infection and UTI. She jorge any chest pain or discomfort. No orthopnea or PND. Jorge any palpitation. She still has some dizziness when she is standing up but no near-syncope or syncope. Past Medical History:   has a past medical history of Breast cancer ([facility] [address] Closed fracture of intracapsular section of femur ([facility] [address] Closed subcapital fracture of femur, right, initial encounter ([facility] [address] Edema extremities, Hyperlipidemia, Hypertension, Hypothyroidism, Osteoarthritis, and Unspecified vitamin D deficiency. Past Surgical History:   has a past surgical history that includes Total abdominal hysterectomy w/ bilateral salpingoophorectomy (1978); Appendectomy; Colonoscopy (2007); Mastectomy, radical (Right, 2/2014); Breast reduction surgery (Left, 2/2014); Breast reconstruction (Right, 2/2014); Cataract removal with implant (Right, 09/19/2019); eye surgery; Hysterectomy; Total hip arthroplasty (Right, 1/23/2020); and Breast biopsy (Right, 2/2014). Home Medications:  Prior to Admission medications    Medication Sig Start Date End Date Taking? Authorizing Provider   apixaban (ELIQUIS) 5 MG TABS tablet TAKE 1 TABLET BY MOUTH 2 TIMES DAILY 8/8/22  Yes Nancy Crocker MD   midodrine (PROAMATINE) 5 MG tablet Take 1 tablet by mouth in the morning and 1 tablet at noon and 1 tablet in the evening. Take with meals.  8/5/22  Yes Haydee Allen   Probiotic Product (PROBIOTIC DAILY PO) Take by mouth   Yes Historical Provider, MD   Handicap Placard [address] by Does not apply route Expires 4/27/2027 4/28/22  Yes Nancy Crocker MD   simvastatin (ZOCOR) 20 MG tablet TAKE 0.5 TABLETS BY MOUTH EVERY EVENING 4/12/22  Yes Nancy Crocker MD   levothyroxine (SYNTHROID) 50 MCG tablet TAKE 1 TABLET BY MOUTH DAILY 10/28/21  Yes Rober Holbrook MD   Cholecalciferol (VITAMIN D3) 50 MCG (2000 UT) TABS Take 2,000 Units by mouth daily    Yes Historical Provider, MD   Multiple Vitamins-Minerals (PRESERVISION AREDS 2 PO) Take 1 tablet by mouth daily   Yes Historical Provider, MD   Handicap Placard MISC by Does not apply route . Expires in 5 years 3/2/17  Yes Rober Holbrook MD   CALCIUM-MAGNESIUM-VITAMIN D PO Take by mouth daily   Yes Historical Provider, MD   Multiple Vitamin (MULTI-VITAMIN DAILY PO) Take 1 tablet by mouth daily. Yes Historical Provider, MD   Lactobacillus Acid-Pectin (ACIDOPHILUS/PECTIN) CAPS  8/5/22   Historical Provider, MD   Probiotic Acidophilus (FLORANEX) TABS Take 1 tablet by mouth in the morning. 8/5/22 9/4/22  Kiley Dooley   triamterene-hydroCHLOROthiazide (MAXZIDE-25) 37.5-25 MG per tablet TAKE 1 TABLET BY MOUTH DAILY 6/21/22 8/5/22  Rober Holbrook MD   AZO-CRANBERRY PO Take 1 tablet by mouth daily as needed (urinary health)   Patient not taking: Reported on 4/27/2022 8/5/22  Historical Provider, MD   acetaminophen (TYLENOL) 500 MG tablet Take 500 mg by mouth every 6 hours as needed for Pain. Uses approx once a week  Patient not taking: Reported on 4/27/2022 8/5/22  Historical Provider, MD       Allergies:  Patient has no known allergies. Social History:   reports that she has never smoked. She has never used smokeless tobacco. She reports that she does not drink alcohol and does not use drugs. Review of Systems:  Constitutional: there has been no unanticipated weight loss. There's been No change in energy level, No change in activity level. Eyes: No visual changes or diplopia. No scleral icterus. ENT: No Headaches, hearing loss or vertigo. No mouth sores or sore throat. Cardiovascular: As above. Respiratory: No SOB, cough or hemoptysis. Gastrointestinal: No abdominal pain, appetite loss, blood in stools.  No change in bowel or bladder habits. Genitourinary: No dysuria, trouble voiding, or hematuria. Musculoskeletal:  No gait disturbance, No weakness or joint complaints. Integumentary: No rash or pruritis. Neurological: No headache, diplopia, change in muscle strength, numbness or tingling. No change in gait, balance, coordination, mood, affect, memory, mentation, behavior. Psychiatric: No anxiety, or depression. Endocrine: No temperature intolerance. No excessive thirst, fluid intake, or urination. No tremor. Hematologic/Lymphatic: No abnormal bruising or bleeding, blood clots or swollen lymph nodes. Allergic/Immunologic: No nasal congestion or hives. Physical Exam:  /68 (Site: Left Upper Arm, Position: Standing)   Pulse 78   Wt 136 lb 9.6 oz (62 kg)   BMI 24.20 kg/m²   Constitutional and General Appearance: alert, cooperative, no distress and appears stated age  HEENT: PERRL, no cervical lymphadenopathy. No masses palpable. Normal oral mucosa  Respiratory:  Normal excursion and expansion without use of accessory muscles  Resp Auscultation: Good respiratory effort. No for increased work of breathing. On auscultation: clear to auscultation bilaterally  Cardiovascular: The apical impulse is not displaced  Heart tones are crisp and normal. regular S1 and S2.  Jugular venous pulsation Normal  The carotid upstroke is normal in amplitude and contour without delay or bruit  Peripheral pulses are symmetrical and full   Abdomen:   No masses or tenderness  Bowel sounds present  Extremities:   No Cyanosis or Clubbing   Lower extremity edema: No   Skin: Warm and dry  Neurological:  Alert and oriented. Moves all extremities well  No abnormalities of mood, affect, memory, mentation, or behavior are noted    Cardiac Data:  Diagnostics:    EKG: normal EKG, normal sinus rhythm, PAC's noted, unchanged from previous tracings.     Labs:     FASTING LIPID PANEL:  Lab Results   Component Value Date/Time     03/03/2022 09:20 AM    TRIG 75 03/03/2022 09:20 AM         IMPRESSION:    Patient Active Problem List   Diagnosis    Hyperlipidemia    Hypothyroidism    Osteoarthritis    History of breast cancer    Edema extremities    Vitamin D deficiency    Use of anastrozole (Arimidex)    Encounter for monitoring aromatase inhibitor therapy    Left arm pain    Hypertension    History of total right hip arthroplasty    Hyponatremia    Acute cystitis with hematuria    Chronic right hip pain    Balance problems    Syncope and collapse    History of right hip replacement    Map-dot-fingerprint corneal dystrophy    Retinal degeneration, peripheral    Nuclear sclerotic cataract of left eye    Myogenic ptosis of eyelid of both eyes    Retinal tear of left eye     Echocardiogram 8/3/2022:  Normal left ventricular diameter. Left ventricular systolic function is normal.  Left ventricular ejection fraction 60 %. No doppler evidence of diastolic dysfunction. Aortic valve sclerosis without stenosis. Moderate aortic insufficiency. Mitral annular calcification. Mild mitral regurgitation. Normal tricuspid valve leaflets. Mild tricuspid regurgitation. Estimated right ventricular systolic pressure is 26 mmHg. No significant pericardial effusion is seen. Aortic root dimension is at upper limit of normal.    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 Plan of Treatment:     Liberalize po fluid intake  Discontinue diuretics  Allow for higher blood pressure in the 140s  Continue Midodrin for the short-term. On Eliquis for PE.    Follow-up in 3 months    Electronically signed by Cristóbal Medina MD on 8/12/2022 at 12:27 PM      [facility]  [phone]

## 2022-08-15 ENCOUNTER — OFFICE VISIT (OUTPATIENT)
Dept: INTERNAL MEDICINE | Age: 87
End: 2022-08-15
Payer: MEDICARE

## 2022-08-15 VITALS
SYSTOLIC BLOOD PRESSURE: 128 MMHG | BODY MASS INDEX: 24.27 KG/M2 | DIASTOLIC BLOOD PRESSURE: 72 MMHG | HEART RATE: 64 BPM | HEIGHT: 63 IN | RESPIRATION RATE: 16 BRPM | WEIGHT: 137 LBS

## 2022-08-15 DIAGNOSIS — R53.83 OTHER FATIGUE: ICD-10-CM

## 2022-08-15 DIAGNOSIS — E03.9 HYPOTHYROIDISM (ACQUIRED): ICD-10-CM

## 2022-08-15 DIAGNOSIS — E78.00 PURE HYPERCHOLESTEROLEMIA: Primary | ICD-10-CM

## 2022-08-15 DIAGNOSIS — N30.00 ACUTE CYSTITIS WITHOUT HEMATURIA: ICD-10-CM

## 2022-08-15 PROCEDURE — 99214 OFFICE O/P EST MOD 30 MIN: CPT | Performed by: INTERNAL MEDICINE

## 2022-08-15 PROCEDURE — 1124F ACP DISCUSS-NO DSCNMKR DOCD: CPT | Performed by: INTERNAL MEDICINE

## 2022-08-15 ASSESSMENT — ENCOUNTER SYMPTOMS
NAUSEA: 0
VOMITING: 0
CONSTIPATION: 0
EYE PAIN: 0
BACK PAIN: 0
ABDOMINAL PAIN: 0
SHORTNESS OF BREATH: 0
DIARRHEA: 0
BLOOD IN STOOL: 0
COUGH: 0

## 2022-08-15 NOTE — PROGRESS NOTES
[place] [id number]  Dept: [phone]  Dept Fax: [phone]  Loc: [id number] Duglas Pastrana is a 80 y.o. female who presents today for her medical conditions/complaintsas noted below. Paulette Al is c/o of   Chief Complaint   Patient presents with    Fatigue     Hospital f/u    Hyperlipidemia         HPI:     Fatigue  This is a recurrent problem. The current episode started 1 to 4 weeks ago. The problem occurs intermittently. The problem has been gradually improving. Associated symptoms include fatigue. Pertinent negatives include no abdominal pain, arthralgias, chest pain, chills, coughing, fever, headaches, nausea, neck pain, numbness, rash, vomiting or weakness. Hyperlipidemia  This is a chronic problem. The current episode started more than 1 year ago. The problem is controlled. Recent lipid tests were reviewed and are variable. Pertinent negatives include no chest pain or shortness of breath. Other  This is a new (3-UTI. Better now after antibiotics. ) problem. The current episode started 1 to 4 weeks ago. The problem occurs intermittently. The problem has been resolved. Associated symptoms include fatigue. Pertinent negatives include no abdominal pain, arthralgias, chest pain, chills, coughing, fever, headaches, nausea, neck pain, numbness, rash, vomiting or weakness. No results found for: LABA1C         No results found for: LABMICR  LDL Cholesterol (mg/dL)   Date Value   03/03/2022 83   01/06/2021 104   03/20/2020 53         AST (U/L)   Date Value   08/05/2022 19     ALT (U/L)   Date Value   08/05/2022 14     BUN (mg/dL)   Date Value   08/05/2022 14     BP Readings from Last 3 Encounters:   08/15/22 128/72   08/12/22 136/68   08/05/22 (!) 161/81              Past Medical History:   Diagnosis Date    Breast cancer ([facility] [address] 2014    s/p mastectomy and reconstruction surgery. Closed fracture of intracapsular section of femur ([facility] [address] 2022    Closed subcapital fracture of femur, right, initial encounter ([facility] [address] 2020    Edema extremities     takes diuretics prn    Hyperlipidemia     Hypertension     Hypothyroidism     Osteoarthritis     mild    Unspecified vitamin D deficiency       Past Surgical History:   Procedure Laterality Date    APPENDECTOMY      at age 5    BREAST BIOPSY Right 2014    before she had her surgery    BREAST RECONSTRUCTION Right 2014    Pt had done at [facility] in [address] Left 2014    Pt had done at [facility] in [address] Right 2019    COLONOSCOPY  2007    showed mild diverticular disease, otherwise negative, repeat in 2017    [address] ([address])      MASTECTOMY, RADICAL Right 2014    Pt had done at [facility] in [facility] ([address] [facility])  [facility]    due to fibroids    TOTAL HIP ARTHROPLASTY Right 2020    RIGHT TOTAL HIP performed by Dana Presley MD at [address] [facility] History   Problem Relation Age of Onset    Alzheimer's Disease Mother          at age 80    Emphysema Father          at age 68    Diabetes Father     COPD Sister     Other Sister         lung disease from tobacco abuse    Stroke Sister         history of a hemorrhagic cerebrovascular accident    Cancer Daughter         CML    Other Daughter         kidney stones    Hypertension Daughter     Heart Disease Maternal Grandmother     Heart Disease Maternal Grandfather     Diabetes Maternal Grandfather           Social History     Tobacco Use    Smoking status: Never    Smokeless tobacco: Never   Substance Use Topics    Alcohol use: No         Current Outpatient Medications   Medication Sig Dispense Refill    Lactobacillus Acid-Pectin (ACIDOPHILUS/PECTIN) CAPS       apixaban (ELIQUIS) 5 MG TABS tablet TAKE 1 TABLET BY MOUTH 2 TIMES DAILY 60 tablet 11    Probiotic Acidophilus (FLORANEX) TABS Take 1 tablet by mouth in the morning. 30 tablet 0    midodrine (PROAMATINE) 5 MG tablet Take 1 tablet by mouth in the morning and 1 tablet at noon and 1 tablet in the evening. Take with meals. 90 tablet 0    Probiotic Product (PROBIOTIC DAILY PO) Take by mouth      Handicap Placard MISC by Does not apply route Expires 4/27/2027 1 each 0    simvastatin (ZOCOR) 20 MG tablet TAKE 0.5 TABLETS BY MOUTH EVERY EVENING 45 tablet 3    levothyroxine (SYNTHROID) 50 MCG tablet TAKE 1 TABLET BY MOUTH DAILY 30 tablet 11    Cholecalciferol (VITAMIN D3) 50 MCG (2000 UT) TABS Take 2,000 Units by mouth daily       Multiple Vitamins-Minerals (PRESERVISION AREDS 2 PO) Take 1 tablet by mouth daily      Handicap Placard MISC by Does not apply route . Expires in 5 years 1 each 0    CALCIUM-MAGNESIUM-VITAMIN D PO Take by mouth daily      Multiple Vitamin (MULTI-VITAMIN DAILY PO) Take 1 tablet by mouth daily. No current facility-administered medications for this visit. No Known Allergies    Health Maintenance   Topic Date Due    Shingles vaccine (1 of 2) Never done    Pneumococcal 65+ years Vaccine (2 - PPSV23 or PCV20) 09/11/2018    COVID-19 Vaccine (2 - Moderna series) 03/06/2021    DTaP/Tdap/Td vaccine (2 - Td or Tdap) 12/19/2021    Flu vaccine (1) 09/01/2022    Lipids  03/03/2023    Depression Screen  03/08/2023    Annual Wellness Visit (AWV)  03/09/2023    Hepatitis A vaccine  Aged Out    Hepatitis B vaccine  Aged Out    Hib vaccine  Aged Out    Meningococcal (ACWY) vaccine  Aged Out       Subjective:      Review of Systems   Constitutional:  Positive for fatigue. Negative for chills and fever. HENT:  Negative for hearing loss. Eyes:  Negative for pain and visual disturbance. Respiratory:  Negative for cough and shortness of breath. Cardiovascular:  Negative for chest pain, palpitations and leg swelling.    Gastrointestinal:  Negative for abdominal pain, blood in stool, constipation, diarrhea, nausea and vomiting. Endocrine: Negative for cold intolerance, polydipsia and polyuria. Genitourinary:  Negative for difficulty urinating, dysuria and hematuria. Musculoskeletal:  Negative for arthralgias, back pain, gait problem and neck pain. Skin:  Negative for pallor and rash. Neurological:  Negative for dizziness, weakness, numbness and headaches. Hematological:  Negative for adenopathy. Does not bruise/bleed easily. Psychiatric/Behavioral:  Negative for confusion. The patient is not nervous/anxious. Objective:     Physical Exam  Vitals reviewed. Constitutional:       Appearance: She is well-developed. HENT:      Head: Normocephalic and atraumatic. Eyes:      Pupils: Pupils are equal, round, and reactive to light. Cardiovascular:      Rate and Rhythm: Normal rate and regular rhythm. Heart sounds: No murmur heard. No friction rub. No gallop. Pulmonary:      Effort: Pulmonary effort is normal.      Breath sounds: Normal breath sounds. No wheezing or rales. Abdominal:      General: There is no distension. Palpations: Abdomen is soft. There is no mass. Tenderness: There is no abdominal tenderness. There is no rebound. Musculoskeletal:         General: Normal range of motion. Cervical back: Neck supple. Lymphadenopathy:      Cervical: No cervical adenopathy. Skin:     General: Skin is warm and dry. Findings: No rash. Neurological:      Mental Status: She is alert and oriented to person, place, and time. Cranial Nerves: No cranial nerve deficit (grossly). Psychiatric:         Thought Content: Thought content normal.      /72 (Site: Right Upper Arm, Position: Sitting, Cuff Size: Large Adult)   Pulse 64   Resp 16   Ht 5' 3\" (1.6 m)   Wt 137 lb (62.1 kg)   BMI 24.27 kg/m²     Assessment:       Diagnosis Orders   1. Pure hypercholesterolemia        2.  Other fatigue  Basic Metabolic Panel 3. Acute cystitis without hematuria        4. Hypothyroidism (acquired)  TSH    T4, Free         Reviewed multiple test results for this appointment. 8/2/2022 head CT negative    8/2/2022 C-spine CT no fracture    8/2/2022 EKG showed normal sinus rhythm with only some PACs. Patient told to continue midodrine. Patient was reminded to drink plenty of fluids daily and to wear the compression stockings. She has not had any dizziness or falls since going home from the hospital.  Her UTI was treated with antibiotics. Plan:       Return if symptoms worsen or fail to improve, for Hyperlipidemia, hypothyroidism, Fatigue. Orders Placed This Encounter   Procedures    TSH     Standing Status:   Future     Standing Expiration Date:   8/15/2023    T4, Free     Standing Status:   Future     Standing Expiration Date:   9/67/7880    Basic Metabolic Panel     Standing Status:   Future     Standing Expiration Date:   8/15/2023       No orders of the defined types were placed in this encounter. Patientgiven educational materials - see patient instructions. Discussed use, benefit,and side effects of prescribed medications. All patient questions answered. Ptvoiced understanding. Reviewed health maintenance. Instructed to continue currentmedications, diet and exercise. Patient agreed with treatment plan. Follow up asdirected.      Electronically signed by Xiomara Fierro MD on 8/15/2022 at 4:18 PM

## 2022-08-16 ENCOUNTER — CARE COORDINATION (OUTPATIENT)
Dept: CASE MANAGEMENT | Age: 87
End: 2022-08-16

## 2022-08-16 NOTE — CARE COORDINATION
[facility] Transitions Follow Up Call    2022    Patient: Leonel Estrella  Patient : 1931   MRN: [id number]  Reason for Admission: Acute cystitis; COVID-19  Discharge Date: 22 RARS: Readmission Risk Score: 9.2         Spoke with: Sariah Phelan stated sh is doing OK, was told to push fluids at PCP HFU yesterday. Pt is adding flavor to water, has some Power Aid to increase intake. Stated BP systolic was 340 today. Per Cardiology appt on 22, Dr Marsha Castillo would like pt to try and keep systolic @ 054'Q. Pt is taking Midodrine as directed, not on diuretics. Pt continues to have fatigue, is working on increasing activity. Pt has lab work ordered, EKG was negative. No immediate needs or concerns. Needs to be reviewed by the provider   Additional needs identified to be addressed with provider: No  none             Method of communication with provider : none      Care Transition Nurse contacted the patient by telephone to follow up after admission on 22. Verified name and  with patient as identifiers. Addressed changes since last contact: completed PCP and Card appts, EKG negative, lab work ordered  Discussed follow-up appointments. CTN reviewed discharge instructions, medical action plan and red flags with patient and discussed any barriers to care and/or understanding of plan of care after discharge. Discussed appropriate site of care based on symptoms and resources available to patient including: PCP  Specialist  When to call [address] The patient agrees to contact the PCP office for questions related to their healthcare. CTN provided contact information for future needs. Plan for follow-up call in 5-7 days based on severity of symptoms and risk factors.   Plan for next call: symptom management-fatigue, light headed  self management-BP, fluids increased        Care Transitions Subsequent and Final Call    Subsequent and Final Calls  Do you have any ongoing symptoms?: Yes  Onset of Patient-reported symptoms: Other  Patient-reported symptoms: Fatigue  Interventions for patient-reported symptoms: Other  Have your medications changed?: No  Do you have any questions related to your medications?: No  Do you currently have any active services?: No  Are you currently active with any services?: Home Health  Do you have any needs or concerns that I can assist you with?: No  Identified Barriers: None  Care Transitions Interventions  Other Interventions:              Follow Up  Future Appointments   Date Time Provider Jonnathan Moreira   9/8/2022 11:00 AM MD [facility]   11/10/2022  3:00 PM MD EVERT Yang [facility]       Mago He RN

## 2022-08-23 ENCOUNTER — CARE COORDINATION (OUTPATIENT)
Dept: CASE MANAGEMENT | Age: 87
End: 2022-08-23

## 2022-08-23 NOTE — CARE COORDINATION
[facility] Trans[facility] Follow Up Call- 1st attempt    2022    Patient: Manny Nevarez  Patient : 1931   MRN: [id number]  Reason for Admission: COVID, acute cystitis, syncope and collapse (fall, loss of consciousness)  Discharge Date: 22 RARS: Readmission Risk Score: 9.2         Attempted to reach patient for subsequent transitional call. Call picked up and hung up x 2. Will attempt follow up at a later time.        Follow Up  Future Appointments   Date Time Provider Jonnathan Moreira   2022 11:00 AM MD EMEKA Victor [facility]   11/10/2022  3:00 PM [facility]       Shelby Hoover RN

## 2022-08-25 ENCOUNTER — CARE COORDINATION (OUTPATIENT)
Dept: CASE MANAGEMENT | Age: 87
End: 2022-08-25

## 2022-08-25 NOTE — CARE COORDINATION
[facility] Transitions Follow Up Call    2022    Patient: Leonel Estrella  Patient : 1931   MRN: [id number]  Reason for Admission: Acute cystitis without hematuria  Discharge Date: 22 RARS: Readmission Risk Score: 9.2      Spoke with: Sariah Phelan stated she is doing OK, still fatigued but improving. Pt is on midodrine, goal is to keep BP systolic @ 311 range. Stated SACHIN systolic has been b/w @ 472-781. Denies dizziness, light headedness, falls, SOB. Advised to start keeping a log of BP. Advised to take BP @ an hour or two of taking midodrine. Pt is trying to push fluids, asked what is best to drink. Advised to drink mostly water, avoid sugar and caffeinated drinks, increase activity as tolerated. Needs to be reviewed by the provider   Additional needs identified to be addressed with provider: No  none             Method of communication with provider : none      Care Transition Nurse contacted the patient by telephone to follow up after admission on 22. Verified name and  with patient as identifiers. Addressed changes since last contact:  BP @ 119-138 , pushing fluids, activity as tolerated  Discussed follow-up appointments. CTN reviewed discharge instructions, medical action plan and red flags with patient and discussed any barriers to care and/or understanding of plan of care after discharge. Discussed appropriate site of care based on symptoms and resources available to patient including: PCP  Specialist  When to call [address] The patient agrees to contact the PCP office for questions related to their healthcare. CTN provided contact information for future needs. Plan for follow-up call in 5-7 days based on severity of symptoms and risk factors. Plan for next call: symptom management-fatigue  self management-BP log? Pushing fluids? Increasing activity?       Care Transitions Subsequent and Final Call    Subsequent and Final Calls  Do you have any ongoing symptoms?: No  Have your medications changed?: No  Do you have any questions related to your medications?: No  Do you currently have any active services?: No  Are you currently active with any services?: Home Health  Do you have any needs or concerns that I can assist you with?: No  Identified Barriers: None  Care Transitions Interventions  Other Interventions:              Follow Up  Future Appointments   Date Time Provider Jonnathan Moreira   9/8/2022 11:00 AM [facility]   11/10/2022  3:00 PM Marylene Pfeiffer, MD [facility]       Manuela Hazel RN

## 2022-08-30 ENCOUNTER — CARE COORDINATION (OUTPATIENT)
Dept: CASE MANAGEMENT | Age: 87
End: 2022-08-30

## 2022-08-30 NOTE — CARE COORDINATION
[facility] Transitions Follow Up Call- final call     2022    Patient: Trey Cortes  Patient : 1931   MRN: [id number]  Reason for Admission: COVID, acute cystitis, syncope and collapse (fall, loss of consciousness)  Discharge Date: 22 RARS: Readmission Risk Score: 9.2         Spoke with: Gabriela Knight     Call to pt who states she is doing pretty good. States she has been active, picking up sticks and stays busy all the time, resting when she needs to   She denies covid symptoms- SOB, cough, fever, nausea  States eating well and drinking fluids  States yesterday her BP in the day went up to 166 systolic but when she rechecked it it was 582 systolic. She confirms she is on the midodrine 3 times daily as ordered. Writer strongly advised she notify PCP or cardiologist if it goes high again and does not come down on its own- v/u   Denies pain or burning when voiding   States BM's are small renaot coming out. She eats prunes and beans daily. Writer advised she ask PCP or pharmacist about getting a stool softener daily until the stool becomes formed and soft. Denies needs  Writer informs this is final (CTC) phone call- v/u. Encouraged call writer/ CTC or providers if questions or concerns- v/u. Episode closed      You Patient resolved from the Care Transitions episode on 2022  Discussed COVID-19 related testing which was available at this time. Test results were positive. Patient informed of results, if available?  Yes    Patient/family has been provided the following resources and education related to COVID-19:                         Signs, symptoms and red flags related to COVID-19            CDC exposure and quarantine guidelines            [employer] exposure contact - [phone]            Contact for their local Department of Health                 Patient currently reports that the following symptoms have improved:  fatigue, cough, and no new/worsening symptoms     No further outreach scheduled with this CTN/ACM. Episode of Care resolved. Patient has this CTN/ACM contact information if future needs arise. Care Transitions Subsequent and Final Call    Subsequent and Final Calls  Do you have any ongoing symptoms?: No  Have your medications changed?: No  Do you have any questions related to your medications?: No  Are you currently active with any services?: Home Health  Do you have any needs or concerns that I can assist you with?: No  Identified Barriers: Lack of Education  Care Transitions Interventions  Other Interventions:              Follow Up  Future Appointments   Date Time Provider Jonnathan Moreira   9/8/2022 11:00 AM MD EMEKA Brar [facility]   11/10/2022  3:00 PM MD EVERT Delacruz [facility]       Sofia Pacheco RN

## 2022-09-02 ENCOUNTER — HOSPITAL ENCOUNTER (OUTPATIENT)
Dept: LAB | Age: 87
Discharge: HOME | End: 2022-09-02
Payer: MEDICARE

## 2022-09-02 DIAGNOSIS — E55.9 VITAMIN D DEFICIENCY: ICD-10-CM

## 2022-09-02 DIAGNOSIS — R53.83 OTHER FATIGUE: ICD-10-CM

## 2022-09-02 DIAGNOSIS — E03.9 HYPOTHYROIDISM (ACQUIRED): ICD-10-CM

## 2022-09-02 LAB
ANION GAP SERPL CALCULATED.3IONS-SCNC: 7 MMOL/L (ref 9–17)
BUN BLDV-MCNC: 16 MG/DL (ref 8–23)
BUN/CREAT BLD: 22 (ref 9–20)
CALCIUM SERPL-MCNC: 9.8 MG/DL (ref 8.6–10.4)
CHLORIDE BLD-SCNC: 99 MMOL/L (ref 98–107)
CO2: 30 MMOL/L (ref 20–31)
CREAT SERPL-MCNC: 0.72 MG/DL (ref 0.5–0.9)
GFR AFRICAN AMERICAN: >60 ML/MIN
GFR NON-AFRICAN AMERICAN: >60 ML/MIN
GFR SERPL CREATININE-BSD FRML MDRD: ABNORMAL ML/MIN/{1.73_M2}
GLUCOSE BLD-MCNC: 128 MG/DL (ref 70–99)
POTASSIUM SERPL-SCNC: 4.5 MMOL/L (ref 3.7–5.3)
SODIUM BLD-SCNC: 136 MMOL/L (ref 135–144)
THYROXINE, FREE: 1.31 NG/DL (ref 0.93–1.7)
TSH SERPL DL<=0.05 MIU/L-ACNC: 1.89 UIU/ML (ref 0.3–5)
VITAMIN D 25-HYDROXY: 64 NG/ML

## 2022-09-02 PROCEDURE — 84439 ASSAY OF FREE THYROXINE: CPT

## 2022-09-02 PROCEDURE — 36415 COLL VENOUS BLD VENIPUNCTURE: CPT

## 2022-09-02 PROCEDURE — 80048 BASIC METABOLIC PNL TOTAL CA: CPT

## 2022-09-02 PROCEDURE — 82306 VITAMIN D 25 HYDROXY: CPT

## 2022-09-02 PROCEDURE — 84443 ASSAY THYROID STIM HORMONE: CPT

## 2022-09-02 RX ORDER — MIDODRINE HYDROCHLORIDE 5 MG/1
5 TABLET ORAL
Qty: 90 TABLET | Refills: 5 | Status: SHIPPED | OUTPATIENT
Start: 2022-09-02

## 2022-09-02 NOTE — TELEPHONE ENCOUNTER
Refill request     Medication pended if agreeable    Last Appt:  8/15/2022  Next Appt:   9/8/2022  Med verified in Epic

## 2022-09-06 ENCOUNTER — TELEPHONE (OUTPATIENT)
Dept: INTERNAL MEDICINE | Age: 87
End: 2022-09-06

## 2022-09-06 NOTE — TELEPHONE ENCOUNTER
Tell Patient to drink plenty of fluids to stay hydrated and hold off on restarting the midodrine.   If she becomes lightheaded with standing or develops low blood pressure,    then she can restart the midodrine at previous dose

## 2022-09-06 NOTE — TELEPHONE ENCOUNTER
Patient was in the hospital recently, she states for low blood pressure. She was to take Midodrine 5mg three times a day. She did not take it last night or this morning as her bp is 182/82    states she has no other symptoms. No dizziness, no headache, no blurred vision. Says she feels fine and her pulse is fine. She wants to know what you advise? [phone]    Patient called back and said blood pressure is better now. It was 146/80  and then just now it was 136/74. Last night and through the night readings were 190/90   182/82     192/92    and 189/90   again, she did not take the medicine last night or yet today. She doesn't know if it was up because she had been out of town and just got worked up but still doesn't know if she should start medication again?

## 2022-09-08 ENCOUNTER — OFFICE VISIT (OUTPATIENT)
Dept: INTERNAL MEDICINE | Age: 87
End: 2022-09-08
Payer: MEDICARE

## 2022-09-08 ENCOUNTER — TELEPHONE (OUTPATIENT)
Dept: INTERNAL MEDICINE | Age: 87
End: 2022-09-08

## 2022-09-08 ENCOUNTER — HOSPITAL ENCOUNTER (OUTPATIENT)
Dept: LAB | Age: 87
Discharge: HOME | End: 2022-09-08
Payer: MEDICARE

## 2022-09-08 VITALS
HEART RATE: 77 BPM | HEIGHT: 63 IN | RESPIRATION RATE: 16 BRPM | WEIGHT: 136 LBS | SYSTOLIC BLOOD PRESSURE: 126 MMHG | OXYGEN SATURATION: 98 % | BODY MASS INDEX: 24.1 KG/M2 | DIASTOLIC BLOOD PRESSURE: 78 MMHG

## 2022-09-08 DIAGNOSIS — R53.83 OTHER FATIGUE: ICD-10-CM

## 2022-09-08 DIAGNOSIS — E03.9 ACQUIRED HYPOTHYROIDISM: ICD-10-CM

## 2022-09-08 DIAGNOSIS — N30.00 ACUTE CYSTITIS WITHOUT HEMATURIA: ICD-10-CM

## 2022-09-08 DIAGNOSIS — Z00.00 GENERAL MEDICAL EXAM: ICD-10-CM

## 2022-09-08 DIAGNOSIS — E78.00 PURE HYPERCHOLESTEROLEMIA: Primary | ICD-10-CM

## 2022-09-08 LAB
BACTERIA: ABNORMAL
BILIRUBIN URINE: NEGATIVE
EPITHELIAL CELLS UA: ABNORMAL /HPF (ref 0–5)
GLUCOSE URINE: NEGATIVE
KETONES, URINE: NEGATIVE
LEUKOCYTE ESTERASE, URINE: ABNORMAL
NITRITE, URINE: NEGATIVE
PH UA: 7 (ref 5–6)
PROTEIN UA: NEGATIVE
RBC UA: ABNORMAL /HPF (ref 0–4)
SPECIFIC GRAVITY UA: 1.01 (ref 1.01–1.02)
URINE HGB: ABNORMAL
UROBILINOGEN, URINE: NORMAL
WBC UA: ABNORMAL /HPF (ref 0–4)

## 2022-09-08 PROCEDURE — 99214 OFFICE O/P EST MOD 30 MIN: CPT | Performed by: INTERNAL MEDICINE

## 2022-09-08 PROCEDURE — 1124F ACP DISCUSS-NO DSCNMKR DOCD: CPT | Performed by: INTERNAL MEDICINE

## 2022-09-08 PROCEDURE — 81001 URINALYSIS AUTO W/SCOPE: CPT

## 2022-09-08 RX ORDER — CEPHALEXIN 500 MG/1
500 CAPSULE ORAL 2 TIMES DAILY
Qty: 14 CAPSULE | Refills: 0 | Status: SHIPPED | OUTPATIENT
Start: 2022-09-08 | End: 2022-09-09 | Stop reason: SDUPTHER

## 2022-09-08 SDOH — ECONOMIC STABILITY: FOOD INSECURITY: WITHIN THE PAST 12 MONTHS, YOU WORRIED THAT YOUR FOOD WOULD RUN OUT BEFORE YOU GOT MONEY TO BUY MORE.: NEVER TRUE

## 2022-09-08 SDOH — ECONOMIC STABILITY: FOOD INSECURITY: WITHIN THE PAST 12 MONTHS, THE FOOD YOU BOUGHT JUST DIDN'T LAST AND YOU DIDN'T HAVE MONEY TO GET MORE.: NEVER TRUE

## 2022-09-08 ASSESSMENT — ENCOUNTER SYMPTOMS
NAUSEA: 0
SHORTNESS OF BREATH: 0
EYE PAIN: 0
ABDOMINAL PAIN: 0
BACK PAIN: 0
CONSTIPATION: 0
VOMITING: 0
COUGH: 0
BLOOD IN STOOL: 0
DIARRHEA: 0

## 2022-09-08 ASSESSMENT — SOCIAL DETERMINANTS OF HEALTH (SDOH): HOW HARD IS IT FOR YOU TO PAY FOR THE VERY BASICS LIKE FOOD, HOUSING, MEDICAL CARE, AND HEATING?: NOT HARD AT ALL

## 2022-09-08 NOTE — PROGRESS NOTES
[place] [address]  Dept: [phone]  Dept Fax: [phone]  Loc: [address] is a 80 y.o. female who presents today for her medical conditions/complaintsas noted below. Rosibel Conway is c/o of   Chief Complaint   Patient presents with    Hyperlipidemia     6 month    Hypothyroidism    Fatigue         HPI:     Hyperlipidemia  This is a chronic problem. The current episode started more than 1 year ago. The problem is controlled. Recent lipid tests were reviewed and are variable. Pertinent negatives include no chest pain or shortness of breath. Fatigue  This is a recurrent problem. The current episode started 1 to 4 weeks ago. The problem occurs intermittently. The problem has been waxing and waning. Associated symptoms include fatigue. Pertinent negatives include no abdominal pain, arthralgias, chest pain, chills, coughing, fever, headaches, nausea, neck pain, numbness, rash, vomiting or weakness. Other  This is a chronic (3-hypothyroidism) problem. The current episode started more than 1 year ago. The problem occurs constantly. The problem has been waxing and waning. Associated symptoms include fatigue. Pertinent negatives include no abdominal pain, arthralgias, chest pain, chills, coughing, fever, headaches, nausea, neck pain, numbness, rash, vomiting or weakness. No results found for: LABA1C         No results found for: LABMICR  LDL Cholesterol (mg/dL)   Date Value   03/03/2022 83   01/06/2021 104   03/20/2020 53         AST (U/L)   Date Value   08/05/2022 19     ALT (U/L)   Date Value   08/05/2022 14     BUN (mg/dL)   Date Value   09/02/2022 16     BP Readings from Last 3 Encounters:   09/08/22 126/78   08/15/22 128/72   08/12/22 136/68              Past Medical History:   Diagnosis Date    Breast cancer ([facility] [address] 2014    s/p mastectomy and reconstruction surgery. MG TABS tablet TAKE 1 TABLET BY MOUTH 2 TIMES DAILY 60 tablet 11    Probiotic Product (PROBIOTIC DAILY PO) Take by mouth      Handicap Placard MISC by Does not apply route Expires 4/27/2027 1 each 0    simvastatin (ZOCOR) 20 MG tablet TAKE 0.5 TABLETS BY MOUTH EVERY EVENING 45 tablet 3    levothyroxine (SYNTHROID) 50 MCG tablet TAKE 1 TABLET BY MOUTH DAILY 30 tablet 11    Cholecalciferol (VITAMIN D3) 50 MCG (2000 UT) TABS Take 2,000 Units by mouth daily       Multiple Vitamins-Minerals (PRESERVISION AREDS 2 PO) Take 1 tablet by mouth daily      Handicap Placard MISC by Does not apply route . Expires in 5 years 1 each 0    CALCIUM-MAGNESIUM-VITAMIN D PO Take by mouth daily      Multiple Vitamin (MULTI-VITAMIN DAILY PO) Take 1 tablet by mouth daily. No current facility-administered medications for this visit. No Known Allergies    Health Maintenance   Topic Date Due    Shingles vaccine (1 of 2) Never done    Pneumococcal 65+ years Vaccine (2 - PPSV23 or PCV20) 09/11/2018    COVID-19 Vaccine (2 - Moderna series) 03/06/2021    DTaP/Tdap/Td vaccine (2 - Td or Tdap) 12/19/2021    Flu vaccine (1) 09/01/2022    Lipids  03/03/2023    Depression Screen  03/08/2023    Annual Wellness Visit (AWV)  03/09/2023    Hepatitis A vaccine  Aged Out    Hepatitis B vaccine  Aged Out    Hib vaccine  Aged Out    Meningococcal (ACWY) vaccine  Aged Out       Subjective:      Review of Systems   Constitutional:  Positive for fatigue. Negative for chills and fever. HENT:  Negative for hearing loss. Eyes:  Negative for pain and visual disturbance. Respiratory:  Negative for cough and shortness of breath. Cardiovascular:  Negative for chest pain, palpitations and leg swelling. Gastrointestinal:  Negative for abdominal pain, blood in stool, constipation, diarrhea, nausea and vomiting. Endocrine: Negative for cold intolerance, polydipsia and polyuria.    Genitourinary:  Negative for difficulty urinating, dysuria and hematuria. Musculoskeletal:  Negative for arthralgias, back pain, gait problem and neck pain. Skin:  Negative for pallor and rash. Neurological:  Negative for dizziness, weakness, numbness and headaches. Hematological:  Negative for adenopathy. Does not bruise/bleed easily. Psychiatric/Behavioral:  Negative for confusion. The patient is not nervous/anxious. Objective:     Physical Exam  Vitals reviewed. Constitutional:       Appearance: She is well-developed. HENT:      Head: Normocephalic and atraumatic. Eyes:      Pupils: Pupils are equal, round, and reactive to light. Cardiovascular:      Rate and Rhythm: Normal rate and regular rhythm. Heart sounds: No murmur heard. No friction rub. No gallop. Pulmonary:      Effort: Pulmonary effort is normal.      Breath sounds: Normal breath sounds. No wheezing or rales. Abdominal:      General: There is no distension. Palpations: Abdomen is soft. There is no mass. Tenderness: There is no abdominal tenderness. There is no rebound. Musculoskeletal:         General: Normal range of motion. Cervical back: Neck supple. Lymphadenopathy:      Cervical: No cervical adenopathy. Skin:     General: Skin is warm and dry. Findings: No rash. Neurological:      Mental Status: She is alert and oriented to person, place, and time. Cranial Nerves: No cranial nerve deficit (grossly). Psychiatric:         Thought Content: Thought content normal.      /78 (Site: Left Upper Arm, Position: Sitting, Cuff Size: Large Adult)   Pulse 77   Resp 16   Ht 5' 3\" (1.6 m)   Wt 136 lb (61.7 kg)   SpO2 98%   BMI 24.09 kg/m²     Assessment:       Diagnosis Orders   1. Pure hypercholesterolemia  Comprehensive Metabolic Panel    Lipid Panel      2. Acquired hypothyroidism  TSH    T4, Free      3. Other fatigue  Comprehensive Metabolic Panel      4. General medical exam  Comprehensive Metabolic Panel      5.  Acute cystitis without hematuria  Urinalysis with Microscopic      Multiple test results for this appointment. 9-2-22 normal TSH 1.89.    9-2-22 normal free thyroxine 1.31    9/2/2022 normal creatinine 0.72.      8/5/2022 normal hemoglobin 12.4    Patient told to continue Synthroid. She may hold the midodrine for now, and make sure she is drinking plenty of fluids every day. Plan:       Return in about 6 months (around 3/13/2023) for medicare AWV, Hyperlipidemia, hypothyroidism. Orders Placed This Encounter   Procedures    TSH     Standing Status:   Future     Standing Expiration Date:   9/8/2023    Comprehensive Metabolic Panel     Standing Status:   Future     Standing Expiration Date:   9/8/2023    T4, Free     Standing Status:   Future     Standing Expiration Date:   9/8/2023    Lipid Panel     Standing Status:   Future     Standing Expiration Date:   9/8/2023     Order Specific Question:   Is Patient Fasting?/# of Hours     Answer:   15     Order Specific Question:   Has the patient fasted? Answer:   Yes    Urinalysis with Microscopic     Standing Status:   Future     Standing Expiration Date:   9/8/2023     Order Specific Question:   SPECIFY(EX-CATH,MIDSTREAM,CYSTO,ETC)? Answer:   midst       No orders of the defined types were placed in this encounter. Patientgiven educational materials - see patient instructions. Discussed use, benefit,and side effects of prescribed medications. All patient questions answered. Ptvoiced understanding. Reviewed health maintenance. Instructed to continue currentmedications, diet and exercise. Patient agreed with treatment plan. Follow up asdirected.      Electronically signed by Vivi Lund MD on 9/8/2022 at 11:30 AM

## 2022-09-08 NOTE — TELEPHONE ENCOUNTER
----- Message from Claudy Smith MD sent at 9/8/2022 12:39 PM EDT -----  Does have some evidence of UTI.     Pend Rx for Keflex 500 mg twice daily x7 days

## 2022-09-09 RX ORDER — CEPHALEXIN 500 MG/1
500 CAPSULE ORAL 2 TIMES DAILY
Qty: 14 CAPSULE | Refills: 0 | Status: SHIPPED | OUTPATIENT
Start: 2022-09-09 | End: 2022-09-16

## 2022-11-21 DIAGNOSIS — E03.9 HYPOTHYROIDISM, UNSPECIFIED TYPE: ICD-10-CM

## 2022-11-21 RX ORDER — LEVOTHYROXINE SODIUM 0.05 MG/1
50 TABLET ORAL DAILY
Qty: 30 TABLET | Refills: 0 | Status: SHIPPED | OUTPATIENT
Start: 2022-11-21

## 2022-12-19 ENCOUNTER — OFFICE VISIT (OUTPATIENT)
Dept: CARDIOLOGY | Age: 87
End: 2022-12-19
Payer: MEDICARE

## 2022-12-19 VITALS
BODY MASS INDEX: 24.98 KG/M2 | DIASTOLIC BLOOD PRESSURE: 70 MMHG | OXYGEN SATURATION: 97 % | WEIGHT: 141 LBS | HEART RATE: 77 BPM | SYSTOLIC BLOOD PRESSURE: 120 MMHG

## 2022-12-19 DIAGNOSIS — R55 SYNCOPE AND COLLAPSE: Primary | ICD-10-CM

## 2022-12-19 PROCEDURE — 99213 OFFICE O/P EST LOW 20 MIN: CPT | Performed by: NURSE PRACTITIONER

## 2022-12-19 NOTE — PROGRESS NOTES
Today's Date: 12/19/2022  Patient's Name: Laureen Coello  Patient's age: 80 y.o., 7/26/1931    Subjective: The patient is a 80y.o. year old, , female is in the office for F/U   She denies any present issues/concerns. States if she walks out into the dark she feels \"woozy\" but then it goes right away. States she stays hydrated and is compliant with all of her medications. Denies any present issues/concerns. Past Medical History:   has a past medical history of Breast cancer ([facility] [address] Closed fracture of intracapsular section of femur ([facility] [address] Closed subcapital fracture of femur, right, initial encounter ([facility] [address] Edema extremities, Hyperlipidemia, Hypertension, Hypothyroidism, Osteoarthritis, and Unspecified vitamin D deficiency. Past Surgical History:   has a past surgical history that includes Total abdominal hysterectomy w/ bilateral salpingoophorectomy (1978); Appendectomy; Colonoscopy (2007); Mastectomy, radical (Right, 2/2014); Breast reduction surgery (Left, 2/2014); Breast reconstruction (Right, 2/2014); Cataract removal with implant (Right, 09/19/2019); eye surgery; Hysterectomy; Total hip arthroplasty (Right, 1/23/2020); and Breast biopsy (Right, 2/2014). Home Medications:  Prior to Admission medications    Medication Sig Start Date End Date Taking?  Authorizing Provider   levothyroxine (SYNTHROID) 50 MCG tablet TAKE 1 TABLET BY MOUTH DAILY 11/21/22   Kenzie Farrell MD   midodrine (PROAMATINE) 5 MG tablet Take 1 tablet by mouth 3 times daily (with meals)  Patient not taking: Reported on 9/8/2022 9/2/22   Kenzie Farrell MD   apixaban (ELIQUIS) 5 MG TABS tablet TAKE 1 TABLET BY MOUTH 2 TIMES DAILY 8/8/22   Kenzie Farrell MD   Probiotic Product (PROBIOTIC DAILY PO) Take by mouth    Historical Provider, MD   triamterene-hydroCHLOROthiazide (DYUFAGA-09) 37.5-25 MG per tablet TAKE 1 TABLET BY MOUTH DAILY 6/21/22 8/5/22  Knezie Farrell MD   Handicap Placard MISC by Does not apply route Expires 4/27/2027 4/28/22   Arianna Lainez MD   simvastatin (ZOCOR) 20 MG tablet TAKE 0.5 TABLETS BY MOUTH EVERY EVENING 4/12/22   Arianna Lainez MD   Cholecalciferol (VITAMIN D3) 50 MCG (2000 UT) TABS Take 2,000 Units by mouth daily     Historical Provider, MD   AZO-CRANBERRY PO Take 1 tablet by mouth daily as needed (urinary health)   Patient not taking: Reported on 4/27/2022 8/5/22  Historical Provider, MD   Multiple Vitamins-Minerals (PRESERVISION AREDS 2 PO) Take 1 tablet by mouth daily    Historical Provider, MD   Handicap Placard MISC by Does not apply route . Expires in 5 years 3/2/17   Arianna Lainez MD   CALCIUM-MAGNESIUM-VITAMIN D PO Take by mouth daily    Historical Provider, MD   acetaminophen (TYLENOL) 500 MG tablet Take 500 mg by mouth every 6 hours as needed for Pain. Uses approx once a week  Patient not taking: Reported on 4/27/2022 8/5/22  Historical Provider, MD   Multiple Vitamin (MULTI-VITAMIN DAILY PO) Take 1 tablet by mouth daily. Historical Provider, MD       Allergies:  Patient has no known allergies. Social History:   reports that she has never smoked. She has never used smokeless tobacco. She reports that she does not drink alcohol and does not use drugs. Review of Systems:  Constitutional: there has been no unanticipated weight loss. There's been No change in energy level, No change in activity level. Eyes: No visual changes or diplopia. No scleral icterus. ENT: No Headaches, hearing loss or vertigo. No mouth sores or sore throat. Cardiovascular: As above. Respiratory: No SOB, cough or hemoptysis. Gastrointestinal: No abdominal pain, appetite loss, blood in stools. No change in bowel or bladder habits. Genitourinary: No dysuria, trouble voiding, or hematuria. Musculoskeletal:  No gait disturbance, No weakness or joint complaints. Integumentary: No rash or pruritis. Neurological: No headache, diplopia, change in muscle strength, numbness or tingling. No change in gait, balance, coordination, mood, affect, memory, mentation, behavior. Psychiatric: No anxiety, or depression. Endocrine: No temperature intolerance. No excessive thirst, fluid intake, or urination. No tremor. Hematologic/Lymphatic: No abnormal bruising or bleeding, blood clots or swollen lymph nodes. Allergic/Immunologic: No nasal congestion or hives. Physical Exam:  There were no vitals taken for this visit. Constitutional and General Appearance: alert, cooperative, no distress and appears stated age  [de-identified]: PERRL, no cervical lymphadenopathy. No masses palpable. Normal oral mucosa  Respiratory:  Normal excursion and expansion without use of accessory muscles  Resp Auscultation: Good respiratory effort. No for increased work of breathing. On auscultation: clear to auscultation bilaterally  Cardiovascular: The apical impulse is not displaced  Heart tones are crisp and normal. regular S1 and S2.  Jugular venous pulsation Normal  The carotid upstroke is normal in amplitude and contour without delay or bruit  Peripheral pulses are symmetrical and full   Abdomen:   No masses or tenderness  Bowel sounds present  Extremities:   No Cyanosis or Clubbing   Lower extremity edema: No   Skin: Warm and dry  Neurological:  Alert and oriented. Moves all extremities well  No abnormalities of mood, affect, memory, mentation, or behavior are noted    Cardiac Data:  Diagnostics:    EKG: normal EKG, normal sinus rhythm, PAC's noted, unchanged from previous tracings.     Labs:     FASTING LIPID PANEL:  Lab Results   Component Value Date/Time     03/03/2022 09:20 AM    TRIG 75 03/03/2022 09:20 AM         IMPRESSION:    Patient Active Problem List   Diagnosis    Hyperlipidemia    Hypothyroidism    Osteoarthritis    History of breast cancer    Edema extremities    Vitamin D deficiency    Use of anastrozole (Arimidex)    Encounter for monitoring aromatase inhibitor therapy    Left arm pain    Hypertension History of total right hip arthroplasty    Hyponatremia    Acute cystitis with hematuria    Chronic right hip pain    Balance problems    Syncope and collapse    History of right hip replacement    Map-dot-fingerprint corneal dystrophy    Retinal degeneration, peripheral    Nuclear sclerotic cataract of left eye    Myogenic ptosis of eyelid of both eyes    Retinal tear of left eye     Echocardiogram 8/3/2022:  Normal left ventricular diameter. Left ventricular systolic function is normal.  Left ventricular ejection fraction 60 %. No doppler evidence of diastolic dysfunction. Aortic valve sclerosis without stenosis. Moderate aortic insufficiency. Mitral annular calcification. Mild mitral regurgitation. Normal tricuspid valve leaflets. Mild tricuspid regurgitation. Estimated right ventricular systolic pressure is 26 mmHg. No significant pericardial effusion is seen. Aortic root dimension is at upper limit of normal.    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 Plan of Treatment:     Stable. Continue to liberalize po fluid intake and avoid dehydration. Hx of PE on Eliquis. Management per PCP. Continue statin. F/U on routine lipid panel - managed by PCP per patient.  Discussed importance of dietary modifications and activity/exercise    F/U annually or as needed    Electronically signed by LYNN Martínez - CNP on 12/19/2022 at [place] [address] [facility]  [phone]

## 2022-12-20 DIAGNOSIS — E03.9 HYPOTHYROIDISM, UNSPECIFIED TYPE: ICD-10-CM

## 2022-12-20 RX ORDER — LEVOTHYROXINE SODIUM 0.05 MG/1
50 TABLET ORAL DAILY
Qty: 30 TABLET | Refills: 0 | Status: SHIPPED | OUTPATIENT
Start: 2022-12-20

## 2022-12-20 NOTE — TELEPHONE ENCOUNTER
pharmacy called requesting a refill of the below medication which has been pended for you:     Requested Prescriptions     Pending Prescriptions Disp Refills    levothyroxine (SYNTHROID) 50 MCG tablet [Pharmacy Med Name: LEVOTHYROXINE SODIUM 50 MCG 50 Tablet] 30 tablet 0     Sig: TAKE 1 TABLET BY MOUTH DAILY       Last Appointment Date: 9/8/2022  Next Appointment Date: 3/9/2023    No Known Allergies

## 2023-01-30 DIAGNOSIS — E03.9 HYPOTHYROIDISM, UNSPECIFIED TYPE: ICD-10-CM

## 2023-01-30 RX ORDER — LEVOTHYROXINE SODIUM 0.05 MG/1
50 TABLET ORAL DAILY
Qty: 30 TABLET | Refills: 0 | Status: SHIPPED | OUTPATIENT
Start: 2023-01-30

## 2023-01-30 NOTE — TELEPHONE ENCOUNTER
Kim Montiel called requesting a refill of the below medication which has been pended for you:     Requested Prescriptions     Pending Prescriptions Disp Refills    levothyroxine (SYNTHROID) 50 MCG tablet [Pharmacy Med Name: LEVOTHYROXINE SODIUM 50 MCG 50 Tablet] 30 tablet 0     Sig: TAKE 1 TABLET BY MOUTH DAILY       Last Appointment Date: 9/8/2022  Next Appointment Date: 3/9/2023    No Known Allergies

## 2023-03-02 DIAGNOSIS — E03.9 HYPOTHYROIDISM, UNSPECIFIED TYPE: ICD-10-CM

## 2023-03-02 RX ORDER — LEVOTHYROXINE SODIUM 0.05 MG/1
50 TABLET ORAL DAILY
Qty: 90 TABLET | Refills: 3 | Status: SHIPPED | OUTPATIENT
Start: 2023-03-02

## 2023-03-02 NOTE — TELEPHONE ENCOUNTER
Renu Chisholm called requesting a refill of the below medication which has been pended for you:     Requested Prescriptions     Pending Prescriptions Disp Refills    levothyroxine (SYNTHROID) 50 MCG tablet [Pharmacy Med Name: LEVOTHYROXINE SODIUM 50 MCG 50 Tablet] 30 tablet 0     Sig: TAKE 1 TABLET BY MOUTH DAILY       Last Appointment Date: 9/8/2022  Next Appointment Date: 3/9/2023    No Known Allergies

## 2023-03-06 ENCOUNTER — TELEPHONE (OUTPATIENT)
Dept: INTERNAL MEDICINE | Age: 88
End: 2023-03-06

## 2023-03-06 DIAGNOSIS — Z12.31 ENCOUNTER FOR SCREENING MAMMOGRAM FOR MALIGNANT NEOPLASM OF BREAST: Primary | ICD-10-CM

## 2023-03-15 ENCOUNTER — HOSPITAL ENCOUNTER (OUTPATIENT)
Age: 88
Discharge: HOME | End: 2023-03-15
Payer: MEDICARE

## 2023-03-15 ENCOUNTER — HOSPITAL ENCOUNTER (OUTPATIENT)
Dept: MAMMOGRAPHY | Age: 88
Discharge: HOME | End: 2023-03-17
Payer: MEDICARE

## 2023-03-15 DIAGNOSIS — Z12.31 ENCOUNTER FOR SCREENING MAMMOGRAM FOR MALIGNANT NEOPLASM OF BREAST: ICD-10-CM

## 2023-03-15 DIAGNOSIS — R53.83 OTHER FATIGUE: ICD-10-CM

## 2023-03-15 DIAGNOSIS — E78.00 PURE HYPERCHOLESTEROLEMIA: ICD-10-CM

## 2023-03-15 DIAGNOSIS — E03.9 ACQUIRED HYPOTHYROIDISM: ICD-10-CM

## 2023-03-15 LAB
ALBUMIN SERPL-MCNC: 4.2 G/DL (ref 3.5–5.2)
ALBUMIN/GLOBULIN RATIO: 1.6 (ref 1–2.5)
ALP SERPL-CCNC: 71 U/L (ref 35–104)
ALT SERPL-CCNC: 13 U/L (ref 5–33)
ANION GAP SERPL CALCULATED.3IONS-SCNC: 9 MMOL/L (ref 9–17)
AST SERPL-CCNC: 20 U/L
BILIRUB SERPL-MCNC: 0.4 MG/DL (ref 0.3–1.2)
BUN SERPL-MCNC: 18 MG/DL (ref 8–23)
BUN/CREAT BLD: 22 (ref 9–20)
CALCIUM SERPL-MCNC: 9.7 MG/DL (ref 8.6–10.4)
CHLORIDE SERPL-SCNC: 102 MMOL/L (ref 98–107)
CHOLEST SERPL-MCNC: 224 MG/DL
CHOLESTEROL/HDL RATIO: 2.1
CO2 SERPL-SCNC: 30 MMOL/L (ref 20–31)
CREAT SERPL-MCNC: 0.83 MG/DL (ref 0.5–0.9)
GFR SERPL CREATININE-BSD FRML MDRD: >60 ML/MIN/1.73M2
GLUCOSE SERPL-MCNC: 99 MG/DL (ref 70–99)
HDLC SERPL-MCNC: 109 MG/DL
LDLC SERPL CALC-MCNC: 103 MG/DL (ref 0–130)
POTASSIUM SERPL-SCNC: 4.6 MMOL/L (ref 3.7–5.3)
PROT SERPL-MCNC: 6.9 G/DL (ref 6.4–8.3)
SODIUM SERPL-SCNC: 141 MMOL/L (ref 135–144)
T4 FREE SERPL-MCNC: 1.02 NG/DL (ref 0.93–1.7)
TRIGL SERPL-MCNC: 58 MG/DL
TSH SERPL-ACNC: 3.26 UIU/ML (ref 0.3–5)

## 2023-03-15 PROCEDURE — 84443 ASSAY THYROID STIM HORMONE: CPT

## 2023-03-15 PROCEDURE — 80061 LIPID PANEL: CPT

## 2023-03-15 PROCEDURE — 77063 BREAST TOMOSYNTHESIS BI: CPT

## 2023-03-15 PROCEDURE — 80053 COMPREHEN METABOLIC PANEL: CPT

## 2023-03-15 PROCEDURE — 84439 ASSAY OF FREE THYROXINE: CPT

## 2023-03-15 PROCEDURE — 36415 COLL VENOUS BLD VENIPUNCTURE: CPT

## 2023-03-27 ENCOUNTER — OFFICE VISIT (OUTPATIENT)
Dept: INTERNAL MEDICINE | Age: 88
End: 2023-03-27
Payer: MEDICARE

## 2023-03-27 VITALS
HEART RATE: 74 BPM | WEIGHT: 141 LBS | OXYGEN SATURATION: 98 % | DIASTOLIC BLOOD PRESSURE: 68 MMHG | RESPIRATION RATE: 16 BRPM | HEIGHT: 63 IN | SYSTOLIC BLOOD PRESSURE: 112 MMHG | BODY MASS INDEX: 24.98 KG/M2

## 2023-03-27 DIAGNOSIS — E03.9 ACQUIRED HYPOTHYROIDISM: ICD-10-CM

## 2023-03-27 DIAGNOSIS — R26.2 DIFFICULTY WALKING: ICD-10-CM

## 2023-03-27 DIAGNOSIS — R53.83 OTHER FATIGUE: ICD-10-CM

## 2023-03-27 DIAGNOSIS — Z00.00 GENERAL MEDICAL EXAM: Primary | ICD-10-CM

## 2023-03-27 DIAGNOSIS — E78.5 HYPERLIPIDEMIA, UNSPECIFIED HYPERLIPIDEMIA TYPE: ICD-10-CM

## 2023-03-27 DIAGNOSIS — Z00.00 MEDICARE ANNUAL WELLNESS VISIT, SUBSEQUENT: ICD-10-CM

## 2023-03-27 DIAGNOSIS — E55.9 VITAMIN D DEFICIENCY: ICD-10-CM

## 2023-03-27 PROCEDURE — 1124F ACP DISCUSS-NO DSCNMKR DOCD: CPT | Performed by: INTERNAL MEDICINE

## 2023-03-27 PROCEDURE — 99214 OFFICE O/P EST MOD 30 MIN: CPT | Performed by: INTERNAL MEDICINE

## 2023-03-27 PROCEDURE — 99397 PER PM REEVAL EST PAT 65+ YR: CPT | Performed by: INTERNAL MEDICINE

## 2023-03-27 PROCEDURE — G0439 PPPS, SUBSEQ VISIT: HCPCS | Performed by: INTERNAL MEDICINE

## 2023-03-27 RX ORDER — SIMVASTATIN 20 MG
10 TABLET ORAL EVERY EVENING
Qty: 45 TABLET | Refills: 3 | Status: SHIPPED | OUTPATIENT
Start: 2023-03-27

## 2023-03-27 SDOH — ECONOMIC STABILITY: HOUSING INSECURITY
IN THE LAST 12 MONTHS, WAS THERE A TIME WHEN YOU DID NOT HAVE A STEADY PLACE TO SLEEP OR SLEPT IN A SHELTER (INCLUDING NOW)?: NO

## 2023-03-27 SDOH — ECONOMIC STABILITY: FOOD INSECURITY: WITHIN THE PAST 12 MONTHS, YOU WORRIED THAT YOUR FOOD WOULD RUN OUT BEFORE YOU GOT MONEY TO BUY MORE.: NEVER TRUE

## 2023-03-27 SDOH — ECONOMIC STABILITY: FOOD INSECURITY: WITHIN THE PAST 12 MONTHS, THE FOOD YOU BOUGHT JUST DIDN'T LAST AND YOU DIDN'T HAVE MONEY TO GET MORE.: NEVER TRUE

## 2023-03-27 SDOH — ECONOMIC STABILITY: INCOME INSECURITY: HOW HARD IS IT FOR YOU TO PAY FOR THE VERY BASICS LIKE FOOD, HOUSING, MEDICAL CARE, AND HEATING?: NOT HARD AT ALL

## 2023-03-27 ASSESSMENT — PATIENT HEALTH QUESTIONNAIRE - PHQ9
SUM OF ALL RESPONSES TO PHQ QUESTIONS 1-9: 0
2. FEELING DOWN, DEPRESSED OR HOPELESS: 0
SUM OF ALL RESPONSES TO PHQ QUESTIONS 1-9: 0
SUM OF ALL RESPONSES TO PHQ QUESTIONS 1-9: 0
1. LITTLE INTEREST OR PLEASURE IN DOING THINGS: 0
SUM OF ALL RESPONSES TO PHQ QUESTIONS 1-9: 0
SUM OF ALL RESPONSES TO PHQ9 QUESTIONS 1 & 2: 0

## 2023-03-27 ASSESSMENT — ENCOUNTER SYMPTOMS
EYE PAIN: 0
COUGH: 0
NAUSEA: 0
BACK PAIN: 0
VOMITING: 0
SHORTNESS OF BREATH: 0
ABDOMINAL PAIN: 0
DIARRHEA: 0
CONSTIPATION: 0
BLOOD IN STOOL: 0

## 2023-03-27 ASSESSMENT — LIFESTYLE VARIABLES
HOW OFTEN DO YOU HAVE A DRINK CONTAINING ALCOHOL: NEVER
HOW MANY STANDARD DRINKS CONTAINING ALCOHOL DO YOU HAVE ON A TYPICAL DAY: PATIENT DOES NOT DRINK

## 2023-03-27 NOTE — PROGRESS NOTES
Medicare Annual Wellness Visit    Kaitlin Lion is here for Medicare AWV (6 month), Hyperlipidemia, Hypothyroidism, and Fatigue    Assessment & Plan   General medical exam  Hyperlipidemia, unspecified hyperlipidemia type  -     simvastatin (ZOCOR) 20 MG tablet; Take 0.5 tablets by mouth every evening, Disp-45 tablet, R-3Normal  Medicare annual wellness visit, subsequent  Acquired hypothyroidism  Other fatigue  Vitamin D deficiency  -     Vitamin D 25 Hydroxy; Future  Difficulty walking  -     Handicap Placard MISC; Starting Mon 3/27/2023, Disp-1 each, R-0, PrintExpires:3/27/2028    Recommendations for Preventive Services Due: see orders and patient instructions/AVS.  Recommended screening schedule for the next 5-10 years is provided to the patient in written form: see Patient Instructions/AVS.     Return in about 6 months (around 10/3/2023) for Hyperlipidemia, hypothyroidism. Subjective       Patient's complete Health Risk Assessment and screening values have been reviewed and are found in Flowsheets. The following problems were reviewed today and where indicated follow up appointments were made and/or referrals ordered. Positive Risk Factor Screenings with Interventions:                                       Objective   Vitals:    03/27/23 1341   BP: 112/68   Site: Left Upper Arm   Position: Sitting   Cuff Size: Large Adult   Pulse: 74   Resp: 16   SpO2: 98%   Weight: 141 lb (64 kg)   Height: 5' 3\" (1.6 m)      Body mass index is 24.98 kg/m². No Known Allergies  Prior to Visit Medications    Medication Sig Taking?  Authorizing Provider   simvastatin (ZOCOR) 20 MG tablet Take 0.5 tablets by mouth every evening Yes Butch Silva MD   [facility]icap [facility]ard [facility] by Does not apply route Expires:3/27/2028 Yes Butch Silva MD   levothyroxine (SYNTHROID) 50 MCG tablet TAKE 1 TABLET BY MOUTH DAILY Yes Butch Silva MD   Probiotic Product (PROBIOTIC DAILY PO) Take by mouth Yes Historical
03/15/2024    Annual Wellness Visit (AWV)  03/27/2024    Hepatitis A vaccine  Aged Out    Hib vaccine  Aged Out    Meningococcal (ACWY) vaccine  Aged Out       Subjective:      Review of Systems    Objective:     Physical Exam   /68 (Site: Left Upper Arm, Position: Sitting, Cuff Size: Large Adult)   Pulse 74   Resp 16   Ht 5' 3\" (1.6 m)   Wt 141 lb (64 kg)   SpO2 98%   BMI 24.98 kg/m²     Assessment:       Diagnosis Orders   1. Medicare annual wellness visit, subsequent        2. Hyperlipidemia, unspecified hyperlipidemia type                  Plan:       No follow-ups on file. No orders of the defined types were placed in this encounter. No orders of the defined types were placed in this encounter. Patientgiven educational materials - see patient instructions. Discussed use, benefit,and side effects of prescribed medications. All patient questions answered. Ptvoiced understanding. Reviewed health maintenance. Instructed to continue currentmedications, diet and exercise. Patient agreed with treatment plan. Follow up asdirected.      Electronically signed by Radha Mcnamara MD on 3/27/2023 at 1:49 PM
99    3/15/2023 okay total cholesterol at 224, with excellent HDL at 109, and normal LDL at 103.    3/15/2023 normal TSH 3.26    Patient told to continue Eliquis, Zocor, and Synthroid. Plan:       Return in about 6 months (around 10/3/2023) for Hyperlipidemia, hypothyroidism. Orders Placed This Encounter   Procedures    Vitamin D 25 Hydroxy     Standing Status:   Future     Standing Expiration Date:   3/27/2024       Orders Placed This Encounter   Medications    simvastatin (ZOCOR) 20 MG tablet     Sig: Take 0.5 tablets by mouth every evening     Dispense:  45 tablet     Refill:  3    Handicap Placard MISC     Sig: by Does not apply route Expires:3/27/2028     Dispense:  1 each     Refill:  0         Patientgiven educational materials - see patient instructions. Discussed use, benefit,and side effects of prescribed medications. All patient questions answered. Ptvoiced understanding. Reviewed health maintenance. Instructed to continue currentmedications, diet and exercise. Patient agreed with treatment plan. Follow up asdirected.      Electronically signed by Markus Navarrete MD on 3/27/2023 at 1:55 PM

## 2023-03-27 NOTE — PATIENT INSTRUCTIONS
foods instead of frying them.     · Eat a variety of fruit and vegetables every day. Dark green, deep orange, red, or yellow fruits and vegetables are especially good for you. Examples include spinach, carrots, peaches, and berries.     · Foods high in fiber can reduce your cholesterol and provide important vitamins and minerals. High-fiber foods include whole-grain cereals and breads, oatmeal, beans, brown rice, citrus fruits, and apples.     · Eat lean proteins. Heart-healthy proteins include seafood, lean meats and poultry, eggs, beans, peas, nuts, seeds, and soy products.     · Limit drinks and foods with added sugar. These include candy, desserts, and soda pop. Lifestyle changes    · If your doctor recommends it, get more exercise. Walking is a good choice. Bit by bit, increase the amount you walk every day. Try for at least 30 minutes on most days of the week. You also may want to swim, bike, or do other activities.     · Do not smoke. If you need help quitting, talk to your doctor about stop-smoking programs and medicines. These can increase your chances of quitting for good. Quitting smoking may be the most important step you can take to protect your heart. It is never too late to quit.     · Limit alcohol to 2 drinks a day for men and 1 drink a day for women. Too much alcohol can cause health problems.     · Manage other health problems such as diabetes, high blood pressure, and high cholesterol. If you think you may have a problem with alcohol or drug use, talk to your doctor. Medicines    · Take your medicines exactly as prescribed. Call your doctor if you think you are having a problem with your medicine.     · If your doctor recommends aspirin, take the amount directed each day. Make sure you take aspirin and not another kind of pain reliever, such as acetaminophen (Tylenol). When should you call for help? Call 911 if you have symptoms of a heart attack.  These may include:    · Chest pain or

## 2023-04-19 ENCOUNTER — OFFICE VISIT (OUTPATIENT)
Dept: OBGYN | Age: 88
End: 2023-04-19
Payer: MEDICARE

## 2023-04-19 ENCOUNTER — HOSPITAL ENCOUNTER (OUTPATIENT)
Age: 88
Discharge: HOME | End: 2023-04-19
Payer: MEDICARE

## 2023-04-19 DIAGNOSIS — N39.41 URGE INCONTINENCE OF URINE: ICD-10-CM

## 2023-04-19 DIAGNOSIS — N39.41 URGE INCONTINENCE OF URINE: Primary | ICD-10-CM

## 2023-04-19 LAB
BACTERIA: ABNORMAL
BILIRUBIN URINE: NEGATIVE
COLOR: YELLOW
EPITHELIAL CELLS UA: ABNORMAL /HPF (ref 0–5)
GLUCOSE UR STRIP.AUTO-MCNC: NEGATIVE MG/DL
KETONES UR STRIP.AUTO-MCNC: NEGATIVE MG/DL
LEUKOCYTE ESTERASE UR QL STRIP.AUTO: ABNORMAL
NITRITE UR QL STRIP.AUTO: POSITIVE
OTHER OBSERVATIONS UA: ABNORMAL
PROT UR STRIP.AUTO-MCNC: 6.5 MG/DL (ref 5–6)
PROT UR STRIP.AUTO-MCNC: NEGATIVE MG/DL
RBC CLUMPS #/AREA URNS AUTO: ABNORMAL /HPF (ref 0–4)
SPECIFIC GRAVITY UA: 1.01 (ref 1.01–1.02)
TURBIDITY: ABNORMAL
URINE HGB: ABNORMAL
UROBILINOGEN, URINE: NORMAL
WBC UA: ABNORMAL /HPF (ref 0–4)

## 2023-04-19 PROCEDURE — 81001 URINALYSIS AUTO W/SCOPE: CPT

## 2023-04-19 PROCEDURE — 87186 SC STD MICRODIL/AGAR DIL: CPT

## 2023-04-19 PROCEDURE — 87086 URINE CULTURE/COLONY COUNT: CPT

## 2023-04-19 PROCEDURE — 87088 URINE BACTERIA CULTURE: CPT

## 2023-04-19 PROCEDURE — 99211 OFF/OP EST MAY X REQ PHY/QHP: CPT | Performed by: OBSTETRICS & GYNECOLOGY

## 2023-04-19 NOTE — PROGRESS NOTES
Patient came to office today. Asked patient if she had followed up with urology for incontinence issues. Patient reported that she had not followed-up with urology. A referral was made to urology. Patient informed that she would be hearing from the urology dept. to schedule an appt. to be seen by this issue. Reviewed proper urine wiping technique with patient. Lab orders were placed for urine cultures. Patient was not able to provide a sample at this time. Instructed patient to come to the clinic lab and give a sample once she feels the urge to urinate. Patient verbalized an understanding of the information and had no questions at this time. Provider, was updated regarding this information.

## 2023-04-21 LAB
MICROORGANISM SPEC CULT: ABNORMAL
SPECIMEN DESCRIPTION: ABNORMAL

## 2023-05-19 ENCOUNTER — OFFICE VISIT (OUTPATIENT)
Dept: UROLOGY | Age: 88
End: 2023-05-19
Payer: MEDICARE

## 2023-05-19 VITALS
WEIGHT: 142 LBS | HEART RATE: 72 BPM | DIASTOLIC BLOOD PRESSURE: 68 MMHG | HEIGHT: 63 IN | BODY MASS INDEX: 25.16 KG/M2 | SYSTOLIC BLOOD PRESSURE: 112 MMHG

## 2023-05-19 DIAGNOSIS — N32.81 OVERACTIVE BLADDER: ICD-10-CM

## 2023-05-19 DIAGNOSIS — R39.9 LOWER URINARY TRACT SYMPTOMS (LUTS): ICD-10-CM

## 2023-05-19 DIAGNOSIS — R32 URINARY INCONTINENCE, UNSPECIFIED TYPE: Primary | ICD-10-CM

## 2023-05-19 PROCEDURE — 99213 OFFICE O/P EST LOW 20 MIN: CPT | Performed by: UROLOGY

## 2023-05-19 NOTE — PROGRESS NOTES
Javier Felton MD  Urology Clinic office visit  NEW PATIENT    Patient:  Yuli Jara  YOB: 1931  Date: 5/19/2023    HISTORY OF PRESENT ILLNESS:   The patient is a 80 y.o. female who presents today for evaluation of the following problems:      1. Urinary incontinence, unspecified type    2. Lower urinary tract symptoms (LUTS)    3. Overactive bladder         Overall the problem(s) : are worsening. Associated Symptoms: No dysuria, gross hematuria. Pain Severity: Pain Score:   0 - No pain    Summary of old records: N/A  (Patient's old records, notes and chart reviewed and summarized above.)      Onset  few weeks  When stands up has incontinence  Severity is described as moderate. The course of symptoms over time is worsening. Alleviating factors: none  Worsening factors: none  Sometimes VIDHI  Some urgency , sometimes UUI      Urinalysis today:  No results found for this visit on 05/19/23. Last BUN and creatinine:  Lab Results   Component Value Date    BUN 18 03/15/2023     Lab Results   Component Value Date    CREATININE 0.83 03/15/2023       Imaging Reviewed during this Office Visit:   (results were independently reviewed by physician and radiology report verified)  I independently reviewed and verified the images and reports from:    No results found. PAST MEDICAL, FAMILY AND SOCIAL HISTORY:  Past Medical History:   Diagnosis Date    Breast cancer ([address] 2014    s/p mastectomy and reconstruction surgery.     Closed fracture of intracapsular section of femur ([address] 8/2/2022    Closed subcapital fracture of femur, right, initial encounter ([address] 1/22/2020    Edema extremities     takes diuretics prn    Hyperlipidemia     Hypertension     Hypothyroidism     Osteoarthritis     mild    Unspecified vitamin D deficiency      Past Surgical History:   Procedure Laterality Date    APPENDECTOMY      at age 5    BREAST BIOPSY Right 2/2014    before she had her surgery    BREAST RECONSTRUCTION

## 2023-07-03 DIAGNOSIS — R32 URINARY INCONTINENCE, UNSPECIFIED TYPE: Primary | ICD-10-CM

## 2023-08-03 ENCOUNTER — OFFICE VISIT (OUTPATIENT)
Dept: INTERNAL MEDICINE | Age: 88
End: 2023-08-03

## 2023-08-03 ENCOUNTER — TELEPHONE (OUTPATIENT)
Dept: INTERNAL MEDICINE | Age: 88
End: 2023-08-03

## 2023-08-03 ENCOUNTER — HOSPITAL ENCOUNTER (OUTPATIENT)
Age: 88
Discharge: HOME | End: 2023-08-03
Payer: MEDICARE

## 2023-08-03 VITALS
HEART RATE: 77 BPM | BODY MASS INDEX: 24.63 KG/M2 | OXYGEN SATURATION: 97 % | HEIGHT: 63 IN | RESPIRATION RATE: 16 BRPM | SYSTOLIC BLOOD PRESSURE: 116 MMHG | DIASTOLIC BLOOD PRESSURE: 64 MMHG | WEIGHT: 139 LBS

## 2023-08-03 DIAGNOSIS — E03.9 ACQUIRED HYPOTHYROIDISM: ICD-10-CM

## 2023-08-03 DIAGNOSIS — E78.00 PURE HYPERCHOLESTEROLEMIA: ICD-10-CM

## 2023-08-03 DIAGNOSIS — R30.0 DYSURIA: ICD-10-CM

## 2023-08-03 DIAGNOSIS — N30.00 ACUTE CYSTITIS WITHOUT HEMATURIA: Primary | ICD-10-CM

## 2023-08-03 DIAGNOSIS — R41.0 TRANSIENT CONFUSION: ICD-10-CM

## 2023-08-03 DIAGNOSIS — I10 PRIMARY HYPERTENSION: Primary | ICD-10-CM

## 2023-08-03 DIAGNOSIS — I10 PRIMARY HYPERTENSION: ICD-10-CM

## 2023-08-03 LAB
ALBUMIN SERPL-MCNC: 4.6 G/DL (ref 3.5–5.2)
ALBUMIN/GLOB SERPL: 1.5 {RATIO} (ref 1–2.5)
ALP SERPL-CCNC: 71 U/L (ref 35–104)
ALT SERPL-CCNC: 13 U/L (ref 5–33)
ANION GAP SERPL CALCULATED.3IONS-SCNC: 8 MMOL/L (ref 9–17)
AST SERPL-CCNC: 20 U/L
BACTERIA URNS QL MICRO: ABNORMAL
BILIRUB SERPL-MCNC: 0.3 MG/DL (ref 0.3–1.2)
BILIRUB UR QL STRIP: NEGATIVE
BUN SERPL-MCNC: 24 MG/DL (ref 8–23)
BUN/CREAT SERPL: 30 (ref 9–20)
CALCIUM SERPL-MCNC: 9.8 MG/DL (ref 8.6–10.4)
CHARACTER UR: ABNORMAL
CHLORIDE SERPL-SCNC: 93 MMOL/L (ref 98–107)
CLARITY UR: CLEAR
CO2 SERPL-SCNC: 30 MMOL/L (ref 20–31)
COLOR UR: YELLOW
CREAT SERPL-MCNC: 0.8 MG/DL (ref 0.5–0.9)
EPI CELLS #/AREA URNS HPF: ABNORMAL /HPF (ref 0–5)
GFR SERPL CREATININE-BSD FRML MDRD: >60 ML/MIN/1.73M2
GLUCOSE SERPL-MCNC: 98 MG/DL (ref 70–99)
GLUCOSE UR STRIP-MCNC: NEGATIVE MG/DL
HGB UR QL STRIP.AUTO: ABNORMAL
KETONES UR STRIP-MCNC: NEGATIVE MG/DL
LEUKOCYTE ESTERASE UR QL STRIP: ABNORMAL
NITRITE UR QL STRIP: POSITIVE
PH UR STRIP: 6.5 [PH] (ref 5–6)
POTASSIUM SERPL-SCNC: 4.3 MMOL/L (ref 3.7–5.3)
PROT SERPL-MCNC: 7.6 G/DL (ref 6.4–8.3)
PROT UR STRIP-MCNC: NEGATIVE MG/DL
RBC #/AREA URNS HPF: ABNORMAL /HPF (ref 0–4)
SODIUM SERPL-SCNC: 131 MMOL/L (ref 135–144)
SP GR UR STRIP: 1.01 (ref 1.01–1.02)
T4 FREE SERPL-MCNC: 1.2 NG/DL (ref 0.9–1.7)
TSH SERPL DL<=0.05 MIU/L-ACNC: 3.85 UIU/ML (ref 0.3–5)
UROBILINOGEN UR STRIP-ACNC: NORMAL EU/DL (ref 0–1)
WBC #/AREA URNS HPF: ABNORMAL /HPF (ref 0–4)

## 2023-08-03 PROCEDURE — 81001 URINALYSIS AUTO W/SCOPE: CPT

## 2023-08-03 PROCEDURE — 84439 ASSAY OF FREE THYROXINE: CPT

## 2023-08-03 PROCEDURE — 84443 ASSAY THYROID STIM HORMONE: CPT

## 2023-08-03 PROCEDURE — 36415 COLL VENOUS BLD VENIPUNCTURE: CPT

## 2023-08-03 PROCEDURE — 80053 COMPREHEN METABOLIC PANEL: CPT

## 2023-08-03 RX ORDER — CEPHALEXIN 500 MG/1
500 CAPSULE ORAL 2 TIMES DAILY
Qty: 14 CAPSULE | Refills: 0 | Status: SHIPPED | OUTPATIENT
Start: 2023-08-03 | End: 2023-08-10

## 2023-08-03 ASSESSMENT — ENCOUNTER SYMPTOMS
ABDOMINAL PAIN: 0
COUGH: 0
CONSTIPATION: 0
EYE PAIN: 0
SHORTNESS OF BREATH: 0
BLOOD IN STOOL: 0
NAUSEA: 0
VOMITING: 0
BACK PAIN: 0
DIARRHEA: 0

## 2023-08-03 NOTE — PROGRESS NOTES
[address] [address]  Dept: [phone]  Dept Fax: [phone]  Loc: Chay Mao is a 80 y.o. female who presents today for her medical conditions/complaintsas noted below. Diana Walter is c/o of   Chief Complaint   Patient presents with    Hypertension    Hyperlipidemia    Hypothyroidism         HPI:     Hypertension  This is a chronic problem. The current episode started more than 1 year ago. The problem has been waxing and waning since onset. The problem is controlled. Pertinent negatives include no chest pain, headaches, neck pain, palpitations or shortness of breath. Hyperlipidemia  This is a chronic problem. The current episode started more than 1 year ago. The problem is controlled. Recent lipid tests were reviewed and are variable. Pertinent negatives include no chest pain or shortness of breath. Other  This is a chronic (3-hypothyroidism) problem. The current episode started more than 1 year ago. The problem occurs constantly. The problem has been waxing and waning. Pertinent negatives include no abdominal pain, arthralgias, chest pain, chills, coughing, fever, headaches, nausea, neck pain, numbness, rash, vomiting or weakness. No results found for: LABA1C         No components found for: LABMICR  LDL Cholesterol (mg/dL)   Date Value   03/15/2023 103   03/03/2022 83   01/06/2021 104         AST (U/L)   Date Value   03/15/2023 20     ALT (U/L)   Date Value   03/15/2023 13     BUN (mg/dL)   Date Value   03/15/2023 18     BP Readings from Last 3 Encounters:   08/03/23 116/64   05/19/23 112/68   03/27/23 112/68              Past Medical History:   Diagnosis Date    Breast cancer ([facility]) 2014    s/p mastectomy and reconstruction surgery.     Closed fracture of intracapsular section of femur ([address]) 8/2/2022    Closed subcapital fracture of femur, right, initial

## 2023-08-03 NOTE — TELEPHONE ENCOUNTER
----- Message from Estephanie Seymour MD sent at 8/3/2023  4:10 PM EDT -----  Pend Keflex 500 mg twice daily x7 days. Also pend order for follow-up UA in 2 weeks.

## 2023-08-11 ENCOUNTER — OFFICE VISIT (OUTPATIENT)
Dept: CARDIOLOGY | Age: 88
End: 2023-08-11
Payer: MEDICARE

## 2023-08-11 ENCOUNTER — HOSPITAL ENCOUNTER (OUTPATIENT)
Dept: CT IMAGING | Age: 88
End: 2023-08-11
Attending: INTERNAL MEDICINE
Payer: MEDICARE

## 2023-08-11 ENCOUNTER — TELEPHONE (OUTPATIENT)
Dept: INTERNAL MEDICINE | Age: 88
End: 2023-08-11

## 2023-08-11 VITALS
SYSTOLIC BLOOD PRESSURE: 138 MMHG | BODY MASS INDEX: 25.23 KG/M2 | DIASTOLIC BLOOD PRESSURE: 78 MMHG | HEIGHT: 63 IN | HEART RATE: 76 BPM | WEIGHT: 142.4 LBS

## 2023-08-11 DIAGNOSIS — G90.3 NEUROGENIC ORTHOSTATIC HYPOTENSION (HCC): Primary | ICD-10-CM

## 2023-08-11 DIAGNOSIS — R41.0 TRANSIENT CONFUSION: ICD-10-CM

## 2023-08-11 DIAGNOSIS — I35.1 AORTIC VALVE INSUFFICIENCY, ETIOLOGY OF CARDIAC VALVE DISEASE UNSPECIFIED: ICD-10-CM

## 2023-08-11 PROCEDURE — 99214 OFFICE O/P EST MOD 30 MIN: CPT | Performed by: INTERNAL MEDICINE

## 2023-08-11 PROCEDURE — 70450 CT HEAD/BRAIN W/O DYE: CPT

## 2023-08-11 PROCEDURE — 93010 ELECTROCARDIOGRAM REPORT: CPT | Performed by: INTERNAL MEDICINE

## 2023-08-11 PROCEDURE — 1124F ACP DISCUSS-NO DSCNMKR DOCD: CPT | Performed by: INTERNAL MEDICINE

## 2023-08-11 PROCEDURE — 93005 ELECTROCARDIOGRAM TRACING: CPT | Performed by: INTERNAL MEDICINE

## 2023-08-11 RX ORDER — SULFAMETHOXAZOLE AND TRIMETHOPRIM 800; 160 MG/1; MG/1
TABLET ORAL
COMMUNITY
Start: 2023-07-01 | End: 2023-08-11 | Stop reason: SDUPTHER

## 2023-08-11 RX ORDER — SULFAMETHOXAZOLE AND TRIMETHOPRIM 800; 160 MG/1; MG/1
TABLET ORAL
Qty: 14 TABLET | Refills: 0 | Status: SHIPPED | OUTPATIENT
Start: 2023-08-11

## 2023-08-11 NOTE — PROGRESS NOTES
[address]  [facility] [address]  Dept: [phone]    CC: H/O syncope with UTI& Covid in past   Subjective: The patient is a 80y.o. year old, , female is in the office for a follow up visit. Patient is followed by the daughter and a friend. According to the family last week patient was not discharged was told to read while reading patient was stumbling to read, no visual problem no weakness numbness dizziness accompanied with it  Patient went home patient blood pressure is very high 180/70. According to her she had a history of low blood pressure in the past and that was the first time it happened  Patient has been checking blood pressures very religiously it is around 120/70-80  Patient have not brought blood pressure reading  According to him she is again being treated for UTI 2 days the last day of antibiotic  According to him since last night patient has been having more burning urine  According to the daughter the primary physician is also doing neuro workup. She denies any chest pain or discomfort. No orthopnea or PND. Denies any palpitation, dizziness or syncope. Past Medical History:   has a past medical history of Breast cancer ([address]), Closed fracture of intracapsular section of femur ([address]), Closed subcapital fracture of femur, right, initial encounter ([address]), Edema extremities, Hyperlipidemia, Hypertension, Hypothyroidism, Osteoarthritis, and Unspecified vitamin D deficiency. Past Surgical History:   has a past surgical history that includes Total abdominal hysterectomy w/ bilateral salpingoophorectomy (1978); Appendectomy; Colonoscopy (2007); Mastectomy, radical (Right, 2/2014); Breast reduction surgery (Left, 2/2014); Breast reconstruction (Right, 2/2014); Cataract removal with implant (Right, 09/19/2019); eye surgery; Hysterectomy;  Total

## 2023-08-11 NOTE — TELEPHONE ENCOUNTER
Pend order for Keflex again 500 mg twice daily x7 days (or Bactrim DS twice daily x7 days).   (Not sure if patient wants the same antibiotic or is looking for something different than what she just had.)

## 2023-08-11 NOTE — TELEPHONE ENCOUNTER
Patient came to window asking for more antibiotics to be sent in. Patient last days for antibiotic is today. Patient said that she is still having symptoms. Patient is have urinary frequency and burning still. Patient uses [employer].

## 2023-08-15 ENCOUNTER — TELEPHONE (OUTPATIENT)
Dept: CARDIOLOGY | Age: 88
End: 2023-08-15

## 2023-08-15 ENCOUNTER — HOSPITAL ENCOUNTER (OUTPATIENT)
Age: 88
Discharge: HOME | End: 2023-08-17
Attending: INTERNAL MEDICINE
Payer: MEDICARE

## 2023-08-15 VITALS
HEIGHT: 63 IN | WEIGHT: 142 LBS | HEART RATE: 71 BPM | DIASTOLIC BLOOD PRESSURE: 81 MMHG | BODY MASS INDEX: 25.16 KG/M2 | SYSTOLIC BLOOD PRESSURE: 183 MMHG

## 2023-08-15 DIAGNOSIS — I26.99 ACUTE PULMONARY EMBOLISM WITHOUT ACUTE COR PULMONALE, UNSPECIFIED PULMONARY EMBOLISM TYPE (HCC): ICD-10-CM

## 2023-08-15 DIAGNOSIS — I35.1 AORTIC VALVE INSUFFICIENCY, ETIOLOGY OF CARDIAC VALVE DISEASE UNSPECIFIED: ICD-10-CM

## 2023-08-15 LAB
ECHO AO ROOT DIAM: 3.5 CM
ECHO AO ROOT INDEX: 2.1 CM/M2
ECHO AR MAX VEL PISA: 4.5 M/S
ECHO AV PEAK GRADIENT: 7 MMHG
ECHO AV PEAK VELOCITY: 1.3 M/S
ECHO AV REGURGITANT PHT: 323 MS
ECHO AV VELOCITY RATIO: 0.69
ECHO BSA: 1.69 M2
ECHO EST RA PRESSURE: 3 MMHG
ECHO LA AREA 4C: 11.6 CM2
ECHO LA DIAMETER INDEX: 1.8 CM/M2
ECHO LA DIAMETER: 3 CM
ECHO LA MAJOR AXIS: 3.9 CM
ECHO LA TO AORTIC ROOT RATIO: 0.86
ECHO LA VOL 4C: 25 ML (ref 22–52)
ECHO LA VOLUME INDEX A4C: 15 ML/M2 (ref 16–34)
ECHO LV E' LATERAL VELOCITY: 5 CM/S
ECHO LV E' SEPTAL VELOCITY: 6 CM/S
ECHO LV EJECTION FRACTION BIPLANE: 65 % (ref 55–100)
ECHO LV FRACTIONAL SHORTENING: 19 % (ref 28–44)
ECHO LV INTERNAL DIMENSION DIASTOLE INDEX: 1.86 CM/M2
ECHO LV INTERNAL DIMENSION DIASTOLIC: 3.1 CM (ref 3.9–5.3)
ECHO LV INTERNAL DIMENSION SYSTOLIC INDEX: 1.5 CM/M2
ECHO LV INTERNAL DIMENSION SYSTOLIC: 2.5 CM
ECHO LV IVSD: 1.1 CM (ref 0.6–0.9)
ECHO LV MASS 2D: 106.8 G (ref 67–162)
ECHO LV MASS INDEX 2D: 64 G/M2 (ref 43–95)
ECHO LV POSTERIOR WALL DIASTOLIC: 1.2 CM (ref 0.6–0.9)
ECHO LV RELATIVE WALL THICKNESS RATIO: 0.77
ECHO LVOT PEAK GRADIENT: 3 MMHG
ECHO LVOT PEAK VELOCITY: 0.9 M/S
ECHO MV A VELOCITY: 1.14 M/S
ECHO MV E DECELERATION TIME (DT): 190 MS
ECHO MV E VELOCITY: 0.68 M/S
ECHO MV E/A RATIO: 0.6
ECHO MV E/E' LATERAL: 13.6
ECHO MV E/E' RATIO (AVERAGED): 12.47
ECHO MV E/E' SEPTAL: 11.33
ECHO MV MAX VELOCITY: 1.2 M/S
ECHO MV PEAK GRADIENT: 6 MMHG
ECHO PV MAX VELOCITY: 0.7 M/S
ECHO PV PEAK GRADIENT: 2 MMHG
ECHO RIGHT VENTRICULAR SYSTOLIC PRESSURE (RVSP): 31 MMHG
ECHO TV PEAK GRADIENT: 3 MMHG
ECHO TV REGURGITANT MAX VELOCITY: 2.65 M/S
ECHO TV REGURGITANT PEAK GRADIENT: 28 MMHG

## 2023-08-15 PROCEDURE — 93306 TTE W/DOPPLER COMPLETE: CPT

## 2023-08-15 PROCEDURE — 93306 TTE W/DOPPLER COMPLETE: CPT | Performed by: INTERNAL MEDICINE

## 2023-08-15 NOTE — TELEPHONE ENCOUNTER
Please review echo and advise.      Last Appt:  8/11/2023  Next Appt:   11/6/2023  Med verified in [id number] [facility]

## 2023-08-15 NOTE — TELEPHONE ENCOUNTER
Inder Oleary called requesting a refill of the below medication which has been pended for you:     Requested Prescriptions     Pending Prescriptions Disp Refills    apixaban (ELIQUIS) 5 MG TABS tablet [Pharmacy Med Name: ELIQUIS 5 MG TAB 5 Tablet] 60 tablet 11     Sig: TAKE 1 TABLET BY MOUTH 2 TIMES DAILY       Last Appointment Date: 8/3/2023  Next Appointment Date: 9/11/2023    No Known Allergies

## 2023-09-11 ENCOUNTER — OFFICE VISIT (OUTPATIENT)
Dept: INTERNAL MEDICINE | Age: 88
End: 2023-09-11
Payer: MEDICARE

## 2023-09-11 ENCOUNTER — HOSPITAL ENCOUNTER (OUTPATIENT)
Age: 88
Discharge: HOME | End: 2023-09-11
Payer: MEDICARE

## 2023-09-11 ENCOUNTER — HOSPITAL ENCOUNTER (OUTPATIENT)
Age: 88
Discharge: HOME | End: 2023-09-13
Attending: INTERNAL MEDICINE
Payer: MEDICARE

## 2023-09-11 VITALS
DIASTOLIC BLOOD PRESSURE: 62 MMHG | HEART RATE: 95 BPM | OXYGEN SATURATION: 96 % | SYSTOLIC BLOOD PRESSURE: 116 MMHG | HEIGHT: 63 IN | RESPIRATION RATE: 16 BRPM | BODY MASS INDEX: 24.98 KG/M2 | WEIGHT: 141 LBS

## 2023-09-11 DIAGNOSIS — R41.82 ALTERED MENTAL STATUS, UNSPECIFIED ALTERED MENTAL STATUS TYPE: ICD-10-CM

## 2023-09-11 DIAGNOSIS — I35.1 AORTIC VALVE INSUFFICIENCY, ETIOLOGY OF CARDIAC VALVE DISEASE UNSPECIFIED: ICD-10-CM

## 2023-09-11 DIAGNOSIS — N30.00 ACUTE CYSTITIS WITHOUT HEMATURIA: ICD-10-CM

## 2023-09-11 DIAGNOSIS — E03.9 ACQUIRED HYPOTHYROIDISM: ICD-10-CM

## 2023-09-11 DIAGNOSIS — E78.00 PURE HYPERCHOLESTEROLEMIA: Primary | ICD-10-CM

## 2023-09-11 DIAGNOSIS — E55.9 VITAMIN D DEFICIENCY: ICD-10-CM

## 2023-09-11 LAB
25(OH)D3 SERPL-MCNC: 69.9 NG/ML
BACTERIA URNS QL MICRO: ABNORMAL
BILIRUB UR QL STRIP: NEGATIVE
CHARACTER UR: ABNORMAL
CLARITY UR: CLEAR
COLOR UR: YELLOW
EPI CELLS #/AREA URNS HPF: ABNORMAL /HPF (ref 0–5)
GLUCOSE UR STRIP-MCNC: NEGATIVE MG/DL
HGB UR QL STRIP.AUTO: ABNORMAL
KETONES UR STRIP-MCNC: NEGATIVE MG/DL
LEUKOCYTE ESTERASE UR QL STRIP: ABNORMAL
NITRITE UR QL STRIP: NEGATIVE
PH UR STRIP: 6 [PH] (ref 5–6)
PROT UR STRIP-MCNC: NEGATIVE MG/DL
RBC #/AREA URNS HPF: ABNORMAL /HPF (ref 0–4)
SP GR UR STRIP: 1.02 (ref 1.01–1.02)
UROBILINOGEN UR STRIP-ACNC: NORMAL EU/DL (ref 0–1)
WBC #/AREA URNS HPF: ABNORMAL /HPF (ref 0–4)

## 2023-09-11 PROCEDURE — 99214 OFFICE O/P EST MOD 30 MIN: CPT | Performed by: INTERNAL MEDICINE

## 2023-09-11 PROCEDURE — 93227 XTRNL ECG REC<48 HR R&I: CPT | Performed by: INTERNAL MEDICINE

## 2023-09-11 PROCEDURE — 93225 XTRNL ECG REC<48 HRS REC: CPT

## 2023-09-11 PROCEDURE — 99212 OFFICE O/P EST SF 10 MIN: CPT | Performed by: INTERNAL MEDICINE

## 2023-09-11 PROCEDURE — 36415 COLL VENOUS BLD VENIPUNCTURE: CPT

## 2023-09-11 PROCEDURE — 81001 URINALYSIS AUTO W/SCOPE: CPT

## 2023-09-11 PROCEDURE — 1124F ACP DISCUSS-NO DSCNMKR DOCD: CPT | Performed by: INTERNAL MEDICINE

## 2023-09-11 PROCEDURE — 82306 VITAMIN D 25 HYDROXY: CPT

## 2023-09-11 ASSESSMENT — ENCOUNTER SYMPTOMS
DIARRHEA: 0
BLOOD IN STOOL: 0
EYE PAIN: 0
CONSTIPATION: 0
VOMITING: 0
BACK PAIN: 0
ABDOMINAL PAIN: 0
SHORTNESS OF BREATH: 0
NAUSEA: 0
COUGH: 0

## 2023-09-11 NOTE — PROGRESS NOTES
normal free thyroxine 1.2    8/11/2023 head CT good results. No acute abnormality. Patient told to continue Synthroid and Eliquis. Patient will be seeing cardiology later today to consider doing a Holter monitor    Patient declined neurology follow-up for the history of the syncope/confusion which patient feels was most likely related to low blood sugar levels. Symptoms improved after eating/drinking things with calories. Plan:       Return in about 27 weeks (around 3/18/2024) for medicare AWV, Hyperlipidemia, hypothyroidism. Orders Placed This Encounter   Procedures    Vitamin D 25 Hydroxy     Standing Status:   Future     Standing Expiration Date:   9/11/2024    TSH     Standing Status:   Future     Standing Expiration Date:   9/11/2024    T4, Free     Standing Status:   Future     Standing Expiration Date:   9/11/2024    Comprehensive Metabolic Panel     Standing Status:   Future     Standing Expiration Date:   9/11/2024    Lipid Panel     Standing Status:   Future     Standing Expiration Date:   9/11/2024     Order Specific Question:   Is Patient Fasting?/# of Hours     Answer:   12     No orders of the defined types were placed in this encounter. Patientgiven educational materials - see patient instructions. Discussed use, benefit,and side effects of prescribed medications. All patient questions answered. Ptvoiced understanding. Reviewed health maintenance. Instructed to continue currentmedications, diet and exercise. Patient agreed with treatment plan. Follow up asdirected.      Electronically signed by Natanael Leonard MD on 9/11/2023 at 2:04 PM

## 2023-09-14 ENCOUNTER — TELEPHONE (OUTPATIENT)
Dept: CARDIOLOGY | Age: 88
End: 2023-09-14

## 2023-09-14 LAB — ECHO BSA: 1.69 M2

## 2024-03-01 ENCOUNTER — HOSPITAL ENCOUNTER (OUTPATIENT)
Age: 89
Discharge: HOME | End: 2024-03-01
Payer: MEDICARE

## 2024-03-01 DIAGNOSIS — E78.00 PURE HYPERCHOLESTEROLEMIA: ICD-10-CM

## 2024-03-01 DIAGNOSIS — E55.9 VITAMIN D DEFICIENCY: ICD-10-CM

## 2024-03-01 DIAGNOSIS — R41.82 ALTERED MENTAL STATUS, UNSPECIFIED ALTERED MENTAL STATUS TYPE: ICD-10-CM

## 2024-03-01 DIAGNOSIS — E03.9 ACQUIRED HYPOTHYROIDISM: ICD-10-CM

## 2024-03-01 LAB
25(OH)D3 SERPL-MCNC: 60.3 NG/ML (ref 30–100)
ALBUMIN SERPL-MCNC: 4.1 G/DL (ref 3.5–5.2)
ALBUMIN/GLOB SERPL: 1.3 {RATIO} (ref 1–2.5)
ALP SERPL-CCNC: 70 U/L (ref 35–104)
ALT SERPL-CCNC: 11 U/L (ref 5–33)
ANION GAP SERPL CALCULATED.3IONS-SCNC: 9 MMOL/L (ref 9–17)
AST SERPL-CCNC: 18 U/L
BILIRUB SERPL-MCNC: 0.3 MG/DL (ref 0.3–1.2)
BUN SERPL-MCNC: 22 MG/DL (ref 8–23)
BUN/CREAT SERPL: 28 (ref 9–20)
CALCIUM SERPL-MCNC: 9.4 MG/DL (ref 8.6–10.4)
CHLORIDE SERPL-SCNC: 100 MMOL/L (ref 98–107)
CHOLEST SERPL-MCNC: 216 MG/DL (ref 0–199)
CHOLESTEROL/HDL RATIO: 2
CO2 SERPL-SCNC: 30 MMOL/L (ref 20–31)
CREAT SERPL-MCNC: 0.8 MG/DL (ref 0.5–0.9)
GFR SERPL CREATININE-BSD FRML MDRD: >60 ML/MIN/1.73M2
GLUCOSE SERPL-MCNC: 93 MG/DL (ref 70–99)
HDLC SERPL-MCNC: 110 MG/DL
LDLC SERPL CALC-MCNC: 89 MG/DL (ref 0–100)
POTASSIUM SERPL-SCNC: 4.3 MMOL/L (ref 3.7–5.3)
PROT SERPL-MCNC: 7.2 G/DL (ref 6.4–8.3)
SODIUM SERPL-SCNC: 139 MMOL/L (ref 135–144)
T4 FREE SERPL-MCNC: 1.1 NG/DL (ref 0.93–1.7)
TRIGL SERPL-MCNC: 85 MG/DL
TSH SERPL DL<=0.05 MIU/L-ACNC: 2.88 UIU/ML (ref 0.3–5)
VLDLC SERPL CALC-MCNC: 17 MG/DL

## 2024-03-01 PROCEDURE — 80061 LIPID PANEL: CPT

## 2024-03-01 PROCEDURE — 36415 COLL VENOUS BLD VENIPUNCTURE: CPT

## 2024-03-01 PROCEDURE — 84443 ASSAY THYROID STIM HORMONE: CPT

## 2024-03-01 PROCEDURE — 84439 ASSAY OF FREE THYROXINE: CPT

## 2024-03-01 PROCEDURE — 80053 COMPREHEN METABOLIC PANEL: CPT

## 2024-03-01 PROCEDURE — 82306 VITAMIN D 25 HYDROXY: CPT

## 2024-03-16 DIAGNOSIS — E78.5 HYPERLIPIDEMIA, UNSPECIFIED HYPERLIPIDEMIA TYPE: ICD-10-CM

## 2024-03-16 DIAGNOSIS — E03.9 HYPOTHYROIDISM, UNSPECIFIED TYPE: ICD-10-CM

## 2024-03-18 ENCOUNTER — OFFICE VISIT (OUTPATIENT)
Dept: INTERNAL MEDICINE | Age: 89
End: 2024-03-18
Payer: MEDICARE

## 2024-03-18 VITALS
OXYGEN SATURATION: 100 % | SYSTOLIC BLOOD PRESSURE: 132 MMHG | RESPIRATION RATE: 16 BRPM | HEART RATE: 87 BPM | DIASTOLIC BLOOD PRESSURE: 72 MMHG | HEIGHT: 63 IN | BODY MASS INDEX: 24.63 KG/M2 | WEIGHT: 139 LBS

## 2024-03-18 DIAGNOSIS — E03.9 ACQUIRED HYPOTHYROIDISM: ICD-10-CM

## 2024-03-18 DIAGNOSIS — Z00.00 GENERAL MEDICAL EXAM: Primary | ICD-10-CM

## 2024-03-18 DIAGNOSIS — Z00.00 MEDICARE ANNUAL WELLNESS VISIT, SUBSEQUENT: ICD-10-CM

## 2024-03-18 DIAGNOSIS — E78.00 PURE HYPERCHOLESTEROLEMIA: ICD-10-CM

## 2024-03-18 DIAGNOSIS — R53.83 OTHER FATIGUE: ICD-10-CM

## 2024-03-18 PROCEDURE — 99214 OFFICE O/P EST MOD 30 MIN: CPT | Performed by: INTERNAL MEDICINE

## 2024-03-18 PROCEDURE — G0439 PPPS, SUBSEQ VISIT: HCPCS | Performed by: INTERNAL MEDICINE

## 2024-03-18 PROCEDURE — 1124F ACP DISCUSS-NO DSCNMKR DOCD: CPT | Performed by: INTERNAL MEDICINE

## 2024-03-18 PROCEDURE — 99212 OFFICE O/P EST SF 10 MIN: CPT | Performed by: INTERNAL MEDICINE

## 2024-03-18 RX ORDER — LEVOTHYROXINE SODIUM 0.05 MG/1
50 TABLET ORAL DAILY
Qty: 90 TABLET | Refills: 3 | Status: SHIPPED | OUTPATIENT
Start: 2024-03-18

## 2024-03-18 RX ORDER — SIMVASTATIN 20 MG
10 TABLET ORAL EVERY EVENING
Qty: 45 TABLET | Refills: 3 | Status: SHIPPED | OUTPATIENT
Start: 2024-03-18

## 2024-03-18 ASSESSMENT — ENCOUNTER SYMPTOMS
DIARRHEA: 0
BLOOD IN STOOL: 0
VOMITING: 0
COUGH: 0
SHORTNESS OF BREATH: 0
BACK PAIN: 0
CONSTIPATION: 0
NAUSEA: 0

## 2024-03-18 ASSESSMENT — PATIENT HEALTH QUESTIONNAIRE - PHQ9
SUM OF ALL RESPONSES TO PHQ QUESTIONS 1-9: 0
1. LITTLE INTEREST OR PLEASURE IN DOING THINGS: NOT AT ALL
SUM OF ALL RESPONSES TO PHQ QUESTIONS 1-9: 0
2. FEELING DOWN, DEPRESSED OR HOPELESS: NOT AT ALL
SUM OF ALL RESPONSES TO PHQ9 QUESTIONS 1 & 2: 0
SUM OF ALL RESPONSES TO PHQ QUESTIONS 1-9: 0
SUM OF ALL RESPONSES TO PHQ QUESTIONS 1-9: 0

## 2024-03-18 NOTE — TELEPHONE ENCOUNTER
Clare called requesting a refill of the below medication which has been pended for you:     Requested Prescriptions     Pending Prescriptions Disp Refills    levothyroxine (SYNTHROID) 50 MCG tablet [Pharmacy Med Name: LEVOTHYROXINE SODIUM 50 MCG 50 Tablet] 90 tablet 3     Sig: TAKE 1 TABLET BY MOUTH DAILY    simvastatin (ZOCOR) 20 MG tablet [Pharmacy Med Name: SIMVASTATIN 20 MG TAB 20 Tablet] 45 tablet 3     Sig: TAKE 0.5 TABLETS BY MOUTH EVERY EVENING       Last Appointment Date: 9/11/2023  Next Appointment Date: 3/18/2024    No Known Allergies

## 2024-03-18 NOTE — PROGRESS NOTES
Medicare Annual Wellness Visit    Clare Jin is here for Medicare AWV, Hyperlipidemia, Hypothyroidism, and Fatigue    Assessment & Plan     Recommendations for Preventive Services Due: see orders and patient instructions/AVS.  Recommended screening schedule for the next 5-10 years is provided to the patient in written form: see Patient Instructions/AVS.     Return in 6 months (on 9/26/2024) for Hyperlipidemia, Hypertension.     Subjective       Patient's complete Health Risk Assessment and screening values have been reviewed and are found in Flowsheets. The following problems were reviewed today and where indicated follow up appointments were made and/or referrals ordered.    No Positive Risk Factors identified today.                                  Objective   Vitals:    03/18/24 1359   BP: 132/72   Site: Right Upper Arm   Position: Sitting   Cuff Size: Large Adult   Pulse: 87   Resp: 16   SpO2: 100%   Weight: 63 kg (139 lb)   Height: 1.6 m (5' 3\")      Body mass index is 24.62 kg/m².            No Known Allergies  Prior to Visit Medications    Medication Sig Taking? Authorizing Provider   apixaban (ELIQUIS) 5 MG TABS tablet TAKE 1 TABLET BY MOUTH 2 TIMES DAILY Yes Pietro Cotto MD   levothyroxine (SYNTHROID) 50 MCG tablet TAKE 1 TABLET BY MOUTH DAILY  Pietro Cotto MD   simvastatin (ZOCOR) 20 MG tablet TAKE 0.5 TABLETS BY MOUTH EVERY EVENING  Pietro Cotto MD   Handicap Placard MISC by Does not apply route Expires:3/27/2028  Pietro Cotto MD   Probiotic Product (PROBIOTIC DAILY PO) Take by mouth  Patient not taking: Reported on 8/11/2023  Randall Venegas MD   triamterene-hydroCHLOROthiazide (MAXZIDE-25) 37.5-25 MG per tablet TAKE 1 TABLET BY MOUTH DAILY  Pietro Cotto MD   Cholecalciferol (VITAMIN D3) 50 MCG (2000 UT) TABS Take 1 tablet by mouth daily  Randall Venegas MD   AZO-CRANBERRY PO Take 1 tablet by mouth daily as needed (urinary health)   Patient not taking:

## 2024-03-18 NOTE — PATIENT INSTRUCTIONS

## 2024-03-18 NOTE — PROGRESS NOTES
[facility]  [facility]PART[facility] OF [facility]  [address] [facility] [address]  Dept: [phone]  Dept Fax: [phone]  Loc: [phone]     Clare Jin is a 89 y.o. female who presents today for her medical conditions/complaintsas noted below.  Clare Jin is c/o of   Chief Complaint   Patient presents with    Medicare AWV    Hyperlipidemia    Hypothyroidism    Fatigue         HPI:     Hyperlipidemia  This is a chronic problem. The current episode started more than 1 year ago. The problem is controlled. Recent lipid tests were reviewed and are variable. Pertinent negatives include no chest pain or shortness of breath.   Fatigue  This is a recurrent problem. The current episode started more than 1 year ago. The problem occurs intermittently. The problem has been waxing and waning. Associated symptoms include fatigue. Pertinent negatives include no abdominal pain, arthralgias, chest pain, chills, coughing, fever, headaches, nausea, neck pain, numbness, rash, vomiting or weakness.   Other  This is a recurrent (3-General medical exam, 4-hypothyroidism) problem. The current episode started today. The problem occurs intermittently. The problem has been waxing and waning. Associated symptoms include fatigue. Pertinent negatives include no abdominal pain, arthralgias, chest pain, chills, coughing, fever, headaches, nausea, neck pain, numbness, rash, vomiting or weakness.       No results found for: \"LABA1C\"         No components found for: \"LABMICR\"  LDL Cholesterol (mg/dL)   Date Value   03/01/2024 89   03/15/2023 103   03/03/2022 83         AST (U/L)   Date Value   03/01/2024 18     ALT (U/L)   Date Value   03/01/2024 11     BUN (mg/dL)   Date Value   03/01/2024 22     BP Readings from Last 3 Encounters:   03/18/24 132/72   09/11/23 116/62   08/15/23 (!) 183/81              Past Medical History:   Diagnosis Date    Breast cancer (HCC) 2014    s/p

## 2024-06-17 DIAGNOSIS — Z12.31 SCREENING MAMMOGRAM FOR BREAST CANCER: Primary | ICD-10-CM

## 2024-06-18 ENCOUNTER — HOSPITAL ENCOUNTER (OUTPATIENT)
Dept: MAMMOGRAPHY | Age: 89
Discharge: HOME | End: 2024-06-20
Payer: MEDICARE

## 2024-06-18 VITALS — WEIGHT: 140 LBS | HEIGHT: 63 IN | BODY MASS INDEX: 24.8 KG/M2

## 2024-06-18 DIAGNOSIS — Z12.31 SCREENING MAMMOGRAM FOR BREAST CANCER: ICD-10-CM

## 2024-06-18 PROCEDURE — 77063 BREAST TOMOSYNTHESIS BI: CPT

## 2024-08-12 ENCOUNTER — HOSPITAL ENCOUNTER (OUTPATIENT)
Age: 89
Setting detail: OBSERVATION
Discharge: HOME | End: 2024-08-14
Attending: STUDENT IN AN ORGANIZED HEALTH CARE EDUCATION/TRAINING PROGRAM | Admitting: INTERNAL MEDICINE
Payer: MEDICARE

## 2024-08-12 DIAGNOSIS — S05.11XA ECCHYMOSIS OF RIGHT EYE, INITIAL ENCOUNTER: ICD-10-CM

## 2024-08-12 DIAGNOSIS — N30.01 ACUTE CYSTITIS WITH HEMATURIA: Primary | ICD-10-CM

## 2024-08-12 DIAGNOSIS — R53.1 GENERALIZED WEAKNESS: ICD-10-CM

## 2024-08-12 DIAGNOSIS — R79.89 ELEVATED TROPONIN: ICD-10-CM

## 2024-08-12 DIAGNOSIS — I10 PRIMARY HYPERTENSION: ICD-10-CM

## 2024-08-12 DIAGNOSIS — R55 NEAR SYNCOPE: ICD-10-CM

## 2024-08-12 LAB
BASOPHILS # BLD: 0.04 K/UL (ref 0–0.2)
BASOPHILS NFR BLD: 1 % (ref 0–2)
EOSINOPHIL # BLD: 0.16 K/UL (ref 0–0.44)
EOSINOPHILS RELATIVE PERCENT: 2 % (ref 1–4)
ERYTHROCYTE [DISTWIDTH] IN BLOOD BY AUTOMATED COUNT: 13 % (ref 11.8–14.4)
HCT VFR BLD AUTO: 37.4 % (ref 36.3–47.1)
HGB BLD-MCNC: 12.5 G/DL (ref 11.9–15.1)
IMM GRANULOCYTES # BLD AUTO: 0.03 K/UL (ref 0–0.3)
IMM GRANULOCYTES NFR BLD: 0 %
LYMPHOCYTES NFR BLD: 1.12 K/UL (ref 1.1–3.7)
LYMPHOCYTES RELATIVE PERCENT: 15 % (ref 24–43)
MCH RBC QN AUTO: 31.6 PG (ref 25.2–33.5)
MCHC RBC AUTO-ENTMCNC: 33.4 G/DL (ref 25.2–33.5)
MCV RBC AUTO: 94.4 FL (ref 82.6–102.9)
MONOCYTES NFR BLD: 0.72 K/UL (ref 0.1–1.2)
MONOCYTES NFR BLD: 10 % (ref 3–12)
NEUTROPHILS NFR BLD: 72 % (ref 36–65)
NEUTS SEG NFR BLD: 5.52 K/UL (ref 1.5–8.1)
NRBC BLD-RTO: 0 PER 100 WBC
PLATELET # BLD AUTO: 272 K/UL (ref 138–453)
PMV BLD AUTO: 9.2 FL (ref 8.1–13.5)
RBC # BLD AUTO: 3.96 M/UL (ref 3.95–5.11)
WBC OTHER # BLD: 7.6 K/UL (ref 3.5–11.3)

## 2024-08-12 PROCEDURE — 87086 URINE CULTURE/COLONY COUNT: CPT

## 2024-08-12 PROCEDURE — 36415 COLL VENOUS BLD VENIPUNCTURE: CPT

## 2024-08-12 PROCEDURE — 87077 CULTURE AEROBIC IDENTIFY: CPT

## 2024-08-12 PROCEDURE — 99285 EMERGENCY DEPT VISIT HI MDM: CPT

## 2024-08-12 PROCEDURE — 84484 ASSAY OF TROPONIN QUANT: CPT

## 2024-08-12 PROCEDURE — 80076 HEPATIC FUNCTION PANEL: CPT

## 2024-08-12 PROCEDURE — 87186 SC STD MICRODIL/AGAR DIL: CPT

## 2024-08-12 PROCEDURE — 80048 BASIC METABOLIC PNL TOTAL CA: CPT

## 2024-08-12 PROCEDURE — 81001 URINALYSIS AUTO W/SCOPE: CPT

## 2024-08-12 PROCEDURE — 83690 ASSAY OF LIPASE: CPT

## 2024-08-12 PROCEDURE — 85025 COMPLETE CBC W/AUTO DIFF WBC: CPT

## 2024-08-12 PROCEDURE — 93005 ELECTROCARDIOGRAM TRACING: CPT | Performed by: STUDENT IN AN ORGANIZED HEALTH CARE EDUCATION/TRAINING PROGRAM

## 2024-08-13 ENCOUNTER — APPOINTMENT (OUTPATIENT)
Dept: CT IMAGING | Age: 89
End: 2024-08-13
Payer: MEDICARE

## 2024-08-13 ENCOUNTER — APPOINTMENT (OUTPATIENT)
Dept: GENERAL RADIOLOGY | Age: 89
End: 2024-08-13
Payer: MEDICARE

## 2024-08-13 ENCOUNTER — APPOINTMENT (OUTPATIENT)
Dept: INTERVENTIONAL RADIOLOGY/VASCULAR | Age: 89
End: 2024-08-13
Payer: MEDICARE

## 2024-08-13 ENCOUNTER — APPOINTMENT (OUTPATIENT)
Age: 89
End: 2024-08-13
Payer: MEDICARE

## 2024-08-13 PROBLEM — S05.11XA ECCHYMOSIS OF RIGHT EYE: Status: ACTIVE | Noted: 2024-08-13

## 2024-08-13 PROBLEM — Z96.641 HISTORY OF RIGHT HIP REPLACEMENT: Status: RESOLVED | Noted: 2022-08-02 | Resolved: 2024-08-13

## 2024-08-13 PROBLEM — R53.1 GENERALIZED WEAKNESS: Status: ACTIVE | Noted: 2024-08-13

## 2024-08-13 PROBLEM — R55 NEAR SYNCOPE: Status: ACTIVE | Noted: 2024-08-13

## 2024-08-13 LAB
ALBUMIN SERPL-MCNC: 4 G/DL (ref 3.5–5.2)
ALBUMIN/GLOB SERPL: 1.3 {RATIO} (ref 1–2.5)
ALP SERPL-CCNC: 66 U/L (ref 35–104)
ALT SERPL-CCNC: 13 U/L (ref 5–33)
ANION GAP SERPL CALCULATED.3IONS-SCNC: 10 MMOL/L (ref 9–17)
ANION GAP SERPL CALCULATED.3IONS-SCNC: 10 MMOL/L (ref 9–17)
AST SERPL-CCNC: 21 U/L
BACTERIA URNS QL MICRO: ABNORMAL
BASOPHILS # BLD: 0.04 K/UL (ref 0–0.2)
BASOPHILS NFR BLD: 1 % (ref 0–2)
BILIRUB DIRECT SERPL-MCNC: 0.1 MG/DL
BILIRUB INDIRECT SERPL-MCNC: 0.1 MG/DL (ref 0–1)
BILIRUB SERPL-MCNC: 0.2 MG/DL (ref 0.3–1.2)
BILIRUB UR QL STRIP: NEGATIVE
BUN SERPL-MCNC: 20 MG/DL (ref 8–23)
BUN SERPL-MCNC: 24 MG/DL (ref 8–23)
BUN/CREAT SERPL: 33 (ref 9–20)
BUN/CREAT SERPL: 34 (ref 9–20)
CALCIUM SERPL-MCNC: 9.1 MG/DL (ref 8.6–10.4)
CALCIUM SERPL-MCNC: 9.3 MG/DL (ref 8.6–10.4)
CHLORIDE SERPL-SCNC: 102 MMOL/L (ref 98–107)
CHLORIDE SERPL-SCNC: 96 MMOL/L (ref 98–107)
CLARITY UR: CLEAR
CO2 SERPL-SCNC: 26 MMOL/L (ref 20–31)
CO2 SERPL-SCNC: 27 MMOL/L (ref 20–31)
COLOR UR: YELLOW
CREAT SERPL-MCNC: 0.6 MG/DL (ref 0.5–0.9)
CREAT SERPL-MCNC: 0.7 MG/DL (ref 0.5–0.9)
ECHO AO ROOT DIAM: 3.4 CM
ECHO AO ROOT INDEX: 2.02 CM/M2
ECHO AR MAX VEL PISA: 4 M/S
ECHO AV PEAK GRADIENT: 4 MMHG
ECHO AV PEAK VELOCITY: 1.1 M/S
ECHO AV REGURGITANT PHT: 491 MS
ECHO AV VELOCITY RATIO: 0.73
ECHO BSA: 1.7 M2
ECHO EST RA PRESSURE: 3 MMHG
ECHO LA AREA 2C: 13.9 CM2
ECHO LA AREA 4C: 13.7 CM2
ECHO LA DIAMETER INDEX: 1.79 CM/M2
ECHO LA DIAMETER: 3 CM
ECHO LA MAJOR AXIS: 4.4 CM
ECHO LA MINOR AXIS: 4.3 CM
ECHO LA TO AORTIC ROOT RATIO: 0.88
ECHO LA VOL BP: 35 ML (ref 22–52)
ECHO LA VOL MOD A2C: 36 ML (ref 22–52)
ECHO LA VOL MOD A4C: 33 ML (ref 22–52)
ECHO LA VOL/BSA BIPLANE: 21 ML/M2 (ref 16–34)
ECHO LA VOLUME INDEX MOD A2C: 21 ML/M2 (ref 16–34)
ECHO LA VOLUME INDEX MOD A4C: 20 ML/M2 (ref 16–34)
ECHO LV E' LATERAL VELOCITY: 6 CM/S
ECHO LV E' SEPTAL VELOCITY: 5 CM/S
ECHO LV EDV A2C: 70 ML
ECHO LV EDV A4C: 73 ML
ECHO LV EDV INDEX A4C: 43 ML/M2
ECHO LV EDV NDEX A2C: 42 ML/M2
ECHO LV EJECTION FRACTION A2C: 63 %
ECHO LV EJECTION FRACTION A4C: 55 %
ECHO LV EJECTION FRACTION BIPLANE: 59 % (ref 55–100)
ECHO LV ESV A2C: 26 ML
ECHO LV ESV A4C: 33 ML
ECHO LV ESV INDEX A2C: 15 ML/M2
ECHO LV ESV INDEX A4C: 20 ML/M2
ECHO LV FRACTIONAL SHORTENING: 32 % (ref 28–44)
ECHO LV INTERNAL DIMENSION DIASTOLE INDEX: 2.44 CM/M2
ECHO LV INTERNAL DIMENSION DIASTOLIC: 4.1 CM (ref 3.9–5.3)
ECHO LV INTERNAL DIMENSION SYSTOLIC INDEX: 1.67 CM/M2
ECHO LV INTERNAL DIMENSION SYSTOLIC: 2.8 CM
ECHO LV IVSD: 1.1 CM (ref 0.6–0.9)
ECHO LV MASS 2D: 132.1 G (ref 67–162)
ECHO LV MASS INDEX 2D: 78.6 G/M2 (ref 43–95)
ECHO LV POSTERIOR WALL DIASTOLIC: 0.9 CM (ref 0.6–0.9)
ECHO LV RELATIVE WALL THICKNESS RATIO: 0.44
ECHO LVOT MEAN GRADIENT: 1 MMHG
ECHO LVOT PEAK GRADIENT: 2 MMHG
ECHO LVOT PEAK VELOCITY: 0.8 M/S
ECHO LVOT VTI: 17.3 CM
ECHO MV A VELOCITY: 1.27 M/S
ECHO MV E DECELERATION TIME (DT): 232 MS
ECHO MV E VELOCITY: 0.93 M/S
ECHO MV E/A RATIO: 0.73
ECHO MV E/E' LATERAL: 15.5
ECHO MV E/E' RATIO (AVERAGED): 17.05
ECHO MV E/E' SEPTAL: 18.6
ECHO RIGHT VENTRICULAR SYSTOLIC PRESSURE (RVSP): 29 MMHG
ECHO RV TAPSE: 2.4 CM (ref 1.7–?)
ECHO TV REGURGITANT MAX VELOCITY: 2.55 M/S
ECHO TV REGURGITANT PEAK GRADIENT: 26 MMHG
EOSINOPHIL # BLD: 0.16 K/UL (ref 0–0.44)
EOSINOPHILS RELATIVE PERCENT: 2 % (ref 1–4)
EPI CELLS #/AREA URNS HPF: ABNORMAL /HPF (ref 0–5)
ERYTHROCYTE [DISTWIDTH] IN BLOOD BY AUTOMATED COUNT: 13.1 % (ref 11.8–14.4)
EST. AVERAGE GLUCOSE BLD GHB EST-MCNC: 117 MG/DL
GFR, ESTIMATED: 81 ML/MIN/1.73M2
GFR, ESTIMATED: 84 ML/MIN/1.73M2
GLOBULIN SER CALC-MCNC: 3.1 G/DL (ref 1.5–3.8)
GLUCOSE SERPL-MCNC: 109 MG/DL (ref 70–99)
GLUCOSE SERPL-MCNC: 97 MG/DL (ref 70–99)
GLUCOSE UR STRIP-MCNC: NEGATIVE MG/DL
HBA1C MFR BLD: 5.7 % (ref 4–6)
HCT VFR BLD AUTO: 35.9 % (ref 36.3–47.1)
HGB BLD-MCNC: 12.1 G/DL (ref 11.9–15.1)
HGB UR QL STRIP.AUTO: ABNORMAL
IMM GRANULOCYTES # BLD AUTO: <0.03 K/UL (ref 0–0.3)
IMM GRANULOCYTES NFR BLD: 0 %
KETONES UR STRIP-MCNC: NEGATIVE MG/DL
LEUKOCYTE ESTERASE UR QL STRIP: ABNORMAL
LIPASE SERPL-CCNC: 33 U/L (ref 13–60)
LYMPHOCYTES NFR BLD: 1.61 K/UL (ref 1.1–3.7)
LYMPHOCYTES RELATIVE PERCENT: 21 % (ref 24–43)
MCH RBC QN AUTO: 31.6 PG (ref 25.2–33.5)
MCHC RBC AUTO-ENTMCNC: 33.7 G/DL (ref 25.2–33.5)
MCV RBC AUTO: 93.7 FL (ref 82.6–102.9)
MONOCYTES NFR BLD: 0.73 K/UL (ref 0.1–1.2)
MONOCYTES NFR BLD: 10 % (ref 3–12)
NEUTROPHILS NFR BLD: 66 % (ref 36–65)
NEUTS SEG NFR BLD: 5.02 K/UL (ref 1.5–8.1)
NITRITE UR QL STRIP: POSITIVE
NRBC BLD-RTO: 0 PER 100 WBC
PH UR STRIP: 7 [PH] (ref 5–6)
PLATELET # BLD AUTO: 268 K/UL (ref 138–453)
PMV BLD AUTO: 9 FL (ref 8.1–13.5)
POTASSIUM SERPL-SCNC: 4.3 MMOL/L (ref 3.7–5.3)
POTASSIUM SERPL-SCNC: 4.3 MMOL/L (ref 3.7–5.3)
PROT SERPL-MCNC: 7.1 G/DL (ref 6.4–8.3)
PROT UR STRIP-MCNC: NEGATIVE MG/DL
RBC # BLD AUTO: 3.83 M/UL (ref 3.95–5.11)
RBC #/AREA URNS HPF: ABNORMAL /HPF (ref 0–4)
SODIUM SERPL-SCNC: 133 MMOL/L (ref 135–144)
SODIUM SERPL-SCNC: 138 MMOL/L (ref 135–144)
SP GR UR STRIP: 1.01 (ref 1.01–1.02)
TROPONIN I SERPL HS-MCNC: 30 NG/L (ref 0–14)
TROPONIN I SERPL HS-MCNC: 49 NG/L (ref 0–14)
TROPONIN I SERPL HS-MCNC: 56 NG/L (ref 0–14)
TROPONIN I SERPL HS-MCNC: 79 NG/L (ref 0–14)
UROBILINOGEN UR STRIP-ACNC: NORMAL EU/DL (ref 0–1)
WBC #/AREA URNS HPF: ABNORMAL /HPF (ref 0–4)
WBC OTHER # BLD: 7.6 K/UL (ref 3.5–11.3)

## 2024-08-13 PROCEDURE — 6360000002 HC RX W HCPCS: Performed by: STUDENT IN AN ORGANIZED HEALTH CARE EDUCATION/TRAINING PROGRAM

## 2024-08-13 PROCEDURE — 93306 TTE W/DOPPLER COMPLETE: CPT | Performed by: INTERNAL MEDICINE

## 2024-08-13 PROCEDURE — 90715 TDAP VACCINE 7 YRS/> IM: CPT

## 2024-08-13 PROCEDURE — 2580000003 HC RX 258: Performed by: NURSE PRACTITIONER

## 2024-08-13 PROCEDURE — 97161 PT EVAL LOW COMPLEX 20 MIN: CPT

## 2024-08-13 PROCEDURE — 36415 COLL VENOUS BLD VENIPUNCTURE: CPT

## 2024-08-13 PROCEDURE — 6360000002 HC RX W HCPCS

## 2024-08-13 PROCEDURE — 71046 X-RAY EXAM CHEST 2 VIEWS: CPT

## 2024-08-13 PROCEDURE — 97165 OT EVAL LOW COMPLEX 30 MIN: CPT | Performed by: OCCUPATIONAL THERAPIST

## 2024-08-13 PROCEDURE — 93005 ELECTROCARDIOGRAM TRACING: CPT | Performed by: STUDENT IN AN ORGANIZED HEALTH CARE EDUCATION/TRAINING PROGRAM

## 2024-08-13 PROCEDURE — 80048 BASIC METABOLIC PNL TOTAL CA: CPT

## 2024-08-13 PROCEDURE — 85025 COMPLETE CBC W/AUTO DIFF WBC: CPT

## 2024-08-13 PROCEDURE — G0378 HOSPITAL OBSERVATION PER HR: HCPCS

## 2024-08-13 PROCEDURE — 93880 EXTRACRANIAL BILAT STUDY: CPT

## 2024-08-13 PROCEDURE — 97116 GAIT TRAINING THERAPY: CPT

## 2024-08-13 PROCEDURE — 90471 IMMUNIZATION ADMIN: CPT

## 2024-08-13 PROCEDURE — 84484 ASSAY OF TROPONIN QUANT: CPT

## 2024-08-13 PROCEDURE — 96365 THER/PROPH/DIAG IV INF INIT: CPT

## 2024-08-13 PROCEDURE — 99222 1ST HOSP IP/OBS MODERATE 55: CPT | Performed by: INTERNAL MEDICINE

## 2024-08-13 PROCEDURE — 6370000000 HC RX 637 (ALT 250 FOR IP): Performed by: NURSE PRACTITIONER

## 2024-08-13 PROCEDURE — 93306 TTE W/DOPPLER COMPLETE: CPT

## 2024-08-13 PROCEDURE — 70450 CT HEAD/BRAIN W/O DYE: CPT

## 2024-08-13 PROCEDURE — 2580000003 HC RX 258: Performed by: STUDENT IN AN ORGANIZED HEALTH CARE EDUCATION/TRAINING PROGRAM

## 2024-08-13 PROCEDURE — 83036 HEMOGLOBIN GLYCOSYLATED A1C: CPT

## 2024-08-13 RX ORDER — SODIUM CHLORIDE 0.9 % (FLUSH) 0.9 %
5-40 SYRINGE (ML) INJECTION EVERY 12 HOURS SCHEDULED
Status: DISCONTINUED | OUTPATIENT
Start: 2024-08-13 | End: 2024-08-14 | Stop reason: HOSPADM

## 2024-08-13 RX ORDER — POTASSIUM CHLORIDE 7.45 MG/ML
10 INJECTION INTRAVENOUS PRN
Status: DISCONTINUED | OUTPATIENT
Start: 2024-08-13 | End: 2024-08-14 | Stop reason: HOSPADM

## 2024-08-13 RX ORDER — ONDANSETRON 4 MG/1
4 TABLET, ORALLY DISINTEGRATING ORAL EVERY 8 HOURS PRN
Status: DISCONTINUED | OUTPATIENT
Start: 2024-08-13 | End: 2024-08-14 | Stop reason: HOSPADM

## 2024-08-13 RX ORDER — ATORVASTATIN CALCIUM 10 MG/1
10 TABLET, FILM COATED ORAL NIGHTLY
Status: DISCONTINUED | OUTPATIENT
Start: 2024-08-13 | End: 2024-08-14 | Stop reason: HOSPADM

## 2024-08-13 RX ORDER — LEVOTHYROXINE SODIUM 0.03 MG/1
50 TABLET ORAL DAILY
Status: DISCONTINUED | OUTPATIENT
Start: 2024-08-13 | End: 2024-08-14 | Stop reason: HOSPADM

## 2024-08-13 RX ORDER — POLYETHYLENE GLYCOL 3350 17 G/17G
17 POWDER, FOR SOLUTION ORAL DAILY PRN
Status: DISCONTINUED | OUTPATIENT
Start: 2024-08-13 | End: 2024-08-14 | Stop reason: HOSPADM

## 2024-08-13 RX ORDER — MAGNESIUM SULFATE IN WATER 40 MG/ML
2000 INJECTION, SOLUTION INTRAVENOUS PRN
Status: DISCONTINUED | OUTPATIENT
Start: 2024-08-13 | End: 2024-08-14 | Stop reason: HOSPADM

## 2024-08-13 RX ORDER — POTASSIUM CHLORIDE 20 MEQ/1
40 TABLET, EXTENDED RELEASE ORAL PRN
Status: DISCONTINUED | OUTPATIENT
Start: 2024-08-13 | End: 2024-08-14 | Stop reason: HOSPADM

## 2024-08-13 RX ORDER — SODIUM CHLORIDE 9 MG/ML
INJECTION, SOLUTION INTRAVENOUS CONTINUOUS
Status: DISCONTINUED | OUTPATIENT
Start: 2024-08-13 | End: 2024-08-14 | Stop reason: HOSPADM

## 2024-08-13 RX ORDER — SODIUM CHLORIDE 0.9 % (FLUSH) 0.9 %
5-40 SYRINGE (ML) INJECTION PRN
Status: DISCONTINUED | OUTPATIENT
Start: 2024-08-13 | End: 2024-08-14 | Stop reason: HOSPADM

## 2024-08-13 RX ORDER — VITAMIN B COMPLEX
2000 TABLET ORAL NIGHTLY
Status: DISCONTINUED | OUTPATIENT
Start: 2024-08-13 | End: 2024-08-14 | Stop reason: HOSPADM

## 2024-08-13 RX ORDER — ONDANSETRON 2 MG/ML
4 INJECTION INTRAMUSCULAR; INTRAVENOUS EVERY 6 HOURS PRN
Status: DISCONTINUED | OUTPATIENT
Start: 2024-08-13 | End: 2024-08-14 | Stop reason: HOSPADM

## 2024-08-13 RX ORDER — ACETAMINOPHEN 650 MG/1
650 SUPPOSITORY RECTAL EVERY 6 HOURS PRN
Status: DISCONTINUED | OUTPATIENT
Start: 2024-08-13 | End: 2024-08-14 | Stop reason: HOSPADM

## 2024-08-13 RX ORDER — SODIUM CHLORIDE 9 MG/ML
INJECTION, SOLUTION INTRAVENOUS PRN
Status: DISCONTINUED | OUTPATIENT
Start: 2024-08-13 | End: 2024-08-14 | Stop reason: HOSPADM

## 2024-08-13 RX ORDER — ACETAMINOPHEN 325 MG/1
650 TABLET ORAL EVERY 6 HOURS PRN
Status: DISCONTINUED | OUTPATIENT
Start: 2024-08-13 | End: 2024-08-14 | Stop reason: HOSPADM

## 2024-08-13 RX ADMIN — CHOLECALCIFEROL TAB 25 MCG (1000 UNIT) 2000 UNITS: 25 TAB at 20:24

## 2024-08-13 RX ADMIN — LEVOTHYROXINE SODIUM 50 MCG: 0.03 TABLET ORAL at 08:27

## 2024-08-13 RX ADMIN — SODIUM CHLORIDE, PRESERVATIVE FREE 10 ML: 5 INJECTION INTRAVENOUS at 08:28

## 2024-08-13 RX ADMIN — CEFTRIAXONE 1000 MG: 1 INJECTION, POWDER, FOR SOLUTION INTRAMUSCULAR; INTRAVENOUS at 01:45

## 2024-08-13 RX ADMIN — SODIUM CHLORIDE: 9 INJECTION, SOLUTION INTRAVENOUS at 15:52

## 2024-08-13 RX ADMIN — APIXABAN 5 MG: 5 TABLET, FILM COATED ORAL at 20:24

## 2024-08-13 RX ADMIN — ATORVASTATIN CALCIUM 10 MG: 10 TABLET, FILM COATED ORAL at 20:24

## 2024-08-13 RX ADMIN — SODIUM CHLORIDE: 9 INJECTION, SOLUTION INTRAVENOUS at 05:37

## 2024-08-13 RX ADMIN — APIXABAN 5 MG: 5 TABLET, FILM COATED ORAL at 08:27

## 2024-08-13 RX ADMIN — TETANUS TOXOID, REDUCED DIPHTHERIA TOXOID AND ACELLULAR PERTUSSIS VACCINE, ADSORBED 0.5 ML: 5; 2.5; 8; 8; 2.5 SUSPENSION INTRAMUSCULAR at 01:39

## 2024-08-13 RX ADMIN — SODIUM CHLORIDE, PRESERVATIVE FREE 10 ML: 5 INJECTION INTRAVENOUS at 20:25

## 2024-08-13 NOTE — FLOWSHEET NOTE
08/13/24 1739   Vitals   Orthostatic B/P and Pulse? Yes   Blood Pressure Lying 132/64   Pulse Lying 70 PER MINUTE   Blood Pressure Sitting 141/77   Pulse Sitting 84 PER MINUTE   Blood Pressure Standing 135/70

## 2024-08-13 NOTE — ED NOTES
Dr Vance on phone with Dr MIKE Vásquez  
[facility]  
limits   URINALYSIS WITH MICROSCOPIC - Abnormal; Notable for the following components:    Urine Hgb 1+ (*)     pH, Urine 7.0 (*)     Nitrite, Urine POSITIVE (*)     Leukocyte Esterase, Urine 3+ (*)     Bacteria, UA MANY (*)     All other components within normal limits   TROPONIN - Abnormal; Notable for the following components:    Troponin, High Sensitivity 56 (*)     All other components within normal limits   CULTURE, URINE   LIPASE     Imaging:    CT HEAD WO CONTRAST   Final Result   No acute intracranial abnormality is identified.         XR CHEST (2 VW)   Final Result   1. No acute radiographic abnormality in the chest.           Medications Administered         cefTRIAXone (ROCEPHIN) 1,000 mg in sodium chloride 0.9 % 50 mL IVPB (mini-bag) Admin Date  08/13/2024 Action  New Bag Dose  1,000 mg Rate  100 mL/hr Route  IntraVENous Documented By  Amrit Grey RN        tetanus-diphth-acell pertussis (BOOSTRIX) injection 0.5 mL Admin Date  08/13/2024 Action  Given Dose  0.5 mL Rate   Route  IntraMUSCular Documented By  Amrti Grey, RN          Medication Reconciliation Completed: Yes      Consults:    [] Hospitalist  Completed     [] yes     [] no    [] Cardiology  Completed     [] yes     [] no    [] General Surgery  Completed     [] yes     [] no                 [] Orthopedics  Completed     [] yes     [] no    [] OB/GYN   Completed     [] yes     [] no    [] Oncology   Completed     [] yes     [] no    [] Neurology   Completed     [] yes     [] no    [] Podiatry   Completed     [] yes     [] no    [] Urology   Completed     [] yes     [] no         Electronically signed by AMRIT GREY RN on 8/13/2024 at 3:37 AM

## 2024-08-13 NOTE — CARE COORDINATION
Case Management Assessment  Initial Evaluation    Date/Time of Evaluation: 8/13/2024 11:36 AM  Assessment Completed by: Marta Serrano RN    If patient is discharged prior to next notation, then this note serves as note for discharge by case management.    Patient Name: Clare Jin                   YOB: 1931  Diagnosis: Primary hypertension [I10]  Generalized weakness [R53.1]  Elevated troponin [R79.89]  Near syncope [R55]  Acute cystitis with hematuria [N30.01]  Ecchymosis of right eye, initial encounter [S05.11XA]                   Date / Time: 8/12/2024 10:54 PM    Patient Admission Status: Observation   Readmission Risk (Low < 19, Mod (19-27), High > 27): No data recorded  Current PCP: Pietro Cotto MD  PCP verified by ? Yes    Chart Reviewed: Yes      History Provided by: Patient  Patient Orientation: Alert and Oriented    Patient Cognition: Alert    Hospitalization in the last 30 days (Readmission):  No    If yes, Readmission Assessment in  Navigator will be completed.    Advance Directives:      Code Status: DNR-CCA   Patient's Primary Decision Maker is: Legal Next of Kin    Primary Decision Maker: Laureen Ramos - Child - [phone]    Secondary Decision Maker: Christoph Jin - [phone]    Discharge Planning:    Patient lives with: Alone Type of Home: House  Primary Care Giver: Self  Patient Support Systems include: Children   Current Financial resources: Medicare  Current community resources:    Current services prior to admission: Durable Medical Equipment            Current DME: Walker, Cane            Type of Home Care services:  None    ADLS  Prior functional level: Independent in ADLs/IADLs  Current functional level: Independent in ADLs/IADLs    PT AM-PAC:   /24  OT AM-PAC:   /24    Family can provide assistance at DC: Yes  Would you like Case Management to discuss the discharge plan with any other family members/significant others, and if so, who? No  Plans to

## 2024-08-13 NOTE — CONSULTS
[facility]   Consult Note         Today's Date: 8/13/2024  Patient Name: Clare Jin  Date of admission: 8/12/2024 10:54 PM  Patient's age: 89 y.o., 7/26/1931  Admission Dx: Primary hypertension [I10]  Generalized weakness [R53.1]  Elevated troponin [R79.89]  Near syncope [R55]  Acute cystitis with hematuria [N30.01]  Ecchymosis of right eye, initial encounter [S05.11XA]    Reason for Consult:  Cardiac evaluation    Requesting Physician: Tyler Murillo MD    REASON FOR CONSULT:  Syncope    History Obtained From:  Patient, chart, staff, records    HISTORY OF PRESENT ILLNESS:      The patient is a 89 y.o. female who is admitted to the hospital for pre-syncope and slowly going to the ground.     Past Medical History:    Past Medical History:   Diagnosis Date    Breast cancer ([facility]) 2014    s/p mastectomy and reconstruction surgery.    Closed fracture of intracapsular section of femur ([facility]) 08/02/2022    Closed subcapital fracture of femur, right, initial encounter ([facility]) 01/22/2020    Edema extremities     takes diuretics prn    History of right hip replacement 08/02/2022    Hyperlipidemia     Hypertension     Hypothyroidism     Osteoarthritis     mild    Unspecified vitamin D deficiency        Past Surgical History:   has a past surgical history that includes Total abdominal hysterectomy w/ bilateral salpingoophorectomy (1978); Appendectomy; Colonoscopy (2007); Mastectomy, radical (Right, 2/2014); Breast reduction surgery (Left, 2/2014); Breast reconstruction (Right, 2/2014); Cataract removal with implant (Right, 09/19/2019); eye surgery; Hysterectomy; Total hip arthroplasty (Right, 1/23/2020); and Breast biopsy (Right, 2/2014).     Home Medications:    Prior to Admission medications    Medication Sig Start Date End Date Taking? Authorizing Provider   levothyroxine (SYNTHROID) 50 MCG tablet TAKE 1 TABLET BY MOUTH DAILY 3/18/24  Yes Pietro Cotto MD   simvastatin (ZOCOR) 20 MG tablet TAKE 0.5

## 2024-08-13 NOTE — PLAN OF CARE
Problem: Discharge Planning  Goal: Discharge to home or other facility with appropriate resources  Outcome: Progressing     Problem: Safety - Adult  Goal: Free from fall injury  Outcome: Progressing     Problem: ABCDS Injury Assessment  Goal: Absence of physical injury  Outcome: Progressing     Problem: Skin/Tissue Integrity - Adult  Goal: Skin integrity remains intact  Outcome: Progressing     Problem: Musculoskeletal - Adult  Goal: Return mobility to safest level of function  Outcome: Progressing

## 2024-08-13 NOTE — PLAN OF CARE
Problem: Discharge Planning  Goal: Discharge to home or other facility with appropriate resources  8/13/2024 0825 by Sabina Davies RN  Outcome: Progressing  8/13/2024 0645 by Yelena Lopez RN  Outcome: Progressing     Problem: Safety - Adult  Goal: Free from fall injury  8/13/2024 0825 by Sabina Davies RN  Outcome: Progressing  8/13/2024 0645 by Yelena Lopez RN  Outcome: Progressing     Problem: ABCDS Injury Assessment  Goal: Absence of physical injury  8/13/2024 0825 by Sabina Davies RN  Outcome: Progressing  8/13/2024 0645 by Yelena Lopez RN  Outcome: Progressing     Problem: Skin/Tissue Integrity - Adult  Goal: Skin integrity remains intact  8/13/2024 0825 by Sabina Davies RN  Outcome: Progressing  8/13/2024 0645 by Yelena Lopez RN  Outcome: Progressing     Problem: Musculoskeletal - Adult  Goal: Return mobility to safest level of function  8/13/2024 0825 by Sabina Davies RN  Outcome: Progressing  8/13/2024 0645 by Yelena Lopez RN  Outcome: Progressing

## 2024-08-13 NOTE — H&P
HOSPITALIST ADMISSION H&P      REASON FOR ADMISSION:  Near syncope--elevated troponin  ESTIMATED LENGTH OF STAY:   < 2 midnights----1-2 days    ATTENDING/ADMITTING PHYSICIAN: Tyler Murillo MD MD  PCP: Pietro Cotto MD    HISTORY OF PRESENT ILLNESS:      The patient is a 89 y.o. female patient of Pietro Cotto MD who presents with a near syncopal event at home--the patient stated that she did not feel well during the day--vague description---lightheadedness--dizziness---while she had experienced \"heartburn\" in the 24 hours before this event---no antecedent chest pain or dyspnea.  During the aforementioned event, the patient lowered herself to her knees, then fell forward striking her right face --> contusion--ecchymosis.  No LOC.  Then she crawled to get to the telephone and called 911.    Rising troponin---30--56---79----advised could remain at [facility] by Cardiology, who will see the patient today.  EKG--8.13.2024---NSR--73--low voltage--NSSTTCs.    Prior history of near syncope and syncope associated with UTIs    UTI---POA ---> Rocephin    HTN    Hyperlipidemia     Hypothyroidism----TSH = 2.88 on 3.1.2024       See below for additional PMH.    Patient tchz-evdrcgqqsu-moqfqdmr-available records reviewed, including, but not limited to,  ER reports--labs--imaging--EKG--office records---personal notes.       Past Medical History:   Diagnosis Date    Breast cancer ([facility]) 2014    s/p mastectomy and reconstruction surgery.    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

## 2024-08-13 NOTE — FLOWSHEET NOTE
Spiritual Health Assessment/Progress Note  [facility]    Initial Encounter, Spiritual/Emotional Needs,  ,  ,      Name: Clare Jin MRN: [id number]    Age: 89 y.o.     Sex: female   Language: English   Samaritan: Cheondoism   Near syncope     Date: 8/13/2024            Total Time Calculated: 30 min              Spiritual Assessment began in [facility]  PROGRESSIVE CARE        Referral/Consult From: Rounding   Encounter Overview/Reason: Initial Encounter, Spiritual/Emotional Needs  Service Provided For: Patient and family together    Patient had returned from a walk down the hallway with her nurse and daughter (Laureen), and was resting in her chair.  Patient explained the reason for her sudden and unexpected health condition, and was waiting on test results.  Patient lives alone and has the support of her 3 children, family, friends, and Adventism community.      Kwadwo, Belief, Meaning:   Patient identifies as spiritual, is connected with a kwadwo tradition or spiritual practice, and has beliefs or practices that help with coping during difficult times  Family/Friends identify as spiritual, are connected with a kwadwo tradition or spiritual practice, and have beliefs or practices that help with coping during difficult times      Importance and Influence:  Patient has spiritual/personal beliefs that influence decisions regarding their health  Family/Friends have spiritual/personal beliefs that influence decisions regarding the patient's health    Community:  Patient is connected with a spiritual community and feels well-supported. Support system includes: Children, Kwadwo Community, and Friends  Family/Friends are connected with a spiritual community: and feel well-supported. Support system includes: Spouse/Partner, Children, Kwadwo Community, and Friends    Assessment and Plan of Care:     Patient Interventions include: Facilitated expression of thoughts and feelings, Explored spiritual coping/struggle/distress

## 2024-08-13 NOTE — PROGRESS NOTES
Physical Therapy  Facility/Department: [facility]  PROGRESSIVE CARE  Physical Therapy Initial Assessment    Name: Clare Jin  : 1931  MRN: [id number]  Date of Service: 2024    Discharge Recommendations:  Continue to assess pending progress, Home with assist PRN          Patient Diagnosis(es): The primary encounter diagnosis was Acute cystitis with hematuria. Diagnoses of Ecchymosis of right eye, initial encounter, Generalized weakness, Elevated troponin, Primary hypertension, and Near syncope were also pertinent to this visit.  Past Medical History:  has a past medical history of Breast cancer ([facility]CC), Closed fracture of intracapsular section of femur ([facility]), Closed subcapital fracture of femur, right, initial encounter ([facility]), Edema extremities, History of right hip replacement, Hyperlipidemia, Hypertension, Hypothyroidism, Osteoarthritis, and Unspecified vitamin D deficiency.  Past Surgical History:  has a past surgical history that includes Total abdominal hysterectomy w/ bilateral salpingoophorectomy (); Appendectomy; Colonoscopy (); Mastectomy, radical (Right, 2014); Breast reduction surgery (Left, 2014); Breast reconstruction (Right, 2014); Cataract removal with implant (Right, 2019); eye surgery; Hysterectomy; Total hip arthroplasty (Right, 2020); and Breast biopsy (Right, 2014).    Assessment   Body Structures, Functions, Activity Limitations Requiring Skilled Therapeutic Intervention: Decreased functional mobility ;Decreased strength;Decreased balance  Therapy Prognosis: Good  Decision Making: Low Complexity  Activity Tolerance  Activity Tolerance: Patient tolerated evaluation without incident     Plan   Physical Therapy Plan  General Plan: 5-7 times per week  Current Treatment Recommendations: Strengthening, Balance training, Functional mobility training, Gait training, Endurance training, Neuromuscular re-education, Home exercise program, Safety education &

## 2024-08-13 NOTE — CARE COORDINATION
DISCHARGE BARRIERS       Reason for Referral:  SW completed a Psychosocial Assessment for evaluation of patient's mental health, social status, and functional capacity within the community. Clare Shepherd is a 89 y.o. female admitted due to Near syncope. Patient alone. SW provided supportive listening while patient discussed past medical history and events leading up to hospitalization.       Mental Status:  Alert, oriented, and engaging during assessment.     Decision Making:  Makes own decisions.     Family/Social/Home Environment: lives alone    Employment status: Retired     Health Insurance:     Active Insurance as of 8/12/2024       Primary Coverage       Payor Plan Insurance Group Employer/Plan Group    AETNA MEDICARE AETNA MEDICARE-ADVANTAGE PPO [id number]       Payor Address Payor Phone Number Payor Fax Number Effective Dates    [id number]   1/1/2022 - None Entered    [facility] [id number]         Subscriber Name Subscriber Birth Date Member ID       CLARE SHEPHERD 7/26/1931 [id number]                     Support: Discussed a good social support network     Current Services:  None      Current DMEs: Cane, Walker, Rollator as needed outside the home    PCP: Pietro Cotto MD and repots no issues affording medication.      status:   No     ADLs and means of transportation:  Independent in ADLs prior to hospitalization and able to transport self.    Food insecurity or needed financial assistance: Denies any food insecurity or financial concerns at this time.    Income Source: social security    ACP and Code Status:  SW discussed an Advance Directive which included the patient's choices for care and treatment in the case of a health event that adversely affects decision-making abilities. SW provided education and resources. Clare Shepherd has no questions at this time and has agreed to keep me up-to-date should anything change.    Clare Shepherd is a DNR-CCA status and has NO

## 2024-08-14 VITALS
DIASTOLIC BLOOD PRESSURE: 62 MMHG | HEIGHT: 63 IN | BODY MASS INDEX: 25.52 KG/M2 | OXYGEN SATURATION: 95 % | SYSTOLIC BLOOD PRESSURE: 126 MMHG | WEIGHT: 144 LBS | RESPIRATION RATE: 16 BRPM | TEMPERATURE: 98.2 F | HEART RATE: 61 BPM

## 2024-08-14 LAB
ANION GAP SERPL CALCULATED.3IONS-SCNC: 6 MMOL/L (ref 9–17)
BASOPHILS # BLD: 0.04 K/UL (ref 0–0.2)
BASOPHILS NFR BLD: 1 % (ref 0–2)
BUN SERPL-MCNC: 20 MG/DL (ref 8–23)
BUN/CREAT SERPL: 29 (ref 9–20)
CALCIUM SERPL-MCNC: 8.1 MG/DL (ref 8.6–10.4)
CHLORIDE SERPL-SCNC: 105 MMOL/L (ref 98–107)
CO2 SERPL-SCNC: 25 MMOL/L (ref 20–31)
CREAT SERPL-MCNC: 0.7 MG/DL (ref 0.5–0.9)
EKG ATRIAL RATE: 70 BPM
EKG ATRIAL RATE: 73 BPM
EKG P AXIS: 40 DEGREES
EKG P AXIS: 62 DEGREES
EKG P-R INTERVAL: 172 MS
EKG P-R INTERVAL: 172 MS
EKG Q-T INTERVAL: 398 MS
EKG Q-T INTERVAL: 420 MS
EKG QRS DURATION: 64 MS
EKG QRS DURATION: 80 MS
EKG QTC CALCULATION (BAZETT): 438 MS
EKG QTC CALCULATION (BAZETT): 453 MS
EKG R AXIS: 25 DEGREES
EKG R AXIS: 43 DEGREES
EKG T AXIS: 43 DEGREES
EKG T AXIS: 59 DEGREES
EKG VENTRICULAR RATE: 70 BPM
EKG VENTRICULAR RATE: 73 BPM
EOSINOPHIL # BLD: 0.18 K/UL (ref 0–0.44)
EOSINOPHILS RELATIVE PERCENT: 3 % (ref 1–4)
ERYTHROCYTE [DISTWIDTH] IN BLOOD BY AUTOMATED COUNT: 13.3 % (ref 11.8–14.4)
GFR, ESTIMATED: 81 ML/MIN/1.73M2
GLUCOSE SERPL-MCNC: 86 MG/DL (ref 70–99)
HCT VFR BLD AUTO: 32.5 % (ref 36.3–47.1)
HGB BLD-MCNC: 11 G/DL (ref 11.9–15.1)
IMM GRANULOCYTES # BLD AUTO: <0.03 K/UL (ref 0–0.3)
IMM GRANULOCYTES NFR BLD: 0 %
LYMPHOCYTES NFR BLD: 1.17 K/UL (ref 1.1–3.7)
LYMPHOCYTES RELATIVE PERCENT: 20 % (ref 24–43)
MAGNESIUM SERPL-MCNC: 2.1 MG/DL (ref 1.6–2.6)
MCH RBC QN AUTO: 32 PG (ref 25.2–33.5)
MCHC RBC AUTO-ENTMCNC: 33.8 G/DL (ref 25.2–33.5)
MCV RBC AUTO: 94.5 FL (ref 82.6–102.9)
MICROORGANISM SPEC CULT: ABNORMAL
MONOCYTES NFR BLD: 0.66 K/UL (ref 0.1–1.2)
MONOCYTES NFR BLD: 11 % (ref 3–12)
NEUTROPHILS NFR BLD: 65 % (ref 36–65)
NEUTS SEG NFR BLD: 3.72 K/UL (ref 1.5–8.1)
NRBC BLD-RTO: 0 PER 100 WBC
PLATELET # BLD AUTO: 244 K/UL (ref 138–453)
PMV BLD AUTO: 9.3 FL (ref 8.1–13.5)
POTASSIUM SERPL-SCNC: 4.1 MMOL/L (ref 3.7–5.3)
RBC # BLD AUTO: 3.44 M/UL (ref 3.95–5.11)
SERVICE CMNT-IMP: ABNORMAL
SODIUM SERPL-SCNC: 136 MMOL/L (ref 135–144)
SPECIMEN DESCRIPTION: ABNORMAL
TSH SERPL DL<=0.05 MIU/L-ACNC: 3.61 UIU/ML (ref 0.3–5)
WBC OTHER # BLD: 5.8 K/UL (ref 3.5–11.3)

## 2024-08-14 PROCEDURE — G0378 HOSPITAL OBSERVATION PER HR: HCPCS

## 2024-08-14 PROCEDURE — 83735 ASSAY OF MAGNESIUM: CPT

## 2024-08-14 PROCEDURE — 6360000002 HC RX W HCPCS: Performed by: NURSE PRACTITIONER

## 2024-08-14 PROCEDURE — 85025 COMPLETE CBC W/AUTO DIFF WBC: CPT

## 2024-08-14 PROCEDURE — 80048 BASIC METABOLIC PNL TOTAL CA: CPT

## 2024-08-14 PROCEDURE — 6370000000 HC RX 637 (ALT 250 FOR IP): Performed by: NURSE PRACTITIONER

## 2024-08-14 PROCEDURE — 84443 ASSAY THYROID STIM HORMONE: CPT

## 2024-08-14 PROCEDURE — 36415 COLL VENOUS BLD VENIPUNCTURE: CPT

## 2024-08-14 PROCEDURE — 2580000003 HC RX 258: Performed by: NURSE PRACTITIONER

## 2024-08-14 PROCEDURE — 96366 THER/PROPH/DIAG IV INF ADDON: CPT

## 2024-08-14 PROCEDURE — 97110 THERAPEUTIC EXERCISES: CPT

## 2024-08-14 PROCEDURE — 97116 GAIT TRAINING THERAPY: CPT

## 2024-08-14 PROCEDURE — 99238 HOSP IP/OBS DSCHRG MGMT 30/<: CPT | Performed by: INTERNAL MEDICINE

## 2024-08-14 RX ORDER — CEFDINIR 300 MG/1
300 CAPSULE ORAL 2 TIMES DAILY
Qty: 6 CAPSULE | Refills: 0 | Status: SHIPPED | OUTPATIENT
Start: 2024-08-14 | End: 2024-08-17

## 2024-08-14 RX ADMIN — CEFTRIAXONE 1000 MG: 1 INJECTION, POWDER, FOR SOLUTION INTRAMUSCULAR; INTRAVENOUS at 01:04

## 2024-08-14 RX ADMIN — LEVOTHYROXINE SODIUM 50 MCG: 0.03 TABLET ORAL at 10:15

## 2024-08-14 RX ADMIN — APIXABAN 5 MG: 5 TABLET, FILM COATED ORAL at 10:15

## 2024-08-14 RX ADMIN — SODIUM CHLORIDE: 9 INJECTION, SOLUTION INTRAVENOUS at 01:04

## 2024-08-14 NOTE — PLAN OF CARE
Problem: Discharge Planning  Goal: Discharge to home or other facility with appropriate resources  Recent Flowsheet Documentation  Taken 8/13/2024 2024 by Marcelle Alicea, RN  Discharge to home or other facility with appropriate resources: Identify barriers to discharge with patient and caregiver     Problem: Musculoskeletal - Adult  Goal: Return mobility to safest level of function  Recent Flowsheet Documentation  Taken 8/13/2024 2024 by Marcelle Alicea, RN  Return Mobility to Safest Level of Function: Assess patient stability and activity tolerance for standing, transferring and ambulating with or without assistive devices

## 2024-08-14 NOTE — PROGRESS NOTES
Hospitalist Progress Note    Patient:  Clare Jin     YOB: 1931    MRN: [id number]   Admit date: 8/12/2024     Acct: [id number]     PCP: Pietro Cotto MD    CC--Interval History:     Near syncope---seen by Cardiology---MI ruled out---dehydration + UTI-POA---orthostatics unremarkable ---no recurrent events---home---8.14.2024    UTI---POA--on Rocephin ----> Omnicef    Contusion---right face---no further intervention required.    See note below.    All other ROS negative except noted in HPI    Diet:  ADULT DIET; Regular; Low Fat/Low Chol/High Fiber/2 gm Na    Medications:  Scheduled Meds:   apixaban  5 mg Oral BID    Vitamin D  2,000 Units Oral Nightly    atorvastatin  10 mg Oral Nightly    levothyroxine  50 mcg Oral Daily    sodium chloride flush  5-40 mL IntraVENous 2 times per day    cefTRIAXone (ROCEPHIN) IV  1,000 mg IntraVENous Q24H     Continuous Infusions:   sodium chloride 100 mL/hr at 08/14/24 0104    sodium chloride       PRN Meds:sodium chloride flush, sodium chloride, ondansetron **OR** ondansetron, polyethylene glycol, acetaminophen **OR** acetaminophen, potassium chloride **OR** potassium alternative oral replacement **OR** potassium chloride, magnesium sulfate    Objective:  Labs:  CBC with Differential:    Lab Results   Component Value Date/Time    WBC 5.8 08/14/2024 05:31 AM    RBC 3.44 08/14/2024 05:31 AM    HGB 11.0 08/14/2024 05:31 AM    HCT 32.5 08/14/2024 05:31 AM     08/14/2024 05:31 AM    MCV 94.5 08/14/2024 05:31 AM    MCH 32.0 08/14/2024 05:31 AM    MCHC 33.8 08/14/2024 05:31 AM    RDW 13.3 08/14/2024 05:31 AM    NRBC 0 02/02/2020 12:39 PM    LYMPHOPCT 20 08/14/2024 05:31 AM    MONOPCT 11 08/14/2024 05:31 AM    EOSPCT 3 08/14/2024 05:31 AM    BASOPCT 1 08/14/2024 05:31 AM    MONOSABS 0.66 08/14/2024 05:31 AM    LYMPHSABS 1.17 08/14/2024 05:31 AM    EOSABS 0.18 08/14/2024 05:31 AM    BASOSABS 0.04 08/14/2024 05:31 AM    DIFFTYPE NOT REPORTED 11/11/2021 11:44 AM

## 2024-08-14 NOTE — PLAN OF CARE
Problem: Discharge Planning  Goal: Discharge to home or other facility with appropriate resources  8/14/2024 1307 by Kathi Wu RN  Outcome: Completed  8/14/2024 0914 by Kathi Wu RN  Outcome: Progressing  Flowsheets (Taken 8/14/2024 0911)  Discharge to home or other facility with appropriate resources:   Identify barriers to discharge with patient and caregiver   Arrange for needed discharge resources and transportation as appropriate   Identify discharge learning needs (meds, wound care, etc)   Refer to discharge planning if patient needs post-hospital services based on physician order or complex needs related to functional status, cognitive ability or social support system  8/14/2024 0108 by Marcelle Alicea RN  Outcome: Progressing  Flowsheets (Taken 8/13/2024 2024)  Discharge to home or other facility with appropriate resources: Identify barriers to discharge with patient and caregiver     Problem: Safety - Adult  Goal: Free from fall injury  8/14/2024 1307 by Kathi Wu RN  Outcome: Completed  8/14/2024 0914 by Kathi Wu RN  Outcome: Progressing  8/14/2024 0108 by Marcelle Alicea RN  Outcome: Progressing     Problem: ABCDS Injury Assessment  Goal: Absence of physical injury  8/14/2024 1307 by Kathi Wu RN  Outcome: Completed  8/14/2024 0914 by Kathi Wu RN  Outcome: Progressing  8/14/2024 0108 by Marcelel Alicea RN  Outcome: Progressing     Problem: Skin/Tissue Integrity - Adult  Goal: Skin integrity remains intact  8/14/2024 1307 by Kathi Wu RN  Outcome: Completed  8/14/2024 0914 by Kathi Wu RN  Outcome: Progressing  Flowsheets (Taken 8/14/2024 0911)  Skin Integrity Remains Intact: Monitor for areas of redness and/or skin breakdown  8/14/2024 0108 by Marcelle Alicea RN  Outcome: Progressing     Problem: Musculoskeletal - Adult  Goal: Return mobility to safest level of function  8/14/2024 1307 by Kathi Wu RN  Outcome:

## 2024-08-14 NOTE — DISCHARGE INSTR - DIET

## 2024-08-14 NOTE — PROGRESS NOTES
CLINICAL PHARMACY NOTE: MEDS TO BEDS    Total # of Prescriptions Filled: 1   The following medications were delivered to the patient:  Cefdinir 300    Additional Documentation:

## 2024-08-14 NOTE — PROGRESS NOTES
Physical Therapy  Facility/Department: [facility]  PROGRESSIVE CARE  Daily Treatment Note  NAME: Clare Jin  : 1931  MRN: [id number]    Date of Service: 2024    Discharge Recommendations:  Continue to assess pending progress, Home with assist PRN        Patient Diagnosis(es): The primary encounter diagnosis was Acute cystitis with hematuria. Diagnoses of Ecchymosis of right eye, initial encounter, Generalized weakness, Elevated troponin, Primary hypertension, and Near syncope were also pertinent to this visit.    Assessment   Activity Tolerance: Patient tolerated treatment well     Plan    Physical Therapy Plan  General Plan: 5-7 times per week  Current Treatment Recommendations: Strengthening;Balance training;Functional mobility training;Gait training;Endurance training;Neuromuscular re-education;Home exercise program;Safety education & training     Restrictions        Subjective    Subjective  Subjective: Pt in bed upon arrival. Pt pleasant and agreeable to session. Pt states feels back to normal  Pain: denies  Orientation  Overall Orientation Status: Within Functional Limits     Objective   Vitals  O2 Device: None (Room air)  Bed Mobility Training  Bed Mobility Training: Yes  Overall Level of Assistance: Supervision  Supine to Sit: Supervision  Sit to Supine: Supervision  Scooting: Supervision  Balance  Sitting: Intact  Standing: Intact  Transfer Training  Transfer Training: Yes  Overall Level of Assistance: Supervision  Sit to Stand: Supervision  Stand to Sit: Supervision  Gait  Gait Training: Yes  Overall Level of Assistance: Stand-by assistance  Distance (ft): 300 Feet  Assistive Device: Walker, rolling  Base of Support: Narrowed  Step Length: Left shortened;Right shortened     PT Exercises  A/AROM Exercises: All completed 20x: LAQ, seated abduction, adduction, seated marches, glute sets, ankle pumps  Resistive Exercises: HS curls 20x     Safety Devices  Type of Devices: Left in chair;Nurse

## 2024-08-14 NOTE — FLOWSHEET NOTE
08/14/24 0734   Vital Signs   Blood Pressure Lying 117/63   Pulse Lying 69 PER MINUTE   Blood Pressure Sitting 118/90   Pulse Sitting 80 PER MINUTE   Blood Pressure Standing 130/72   Pulse Standing 93 PER MINUTE

## 2024-08-14 NOTE — PLAN OF CARE
Problem: Discharge Planning  Goal: Discharge to home or other facility with appropriate resources  8/14/2024 0914 by Kathi Wu RN  Outcome: Progressing  Flowsheets (Taken 8/14/2024 0911)  Discharge to home or other facility with appropriate resources:   Identify barriers to discharge with patient and caregiver   Arrange for needed discharge resources and transportation as appropriate   Identify discharge learning needs (meds, wound care, etc)   Refer to discharge planning if patient needs post-hospital services based on physician order or complex needs related to functional status, cognitive ability or social support system  8/14/2024 0108 by Marcelle Alicea RN  Outcome: Progressing  Flowsheets (Taken 8/13/2024 2024)  Discharge to home or other facility with appropriate resources: Identify barriers to discharge with patient and caregiver     Problem: Safety - Adult  Goal: Free from fall injury  8/14/2024 0914 by Kathi Wu RN  Outcome: Progressing  8/14/2024 0108 by Marcelle Alicea RN  Outcome: Progressing     Problem: ABCDS Injury Assessment  Goal: Absence of physical injury  8/14/2024 0914 by Kathi Wu RN  Outcome: Progressing  8/14/2024 0108 by Marcelle Alicea RN  Outcome: Progressing     Problem: Skin/Tissue Integrity - Adult  Goal: Skin integrity remains intact  8/14/2024 0914 by Kathi Wu RN  Outcome: Progressing  Flowsheets (Taken 8/14/2024 0911)  Skin Integrity Remains Intact: Monitor for areas of redness and/or skin breakdown  8/14/2024 0108 by Marcelle Alicea RN  Outcome: Progressing     Problem: Musculoskeletal - Adult  Goal: Return mobility to safest level of function  8/14/2024 0914 by Kathi Wu RN  Outcome: Progressing  Flowsheets (Taken 8/14/2024 0911)  Return Mobility to Safest Level of Function:   Assess patient stability and activity tolerance for standing, transferring and ambulating with or without assistive devices   Assist with

## 2024-08-15 ENCOUNTER — TELEPHONE (OUTPATIENT)
Dept: INTERNAL MEDICINE | Age: 89
End: 2024-08-15

## 2024-08-15 NOTE — TELEPHONE ENCOUNTER
Care Transitions Initial Follow Up Call    Outreach made within 2 business days of discharge: Yes    Patient: Clare iJn Patient : 1931   MRN: [id number]  Reason for Admission: Near Syncope   Discharge Date: 24       Spoke with: patient     Discharge department/facility: [facility] Interactive Patient Contact:  Was patient able to fill all prescriptions: Yes  Was patient instructed to bring all medications to the follow-up visit: Yes  Is patient taking all medications as directed in the discharge summary? Yes  Does patient understand their discharge instructions: Yes  Does patient have questions or concerns that need addressed prior to 7-14 day follow up office visit: no    Additional needs identified to be addressed with provider  No needs identified             Scheduled appointment with PCP within 7-14 days    Follow Up  Future Appointments   Date Time Provider Department Center   2024  2:00 PM Pietro Cotto MD [facility]   2024  2:30 PM Hien North APRN - CNP [facility]   2024  1:00 PM Pietro Cotto MD [facility]       Tamara Harding MA

## 2024-08-15 NOTE — DISCHARGE SUMMARY
Hyperlipidemia.  15. Hypothyroidism.  16. Chronic kidney disease stage 2.  17. Hearing impairment.  18. Other medical problems set forth in the progress note of 08/14/2024 incorporated for reference herein.    HISTORY OF PRESENT ILLNESS AND HOSPITAL COURSE:  The patient is a 89-year-old white female, patient of Pietro Cotto M.D., Internal Medicine, [facility]; [facility], [facility].      The patient presented with near syncopal episode, likely due to a combination of dehydration and urinary tract infection, for which she was treated with Rocephin.  The patient indicated she started to fall, then went down to the floor, fell forward, striking the right side of her face, which she has contusions and ecchymosis.  Denies loss of consciousness.  Stated that she then had to crawl to be able to get to a phone.  The patient was seen and evaluated by Cardiology during this hospitalization.  No significant changes in medications.  The patient had mildly elevated troponin's.      hyponatremia, for which replacement therapy given and corrected.       hypertension, blood pressure 123/59.      hypothyroidism, on replacement therapy.      Chronic kidney disease at a stage 2 level with a GFR of 81.     hearing impairment bilaterally.       normal orthostatics around the time of discharge.    The patient's laboratory data around the time of discharge:  White cell count 5.8, hemoglobin 11.0 to 12.1, platelets 244,000.  Sodium 136, potassium 4.1, chloride 105, CO2 of 25, BUN 20, creatinine 0.7, glucose 86, GFR 81, calcium in the range of 8.1 to 9.1, magnesium 2.1.    DISCHARGE INSTRUCTIONS AND FOLLOWUP:  Discharged to home on 08/14/2024.    DIET:  Cardiac.    ACTIVITY:  As tolerated.    CONDITION AT DISCHARGE:  Fair, improved.    MEDICATIONS:  New:  Cefdinir (Omnicef) 300 mg p.o. b.i.d., 3 days to complete course for urinary tract infection.    Following medications continued without

## 2024-08-21 ENCOUNTER — HOSPITAL ENCOUNTER (EMERGENCY)
Age: 89
Discharge: HOME | End: 2024-08-21
Attending: EMERGENCY MEDICINE
Payer: MEDICARE

## 2024-08-21 VITALS
TEMPERATURE: 98 F | HEIGHT: 62 IN | WEIGHT: 144 LBS | HEART RATE: 75 BPM | BODY MASS INDEX: 26.5 KG/M2 | OXYGEN SATURATION: 100 % | DIASTOLIC BLOOD PRESSURE: 77 MMHG | RESPIRATION RATE: 16 BRPM | SYSTOLIC BLOOD PRESSURE: 178 MMHG

## 2024-08-21 DIAGNOSIS — I10 HYPERTENSION, UNSPECIFIED TYPE: Primary | ICD-10-CM

## 2024-08-21 PROCEDURE — 99283 EMERGENCY DEPT VISIT LOW MDM: CPT

## 2024-08-21 NOTE — ED PROVIDER NOTES
eMERGENCY dEPARTMENT eNCOUnter      Pt Name: Clare Jin  MRN: [id number]  Birthdate 7/26/1931  Date of evaluation: 8/21/2024      CHIEF COMPLAINT       Chief Complaint   Patient presents with    Hypertension     Pt states she checked her BP at 2300 and was 230/90. EMS states 170 70          HISTORY OF PRESENT ILLNESS    Clare Jin is a 89 y.o. female who presents with high blood pressure.  Patient states she just decided to check her blood pressure noted to be high checked again later was even higher so she came in for evaluation she denies any chest pain headache fever chills nausea vomiting numbness tingling weakness she denies any symptoms states she just feels fine        REVIEW OF SYSTEMS       Review of systems are all reviewed and negative except stated above in the HPI    PAST MEDICAL HISTORY    has a past medical history of Breast cancer (HCC), Closed fracture of intracapsular section of femur (HCC), Closed subcapital fracture of femur, right, initial encounter ([facility]), Edema extremities, History of right hip replacement, Hyperlipidemia, Hypertension, Hypothyroidism, Osteoarthritis, and Unspecified vitamin D deficiency.    SURGICAL HISTORY      has a past surgical history that includes Total abdominal hysterectomy w/ bilateral salpingoophorectomy (1978); Appendectomy; Colonoscopy (2007); Mastectomy, radical (Right, 2/2014); Breast reduction surgery (Left, 2/2014); Breast reconstruction (Right, 2/2014); Cataract removal with implant (Right, 09/19/2019); eye surgery; Hysterectomy; Total hip arthroplasty (Right, 1/23/2020); and Breast biopsy (Right, 2/2014).    CURRENT MEDICATIONS       Discharge Medication List as of 8/21/2024  1:41 AM        CONTINUE these medications which have NOT CHANGED    Details   levothyroxine (SYNTHROID) 50 MCG tablet TAKE 1 TABLET BY MOUTH DAILY, Disp-90 tablet, R-3Normal      simvastatin (ZOCOR) 20 MG tablet TAKE 0.5 TABLETS BY MOUTH EVERY EVENING, Disp-45 tablet,

## 2024-08-28 ENCOUNTER — OFFICE VISIT (OUTPATIENT)
Dept: CARDIOLOGY | Age: 89
End: 2024-08-28
Payer: MEDICARE

## 2024-08-28 ENCOUNTER — TELEPHONE (OUTPATIENT)
Dept: INTERNAL MEDICINE | Age: 89
End: 2024-08-28

## 2024-08-28 VITALS
HEART RATE: 72 BPM | SYSTOLIC BLOOD PRESSURE: 114 MMHG | OXYGEN SATURATION: 97 % | HEIGHT: 62 IN | BODY MASS INDEX: 26.31 KG/M2 | WEIGHT: 143 LBS | DIASTOLIC BLOOD PRESSURE: 80 MMHG

## 2024-08-28 DIAGNOSIS — R31.9 URINARY TRACT INFECTION WITH HEMATURIA, SITE UNSPECIFIED: Primary | ICD-10-CM

## 2024-08-28 DIAGNOSIS — I20.9 ANGINA PECTORIS, UNSPECIFIED (HCC): ICD-10-CM

## 2024-08-28 DIAGNOSIS — N39.0 URINARY TRACT INFECTION WITH HEMATURIA, SITE UNSPECIFIED: Primary | ICD-10-CM

## 2024-08-28 DIAGNOSIS — I10 PRIMARY HYPERTENSION: Primary | ICD-10-CM

## 2024-08-28 DIAGNOSIS — I27.82 OTHER CHRONIC PULMONARY EMBOLISM WITHOUT ACUTE COR PULMONALE (HCC): ICD-10-CM

## 2024-08-28 PROBLEM — I25.119 ATHEROSCLEROTIC HEART DISEASE OF NATIVE CORONARY ARTERY WITH UNSPECIFIED ANGINA PECTORIS (HCC): Status: ACTIVE | Noted: 2024-08-28

## 2024-08-28 PROCEDURE — 1123F ACP DISCUSS/DSCN MKR DOCD: CPT | Performed by: NURSE PRACTITIONER

## 2024-08-28 PROCEDURE — 99214 OFFICE O/P EST MOD 30 MIN: CPT | Performed by: NURSE PRACTITIONER

## 2024-08-28 PROCEDURE — 99212 OFFICE O/P EST SF 10 MIN: CPT | Performed by: NURSE PRACTITIONER

## 2024-08-28 RX ORDER — NITROFURANTOIN 25; 75 MG/1; MG/1
CAPSULE ORAL
COMMUNITY
Start: 2024-08-22 | End: 2024-08-29

## 2024-08-28 RX ORDER — NITROGLYCERIN 0.4 MG/1
0.4 TABLET SUBLINGUAL EVERY 5 MIN PRN
Qty: 25 TABLET | Refills: 3 | Status: SHIPPED | OUTPATIENT
Start: 2024-08-28

## 2024-08-28 NOTE — PROGRESS NOTES
Today's Date: 8/28/2024  Patient's Name: Clare Jin  Patient's age: 89 y.o., 7/26/1931    Subjective:  The patient is a 89 y.o. year old, , female is in the office for F/U   She states that she was recently in the ER for HTN.  She denies any CP or sob.  She states that she had 2 episode sof very high BP, 200 systolic but usually BP is very low, in fact at one time she was on midodrine for hypotension       Past Medical History:   has a past medical history of Breast cancer (HCC), Closed fracture of intracapsular section of femur ([facility]), Closed subcapital fracture of femur, right, initial encounter ([facility]), Edema extremities, History of right hip replacement, Hyperlipidemia, Hypertension, Hypothyroidism, Osteoarthritis, and Unspecified vitamin D deficiency.    Past Surgical History:   has a past surgical history that includes Total abdominal hysterectomy w/ bilateral salpingoophorectomy (1978); Appendectomy; Colonoscopy (2007); Mastectomy, radical (Right, 2/2014); Breast reduction surgery (Left, 2/2014); Breast reconstruction (Right, 2/2014); Cataract removal with implant (Right, 09/19/2019); eye surgery; Hysterectomy; Total hip arthroplasty (Right, 1/23/2020); and Breast biopsy (Right, 2/2014).    Home Medications:  Prior to Admission medications    Medication Sig Start Date End Date Taking? Authorizing Provider   levothyroxine (SYNTHROID) 50 MCG tablet TAKE 1 TABLET BY MOUTH DAILY 3/18/24   Pietro Cotto MD   simvastatin (ZOCOR) 20 MG tablet TAKE 0.5 TABLETS BY MOUTH EVERY EVENING 3/18/24   Pietro Cotto MD   apixaban (ELIQUIS) 5 MG TABS tablet TAKE 1 TABLET BY MOUTH 2 TIMES DAILY 8/15/23   Pietro Cotto MD   Handicap Placard MISC by Does not apply route Expires:3/27/2028 3/27/23   Pietro Cotto MD   Probiotic Product (PROBIOTIC DAILY PO) Take by mouth  Patient not taking: Reported on 8/11/2023    Provider, MD Randall   triamterene-hydroCHLOROthiazide (MAXZIDE-25) 37.5-25 MG  left eye    Myogenic ptosis of eyelid of both eyes    Retinal tear of left eye    Near syncope    Ecchymosis of right eye    Generalized weakness     Echocardiogram 8/3/2022:  Normal left ventricular diameter.  Left ventricular systolic function is normal.  Left ventricular ejection fraction 60 %.  No doppler evidence of diastolic dysfunction.  Aortic valve sclerosis without stenosis.  Moderate aortic insufficiency.  Mitral annular calcification.  Mild mitral regurgitation.  Normal tricuspid valve leaflets.  Mild tricuspid regurgitation.  Estimated right ventricular systolic pressure is 26 mmHg.  No significant pericardial effusion is seen.  Aortic root dimension is at upper limit of normal.      Echo 8/13/24    Left Ventricle: Normal left ventricular systolic function with a visually estimated EF of 55 - 60%. Left ventricle size is normal. Normal wall thickness. Normal wall motion. Grade I diastolic dysfunction with normal LAP.    Aortic Valve: Moderate regurgitation.    Mitral Valve: Mildly thickened leaflets. Mild leaflet prolapse noted.    Tricuspid Valve: Mildly calcified leaflets. Mild regurgitation.    Image quality is adequate.     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    Plan of Treatment:     Stable. Continue to liberalize po fluid intake and avoid dehydration.   Hx of PE on Eliquis. Management per PCP.   Will order SL nitro PRN for HTN.  Advised to call if taking more the 2x week.  Will use to manage rare spikes in BP.    Continue statin. F/U on routine lipid panel - managed by PCP per patient. Discussed importance of dietary modifications and activity/exercise    F/U annually or as needed    Electronically signed by LYNN CERVANTES CNP on 8/28/2024 at 2:09 PM      [place]

## 2024-08-28 NOTE — TELEPHONE ENCOUNTER
Clare has been treated for uti and took her last pill today. She is coming in to see Dr Cotto Thursday and would like to get a urine test before appt tomorrow.  *no current symptoms, but making sure she is all clear  Please send order to lab

## 2024-08-29 ENCOUNTER — HOSPITAL ENCOUNTER (OUTPATIENT)
Age: 89
Discharge: HOME | End: 2024-08-29
Payer: MEDICARE

## 2024-08-29 ENCOUNTER — HOSPITAL ENCOUNTER (OUTPATIENT)
Age: 89
Setting detail: SPECIMEN
Discharge: HOME | End: 2024-08-29
Payer: MEDICARE

## 2024-08-29 ENCOUNTER — OFFICE VISIT (OUTPATIENT)
Dept: INTERNAL MEDICINE | Age: 89
End: 2024-08-29

## 2024-08-29 VITALS
DIASTOLIC BLOOD PRESSURE: 60 MMHG | RESPIRATION RATE: 16 BRPM | SYSTOLIC BLOOD PRESSURE: 114 MMHG | HEIGHT: 62 IN | OXYGEN SATURATION: 95 % | HEART RATE: 73 BPM | WEIGHT: 143 LBS | BODY MASS INDEX: 26.31 KG/M2

## 2024-08-29 DIAGNOSIS — E78.00 PURE HYPERCHOLESTEROLEMIA: ICD-10-CM

## 2024-08-29 DIAGNOSIS — N39.0 URINARY TRACT INFECTION WITH HEMATURIA, SITE UNSPECIFIED: ICD-10-CM

## 2024-08-29 DIAGNOSIS — R40.20 LOSS OF CONSCIOUSNESS (HCC): Primary | ICD-10-CM

## 2024-08-29 DIAGNOSIS — R31.9 URINARY TRACT INFECTION WITH HEMATURIA, SITE UNSPECIFIED: ICD-10-CM

## 2024-08-29 DIAGNOSIS — I10 PRIMARY HYPERTENSION: ICD-10-CM

## 2024-08-29 DIAGNOSIS — R55 NEAR SYNCOPE: ICD-10-CM

## 2024-08-29 DIAGNOSIS — N39.0 URINARY TRACT INFECTION WITH HEMATURIA, SITE UNSPECIFIED: Primary | ICD-10-CM

## 2024-08-29 DIAGNOSIS — R31.9 URINARY TRACT INFECTION WITH HEMATURIA, SITE UNSPECIFIED: Primary | ICD-10-CM

## 2024-08-29 LAB
BACTERIA URNS QL MICRO: ABNORMAL
BILIRUB UR QL STRIP: NEGATIVE
CHARACTER UR: ABNORMAL
CLARITY UR: CLEAR
COLOR UR: YELLOW
EPI CELLS #/AREA URNS HPF: ABNORMAL /HPF (ref 0–5)
GLUCOSE UR STRIP-MCNC: NEGATIVE MG/DL
HGB UR QL STRIP.AUTO: ABNORMAL
KETONES UR STRIP-MCNC: NEGATIVE MG/DL
LEUKOCYTE ESTERASE UR QL STRIP: ABNORMAL
NITRITE UR QL STRIP: NEGATIVE
PH UR STRIP: 6.5 [PH] (ref 5–6)
PROT UR STRIP-MCNC: NEGATIVE MG/DL
RBC #/AREA URNS HPF: ABNORMAL /HPF (ref 0–4)
SP GR UR STRIP: 1.01 (ref 1.01–1.02)
UROBILINOGEN UR STRIP-ACNC: NORMAL EU/DL (ref 0–1)
WBC #/AREA URNS HPF: ABNORMAL /HPF (ref 0–4)

## 2024-08-29 PROCEDURE — 87086 URINE CULTURE/COLONY COUNT: CPT

## 2024-08-29 PROCEDURE — 81001 URINALYSIS AUTO W/SCOPE: CPT

## 2024-08-29 SDOH — ECONOMIC STABILITY: FOOD INSECURITY: WITHIN THE PAST 12 MONTHS, THE FOOD YOU BOUGHT JUST DIDN'T LAST AND YOU DIDN'T HAVE MONEY TO GET MORE.: NEVER TRUE

## 2024-08-29 SDOH — ECONOMIC STABILITY: INCOME INSECURITY: HOW HARD IS IT FOR YOU TO PAY FOR THE VERY BASICS LIKE FOOD, HOUSING, MEDICAL CARE, AND HEATING?: NOT HARD AT ALL

## 2024-08-29 SDOH — ECONOMIC STABILITY: FOOD INSECURITY: WITHIN THE PAST 12 MONTHS, YOU WORRIED THAT YOUR FOOD WOULD RUN OUT BEFORE YOU GOT MONEY TO BUY MORE.: NEVER TRUE

## 2024-08-29 ASSESSMENT — ENCOUNTER SYMPTOMS
DIARRHEA: 0
NAUSEA: 0
CONSTIPATION: 0
ABDOMINAL PAIN: 0
COUGH: 0
BLOOD IN STOOL: 0
VOMITING: 0
EYE PAIN: 0
BACK PAIN: 0
SHORTNESS OF BREATH: 0

## 2024-08-29 NOTE — PROGRESS NOTES
[facility]  [facility] DEPARTMENT OF [facility]  [address] [facility] [address]  Dept: [phone]  Dept Fax: [phone]  Loc: [phone]     Clare Jin is a 89 y.o. female who presents today for her medical conditions/complaintsas noted below.  Clare Jin is c/o of   Chief Complaint   Patient presents with    Follow-Up from Hospital    Loss of Consciousness     near    Hyperlipidemia    Hypothyroidism         HPI:     Loss of Consciousness  This is a new problem. The current episode started 1 to 4 weeks ago. The problem occurs intermittently. The problem has been resolved. Pertinent negatives include no abdominal pain, back pain, chest pain, confusion, dizziness, fever, headaches, nausea, palpitations, vomiting or weakness.   Hyperlipidemia  This is a chronic problem. The current episode started more than 1 year ago. The problem is controlled. Recent lipid tests were reviewed and are variable. Pertinent negatives include no chest pain or shortness of breath.   Hypertension  This is a chronic problem. The current episode started more than 1 year ago. The problem has been waxing and waning since onset. The problem is controlled. Pertinent negatives include no chest pain, headaches, neck pain, palpitations or shortness of breath.       Hemoglobin A1C (%)   Date Value   08/13/2024 5.7            No components found for: \"LABMICR\"  No components found for: \"LDLCHOLESTEROL\", \"LDLCALC\"      AST (U/L)   Date Value   08/12/2024 21     ALT (U/L)   Date Value   08/12/2024 13     BUN (mg/dL)   Date Value   08/14/2024 20     BP Readings from Last 3 Encounters:   08/29/24 114/60   08/28/24 114/80   08/21/24 (!) 178/77              Past Medical History:   Diagnosis Date    Breast cancer ([facility]) 2014    s/p mastectomy and reconstruction surgery.    Closed fracture of intracapsular section of femur ([facility]) 08/02/2022    Closed subcapital fracture of femur, right,

## 2024-08-30 LAB
MICROORGANISM SPEC CULT: NORMAL
SERVICE CMNT-IMP: NORMAL
SPECIMEN DESCRIPTION: NORMAL

## 2024-09-14 DIAGNOSIS — I26.99 ACUTE PULMONARY EMBOLISM WITHOUT ACUTE COR PULMONALE, UNSPECIFIED PULMONARY EMBOLISM TYPE (HCC): ICD-10-CM

## 2024-09-23 ENCOUNTER — HOSPITAL ENCOUNTER (OUTPATIENT)
Age: 89
Discharge: HOME | End: 2024-09-23
Payer: MEDICARE

## 2024-09-23 ENCOUNTER — OFFICE VISIT (OUTPATIENT)
Dept: INTERNAL MEDICINE | Age: 89
End: 2024-09-23
Payer: MEDICARE

## 2024-09-23 ENCOUNTER — TELEPHONE (OUTPATIENT)
Dept: INTERNAL MEDICINE | Age: 89
End: 2024-09-23

## 2024-09-23 VITALS
HEIGHT: 62 IN | RESPIRATION RATE: 16 BRPM | DIASTOLIC BLOOD PRESSURE: 62 MMHG | OXYGEN SATURATION: 95 % | WEIGHT: 142 LBS | BODY MASS INDEX: 26.13 KG/M2 | HEART RATE: 78 BPM | SYSTOLIC BLOOD PRESSURE: 126 MMHG

## 2024-09-23 DIAGNOSIS — E03.9 ACQUIRED HYPOTHYROIDISM: ICD-10-CM

## 2024-09-23 DIAGNOSIS — E78.00 PURE HYPERCHOLESTEROLEMIA: ICD-10-CM

## 2024-09-23 DIAGNOSIS — Z12.31 VISIT FOR SCREENING MAMMOGRAM: ICD-10-CM

## 2024-09-23 DIAGNOSIS — N30.00 ACUTE CYSTITIS WITHOUT HEMATURIA: ICD-10-CM

## 2024-09-23 DIAGNOSIS — D64.9 ANEMIA, UNSPECIFIED TYPE: ICD-10-CM

## 2024-09-23 DIAGNOSIS — Z85.3 HISTORY OF RIGHT BREAST CANCER: ICD-10-CM

## 2024-09-23 DIAGNOSIS — E55.9 VITAMIN D DEFICIENCY: ICD-10-CM

## 2024-09-23 DIAGNOSIS — I10 PRIMARY HYPERTENSION: Primary | ICD-10-CM

## 2024-09-23 PROCEDURE — 90471 IMMUNIZATION ADMIN: CPT | Performed by: INTERNAL MEDICINE

## 2024-09-23 PROCEDURE — PBSHW INFLUENZA, FLUAD TRIVALENT, (AGE 65 Y+), IM, PRESERVATIVE FREE, 0.5ML: Performed by: INTERNAL MEDICINE

## 2024-09-23 PROCEDURE — 87186 SC STD MICRODIL/AGAR DIL: CPT

## 2024-09-23 PROCEDURE — 99214 OFFICE O/P EST MOD 30 MIN: CPT | Performed by: INTERNAL MEDICINE

## 2024-09-23 PROCEDURE — 1123F ACP DISCUSS/DSCN MKR DOCD: CPT | Performed by: INTERNAL MEDICINE

## 2024-09-23 PROCEDURE — 87088 URINE BACTERIA CULTURE: CPT

## 2024-09-23 PROCEDURE — 81001 URINALYSIS AUTO W/SCOPE: CPT

## 2024-09-23 PROCEDURE — 87086 URINE CULTURE/COLONY COUNT: CPT

## 2024-09-23 PROCEDURE — 99212 OFFICE O/P EST SF 10 MIN: CPT | Performed by: INTERNAL MEDICINE

## 2024-09-23 RX ORDER — SULFAMETHOXAZOLE/TRIMETHOPRIM 800-160 MG
1 TABLET ORAL 2 TIMES DAILY
Qty: 14 TABLET | Refills: 0 | Status: SHIPPED | OUTPATIENT
Start: 2024-09-23 | End: 2024-09-30

## 2024-09-23 ASSESSMENT — ENCOUNTER SYMPTOMS
BACK PAIN: 0
SHORTNESS OF BREATH: 0
BLOOD IN STOOL: 0
NAUSEA: 0
VOMITING: 0
COUGH: 0
DIARRHEA: 0
EYE PAIN: 0
CONSTIPATION: 0
ABDOMINAL PAIN: 0

## 2024-09-25 ENCOUNTER — TELEPHONE (OUTPATIENT)
Dept: INTERNAL MEDICINE | Age: 89
End: 2024-09-25

## 2024-09-25 DIAGNOSIS — Z12.31 ENCOUNTER FOR SCREENING MAMMOGRAM FOR MALIGNANT NEOPLASM OF BREAST: Primary | ICD-10-CM

## 2024-09-25 LAB
MICROORGANISM SPEC CULT: ABNORMAL
SPECIMEN DESCRIPTION: ABNORMAL

## 2025-02-04 DIAGNOSIS — E03.9 HYPOTHYROIDISM, UNSPECIFIED TYPE: ICD-10-CM

## 2025-02-04 RX ORDER — LEVOTHYROXINE SODIUM 50 UG/1
50 TABLET ORAL DAILY
Qty: 90 TABLET | Refills: 3 | Status: SHIPPED | OUTPATIENT
Start: 2025-02-04

## 2025-02-04 NOTE — TELEPHONE ENCOUNTER
Clare called requesting a refill of the below medication which has been pended for you:     Requested Prescriptions     Pending Prescriptions Disp Refills    levothyroxine (SYNTHROID) 50 MCG tablet [Pharmacy Med Name: levothyroxine 50 mcg tablet] 90 tablet 3     Sig: TAKE ONE tablet BY MOUTH DAILY       Last Appointment Date: 9/23/2024  Next Appointment Date: 4/1/2025    No Known Allergies

## 2025-03-26 ENCOUNTER — HOSPITAL ENCOUNTER (OUTPATIENT)
Age: 89
Discharge: HOME | End: 2025-03-26
Payer: MEDICARE

## 2025-03-26 DIAGNOSIS — E55.9 VITAMIN D DEFICIENCY: ICD-10-CM

## 2025-03-26 DIAGNOSIS — E78.00 PURE HYPERCHOLESTEROLEMIA: ICD-10-CM

## 2025-03-26 DIAGNOSIS — E03.9 ACQUIRED HYPOTHYROIDISM: ICD-10-CM

## 2025-03-26 DIAGNOSIS — I10 PRIMARY HYPERTENSION: ICD-10-CM

## 2025-03-26 DIAGNOSIS — D64.9 ANEMIA, UNSPECIFIED TYPE: ICD-10-CM

## 2025-03-26 LAB
25(OH)D3 SERPL-MCNC: 75.2 NG/ML (ref 30–100)
ALBUMIN SERPL-MCNC: 4.5 G/DL (ref 3.5–5.2)
ALBUMIN/GLOB SERPL: 1.4 {RATIO} (ref 1–2.5)
ALP SERPL-CCNC: 65 U/L (ref 35–104)
ALT SERPL-CCNC: 13 U/L (ref 5–33)
ANION GAP SERPL CALCULATED.3IONS-SCNC: 10 MMOL/L (ref 9–17)
AST SERPL-CCNC: 20 U/L
BILIRUB SERPL-MCNC: 0.4 MG/DL (ref 0.3–1.2)
BUN SERPL-MCNC: 24 MG/DL (ref 8–23)
BUN/CREAT SERPL: 30 (ref 9–20)
CALCIUM SERPL-MCNC: 9.9 MG/DL (ref 8.6–10.4)
CHLORIDE SERPL-SCNC: 99 MMOL/L (ref 98–107)
CHOLEST SERPL-MCNC: 218 MG/DL (ref 0–199)
CHOLESTEROL/HDL RATIO: 1.9
CO2 SERPL-SCNC: 30 MMOL/L (ref 20–31)
CREAT SERPL-MCNC: 0.8 MG/DL (ref 0.5–0.9)
GFR, ESTIMATED: 69 ML/MIN/1.73M2
GLUCOSE SERPL-MCNC: 99 MG/DL (ref 70–99)
HDLC SERPL-MCNC: 112 MG/DL
HGB BLD-MCNC: 12.9 G/DL (ref 11.9–15.1)
LDLC SERPL CALC-MCNC: 95 MG/DL (ref 0–100)
POTASSIUM SERPL-SCNC: 4.7 MMOL/L (ref 3.7–5.3)
PROT SERPL-MCNC: 7.7 G/DL (ref 6.4–8.3)
SODIUM SERPL-SCNC: 139 MMOL/L (ref 135–144)
T4 FREE SERPL-MCNC: 1.1 NG/DL (ref 0.9–1.7)
TRIGL SERPL-MCNC: 54 MG/DL (ref 0–149)
TSH SERPL DL<=0.05 MIU/L-ACNC: 3.5 UIU/ML (ref 0.27–4.2)
VLDLC SERPL CALC-MCNC: 11 MG/DL (ref 1–30)

## 2025-03-26 PROCEDURE — 84443 ASSAY THYROID STIM HORMONE: CPT

## 2025-03-26 PROCEDURE — 36415 COLL VENOUS BLD VENIPUNCTURE: CPT

## 2025-03-26 PROCEDURE — 80053 COMPREHEN METABOLIC PANEL: CPT

## 2025-03-26 PROCEDURE — 84439 ASSAY OF FREE THYROXINE: CPT

## 2025-03-26 PROCEDURE — 82306 VITAMIN D 25 HYDROXY: CPT

## 2025-03-26 PROCEDURE — 80061 LIPID PANEL: CPT

## 2025-03-26 PROCEDURE — 85018 HEMOGLOBIN: CPT

## 2025-03-27 ENCOUNTER — RESULTS FOLLOW-UP (OUTPATIENT)
Dept: INTERNAL MEDICINE | Age: 89
End: 2025-03-27

## 2025-04-01 ENCOUNTER — OFFICE VISIT (OUTPATIENT)
Dept: INTERNAL MEDICINE | Age: 89
End: 2025-04-01
Payer: MEDICARE

## 2025-04-01 VITALS
BODY MASS INDEX: 26.13 KG/M2 | RESPIRATION RATE: 16 BRPM | HEART RATE: 72 BPM | HEIGHT: 62 IN | SYSTOLIC BLOOD PRESSURE: 116 MMHG | OXYGEN SATURATION: 98 % | WEIGHT: 142 LBS | DIASTOLIC BLOOD PRESSURE: 70 MMHG

## 2025-04-01 DIAGNOSIS — Z86.711 HISTORY OF PULMONARY EMBOLISM: ICD-10-CM

## 2025-04-01 DIAGNOSIS — Z00.00 MEDICARE ANNUAL WELLNESS VISIT, SUBSEQUENT: ICD-10-CM

## 2025-04-01 DIAGNOSIS — I26.99 ACUTE PULMONARY EMBOLISM WITHOUT ACUTE COR PULMONALE, UNSPECIFIED PULMONARY EMBOLISM TYPE (HCC): ICD-10-CM

## 2025-04-01 DIAGNOSIS — E03.9 ACQUIRED HYPOTHYROIDISM: ICD-10-CM

## 2025-04-01 DIAGNOSIS — Z00.00 GENERAL MEDICAL EXAM: Primary | ICD-10-CM

## 2025-04-01 DIAGNOSIS — I10 PRIMARY HYPERTENSION: ICD-10-CM

## 2025-04-01 DIAGNOSIS — Z78.0 POSTMENOPAUSAL: ICD-10-CM

## 2025-04-01 DIAGNOSIS — E78.5 HYPERLIPIDEMIA, UNSPECIFIED HYPERLIPIDEMIA TYPE: ICD-10-CM

## 2025-04-01 PROCEDURE — 99212 OFFICE O/P EST SF 10 MIN: CPT | Performed by: INTERNAL MEDICINE

## 2025-04-01 RX ORDER — SIMVASTATIN 20 MG
10 TABLET ORAL EVERY EVENING
Qty: 45 TABLET | Refills: 3 | Status: SHIPPED | OUTPATIENT
Start: 2025-04-01

## 2025-04-01 ASSESSMENT — PATIENT HEALTH QUESTIONNAIRE - PHQ9
2. FEELING DOWN, DEPRESSED OR HOPELESS: NOT AT ALL
SUM OF ALL RESPONSES TO PHQ QUESTIONS 1-9: 0
1. LITTLE INTEREST OR PLEASURE IN DOING THINGS: NOT AT ALL
SUM OF ALL RESPONSES TO PHQ QUESTIONS 1-9: 0

## 2025-04-01 ASSESSMENT — ENCOUNTER SYMPTOMS
VOMITING: 0
BACK PAIN: 0
CONSTIPATION: 0
ABDOMINAL PAIN: 0
SHORTNESS OF BREATH: 0
BLOOD IN STOOL: 0
NAUSEA: 0
DIARRHEA: 0
EYE PAIN: 0
COUGH: 0

## 2025-04-01 ASSESSMENT — LIFESTYLE VARIABLES
HOW MANY STANDARD DRINKS CONTAINING ALCOHOL DO YOU HAVE ON A TYPICAL DAY: PATIENT DOES NOT DRINK
HOW OFTEN DO YOU HAVE A DRINK CONTAINING ALCOHOL: NEVER

## 2025-04-01 NOTE — PATIENT INSTRUCTIONS

## 2025-04-01 NOTE — PROGRESS NOTES
Medicare Annual Wellness Visit    Clare Jin is here for Medicare AWV, Hypertension, Hyperlipidemia, and Hypothyroidism    Assessment & Plan   Medicare annual wellness visit, subsequent  Hyperlipidemia, unspecified hyperlipidemia type  -     simvastatin (ZOCOR) 20 MG tablet; Take 0.5 tablets by mouth every evening, Disp-45 tablet, R-3Normal  -     ESTABLISHED, MOD MDM, 30-39 MIN [86756]  Acute pulmonary embolism without acute cor pulmonale, unspecified pulmonary embolism type (HCC)  -     ESTABLISHED, MOD MDM, 30-39 MIN [[id number]]  Acquired hypothyroidism  -     ESTABLISHED, MOD MDM, 30-39 MIN [[id number]]  Primary hypertension  -     ESTABLISHED, MOD MDM, 30-39 MIN [[id number]]  General medical exam  Postmenopausal  -     DEXA BONE DENSITY AXIAL SKELETON; Future  -     ESTABLISHED, MOD MDM, 30-39 MIN [[id number]]  History of pulmonary embolism  -     apixaban (ELIQUIS) 5 MG TABS tablet; TAKE 1 TABLET BY MOUTH 2 TIMES DAILY, Disp-60 tablet, R-12Normal  -     ESTABLISHED, MOD MDM, 30-39 MIN [[id number]]       Return in about 6 months (around 10/9/2025) for Hypertension, Hyperlipidemia, hypothyroidism.     Subjective       Patient's complete Health Risk Assessment and screening values have been reviewed and are found in Flowsheets. The following problems were reviewed today and where indicated follow up appointments were made and/or referrals ordered.    Positive Risk Factor Screenings with Interventions:                      Advanced Directives:  Do you have a Living Will?: (!) No    Intervention:  Is working with a  currently to get a living will completed.                     Objective   Vitals:    04/01/25 1441   BP: 116/70   BP Site: Left Upper Arm   Patient Position: Sitting   BP Cuff Size: Large Adult   Pulse: 72   Resp: 16   SpO2: 98%   Weight: 64.4 kg (142 lb)   Height: 1.575 m (5' 2\")      Body mass index is 25.97 kg/m².                No Known Allergies  Prior to Visit Medications    Medication Sig Taking? Authorizing 
for pallor and rash.   Neurological:  Negative for dizziness, weakness, numbness and headaches.   Hematological:  Negative for adenopathy. Does not bruise/bleed easily.   Psychiatric/Behavioral:  Negative for confusion. The patient is not nervous/anxious.        Objective:     Physical Exam  Vitals reviewed.   Constitutional:       Appearance: She is well-developed.   HENT:      Head: Normocephalic and atraumatic.   Eyes:      Pupils: Pupils are equal, round, and reactive to light.   Cardiovascular:      Rate and Rhythm: Normal rate and regular rhythm.      Heart sounds: No murmur heard.     No friction rub. No gallop.   Pulmonary:      Effort: Pulmonary effort is normal.      Breath sounds: Normal breath sounds. No wheezing or rales.   Abdominal:      General: There is no distension.      Palpations: Abdomen is soft. There is no mass.      Tenderness: There is no abdominal tenderness. There is no rebound.   Musculoskeletal:         General: Normal range of motion.      Cervical back: Neck supple.   Lymphadenopathy:      Cervical: No cervical adenopathy.   Skin:     General: Skin is warm and dry.      Findings: No rash.   Neurological:      Mental Status: She is alert and oriented to person, place, and time.      Cranial Nerves: No cranial nerve deficit (grossly).   Psychiatric:         Thought Content: Thought content normal.        /70 (BP Site: Left Upper Arm, Patient Position: Sitting, BP Cuff Size: Large Adult)   Pulse 72   Resp 16   Ht 1.575 m (5' 2\")   Wt 64.4 kg (142 lb)   SpO2 98%   BMI 25.97 kg/m²     Assessment:       Diagnosis Orders   1. General medical exam        2. Hyperlipidemia, unspecified hyperlipidemia type  simvastatin (ZOCOR) 20 MG tablet    ESTABLISHED, MOD MDM, 30-39 MIN [[id number]]      3. Acute pulmonary embolism without acute cor pulmonale, unspecified pulmonary embolism type (HCC)  ESTABLISHED, MOD MDM, 30-39 MIN [[id number]]      4. Acquired hypothyroidism  ESTABLISHED, MOD MDM,

## 2025-05-12 NOTE — PROGRESS NOTES
[facility] Pharmacy Dosing Service      Initial Pharmacokinetic Consult for Vancomycin Dosing     Kelli Fisher is a 69 year old female who is being initiated on vancomycin therapy for  Neutropenia  .  Pharmacy has been asked to dose vancomycin by .  The initial treatment and monitoring approach will be steady state AUC strategy.        Weight and Temperature:    Wt Readings from Last 1 Encounters:   25 75.2 kg (165 lb 11.2 oz)        Temp Readings from Last 1 Encounters:   25 99.6 °F (37.6 °C) (Oral)      Labs:   Recent Labs   Lab 25  1411   CREATSERUM 0.78      Estimated Creatinine Clearance: 48.9 mL/min (based on SCr of 0.78 mg/dL).     Recent Labs   Lab 25  1410   WBC 0.5*          The Pharmacokinetic Target is:     to 600 mg-h/L and trough <=15 mg/L    Renal Dosing Considerations:    None     Assessment/Plan:   Initial/Loading dose: Has received 1500 mg IV (20 mg/kg, capped at 2250 mg) x 1 initial dose.      Maintenance dose: Pharmacy will dose vancomycin at 1250 mg IV every 24 hours    Monitorin) Plan for vancomycin peak and trough to be obtained at steady state    2) Pharmacy will order SCr as clinically indicated to assess renal function.    3) Pharmacy will monitor for toxicity and efficacy, adjust vancomycin dose and/or frequency, and order vancomycin levels as appropriate per the Pharmacy and Therapeutics Committee approved protocol until discontinuation of the medication.       We appreciate the opportunity to assist in the care of this patient.     Aracely Johns, PharmD  2025  5:53 PM  [place]  Pharmacy Extension: [phone]        Progress Note    Subjective:     Post-Operative Day: 1 Status Post right Total Hip Arthroplasty  Systemic or Specific Complaints:No Complaints    Objective:   VITALS:  /63   Pulse 86   Temp 97.7 °F (36.5 °C) (Oral)   Resp 16   Ht 5' 3\" (1.6 m)   Wt 140 lb (63.5 kg)   SpO2 91%   BMI 24.80 kg/m²     General: alert, appears stated age and cooperative   Wound: Wound clean and dry no evidence of infection. DVT Exam: No evidence of DVT seen on physical exam.   Abdomen soft and non tender  NVI in lower extremity. Thigh swollen but soft. Moving foot and ankle. Data Review  CBC:   Lab Results   Component Value Date    WBC 12.2 01/22/2020    RBC 4.08 01/22/2020    HGB 13.0 01/22/2020    HCT 40.2 01/22/2020     01/22/2020     BMP:   Lab Results   Component Value Date     01/22/2020    K 4.4 01/22/2020    CL 96 01/22/2020    CO2 28 01/22/2020    BUN 21 01/22/2020    CREATININE 0.7 01/22/2020    GLUCOSE 116 01/22/2020       Assessment:     Status Post right Total Hip Arthroplasty. Doing well postoperatively.      Plan:      1:  Continue Total Hip Replacement protocol   2:  Continue Deep venous thrombosis prophylaxis  3:  Continue physical therapy with Total Hip precautions  4:  Continue Pain Control  5:  Monitor Labs  6:  Discharge pending snf vs rehab vs home    Orlando Lion PA-C in collaboration with Dr. Monet Guevara

## 2025-06-26 ENCOUNTER — HOSPITAL ENCOUNTER (OUTPATIENT)
Dept: MAMMOGRAPHY | Age: 89
Discharge: HOME | End: 2025-06-28
Attending: INTERNAL MEDICINE
Payer: MEDICARE

## 2025-06-26 ENCOUNTER — HOSPITAL ENCOUNTER (OUTPATIENT)
Dept: BONE DENSITY | Age: 89
Discharge: HOME | End: 2025-06-28
Attending: INTERNAL MEDICINE
Payer: MEDICARE

## 2025-06-26 VITALS — HEIGHT: 63 IN | WEIGHT: 140 LBS | BODY MASS INDEX: 24.8 KG/M2

## 2025-06-26 DIAGNOSIS — Z12.31 ENCOUNTER FOR SCREENING MAMMOGRAM FOR MALIGNANT NEOPLASM OF BREAST: ICD-10-CM

## 2025-06-26 DIAGNOSIS — Z78.0 POSTMENOPAUSAL: ICD-10-CM

## 2025-06-26 PROCEDURE — 77063 BREAST TOMOSYNTHESIS BI: CPT

## 2025-06-27 ENCOUNTER — RESULTS FOLLOW-UP (OUTPATIENT)
Dept: INTERNAL MEDICINE | Age: 89
End: 2025-06-27

## 2025-08-05 ENCOUNTER — HOSPITAL ENCOUNTER (OUTPATIENT)
Dept: BONE DENSITY | Age: 89
Discharge: HOME | End: 2025-08-07
Attending: INTERNAL MEDICINE
Payer: MEDICARE

## 2025-08-05 PROCEDURE — 77080 DXA BONE DENSITY AXIAL: CPT

## 2025-08-12 ENCOUNTER — RESULTS FOLLOW-UP (OUTPATIENT)
Dept: INTERNAL MEDICINE | Age: 89
End: 2025-08-12

## (undated) DEVICE — SUTURE STRATAFIX SPRL SZ 1 L14IN ABSRB VLT L48CM CTX 1/2 SXPD2B405

## (undated) DEVICE — GLOVE ORANGE PI 7 1/2   MSG9075

## (undated) DEVICE — GOWN,SIRUS,NON REINFRCD,LARGE,SET IN SL: Brand: MEDLINE

## (undated) DEVICE — SUTURE VCRL SZ 1 L36IN ABSRB UD CTX L48MM 1/2 CIR J977H

## (undated) DEVICE — DRESSING TRNSPAR W8XL12IN FLM SURESITE 123

## (undated) DEVICE — Z DUP USE 2257490 ADHESIVE SKIN CLSRE 036ML TPCL 2CTL CNCRLTE HIGH VSCSTY DRMB

## (undated) DEVICE — GLOVE ORANGE PI 7   MSG9070

## (undated) DEVICE — GLOVE SURG SZ 75 L12IN FNGR THK94MIL TRNSLUC YEL LTX

## (undated) DEVICE — SOLUTION IV 1000ML 0.9% SOD CHL PH 5 INJ USP VIAFLX PLAS

## (undated) DEVICE — PATIENT RETURN ELECTRODE, SINGLE-USE, CONTACT QUALITY MONITORING, ADULT, WITH 9FT CORD, FOR PATIENTS WEIGING OVER 33LBS. (15KG): Brand: MEGADYNE

## (undated) DEVICE — SPONGE GZ W4XL4IN COT 12 PLY TYP VII WVN C FLD DSGN

## (undated) DEVICE — SUTURE VCRL SZ 2-0 L27IN ABSRB UD L36MM CP-1 1/2 CIR REV J266H

## (undated) DEVICE — BANDAGE COMPR W6INXL5YD SELF ADH COHESIVE CO FLX

## (undated) DEVICE — 3M™ IOBAN™ 2 ANTIMICROBIAL INCISE DRAPE 6651EZ: Brand: IOBAN™ 2

## (undated) DEVICE — SOLUTION IV IRRIG WATER 1000ML POUR BRL 2F7114

## (undated) DEVICE — COVER ARMBRD W13XL28.5IN IMPERV BLU FOR OP RM

## (undated) DEVICE — SUTURE PDS II SZ 1 L36IN ABSRB VLT L48MM CTX 1/2 CIR Z371T

## (undated) DEVICE — BANDAGE COMPR M W6INXL10YD WHT BGE VELC E MTRX HK AND LOOP

## (undated) DEVICE — CLOSURE SKIN FLX NONINVASIVE PRELOC TECHNOLOGY FOR 24IN

## (undated) DEVICE — GLOVE ORANGE PI 8   MSG9080

## (undated) DEVICE — GLOVE SURG SZ 65 THK91MIL LTX FREE SYN POLYISOPRENE

## (undated) DEVICE — PAD,NON-ADHERENT,3X8,STERILE,LF,1/PK: Brand: MEDLINE

## (undated) DEVICE — CHLORAPREP 26ML ORANGE

## (undated) DEVICE — DUAL CUT SAGITTAL BLADE

## (undated) DEVICE — GOWN,SIRUS,NONRNF,SETINSLV,XL,20/CS: Brand: MEDLINE